# Patient Record
Sex: MALE | Race: BLACK OR AFRICAN AMERICAN | NOT HISPANIC OR LATINO | Employment: OTHER | URBAN - METROPOLITAN AREA
[De-identification: names, ages, dates, MRNs, and addresses within clinical notes are randomized per-mention and may not be internally consistent; named-entity substitution may affect disease eponyms.]

---

## 2024-04-02 ENCOUNTER — OFFICE VISIT (OUTPATIENT)
Dept: SURGERY | Facility: CLINIC | Age: 39
End: 2024-04-02
Payer: MEDICARE

## 2024-04-02 VITALS
BODY MASS INDEX: 40.02 KG/M2 | WEIGHT: 255 LBS | SYSTOLIC BLOOD PRESSURE: 126 MMHG | HEIGHT: 67 IN | DIASTOLIC BLOOD PRESSURE: 78 MMHG

## 2024-04-02 DIAGNOSIS — N18.5 CKD (CHRONIC KIDNEY DISEASE) STAGE 5, GFR LESS THAN 15 ML/MIN: Primary | ICD-10-CM

## 2024-04-02 DIAGNOSIS — E66.01 CLASS 2 SEVERE OBESITY DUE TO EXCESS CALORIES WITH SERIOUS COMORBIDITY AND BODY MASS INDEX (BMI) OF 39.0 TO 39.9 IN ADULT: ICD-10-CM

## 2024-04-02 PROCEDURE — 1159F MED LIST DOCD IN RCRD: CPT | Performed by: SURGERY

## 2024-04-02 PROCEDURE — 1160F RVW MEDS BY RX/DR IN RCRD: CPT | Performed by: SURGERY

## 2024-04-02 PROCEDURE — 99203 OFFICE O/P NEW LOW 30 MIN: CPT | Performed by: SURGERY

## 2024-04-02 RX ORDER — POLYETHYLENE GLYCOL 3350 17 G/17G
17 POWDER, FOR SOLUTION ORAL
COMMUNITY
Start: 2024-03-11

## 2024-04-02 RX ORDER — TICAGRELOR 90 MG/1
1 TABLET ORAL EVERY 12 HOURS SCHEDULED
COMMUNITY
Start: 2024-03-10

## 2024-04-02 RX ORDER — ASPIRIN 81 MG/1
81 TABLET ORAL DAILY
COMMUNITY

## 2024-04-02 RX ORDER — CLONIDINE HYDROCHLORIDE 0.2 MG/1
0.2 TABLET ORAL 3 TIMES DAILY
COMMUNITY
Start: 2023-10-19 | End: 2024-10-18

## 2024-04-02 RX ORDER — FLURBIPROFEN SODIUM 0.3 MG/ML
SOLUTION/ DROPS OPHTHALMIC
COMMUNITY
Start: 2024-01-18

## 2024-04-02 RX ORDER — ATORVASTATIN CALCIUM 40 MG/1
40 TABLET, FILM COATED ORAL NIGHTLY
COMMUNITY
Start: 2024-02-01

## 2024-04-02 RX ORDER — BUMETANIDE 1 MG/1
2 TABLET ORAL
COMMUNITY
Start: 2024-03-22 | End: 2025-03-22

## 2024-04-02 RX ORDER — AMLODIPINE BESYLATE 2.5 MG/1
2.5 TABLET ORAL DAILY
COMMUNITY
Start: 2024-02-19

## 2024-04-02 RX ORDER — EMPAGLIFLOZIN 10 MG/1
10 TABLET, FILM COATED ORAL DAILY
COMMUNITY
Start: 2024-02-27

## 2024-04-02 RX ORDER — DOXAZOSIN 2 MG/1
2 TABLET ORAL DAILY
COMMUNITY
Start: 2023-10-13

## 2024-04-02 RX ORDER — CARVEDILOL 25 MG/1
2 TABLET ORAL 2 TIMES DAILY
COMMUNITY
Start: 2024-01-31

## 2024-04-02 RX ORDER — HYDRALAZINE HYDROCHLORIDE 100 MG/1
TABLET, FILM COATED ORAL
COMMUNITY
Start: 2024-03-29

## 2024-04-02 RX ORDER — PANTOPRAZOLE SODIUM 40 MG/1
40 TABLET, DELAYED RELEASE ORAL DAILY
COMMUNITY
Start: 2024-02-10

## 2024-04-02 RX ORDER — INSULIN ASPART 100 [IU]/ML
INJECTION, SOLUTION INTRAVENOUS; SUBCUTANEOUS
COMMUNITY
Start: 2024-03-02

## 2024-04-02 RX ORDER — SILDENAFIL 25 MG/1
1 TABLET, FILM COATED ORAL DAILY PRN
COMMUNITY
Start: 2024-01-25

## 2024-04-02 RX ORDER — SODIUM BICARBONATE 650 MG/1
650 TABLET ORAL
COMMUNITY
Start: 2024-03-22 | End: 2025-03-22

## 2024-04-02 NOTE — PROGRESS NOTES
Chief Complaint   Patient presents with    PD Cath Consult       Subjective      Wilner Menjivar is a 39 y.o. male who is referred by PAYTON Escobedo to be evaluated for peritoneal dialysis catheter placement. Patient has CKD secondary to diabetic nephropathy and hypertensive nephrosclerosis and  is not on dialysis. Patient does not have a AV access.  Patient has not had a recent intraabdominal infection. The patient reports having regular bowel movements.  Patient did not have a Colonoscopy.   Patient has visual impairment.    Home Evaluation: No    Past Medical History:   Diagnosis Date    CKD stage 4 due to type 2 diabetes mellitus     GERD (gastroesophageal reflux disease)     Heart failure     Hypertension     Type 2 diabetes mellitus     Vision impairment        Past Surgical History:   Procedure Laterality Date    CORONARY STENT PLACEMENT      EYE SURGERY      WISDOM TOOTH EXTRACTION           Current Outpatient Medications:     amLODIPine (NORVASC) 2.5 MG tablet, Take 1 tablet by mouth Daily., Disp: , Rfl:     aspirin 81 MG EC tablet, Take 1 tablet by mouth Daily., Disp: , Rfl:     atorvastatin (LIPITOR) 40 MG tablet, Take 1 tablet by mouth Every Night., Disp: , Rfl:     B-D UF III MINI PEN NEEDLES 31G X 5 MM misc, USE 1 TO CHECK BLOOD SUGAR FIVE TIMES DAILY, Disp: , Rfl:     Brilinta 90 MG tablet tablet, Take 1 tablet by mouth Every 12 (Twelve) Hours., Disp: , Rfl:     bumetanide (BUMEX) 1 MG tablet, Take 2 tablets by mouth., Disp: , Rfl:     carvedilol (COREG) 25 MG tablet, Take 2 tablets by mouth 2 (Two) Times a Day., Disp: , Rfl:     cloNIDine (CATAPRES) 0.2 MG tablet, Take 1 tablet by mouth 3 (Three) Times a Day., Disp: , Rfl:     doxazosin (CARDURA) 2 MG tablet, Take 1 tablet by mouth Daily., Disp: , Rfl:     hydrALAZINE (APRESOLINE) 100 MG tablet, , Disp: , Rfl:     Insulin Degludec (TRESIBA FLEXTOUCH) 200 UNIT/ML solution pen-injector pen injection, Inject 40 Units under the skin into the  appropriate area as directed Daily., Disp: , Rfl:     Jardiance 10 MG tablet tablet, Take 1 tablet by mouth Daily., Disp: , Rfl:     NovoLOG FlexPen 100 UNIT/ML solution pen-injector sc pen, INJECT 5 UNITS SUBCUTANEOUSLY THREE TIMES DAILY WITH MEALS PLUS UP TO 30 UNITS SLIDING SCALE. MAX OF 45 UNITS DAILY, Disp: , Rfl:     pantoprazole (PROTONIX) 40 MG EC tablet, Take 1 tablet by mouth Daily., Disp: , Rfl:     polyethylene glycol (MIRALAX) 17 g packet, Take 17 g by mouth., Disp: , Rfl:     sildenafil (VIAGRA) 25 MG tablet, Take 1 tablet by mouth Daily As Needed., Disp: , Rfl:     sodium bicarbonate 650 MG tablet, Take 1 tablet by mouth., Disp: , Rfl:     Allergies   Allergen Reactions    Azithromycin Nausea And Vomiting     Pt given IV azithro, reported nausea/vomiting and hot flashes within 3-5 minutes of admin. Pt also c/o new abd pain with admin.     Pt given IV azithro, reported nausea/vomiting and hot flashes within 3-5 minutes of admin. Pt also c/o new abd pain with admin.    Penicillins Unknown - Low Severity     reaction as a child. Does not recall reaction.     reaction as a child. Does not recall reaction.  Beta lactam allergy details  Antibiotic reaction: unknown  Age at reaction: infant  Dose to reaction time: unknown  Reason for antibiotic: unknown  Epinephrine required for reaction?: unknown  Tolerated antibiotics: unknown          History reviewed. No pertinent family history.    Social History     Socioeconomic History    Marital status:    Tobacco Use    Smoking status: Never    Smokeless tobacco: Never   Vaping Use    Vaping status: Never Used   Substance and Sexual Activity    Alcohol use: Yes     Comment: occ    Drug use: Yes     Types: Marijuana    Sexual activity: Defer     REVIEW OF SYSTEMS    Review of Systems   Constitutional:  Negative for activity change and appetite change.   HENT:  Negative for congestion and hearing loss.    Eyes:  Positive for visual disturbance. Negative for  "photophobia.   Respiratory:  Negative for apnea and chest tightness.    Cardiovascular:  Negative for chest pain and leg swelling.   Gastrointestinal:  Negative for abdominal distention, abdominal pain, constipation and diarrhea.   Endocrine: Negative for cold intolerance and heat intolerance.   Genitourinary:  Negative for difficulty urinating, dysuria and hematuria.   Musculoskeletal:  Negative for arthralgias and back pain.   Skin:  Negative for color change and pallor.   Allergic/Immunologic: Negative for environmental allergies and food allergies.   Neurological:  Negative for dizziness, seizures and speech difficulty.   Hematological:  Negative for adenopathy. Does not bruise/bleed easily.   Psychiatric/Behavioral:  Negative for agitation. The patient is not nervous/anxious.        Physical Examination  /78 (BP Location: Left arm, Patient Position: Sitting)   Ht 170.2 cm (67\")   Wt 116 kg (255 lb)   BMI 39.94 kg/m²   Body mass index is 39.94 kg/m².  Physical Exam  Constitutional:       Appearance: He is obese.   HENT:      Head: Normocephalic and atraumatic.      Nose: Nose normal.      Mouth/Throat:      Pharynx: Oropharynx is clear.   Eyes:      General: No scleral icterus.     Conjunctiva/sclera: Conjunctivae normal.   Cardiovascular:      Pulses: Normal pulses.   Pulmonary:      Effort: Pulmonary effort is normal.      Breath sounds: Normal breath sounds.   Abdominal:      General: Bowel sounds are normal. There is no distension.      Palpations: Abdomen is soft. There is no mass.      Tenderness: There is no abdominal tenderness.      Hernia: No hernia is present.   Genitourinary:     Penis: Normal.       Testes: Normal.   Musculoskeletal:         General: Normal range of motion.      Cervical back: Normal range of motion and neck supple.   Skin:     General: Skin is warm and dry.      Capillary Refill: Capillary refill takes less than 2 seconds.   Neurological:      General: No focal deficit " present.      Mental Status: He is alert. Mental status is at baseline.   Psychiatric:         Mood and Affect: Mood normal.         Behavior: Behavior normal.       Class 2 Severe Obesity (BMI >=35 and <=39.9). Obesity-related health conditions include the following: hypertension and diabetes mellitus. Obesity is improving with treatment. BMI is is above average; BMI management plan is completed. We discussed low calorie, low carb based diet program, portion control, and increasing exercise.      Labs reviewed  3/22/2024:   Creatinine 5.3  BUN 37  Hemoglobin 8.5, platelets 167  All other results reviewed    Assessment:   Wilner Menjivar is a 39 y.o. male with CKD due to  diabetic nephropathy and hypertensive nephrosclerosis that is not on dialysis.  He is interested in having hemodialysis at home rather than peritoneal dialysis.  He is on full anticoagulation with Brilinta for history of cardiac stents.      The procedure was explained in detail to the patient including risks and benefits.  The benefits including the possibility of having dialysis at home without the side effects of the hemodialysis.  The risks including but not limited to catheter dislodgment, obstruction, malfunction, bleeding, infection and possible injury to surrounding organs during peritoneal access. The patient understands that the catheter will not be used for dialysis for a period of approximately 2 weeks after its placement and that during this time they should not shower until the exit site is completely healed.        Plan:     -Patient to follow-up for peritoneal dialysis catheter placement if he decide to go ahead with it instead of home hemodialysis.   -Follow-up in my office.    Ankit Pacheco MD  General, Minimally Invasive and Endoscopic Surgery  [facility]    [address] [place] [address], [address]  [place], [place], [facility]  P: [phone]  F: [phone]

## 2025-02-11 ENCOUNTER — APPOINTMENT (OUTPATIENT)
Dept: GENERAL RADIOLOGY | Facility: HOSPITAL | Age: 40
DRG: 628 | End: 2025-02-11
Payer: MEDICARE

## 2025-02-11 ENCOUNTER — HOSPITAL ENCOUNTER (INPATIENT)
Facility: HOSPITAL | Age: 40
LOS: 20 days | Discharge: HOME | DRG: 628 | End: 2025-03-03
Attending: EMERGENCY MEDICINE | Admitting: HOSPITALIST
Payer: MEDICARE

## 2025-02-11 DIAGNOSIS — R73.9 HYPERGLYCEMIA: ICD-10-CM

## 2025-02-11 DIAGNOSIS — L97.519 DIABETIC ULCER OF RIGHT GREAT TOE: Primary | ICD-10-CM

## 2025-02-11 DIAGNOSIS — M86.9 OSTEOMYELITIS OF GREAT TOE OF RIGHT FOOT: ICD-10-CM

## 2025-02-11 DIAGNOSIS — L97.514 DIABETIC ULCER OF TOE OF RIGHT FOOT ASSOCIATED WITH TYPE 2 DIABETES MELLITUS, WITH NECROSIS OF BONE: ICD-10-CM

## 2025-02-11 DIAGNOSIS — E11.621 DIABETIC ULCER OF TOE OF RIGHT FOOT ASSOCIATED WITH TYPE 2 DIABETES MELLITUS, WITH NECROSIS OF BONE: ICD-10-CM

## 2025-02-11 DIAGNOSIS — E11.621 DIABETIC ULCER OF RIGHT GREAT TOE: Primary | ICD-10-CM

## 2025-02-11 PROBLEM — L97.509 DIABETIC TOE ULCER: Status: ACTIVE | Noted: 2025-02-11

## 2025-02-11 LAB
ALBUMIN SERPL-MCNC: 3.4 G/DL (ref 3.5–5.2)
ALBUMIN/GLOB SERPL: 1 G/DL
ALP SERPL-CCNC: 154 U/L (ref 39–117)
ALT SERPL W P-5'-P-CCNC: 14 U/L (ref 1–41)
ANION GAP SERPL CALCULATED.3IONS-SCNC: 13.5 MMOL/L (ref 5–15)
AST SERPL-CCNC: 19 U/L (ref 1–40)
BASOPHILS # BLD AUTO: 0.03 10*3/MM3 (ref 0–0.2)
BASOPHILS NFR BLD AUTO: 0.3 % (ref 0–1.5)
BILIRUB SERPL-MCNC: 0.9 MG/DL (ref 0–1.2)
BUN SERPL-MCNC: 42 MG/DL (ref 6–20)
BUN/CREAT SERPL: 7.2 (ref 7–25)
CALCIUM SPEC-SCNC: 9 MG/DL (ref 8.6–10.5)
CHLORIDE SERPL-SCNC: 88 MMOL/L (ref 98–107)
CO2 SERPL-SCNC: 23.5 MMOL/L (ref 22–29)
CREAT SERPL-MCNC: 5.85 MG/DL (ref 0.76–1.27)
D-LACTATE SERPL-SCNC: 1.4 MMOL/L (ref 0.5–2)
DEPRECATED RDW RBC AUTO: 42 FL (ref 37–54)
EGFRCR SERPLBLD CKD-EPI 2021: 11.7 ML/MIN/1.73
EOSINOPHIL # BLD AUTO: 0.03 10*3/MM3 (ref 0–0.4)
EOSINOPHIL NFR BLD AUTO: 0.3 % (ref 0.3–6.2)
ERYTHROCYTE [DISTWIDTH] IN BLOOD BY AUTOMATED COUNT: 12.8 % (ref 12.3–15.4)
GLOBULIN UR ELPH-MCNC: 3.5 GM/DL
GLUCOSE SERPL-MCNC: 442 MG/DL (ref 65–99)
HCT VFR BLD AUTO: 24.1 % (ref 37.5–51)
HGB BLD-MCNC: 7.9 G/DL (ref 13–17.7)
HOLD SPECIMEN: NORMAL
HOLD SPECIMEN: NORMAL
IMM GRANULOCYTES # BLD AUTO: 0.06 10*3/MM3 (ref 0–0.05)
IMM GRANULOCYTES NFR BLD AUTO: 0.5 % (ref 0–0.5)
LYMPHOCYTES # BLD AUTO: 0.57 10*3/MM3 (ref 0.7–3.1)
LYMPHOCYTES NFR BLD AUTO: 5.2 % (ref 19.6–45.3)
MCH RBC QN AUTO: 30.2 PG (ref 26.6–33)
MCHC RBC AUTO-ENTMCNC: 32.8 G/DL (ref 31.5–35.7)
MCV RBC AUTO: 92 FL (ref 79–97)
MONOCYTES # BLD AUTO: 1.27 10*3/MM3 (ref 0.1–0.9)
MONOCYTES NFR BLD AUTO: 11.5 % (ref 5–12)
NEUTROPHILS NFR BLD AUTO: 82.2 % (ref 42.7–76)
NEUTROPHILS NFR BLD AUTO: 9.09 10*3/MM3 (ref 1.7–7)
NRBC BLD AUTO-RTO: 0 /100 WBC (ref 0–0.2)
PLATELET # BLD AUTO: 138 10*3/MM3 (ref 140–450)
PMV BLD AUTO: 8.3 FL (ref 6–12)
POTASSIUM SERPL-SCNC: 4.5 MMOL/L (ref 3.5–5.2)
PROT SERPL-MCNC: 6.9 G/DL (ref 6–8.5)
RBC # BLD AUTO: 2.62 10*6/MM3 (ref 4.14–5.8)
SODIUM SERPL-SCNC: 125 MMOL/L (ref 136–145)
WBC NRBC COR # BLD AUTO: 11.05 10*3/MM3 (ref 3.4–10.8)
WHOLE BLOOD HOLD COAG: NORMAL
WHOLE BLOOD HOLD SPECIMEN: NORMAL

## 2025-02-11 PROCEDURE — 36415 COLL VENOUS BLD VENIPUNCTURE: CPT

## 2025-02-11 PROCEDURE — 25010000002 VANCOMYCIN 10 G RECONSTITUTED SOLUTION: Performed by: EMERGENCY MEDICINE

## 2025-02-11 PROCEDURE — 25810000003 SODIUM CHLORIDE 0.9 % SOLUTION: Performed by: EMERGENCY MEDICINE

## 2025-02-11 PROCEDURE — 83605 ASSAY OF LACTIC ACID: CPT | Performed by: EMERGENCY MEDICINE

## 2025-02-11 PROCEDURE — 25010000002 CEFTRIAXONE PER 250 MG: Performed by: EMERGENCY MEDICINE

## 2025-02-11 PROCEDURE — 25010000002 MORPHINE PER 10 MG: Performed by: EMERGENCY MEDICINE

## 2025-02-11 PROCEDURE — 87340 HEPATITIS B SURFACE AG IA: CPT | Performed by: INTERNAL MEDICINE

## 2025-02-11 PROCEDURE — 25810000003 LACTATED RINGERS SOLUTION: Performed by: EMERGENCY MEDICINE

## 2025-02-11 PROCEDURE — 87040 BLOOD CULTURE FOR BACTERIA: CPT | Performed by: EMERGENCY MEDICINE

## 2025-02-11 PROCEDURE — 73630 X-RAY EXAM OF FOOT: CPT

## 2025-02-11 PROCEDURE — 25010000002 ONDANSETRON PER 1 MG: Performed by: EMERGENCY MEDICINE

## 2025-02-11 PROCEDURE — 85025 COMPLETE CBC W/AUTO DIFF WBC: CPT

## 2025-02-11 PROCEDURE — 80053 COMPREHEN METABOLIC PANEL: CPT | Performed by: EMERGENCY MEDICINE

## 2025-02-11 PROCEDURE — 99285 EMERGENCY DEPT VISIT HI MDM: CPT

## 2025-02-11 PROCEDURE — 63710000001 INSULIN REGULAR HUMAN PER 5 UNITS: Performed by: EMERGENCY MEDICINE

## 2025-02-11 RX ORDER — CARVEDILOL 12.5 MG/1
12.5 TABLET ORAL 2 TIMES DAILY WITH MEALS
Status: DISCONTINUED | OUTPATIENT
Start: 2025-02-11 | End: 2025-02-12

## 2025-02-11 RX ORDER — CLONIDINE HYDROCHLORIDE 0.1 MG/1
0.1 TABLET ORAL 2 TIMES DAILY
COMMUNITY
End: 2025-03-03 | Stop reason: HOSPADM

## 2025-02-11 RX ORDER — ONDANSETRON 2 MG/ML
4 INJECTION INTRAMUSCULAR; INTRAVENOUS EVERY 6 HOURS PRN
Status: DISCONTINUED | OUTPATIENT
Start: 2025-02-11 | End: 2025-03-03

## 2025-02-11 RX ORDER — HYDROCODONE BITARTRATE AND ACETAMINOPHEN 7.5; 325 MG/1; MG/1
1 TABLET ORAL ONCE
Status: COMPLETED | OUTPATIENT
Start: 2025-02-11 | End: 2025-02-11

## 2025-02-11 RX ORDER — HYDROCODONE BITARTRATE AND ACETAMINOPHEN 7.5; 325 MG/1; MG/1
1 TABLET ORAL EVERY 4 HOURS PRN
Status: DISCONTINUED | OUTPATIENT
Start: 2025-02-11 | End: 2025-02-12

## 2025-02-11 RX ORDER — MORPHINE SULFATE 2 MG/ML
4 INJECTION, SOLUTION INTRAMUSCULAR; INTRAVENOUS ONCE
Status: COMPLETED | OUTPATIENT
Start: 2025-02-11 | End: 2025-02-11

## 2025-02-11 RX ORDER — LOSARTAN POTASSIUM 100 MG/1
100 TABLET ORAL DAILY
COMMUNITY
End: 2025-03-03 | Stop reason: HOSPADM

## 2025-02-11 RX ORDER — AMLODIPINE BESYLATE 10 MG/1
10 TABLET ORAL DAILY
Status: DISCONTINUED | OUTPATIENT
Start: 2025-02-12 | End: 2025-02-14

## 2025-02-11 RX ORDER — ONDANSETRON 2 MG/ML
4 INJECTION INTRAMUSCULAR; INTRAVENOUS ONCE
Status: COMPLETED | OUTPATIENT
Start: 2025-02-11 | End: 2025-02-11

## 2025-02-11 RX ORDER — PANTOPRAZOLE SODIUM 40 MG/1
40 TABLET, DELAYED RELEASE ORAL
Status: DISCONTINUED | OUTPATIENT
Start: 2025-02-12 | End: 2025-02-19

## 2025-02-11 RX ORDER — ATORVASTATIN CALCIUM 20 MG/1
40 TABLET, FILM COATED ORAL NIGHTLY
Status: DISCONTINUED | OUTPATIENT
Start: 2025-02-11 | End: 2025-02-18

## 2025-02-11 RX ORDER — ASPIRIN 81 MG/1
81 TABLET, CHEWABLE ORAL DAILY
Status: DISCONTINUED | OUTPATIENT
Start: 2025-02-12 | End: 2025-03-03 | Stop reason: HOSPADM

## 2025-02-11 RX ORDER — MORPHINE SULFATE 2 MG/ML
2 INJECTION, SOLUTION INTRAMUSCULAR; INTRAVENOUS EVERY 4 HOURS PRN
Status: DISCONTINUED | OUTPATIENT
Start: 2025-02-11 | End: 2025-02-12

## 2025-02-11 RX ADMIN — SODIUM CHLORIDE, POTASSIUM CHLORIDE, SODIUM LACTATE AND CALCIUM CHLORIDE 500 ML: 600; 310; 30; 20 INJECTION, SOLUTION INTRAVENOUS at 17:56

## 2025-02-11 RX ADMIN — CARVEDILOL 12.5 MG: 12.5 TABLET, FILM COATED ORAL at 22:52

## 2025-02-11 RX ADMIN — HYDROCODONE BITARTRATE AND ACETAMINOPHEN 1 TABLET: 7.5; 325 TABLET ORAL at 20:07

## 2025-02-11 RX ADMIN — INSULIN HUMAN 5 UNITS: 100 INJECTION, SOLUTION PARENTERAL at 17:56

## 2025-02-11 RX ADMIN — CEFTRIAXONE 2000 MG: 2 INJECTION, POWDER, FOR SOLUTION INTRAMUSCULAR; INTRAVENOUS at 17:56

## 2025-02-11 RX ADMIN — MORPHINE SULFATE 4 MG: 2 INJECTION, SOLUTION INTRAMUSCULAR; INTRAVENOUS at 17:34

## 2025-02-11 RX ADMIN — HYDROCODONE BITARTRATE AND ACETAMINOPHEN 1 TABLET: 7.5; 325 TABLET ORAL at 22:51

## 2025-02-11 RX ADMIN — SODIUM CHLORIDE 2250 MG: 9 INJECTION, SOLUTION INTRAVENOUS at 18:33

## 2025-02-11 RX ADMIN — ONDANSETRON 4 MG: 2 INJECTION, SOLUTION INTRAMUSCULAR; INTRAVENOUS at 17:33

## 2025-02-11 RX ADMIN — ATORVASTATIN CALCIUM 40 MG: 20 TABLET, FILM COATED ORAL at 22:52

## 2025-02-11 NOTE — ED PROVIDER NOTES
EMERGENCY DEPARTMENT ENCOUNTER  Room Number:  14/14  PCP: Roosevelt Thao MD  Independent Historians: Patient and Family  Date of encounter:  2/11/2025  Patient Care Team:  Roosevelt Thao MD as PCP - General (Internal Medicine)     HPI:  Chief Complaint: had concerns including Wound Check.     A complete HPI/ROS/PMH/PSH/SH/FH are unobtainable due to: None    Chronic or social conditions impacting patient care (Social Determinants of Health): None    Context: Wilner Menjivar is a 39 y.o. male with a medical history of ESRD, heart failure, hypertension, diabetes, blindness who presents to the ED c/o acute right great toe infection.  Nearly a month ago, patient developed a callus on his right great toe.  It was removed by podiatrist.  When he was reevaluated today and the callus was removed again, patient had exposed bone.  Patient is also had intermittent bleeding from the wound but no noticed purulent drainage.  No fevers or chills.  States that the foot itself is swollen and more uncomfortable.  Patient has not been on any antibiotics.  Patient received dialysis yesterday.    PAST MEDICAL HISTORY  Active Ambulatory Problems     Diagnosis Date Noted    Morbid (severe) obesity due to excess calories (*specify comorbidity) 04/02/2024     Resolved Ambulatory Problems     Diagnosis Date Noted    No Resolved Ambulatory Problems     Past Medical History:   Diagnosis Date    CKD stage 4 due to type 2 diabetes mellitus     GERD (gastroesophageal reflux disease)     Heart failure     Hypertension     Type 2 diabetes mellitus     Vision impairment        PAST SURGICAL HISTORY  Past Surgical History:   Procedure Laterality Date    CORONARY STENT PLACEMENT      EYE SURGERY      WISDOM TOOTH EXTRACTION         FAMILY HISTORY  History reviewed. No pertinent family history.    SOCIAL HISTORY  Social History     Socioeconomic History    Marital status:    Tobacco Use    Smoking status: Never    Smokeless tobacco: Never    Vaping Use    Vaping status: Never Used   Substance and Sexual Activity    Alcohol use: Yes     Comment: occ    Drug use: Yes     Types: Marijuana    Sexual activity: Defer       ALLERGIES  Azithromycin and Penicillins    REVIEW OF SYSTEMS  Review of Systems  Included in HPI  All systems reviewed and negative except for those discussed in HPI.    PHYSICAL EXAM    I have reviewed the triage vital signs and nursing notes.    ED Triage Vitals   Temp Heart Rate Resp BP SpO2   02/11/25 1549 02/11/25 1549 02/11/25 1549 02/11/25 1551 02/11/25 1549   99 °F (37.2 °C) 95 16 137/75 100 %      Temp src Heart Rate Source Patient Position BP Location FiO2 (%)   02/11/25 1549 02/11/25 1549 02/11/25 1551 -- --   Tympanic Monitor Sitting         Physical Exam  Constitutional:       General: He is not in acute distress.     Appearance: Normal appearance. He is not ill-appearing, toxic-appearing or diaphoretic.   HENT:      Head: Normocephalic and atraumatic.      Nose: Nose normal.      Mouth/Throat:      Mouth: Mucous membranes are moist.      Pharynx: Oropharynx is clear.   Eyes:      Conjunctiva/sclera: Conjunctivae normal.      Pupils: Pupils are equal, round, and reactive to light.   Cardiovascular:      Rate and Rhythm: Normal rate and regular rhythm.      Pulses: Normal pulses.   Pulmonary:      Effort: Pulmonary effort is normal.   Abdominal:      General: Abdomen is flat.      Palpations: Abdomen is soft.   Musculoskeletal:      Comments: Right great toe is black in color with a deep ulceration and dried blood, no active purulent drainage, no palpable crepitus or fluctuance   Skin:     General: Skin is warm and dry.   Neurological:      General: No focal deficit present.      Mental Status: He is alert and oriented to person, place, and time. Mental status is at baseline.   Psychiatric:         Mood and Affect: Mood normal.         Behavior: Behavior normal.         Thought Content: Thought content normal.          Judgment: Judgment normal.         LAB RESULTS  Recent Results (from the past 24 hours)   Comprehensive Metabolic Panel    Collection Time: 02/11/25  3:56 PM    Specimen: Arm, Right; Blood   Result Value Ref Range    Glucose 442 (C) 65 - 99 mg/dL    BUN 42 (H) 6 - 20 mg/dL    Creatinine 5.85 (H) 0.76 - 1.27 mg/dL    Sodium 125 (L) 136 - 145 mmol/L    Potassium 4.5 3.5 - 5.2 mmol/L    Chloride 88 (L) 98 - 107 mmol/L    CO2 23.5 22.0 - 29.0 mmol/L    Calcium 9.0 8.6 - 10.5 mg/dL    Total Protein 6.9 6.0 - 8.5 g/dL    Albumin 3.4 (L) 3.5 - 5.2 g/dL    ALT (SGPT) 14 1 - 41 U/L    AST (SGOT) 19 1 - 40 U/L    Alkaline Phosphatase 154 (H) 39 - 117 U/L    Total Bilirubin 0.9 0.0 - 1.2 mg/dL    Globulin 3.5 gm/dL    A/G Ratio 1.0 g/dL    BUN/Creatinine Ratio 7.2 7.0 - 25.0    Anion Gap 13.5 5.0 - 15.0 mmol/L    eGFR 11.7 (L) >60.0 mL/min/1.73   Green Top (Gel)    Collection Time: 02/11/25  3:56 PM   Result Value Ref Range    Extra Tube Hold for add-ons.    Lavender Top    Collection Time: 02/11/25  3:56 PM   Result Value Ref Range    Extra Tube hold for add-on    Gold Top - SST    Collection Time: 02/11/25  3:56 PM   Result Value Ref Range    Extra Tube Hold for add-ons.    Light Blue Top    Collection Time: 02/11/25  3:56 PM   Result Value Ref Range    Extra Tube Hold for add-ons.    CBC Auto Differential    Collection Time: 02/11/25  3:56 PM    Specimen: Arm, Right; Blood   Result Value Ref Range    WBC 11.05 (H) 3.40 - 10.80 10*3/mm3    RBC 2.62 (L) 4.14 - 5.80 10*6/mm3    Hemoglobin 7.9 (L) 13.0 - 17.7 g/dL    Hematocrit 24.1 (L) 37.5 - 51.0 %    MCV 92.0 79.0 - 97.0 fL    MCH 30.2 26.6 - 33.0 pg    MCHC 32.8 31.5 - 35.7 g/dL    RDW 12.8 12.3 - 15.4 %    RDW-SD 42.0 37.0 - 54.0 fl    MPV 8.3 6.0 - 12.0 fL    Platelets 138 (L) 140 - 450 10*3/mm3    Neutrophil % 82.2 (H) 42.7 - 76.0 %    Lymphocyte % 5.2 (L) 19.6 - 45.3 %    Monocyte % 11.5 5.0 - 12.0 %    Eosinophil % 0.3 0.3 - 6.2 %    Basophil % 0.3 0.0 - 1.5 %     Immature Grans % 0.5 0.0 - 0.5 %    Neutrophils, Absolute 9.09 (H) 1.70 - 7.00 10*3/mm3    Lymphocytes, Absolute 0.57 (L) 0.70 - 3.10 10*3/mm3    Monocytes, Absolute 1.27 (H) 0.10 - 0.90 10*3/mm3    Eosinophils, Absolute 0.03 0.00 - 0.40 10*3/mm3    Basophils, Absolute 0.03 0.00 - 0.20 10*3/mm3    Immature Grans, Absolute 0.06 (H) 0.00 - 0.05 10*3/mm3    nRBC 0.0 0.0 - 0.2 /100 WBC   Lactic Acid, Plasma    Collection Time: 02/11/25  5:42 PM    Specimen: Arm, Right; Blood   Result Value Ref Range    Lactate 1.4 0.5 - 2.0 mmol/L       RADIOLOGY  XR Foot 3+ View Right    Result Date: 2/11/2025  XR FOOT 3+ VW RIGHT-   HISTORY:   Right great toe infection, concern for osteomyelitis  TECHNIQUE: 3 views of the right foot.       There is subcutaneous emphysema and soft tissue loss over the great toe. No radiopaque foreign body. No definite bone erosion.    This report was finalized on 2/11/2025 5:20 PM by Dr. Jason Dan M.D on Workstation: [id number]       MEDICATIONS GIVEN IN ER  Medications   vancomycin 2250 mg/500 mL 0.9% NS IVPB ([facility]) (2,250 mg Intravenous New Bag 2/11/25 1833)   lactated ringers bolus 500 mL (500 mL Intravenous New Bag 2/11/25 1756)   cefTRIAXone (ROCEPHIN) 2,000 mg in sodium chloride 0.9 % 100 mL MBP (2,000 mg Intravenous New Bag 2/11/25 1756)   insulin regular (humuLIN R,novoLIN R) injection 5 Units (5 Units Intravenous Given 2/11/25 1756)   morphine injection 4 mg (4 mg Intravenous Given 2/11/25 1734)   ondansetron (ZOFRAN) injection 4 mg (4 mg Intravenous Given 2/11/25 1733)       ORDERS PLACED DURING THIS VISIT:  Orders Placed This Encounter   Procedures    Blood Culture - Blood,    Blood Culture - Blood,    XR Foot 3+ View Right    [place] Draw    Comprehensive Metabolic Panel    CBC Auto Differential    Lactic Acid, Plasma    LIPPS (on-call MD unless specified)    Inpatient Admission    CBC & Differential    Green Top (Gel)    Lavender Top    Gold Top - SST    Light Blue Top        OUTPATIENT MEDICATION MANAGEMENT:  Current Facility-Administered Medications Ordered in Epic   Medication Dose Route Frequency Provider Last Rate Last Admin    lactated ringers bolus 500 mL  500 mL Intravenous Once Dejon Corbin  mL/hr at 02/11/25 1756 500 mL at 02/11/25 1756    vancomycin 2250 mg/500 mL 0.9% NS IVPB ([facility])  20 mg/kg Intravenous Once Dejon Corbin MD   2,250 mg at 02/11/25 1833     Current Outpatient Medications Ordered in Epic   Medication Sig Dispense Refill    amLODIPine (NORVASC) 2.5 MG tablet Take 1 tablet by mouth Daily.      aspirin 81 MG EC tablet Take 1 tablet by mouth Daily.      atorvastatin (LIPITOR) 40 MG tablet Take 1 tablet by mouth Every Night.      B-D UF III MINI PEN NEEDLES 31G X 5 MM misc USE 1 TO CHECK BLOOD SUGAR FIVE TIMES DAILY      Brilinta 90 MG tablet tablet Take 1 tablet by mouth Every 12 (Twelve) Hours.      bumetanide (BUMEX) 1 MG tablet Take 2 tablets by mouth.      carvedilol (COREG) 25 MG tablet Take 2 tablets by mouth 2 (Two) Times a Day.      cloNIDine (CATAPRES) 0.2 MG tablet Take 1 tablet by mouth 3 (Three) Times a Day.      doxazosin (CARDURA) 2 MG tablet Take 1 tablet by mouth Daily.      hydrALAZINE (APRESOLINE) 100 MG tablet       Insulin Degludec (TRESIBA FLEXTOUCH) 200 UNIT/ML solution pen-injector pen injection Inject 40 Units under the skin into the appropriate area as directed Daily.      Jardiance 10 MG tablet tablet Take 1 tablet by mouth Daily.      NovoLOG FlexPen 100 UNIT/ML solution pen-injector sc pen INJECT 5 UNITS SUBCUTANEOUSLY THREE TIMES DAILY WITH MEALS PLUS UP TO 30 UNITS SLIDING SCALE. MAX OF 45 UNITS DAILY      pantoprazole (PROTONIX) 40 MG EC tablet Take 1 tablet by mouth Daily.      polyethylene glycol (MIRALAX) 17 g packet Take 17 g by mouth.      sildenafil (VIAGRA) 25 MG tablet Take 1 tablet by mouth Daily As Needed.      sodium bicarbonate 650 MG tablet Take 1 tablet by mouth.          PROCEDURES  Procedures    PROGRESS, DATA ANALYSIS, CONSULTS, AND MEDICAL DECISION MAKING  All labs have been independently interpreted by me.  All radiology studies have been reviewed by me. All EKG's have been independently viewed and interpreted by me.  Discussion below represents my analysis of pertinent findings related to patient's condition, differential diagnosis, treatment plan and final disposition.    Differential diagnosis includes but is not limited to diabetic toe wound, anemia, osteomyelitis.    Clinical Scores:                   ED Course as of 02/11/25 1855 Tue Feb 11, 2025   1629 Hemoglobin(!): 7.9  Down 1.8 points from 6 days ago [AB]   1630 WBC(!): 11.05 [AB]   1649 Glucose(!!): 442 [AB]   1649 Creatinine(!): 5.85 [AB]   1649 Anion Gap: 13.5 [AB]   1825 XR Foot 3+ View Right  My independent interpretation of the imaging study is tracking soft tissue gas along the great toe [AB]   1853 Phone call with Dr. Thornton.  Discussed the patient, relevant history, exam, diagnostics, ED findings/progress, and concerns. They agree to admit the patient to telemetry. Care assumed by the admitting physician at this time. [AB]   1854 MDM: Patient presents with chronic right toe.  Elevated blood sugar, given IV fluids and insulin.  Blood cultures collected.  Given vancomycin and Rocephin.  Admit for further workup and management. [AB]      ED Course User Index  [AB] Dejon Corbin MD             AS OF 18:55 EST VITALS:    BP - 137/75  HR - 90  TEMP - 99 °F (37.2 °C) (Tympanic)  O2 SATS - 100%    COMPLEXITY OF CARE  The patient requires admission.      DIAGNOSIS  Final diagnoses:   Diabetic ulcer of right great toe   Hyperglycemia         DISPOSITION  ED Disposition       ED Disposition   Decision to Admit    Condition   --    Comment   Level of Care: Telemetry [5]   Diagnosis: Diabetic toe ulcer [208213]   Admitting Physician: JASKARAN THORNTON [[id number]]   Attending Physician: JASKARAN THORNTNO [[id number]]   Certification:  I Certify That Inpatient Hospital Services Are Medically Necessary For Greater Than 2 Midnights                  Please note that portions of this document were completed with a voice recognition program.    Note Disclaimer: At [facility], we believe that sharing information builds trust and better relationships. You are receiving this note because you recently visited [facility]. It is possible you will see health information before a provider has talked with you about it. This kind of information can be easy to misunderstand. To help you fully understand what it means for your health, we urge you to discuss this note with your provider.         Dejon Corbin MD  02/11/25 [id number]

## 2025-02-11 NOTE — ED NOTES
Pt has a wound to his great toe of his right foot. Went to his podiatrist today and was told to come here for further eval.

## 2025-02-12 ENCOUNTER — APPOINTMENT (OUTPATIENT)
Dept: MRI IMAGING | Facility: HOSPITAL | Age: 40
DRG: 628 | End: 2025-02-12
Payer: MEDICARE

## 2025-02-12 LAB
ANION GAP SERPL CALCULATED.3IONS-SCNC: 12 MMOL/L (ref 5–15)
BASOPHILS # BLD AUTO: 0.03 10*3/MM3 (ref 0–0.2)
BASOPHILS NFR BLD AUTO: 0.3 % (ref 0–1.5)
BUN SERPL-MCNC: 45 MG/DL (ref 6–20)
BUN/CREAT SERPL: 7.5 (ref 7–25)
CALCIUM SPEC-SCNC: 8.9 MG/DL (ref 8.6–10.5)
CHLORIDE SERPL-SCNC: 95 MMOL/L (ref 98–107)
CO2 SERPL-SCNC: 23 MMOL/L (ref 22–29)
CREAT SERPL-MCNC: 6.03 MG/DL (ref 0.76–1.27)
DEPRECATED RDW RBC AUTO: 40 FL (ref 37–54)
EGFRCR SERPLBLD CKD-EPI 2021: 11.3 ML/MIN/1.73
EOSINOPHIL # BLD AUTO: 0.07 10*3/MM3 (ref 0–0.4)
EOSINOPHIL NFR BLD AUTO: 0.7 % (ref 0.3–6.2)
ERYTHROCYTE [DISTWIDTH] IN BLOOD BY AUTOMATED COUNT: 12.8 % (ref 12.3–15.4)
GLUCOSE BLDC GLUCOMTR-MCNC: 127 MG/DL (ref 70–130)
GLUCOSE BLDC GLUCOMTR-MCNC: 179 MG/DL (ref 70–130)
GLUCOSE BLDC GLUCOMTR-MCNC: 181 MG/DL (ref 70–130)
GLUCOSE BLDC GLUCOMTR-MCNC: 182 MG/DL (ref 70–130)
GLUCOSE BLDC GLUCOMTR-MCNC: 280 MG/DL (ref 70–130)
GLUCOSE SERPL-MCNC: 172 MG/DL (ref 65–99)
HBV SURFACE AG SERPL QL IA: NORMAL
HCT VFR BLD AUTO: 21.5 % (ref 37.5–51)
HGB BLD-MCNC: 7.4 G/DL (ref 13–17.7)
IMM GRANULOCYTES # BLD AUTO: 0.09 10*3/MM3 (ref 0–0.05)
IMM GRANULOCYTES NFR BLD AUTO: 0.9 % (ref 0–0.5)
LYMPHOCYTES # BLD AUTO: 0.57 10*3/MM3 (ref 0.7–3.1)
LYMPHOCYTES NFR BLD AUTO: 5.6 % (ref 19.6–45.3)
MCH RBC QN AUTO: 29.8 PG (ref 26.6–33)
MCHC RBC AUTO-ENTMCNC: 34.4 G/DL (ref 31.5–35.7)
MCV RBC AUTO: 86.7 FL (ref 79–97)
MONOCYTES # BLD AUTO: 1.1 10*3/MM3 (ref 0.1–0.9)
MONOCYTES NFR BLD AUTO: 10.7 % (ref 5–12)
NEUTROPHILS NFR BLD AUTO: 8.4 10*3/MM3 (ref 1.7–7)
NEUTROPHILS NFR BLD AUTO: 81.8 % (ref 42.7–76)
NRBC BLD AUTO-RTO: 0 /100 WBC (ref 0–0.2)
PLATELET # BLD AUTO: 155 10*3/MM3 (ref 140–450)
PMV BLD AUTO: 8.7 FL (ref 6–12)
POTASSIUM SERPL-SCNC: 3.9 MMOL/L (ref 3.5–5.2)
RBC # BLD AUTO: 2.48 10*6/MM3 (ref 4.14–5.8)
SODIUM SERPL-SCNC: 130 MMOL/L (ref 136–145)
VANCOMYCIN SERPL-MCNC: 24.8 MCG/ML (ref 5–40)
WBC NRBC COR # BLD AUTO: 10.26 10*3/MM3 (ref 3.4–10.8)

## 2025-02-12 PROCEDURE — 25010000002 CEFTRIAXONE PER 250 MG: Performed by: HOSPITALIST

## 2025-02-12 PROCEDURE — 5A1D70Z PERFORMANCE OF URINARY FILTRATION, INTERMITTENT, LESS THAN 6 HOURS PER DAY: ICD-10-PCS | Performed by: HOSPITALIST

## 2025-02-12 PROCEDURE — 25010000002 VANCOMYCIN 750 MG RECONSTITUTED SOLUTION 1 EACH VIAL: Performed by: HOSPITALIST

## 2025-02-12 PROCEDURE — 25810000003 SODIUM CHLORIDE 0.9 % SOLUTION 250 ML FLEX CONT: Performed by: HOSPITALIST

## 2025-02-12 PROCEDURE — 63710000001 INSULIN GLARGINE PER 5 UNITS: Performed by: HOSPITALIST

## 2025-02-12 PROCEDURE — 82948 REAGENT STRIP/BLOOD GLUCOSE: CPT

## 2025-02-12 PROCEDURE — 25010000002 MORPHINE PER 10 MG: Performed by: HOSPITALIST

## 2025-02-12 PROCEDURE — 73718 MRI LOWER EXTREMITY W/O DYE: CPT

## 2025-02-12 PROCEDURE — 63710000001 INSULIN LISPRO (HUMAN) PER 5 UNITS: Performed by: HOSPITALIST

## 2025-02-12 PROCEDURE — 80048 BASIC METABOLIC PNL TOTAL CA: CPT | Performed by: HOSPITALIST

## 2025-02-12 PROCEDURE — 25010000002 ONDANSETRON PER 1 MG: Performed by: HOSPITALIST

## 2025-02-12 PROCEDURE — 80202 ASSAY OF VANCOMYCIN: CPT | Performed by: HOSPITALIST

## 2025-02-12 PROCEDURE — 85025 COMPLETE CBC W/AUTO DIFF WBC: CPT | Performed by: HOSPITALIST

## 2025-02-12 RX ORDER — NICOTINE POLACRILEX 4 MG
15 LOZENGE BUCCAL
Status: DISCONTINUED | OUTPATIENT
Start: 2025-02-12 | End: 2025-03-03

## 2025-02-12 RX ORDER — HYDROCODONE BITARTRATE AND ACETAMINOPHEN 5; 325 MG/1; MG/1
1 TABLET ORAL EVERY 4 HOURS PRN
Status: DISCONTINUED | OUTPATIENT
Start: 2025-02-12 | End: 2025-02-13

## 2025-02-12 RX ORDER — LOSARTAN POTASSIUM 25 MG/1
25 TABLET ORAL DAILY
Status: DISCONTINUED | OUTPATIENT
Start: 2025-02-12 | End: 2025-02-14

## 2025-02-12 RX ORDER — CARVEDILOL 25 MG/1
25 TABLET ORAL 2 TIMES DAILY
Status: DISCONTINUED | OUTPATIENT
Start: 2025-02-12 | End: 2025-03-03 | Stop reason: HOSPADM

## 2025-02-12 RX ORDER — TERAZOSIN 2 MG/1
2 CAPSULE ORAL NIGHTLY
Status: DISCONTINUED | OUTPATIENT
Start: 2025-02-12 | End: 2025-02-14

## 2025-02-12 RX ORDER — LOSARTAN POTASSIUM 100 MG/1
100 TABLET ORAL DAILY
Status: DISCONTINUED | OUTPATIENT
Start: 2025-02-12 | End: 2025-02-12

## 2025-02-12 RX ORDER — POLYETHYLENE GLYCOL 3350 17 G/17G
17 POWDER, FOR SOLUTION ORAL DAILY
Status: DISCONTINUED | OUTPATIENT
Start: 2025-02-12 | End: 2025-03-03 | Stop reason: HOSPADM

## 2025-02-12 RX ORDER — BUMETANIDE 1 MG/1
2 TABLET ORAL
Status: DISCONTINUED | OUTPATIENT
Start: 2025-02-12 | End: 2025-03-03 | Stop reason: HOSPADM

## 2025-02-12 RX ORDER — IBUPROFEN 600 MG/1
1 TABLET ORAL
Status: DISCONTINUED | OUTPATIENT
Start: 2025-02-12 | End: 2025-03-03

## 2025-02-12 RX ORDER — INSULIN LISPRO 100 [IU]/ML
2-7 INJECTION, SOLUTION INTRAVENOUS; SUBCUTANEOUS
Status: DISCONTINUED | OUTPATIENT
Start: 2025-02-12 | End: 2025-03-03 | Stop reason: HOSPADM

## 2025-02-12 RX ORDER — CARVEDILOL 25 MG/1
50 TABLET ORAL 2 TIMES DAILY
Status: DISCONTINUED | OUTPATIENT
Start: 2025-02-12 | End: 2025-02-12

## 2025-02-12 RX ORDER — SODIUM BICARBONATE 650 MG/1
650 TABLET ORAL 3 TIMES DAILY
Status: DISCONTINUED | OUTPATIENT
Start: 2025-02-12 | End: 2025-02-12

## 2025-02-12 RX ORDER — ZOLPIDEM TARTRATE 5 MG/1
5 TABLET ORAL NIGHTLY PRN
Status: DISCONTINUED | OUTPATIENT
Start: 2025-02-12 | End: 2025-02-16

## 2025-02-12 RX ORDER — DEXTROSE MONOHYDRATE 25 G/50ML
25 INJECTION, SOLUTION INTRAVENOUS
Status: DISCONTINUED | OUTPATIENT
Start: 2025-02-12 | End: 2025-03-03

## 2025-02-12 RX ADMIN — ASPIRIN 81 MG CHEWABLE TABLET 81 MG: 81 TABLET CHEWABLE at 12:42

## 2025-02-12 RX ADMIN — INSULIN GLARGINE 10 UNITS: 100 INJECTION, SOLUTION SUBCUTANEOUS at 20:40

## 2025-02-12 RX ADMIN — TERAZOSIN 2 MG: 2 CAPSULE ORAL at 20:46

## 2025-02-12 RX ADMIN — VANCOMYCIN HYDROCHLORIDE 750 MG: 750 INJECTION, POWDER, LYOPHILIZED, FOR SOLUTION INTRAVENOUS at 20:46

## 2025-02-12 RX ADMIN — LOSARTAN POTASSIUM 25 MG: 25 TABLET, FILM COATED ORAL at 12:42

## 2025-02-12 RX ADMIN — ATORVASTATIN CALCIUM 40 MG: 20 TABLET, FILM COATED ORAL at 20:37

## 2025-02-12 RX ADMIN — TICAGRELOR 90 MG: 90 TABLET ORAL at 20:46

## 2025-02-12 RX ADMIN — AMLODIPINE BESYLATE 10 MG: 10 TABLET ORAL at 12:42

## 2025-02-12 RX ADMIN — TICAGRELOR 60 MG: 60 TABLET ORAL at 00:53

## 2025-02-12 RX ADMIN — MORPHINE SULFATE 2 MG: 2 INJECTION, SOLUTION INTRAMUSCULAR; INTRAVENOUS at 16:54

## 2025-02-12 RX ADMIN — CARVEDILOL 25 MG: 25 TABLET, FILM COATED ORAL at 12:42

## 2025-02-12 RX ADMIN — ONDANSETRON 4 MG: 2 INJECTION INTRAMUSCULAR; INTRAVENOUS at 08:20

## 2025-02-12 RX ADMIN — BUMETANIDE 2 MG: 2 TABLET ORAL at 12:47

## 2025-02-12 RX ADMIN — Medication 5 MG: at 01:20

## 2025-02-12 RX ADMIN — INSULIN LISPRO 4 UNITS: 100 INJECTION, SOLUTION INTRAVENOUS; SUBCUTANEOUS at 12:42

## 2025-02-12 RX ADMIN — BUMETANIDE 2 MG: 2 TABLET ORAL at 20:37

## 2025-02-12 RX ADMIN — CARVEDILOL 25 MG: 25 TABLET, FILM COATED ORAL at 20:37

## 2025-02-12 RX ADMIN — ZOLPIDEM TARTRATE 5 MG: 5 TABLET, FILM COATED ORAL at 22:12

## 2025-02-12 RX ADMIN — MORPHINE SULFATE 2 MG: 2 INJECTION, SOLUTION INTRAMUSCULAR; INTRAVENOUS at 10:12

## 2025-02-12 RX ADMIN — HYDROCODONE BITARTRATE AND ACETAMINOPHEN 1 TABLET: 5; 325 TABLET ORAL at 20:37

## 2025-02-12 RX ADMIN — CEFTRIAXONE 2000 MG: 2 INJECTION, POWDER, FOR SOLUTION INTRAMUSCULAR; INTRAVENOUS at 10:12

## 2025-02-12 RX ADMIN — PANTOPRAZOLE SODIUM 40 MG: 40 TABLET, DELAYED RELEASE ORAL at 06:15

## 2025-02-12 NOTE — NURSING NOTE
Called Attending (DR. Thornton) X3 and sent 3 secure chat requests for sleeping meds for this patient starting at 10:09 pm.   (No response)  Called [facility] for assistance and they referred me back to Dr. Thornton.  Called House Mgr for assistance, still no return call from Dr. Thornton.  Dr. Thornton returned my call at 01:12 a.m. and gave me a verbal order for 5mg melatonin PRN.

## 2025-02-12 NOTE — CONSULTS
[facility] Consult Note      Patient Name: Wilner Menjivar  : 1985  MRN: [id number]  Primary Care Physician:  Roosevelt Thao MD  Referring Physician: Remy Thornton MD  Date of admission: 2025    Subjective     Reason for Consult: End-stage renal disease on hemodialysis    HPI:   Wilner Menjivar is a 39 y.o. male with past medical history of longstanding diabetes mellitus type 2 with complications including diabetic retinopathy and nephropathy with chronic kidney disease that progressed to end-stage renal disease on  status post left upper extremity AV fistula undergoing hemodialysis on a Monday, Wednesday and Friday schedule hypertension, gastroesophageal flux disease, congestive heart failure, vitamin deficiency, hyperphosphatemia, secondary hyperparathyroidism, chronic normocytic anemia    Patient follows closely with podiatry on follow-up by provider noticed that his diabetic ulcer on the right toe require hospital admission and instructed the patient to present to the emergency department where he was admitted for further management    Nephrology consultation has been requested to arrange for hemodialysis during his admission    Patient is due to undergo hemodialysis today    Review of Systems:   14 point review of systems is otherwise negative except for mentioned above on HPI    Personal History     Past Medical History:   Diagnosis Date    CKD stage 4 due to type 2 diabetes mellitus     GERD (gastroesophageal reflux disease)     Heart failure     Hypertension     Type 2 diabetes mellitus     Vision impairment        Past Surgical History:   Procedure Laterality Date    CORONARY STENT PLACEMENT      EYE SURGERY      WISDOM TOOTH EXTRACTION         Family History: family history is not on file.    Social History:  reports that he has never smoked. He has never used smokeless tobacco. He reports current alcohol use. He reports current drug use. Drug:  Marijuana.    Home Medications:  Prior to Admission medications    Medication Sig Start Date End Date Taking? Authorizing Provider   amLODIPine (NORVASC) 2.5 MG tablet Take 1 tablet by mouth Daily. 2/19/24  Yes Lonnie Colin MD   aspirin 81 MG EC tablet Take 1 tablet by mouth Daily.   Yes Lonnie Colin MD   atorvastatin (LIPITOR) 40 MG tablet Take 1 tablet by mouth Every Night. 2/1/24  Yes Lonnie Colin MD   B-D UF III MINI PEN NEEDLES 31G X 5 MM misc USE 1 TO CHECK BLOOD SUGAR FIVE TIMES DAILY 1/18/24  Yes Lonnie Colin MD   Brilinta 90 MG tablet tablet Take 1 tablet by mouth Every 12 (Twelve) Hours. 3/10/24  Yes Lonnie Colin MD   bumetanide (BUMEX) 1 MG tablet Take 2 tablets by mouth. 3/22/24 3/22/25 Yes Lonnie Colin MD   carvedilol (COREG) 25 MG tablet Take 2 tablets by mouth 2 (Two) Times a Day. 1/31/24  Yes Lonnie Colin MD   doxazosin (CARDURA) 2 MG tablet Take 1 tablet by mouth Daily. 10/13/23  Yes Lonnie Colin MD   Insulin Degludec (TRESIBA FLEXTOUCH) 200 UNIT/ML solution pen-injector pen injection Inject 40 Units under the skin into the appropriate area as directed Daily. 2/21/24  Yes Lonnie Colin MD   pantoprazole (PROTONIX) 40 MG EC tablet Take 1 tablet by mouth Daily. 2/10/24  Yes Lonnie Colin MD   sodium bicarbonate 650 MG tablet Take 1 tablet by mouth. 3/22/24 3/22/25 Yes Lonnie Colin MD   cloNIDine (CATAPRES) 0.1 MG tablet Take 1 tablet by mouth 2 (Two) Times a Day.    Lonnie Colin MD   losartan (COZAAR) 100 MG tablet Take 1 tablet by mouth Daily.    Lonnie Colin MD   NovoLOG FlexPen 100 UNIT/ML solution pen-injector sc pen INJECT 5 UNITS SUBCUTANEOUSLY THREE TIMES DAILY WITH MEALS PLUS UP TO 30 UNITS SLIDING SCALE. MAX OF 45 UNITS DAILY 3/2/24   Lonnie Colin MD   polyethylene glycol (MIRALAX) 17 g packet Take 17 g by mouth. 3/11/24   Lonnie Colin MD        Allergies:  Allergies   Allergen Reactions    Azithromycin Nausea And Vomiting     Pt given IV azithro, reported nausea/vomiting and hot flashes within 3-5 minutes of admin. Pt also c/o new abd pain with admin.     Pt given IV azithro, reported nausea/vomiting and hot flashes within 3-5 minutes of admin. Pt also c/o new abd pain with admin.    Penicillins Unknown - Low Severity     reaction as a child. Does not recall reaction.     reaction as a child. Does not recall reaction.  Beta lactam allergy details  Antibiotic reaction: unknown  Age at reaction: infant  Dose to reaction time: unknown  Reason for antibiotic: unknown  Epinephrine required for reaction?: unknown  Tolerated antibiotics: unknown          Objective     Vitals:   Temp:  [99 °F (37.2 °C)-100.6 °F (38.1 °C)] 99.8 °F (37.7 °C)  Heart Rate:  [] 102  Resp:  [16] 16  BP: (137-174)/(75-90) 160/75    Intake/Output Summary (Last 24 hours) at 2/12/2025 0644  Last data filed at 2/12/2025 0030  Gross per 24 hour   Intake 500 ml   Output 200 ml   Net 300 ml       Physical Exam:   Constitutional: Awake, alert, no acute distress.  HEENT: Sclera anicteric, no conjunctival injection  Neck: Supple, no thyromegaly, no lymphadenopathy, trachea at midline, no JVD  Respiratory: Clear to auscultation bilaterally, nonlabored respiration  Cardiovascular: RRR, no murmurs, no rubs or gallops, no carotid bruit  Gastrointestinal: Positive bowel sounds, abdomen is soft, nontender and nondistended  : No palpable bladder  Musculoskeletal: No edema, no clubbing or cyanosis.  Dressing on right foot left upper extremity AV fistula bruit and thrill present.  Psychiatric: Appropriate affect, cooperative  Neurologic: Oriented x3, moving all extremities, normal speech and mental status  Skin: Warm and dry       Scheduled Meds:     amLODIPine, 10 mg, Oral, Daily  aspirin, 81 mg, Oral, Daily  atorvastatin, 40 mg, Oral, Nightly  carvedilol, 12.5 mg, Oral, BID With  Meals  cefTRIAXone, 2,000 mg, Intravenous, Daily  insulin lispro, 2-7 Units, Subcutaneous, 4x Daily AC & at Bedtime  pantoprazole, 40 mg, Oral, Q AM  ticagrelor, 60 mg, Oral, BID      IV Meds:   Pharmacy to dose vancomycin,         Results Reviewed:   I have personally reviewed the results from the time of this admission to 2/12/2025 06:44 EST     Results from last 7 days   Lab Units 02/11/25  1556   WBC 10*3/mm3 11.05*   HEMOGLOBIN g/dL 7.9*   HEMATOCRIT % 24.1*   PLATELETS 10*3/mm3 138*       Lab Results   Component Value Date    GLUCOSE 442 (C) 02/11/2025    CALCIUM 9.0 02/11/2025     (L) 02/11/2025    K 4.5 02/11/2025    CO2 23.5 02/11/2025    CL 88 (L) 02/11/2025    BUN 42 (H) 02/11/2025    CREATININE 5.85 (H) 02/11/2025    EGFRIFAFRI 28 (L) 02/07/2023    BCR 7.2 02/11/2025    ANIONGAP 13.5 02/11/2025      Lab Results   Component Value Date    MG 1.9 06/19/2024    ALBUMIN 3.4 (L) 02/11/2025           Assessment / Plan       Diabetic toe ulcer      ASSESSMENT:    -End Stage Renal Disease ( ESRD )  since 6/2024 followed by Dr. Wasserman on Hemodialysis on a Monday, Wednesday and Friday schedule.s/p LUE  AVF functional .Continue to arrange hemodialysis treatment during patient  admission. Continue renal diet     -Chronic normocytic anemia. On Epogen protocol.  10,000 units subcu  TIW , we will continue to follow H&H closely , order iron panel will follow H&H closely      -Hyperphosphatemia. Continue renal diet  meals w low phosphorus . We will follow phosphorus to make further adjustments to binders if needed      -Hypertension w/ CKD .  Continue   home regimen     . We will continue to follow closely. Heart healthy diet     -Coronary artery disease status postcardiac cath with stent placement to mid/distal OM2 , and proximal Cx .  ( 9/24 ) .  On medical management    -Diabetes Mellitus type 2 w/ complications ( retinopathy , peripheral vascular disease  nephropathy ) .Continue insulin regimen  and .Diabetic  diet, as per primary team    -Right diabetic foot as per primary team.  Foot x-ray showing subcutaneous emphysema and soft tissue loss over the great toe    -Hypervolemic hyponatremia will adjust dialysis prescription and will follow sodium trend      PLAN:  - will arrange for hemodialysis today  -Order Epogen protocol and will obtain iron studies  -For follow phosphorus to adjust binders if needed    Thank you for involving us in the care of Wilner ANGELLA DelarosaMenjivar.  Please feel free to call with any questions.    Houston Ramos MD  02/12/25  06:44 [facility]    [facility]  [phone]      Please note that portions of this note were completed with a voice recognition program.

## 2025-02-12 NOTE — PROGRESS NOTES
"[facility] Clinical Pharmacy Services: Vancomycin Pharmacokinetic Progress Consult Note    Wilner Menjivar is a 39 y.o. male who is on day 2 of pharmacy to dose vancomycin.    Indication: Skin and Soft Tissue  Consulting Provider: Dr. Thornton  Planned Duration of Therapy: 5 days  Loading Dose Ordered or Given: 2250 mg on 2/11 at 1833  Culture/Source:   2/11 blood cultures in process  Target: Dose by Levels  Other Antimicrobials: Ceftriaxone 2 g IV daily    Vitals/Labs  Ht: 170.2 cm (67\"); Wt: 108 kg (238 lb 1.6 oz)  Temp Readings from Last 1 Encounters:   02/12/25 99.5 °F (37.5 °C) (Oral)    Estimated Creatinine Clearance: 19.1 mL/min (A) (by C-G formula based on SCr of 6.03 mg/dL (H)).  Dialysis MWF     Results from last 7 days   Lab Units 02/12/25  0746 02/11/25  1556   CREATININE mg/dL 6.03* 5.85*   WBC 10*3/mm3 10.26 11.05*     Lab Results   Component Value Date    VANCORANDOM 24.80 02/12/2025      Assessment/Plan:    Continue to dose based on levels in conjunction with planned hemodialysis schedule   Based on random level this AM, would expect post dialysis level to be closer to 15 mcg/mL based on estimated ~40% clearance during HD.  Plan to provide supplemental dose of vancomycin 750mg IV x 1 tonight post-HD  Next vancomycin random level planned for 2/14 AM (pre-HD)    Pharmacy will follow patient's kidney function and will adjust doses and obtain levels as necessary. Thank you for involving pharmacy in this patient's care. Please contact pharmacy with any questions or concerns.                           Shelbie Lóen, PharmD, BCPS, [facility]  Clinical Pharmacy Specialist, Emergency Medicine   Phone: [phone]      "

## 2025-02-12 NOTE — NURSING NOTE
Wound/ostomy - consult received regarding wound to the great toe POA. Patient follows with podiatrist Dr. Trevon Garcia, who is a provider that comes to [facility]. Patient was seen at podiatrist office yesterday and was advised to proceed to the ER for concerns regarding the toe. Chart reviewed, Dr. Thornton has assessed patient and did not indicate need for [facility] nurse to see patient, there are greater concerns for infection/osteomyelitis and an MRI has been ordered. Patient's needs are beyond requiring topical management for a wound but site can be covered and protected with betadine moist gauze and wrap with kerlix. Any further wound management needs for the patient should be directed towards patient's podiatrist who does come to this hospital to see patient's.

## 2025-02-12 NOTE — PLAN OF CARE
Goal Outcome Evaluation:    Patient admitted for diabetic ulcer on right big toe, cleaned and dressed.  (Small amount of bleeding)  Patient anxious, awake, with wife at bedside, given sleep meds given.  LIPPS Hold.  Nephrology consult.

## 2025-02-12 NOTE — SIGNIFICANT NOTE
02/12/25 1104   OTHER   Discipline physical therapist   Therapy Assessment/Plan (PT)   Criteria for Skilled Interventions Met (PT) no;no problems identified which require skilled intervention  (Pt denies skilled PT needs, denies DME/walking aide needs (blind). Noted post op shoe bedside, pt reports he has  been wearing this to protect R foot. Pt reports he has assist from spouse at home. will defer formal eval.)

## 2025-02-12 NOTE — H&P
"History and physical    Primary care physician  Dr. NERI    Chief complaint  Right foot wound with infection    History of present illness  39-year-old male with history of end-stage renal disease on hemodialysis chronic anemia diabetes hypertension hyperlipidemia coronary artery disease and gastroesophageal of disease who is also legally blind and has right foot wound which is getting worse mainly right great toe which looks infected and has recently seen by podiatrist and removed the callus from the right great toe.  Patient has intermittent bleeding pain drainage but no fever chills.  Patient also denies any chest pain shortness of breath palpitation abdominal pain nausea vomiting diarrhea.  Patient workup in ER revealed infected right great toe wound admitted for management.    PAST MEDICAL HISTORY   End-stage renal disease      Diabetes mellitus      Hypertension      Hyperlipidemia      Coronary artery disease      Legally blind  Chronic anemia  Gastroesophageal reflux disease        PAST SURGICAL HISTORY              Procedure Laterality Date    CORONARY STENT PLACEMENT        EYE SURGERY        WISDOM TOOTH EXTRACTION             FAMILY HISTORY  History reviewed. No pertinent family history.     SOCIAL HISTORY                 Socioeconomic History    Marital status:    Tobacco Use    Smoking status: Never    Smokeless tobacco: Never   Vaping Use    Vaping status: Never Used   Substance and Sexual Activity    Alcohol use: Yes       Comment: occ    Drug use: Yes       Types: Marijuana    Sexual activity: Defer         ALLERGIES  Azithromycin and Penicillins  Home medications reviewed     REVIEW OF SYSTEMS  All systems reviewed and negative except for those discussed in HPI.     PHYSICAL EXAM   Blood pressure 150/69, pulse 94, temperature 98.5 °F (36.9 °C), temperature source Tympanic, resp. rate 18, height 170.2 cm (67\"), weight 108 kg (238 lb 1.6 oz), SpO2 96%.    Constitutional:       General: He is " not in acute distress.     Appearance: Normal appearance. He is not ill-appearing, toxic-appearing or diaphoretic.   HENT:      Head: Normocephalic and atraumatic.      Nose: Nose normal.      Mouth/Throat:      Mouth: Mucous membranes are moist.      Pharynx: Oropharynx is clear.   Eyes:      Conjunctiva/sclera: Conjunctivae normal.      Pupils: Pupils are equal, round, and reactive to light.   Cardiovascular:      Rate and Rhythm: Normal rate and regular rhythm.      Pulses: Normal pulses.   Pulmonary:      Effort: Pulmonary effort is normal.   Abdominal:      General: Abdomen is flat.      Palpations: Abdomen is soft.   Musculoskeletal:      Comments: Right great toe is black in color with a deep ulceration and dried blood, no active purulent drainage, no palpable crepitus or fluctuance   Skin:     General: Skin is warm and dry.   Neurological:      General: No focal deficit present.      Mental Status: He is alert and oriented to person, place, and time. Mental status is at baseline.   Psychiatric:         Mood and Affect: Mood normal.         Behavior: Behavior normal.         Thought Content: Thought content normal.         Judgment: Judgment normal.      LAB RESULTS  Lab Results (last 24 hours)       Procedure Component Value Units Date/Time    Hepatitis B Surface Antigen [[id number]]  (Normal) Collected: 02/11/25 1556    Specimen: Blood from Arm, Right Updated: 02/12/25 1151     Hepatitis B Surface Ag Non-Reactive    POC Glucose Once [[id number]]  (Abnormal) Collected: 02/12/25 0818    Specimen: Blood Updated: 02/12/25 0832     Glucose 179 mg/dL     Basic Metabolic Panel [[id number]]  (Abnormal) Collected: 02/12/25 0746    Specimen: Blood Updated: 02/12/25 0824     Glucose 172 mg/dL      BUN 45 mg/dL      Creatinine 6.03 mg/dL      Sodium 130 mmol/L      Potassium 3.9 mmol/L      Chloride 95 mmol/L      CO2 23.0 mmol/L      Calcium 8.9 mg/dL      BUN/Creatinine Ratio 7.5     Anion Gap 12.0 mmol/L      eGFR 11.3  mL/min/1.73     Narrative:      GFR Categories in Chronic Kidney Disease (CKD)      GFR Category          GFR (mL/min/1.73)    Interpretation  G1                     90 or greater         Normal or high (1)  G2                      60-89                Mild decrease (1)  G3a                   45-59                Mild to moderate decrease  G3b                   30-44                Moderate to severe decrease  G4                    15-29                Severe decrease  G5                    14 or less           Kidney failure          (1)In the absence of evidence of kidney disease, neither GFR category G1 or G2 fulfill the criteria for CKD.    eGFR calculation 2021 CKD-EPI creatinine equation, which does not include race as a factor    Vancomycin, Random [[id number]]  (Normal) Collected: 02/12/25 0746    Specimen: Blood Updated: 02/12/25 0822     Vancomycin Random 24.80 mcg/mL     Narrative:      Therapeutic Ranges for Vancomycin    Vancomycin Random   5.0-40.0 mcg/mL  Vancomycin Trough   5.0-20.0 mcg/mL  Vancomycin Peak     20.0-40.0 mcg/mL    CBC & Differential [[id number]]  (Abnormal) Collected: 02/12/25 0746    Specimen: Blood Updated: 02/12/25 0802    Narrative:      The following orders were created for panel order CBC & Differential.  Procedure                               Abnormality         Status                     ---------                               -----------         ------                     CBC Auto Differential[[id number]]        Abnormal            Final result                 Please view results for these tests on the individual orders.    CBC Auto Differential [[id number]]  (Abnormal) Collected: 02/12/25 0746    Specimen: Blood Updated: 02/12/25 0802     WBC 10.26 10*3/mm3      RBC 2.48 10*6/mm3      Hemoglobin 7.4 g/dL      Hematocrit 21.5 %      MCV 86.7 fL      MCH 29.8 pg      MCHC 34.4 g/dL      RDW 12.8 %      RDW-SD 40.0 fl      MPV 8.7 fL      Platelets 155 10*3/mm3      Neutrophil %  81.8 %      Lymphocyte % 5.6 %      Monocyte % 10.7 %      Eosinophil % 0.7 %      Basophil % 0.3 %      Immature Grans % 0.9 %      Neutrophils, Absolute 8.40 10*3/mm3      Lymphocytes, Absolute 0.57 10*3/mm3      Monocytes, Absolute 1.10 10*3/mm3      Eosinophils, Absolute 0.07 10*3/mm3      Basophils, Absolute 0.03 10*3/mm3      Immature Grans, Absolute 0.09 10*3/mm3      nRBC 0.0 /100 WBC     POC Glucose Once [[id number]]  (Abnormal) Collected: 02/12/25 0618    Specimen: Blood Updated: 02/12/25 0620     Glucose 182 mg/dL     Lactic Acid, Plasma [[id number]]  (Normal) Collected: 02/11/25 1742    Specimen: Blood from Arm, Right Updated: 02/11/25 1822     Lactate 1.4 mmol/L     Blood Culture - Blood, Arm, Right [[id number]] Collected: 02/11/25 1749    Specimen: Blood from Arm, Right Updated: 02/11/25 1753    Blood Culture - Blood, Arm, Right [[id number]] Collected: 02/11/25 1742    Specimen: Blood from Arm, Right Updated: 02/11/25 1752    Comprehensive Metabolic Panel [[id number]]  (Abnormal) Collected: 02/11/25 1556    Specimen: Blood from Arm, Right Updated: 02/11/25 1647     Glucose 442 mg/dL      BUN 42 mg/dL      Creatinine 5.85 mg/dL      Sodium 125 mmol/L      Potassium 4.5 mmol/L      Chloride 88 mmol/L      CO2 23.5 mmol/L      Calcium 9.0 mg/dL      Total Protein 6.9 g/dL      Albumin 3.4 g/dL      ALT (SGPT) 14 U/L      AST (SGOT) 19 U/L      Alkaline Phosphatase 154 U/L      Total Bilirubin 0.9 mg/dL      Globulin 3.5 gm/dL      A/G Ratio 1.0 g/dL      BUN/Creatinine Ratio 7.2     Anion Gap 13.5 mmol/L      eGFR 11.7 mL/min/1.73     Narrative:      GFR Categories in Chronic Kidney Disease (CKD)      GFR Category          GFR (mL/min/1.73)    Interpretation  G1                     90 or greater         Normal or high (1)  G2                      60-89                Mild decrease (1)  G3a                   45-59                Mild to moderate decrease  G3b                   30-44                Moderate to  severe decrease  G4                    15-29                Severe decrease  G5                    14 or less           Kidney failure          (1)In the absence of evidence of kidney disease, neither GFR category G1 or G2 fulfill the criteria for CKD.    eGFR calculation 2021 CKD-EPI creatinine equation, which does not include race as a factor    [place] Draw [[id number]] Collected: 02/11/25 1556    Specimen: Blood from Arm, Right Updated: 02/11/25 1615    Narrative:      The following orders were created for panel order [place] Draw.  Procedure                               Abnormality         Status                     ---------                               -----------         ------                     Green Top (Gel)[[id number]]                                  Final result               Lavender Top[[id number]]                                     Final result               Gold Top - SST[[id number]]                                   Final result               Light Blue Top[[id number]]                                   Final result                 Please view results for these tests on the individual orders.    Green Top (Gel) [[id number]] Collected: 02/11/25 1556    Specimen: Blood from Arm, Right Updated: 02/11/25 1615     Extra Tube Hold for add-ons.     Comment: Auto resulted.       Gold Top - SST [[id number]] Collected: 02/11/25 1556    Specimen: Blood from Arm, Right Updated: 02/11/25 1615     Extra Tube Hold for add-ons.     Comment: Auto resulted.       Light Blue Top [[id number]] Collected: 02/11/25 1556    Specimen: Blood from Arm, Right Updated: 02/11/25 1615     Extra Tube Hold for add-ons.     Comment: Auto resulted       CBC & Differential [[id number]]  (Abnormal) Collected: 02/11/25 1556    Specimen: Blood from Arm, Right Updated: 02/11/25 1605    Narrative:      The following orders were created for panel order CBC & Differential.  Procedure                               Abnormality         Status                      ---------                               -----------         ------                     CBC Auto Differential[[id number]]        Abnormal            Final result                 Please view results for these tests on the individual orders.    CBC Auto Differential [[id number]]  (Abnormal) Collected: 02/11/25 1556    Specimen: Blood from Arm, Right Updated: 02/11/25 1605     WBC 11.05 10*3/mm3      RBC 2.62 10*6/mm3      Hemoglobin 7.9 g/dL      Hematocrit 24.1 %      MCV 92.0 fL      MCH 30.2 pg      MCHC 32.8 g/dL      RDW 12.8 %      RDW-SD 42.0 fl      MPV 8.3 fL      Platelets 138 10*3/mm3      Neutrophil % 82.2 %      Lymphocyte % 5.2 %      Monocyte % 11.5 %      Eosinophil % 0.3 %      Basophil % 0.3 %      Immature Grans % 0.5 %      Neutrophils, Absolute 9.09 10*3/mm3      Lymphocytes, Absolute 0.57 10*3/mm3      Monocytes, Absolute 1.27 10*3/mm3      Eosinophils, Absolute 0.03 10*3/mm3      Basophils, Absolute 0.03 10*3/mm3      Immature Grans, Absolute 0.06 10*3/mm3      nRBC 0.0 /100 WBC     Lavender Top [[id number]] Collected: 02/11/25 1556    Specimen: Blood from Arm, Right Updated: 02/11/25 1600     Extra Tube hold for add-on     Comment: Auto resulted             Imaging Results (Last 24 Hours)       Procedure Component Value Units Date/Time    XR Foot 3+ View Right [[id number]] Collected: 02/11/25 1716     Updated: 02/11/25 1723    Narrative:      XR FOOT 3+ VW RIGHT-       HISTORY:   Right great toe infection, concern for osteomyelitis      TECHNIQUE: 3 views of the right foot.       Impression:         There is subcutaneous emphysema and soft tissue loss over the great toe.  No radiopaque foreign body. No definite bone erosion.           This report was finalized on 2/11/2025 5:20 PM by Dr. Jason Dan M.D on Workstation: [id number]               Current Facility-Administered Medications:     amLODIPine (NORVASC) tablet 10 mg, 10 mg, Oral, Daily, Remy Thornton MD    aspirin  chewable tablet 81 mg, 81 mg, Oral, Daily, Remy Thornton MD    atorvastatin (LIPITOR) tablet 40 mg, 40 mg, Oral, Nightly, Remy Thornton MD, 40 mg at 02/11/25 2252    bumetanide (BUMEX) tablet 2 mg, 2 mg, Oral, BID Diuretics, Remy Thornton MD    carvedilol (COREG) tablet 25 mg, 25 mg, Oral, BID, Remy Thornton MD    cefTRIAXone (ROCEPHIN) 2,000 mg in sodium chloride 0.9 % 100 mL MBP, 2,000 mg, Intravenous, Daily, Remy Thornton MD, Last Rate: 200 mL/hr at 02/12/25 1012, 2,000 mg at 02/12/25 1012    dextrose (D50W) (25 g/50 mL) IV injection 25 g, 25 g, Intravenous, Q15 Min PRN, Remy Thornton MD    dextrose (GLUTOSE) oral gel 15 g, 15 g, Oral, Q15 Min PRN, Remy Thornton MD    epoetin jamil-epbx (RETACRIT) injection 10,000 Units, 10,000 Units, Subcutaneous, Once per day on Monday Wednesday Friday, Houston Ramos MD    glucagon (GLUCAGEN) injection 1 mg, 1 mg, Intramuscular, Q15 Min PRN, Remy Thornton MD    insulin glargine (LANTUS, SEMGLEE) injection 10 Units, 10 Units, Subcutaneous, Nightly, Remy Thornton MD    insulin lispro (HUMALOG/ADMELOG) injection 2-7 Units, 2-7 Units, Subcutaneous, 4x Daily AC & at Bedtime, Remy Thornton MD    losartan (COZAAR) tablet 25 mg, 25 mg, Oral, Daily, Remy Thornton MD    melatonin tablet 5 mg, 5 mg, Oral, Nightly PRN, Remy Thornton MD, 5 mg at 02/12/25 0120    morphine injection 2 mg, 2 mg, Intravenous, Q4H PRN, Remy Thornton MD, 2 mg at 02/12/25 1012    ondansetron (ZOFRAN) injection 4 mg, 4 mg, Intravenous, Q6H PRN, Remy Thornton MD, 4 mg at 02/12/25 0820    pantoprazole (PROTONIX) EC tablet 40 mg, 40 mg, Oral, Q AM, Remy Thornton MD, 40 mg at 02/12/25 0615    Pharmacy to dose vancomycin, , Not Applicable, Continuous PRN, Remy Thornton MD    polyethylene glycol (MIRALAX) packet 17 g, 17 g, Oral, Daily, Remy Thornton MD    terazosin (HYTRIN) capsule 2 mg, 2 mg, Oral, Nightly, Remy Thornton MD    ticagrelor (BRILINTA) tablet 90 mg, 90 mg, Oral, Q12H, Remy Thornton MD    vancomycin  750 mg in sodium chloride 0.9 % 250 mL IVPB-VTB, 750 mg, Intravenous, Once, Jaskaran Thornton MD     ASSESSMENT  Right great toe wound with infection and surrounding cellulitis rule out osteomyelitis  End-stage renal disease on hemodialysis  Diabetes mellitus  Hypertension  Hyperlipidemia  Coronary artery disease  Legally blind  Chronic anemia  Gastroesophageal reflux disease    PLAN  Admit  IV antibiotics after obtaining blood cultures  Check MRI of the right foot  Infectious disease consult  Nephrology to follow patient for hemodialysis  Continue home medications  Stress ulcer DVT prophylaxis  Supportive care  Patient is full code  Discussed with nursing staff  Follow closely further recommendation current hospital course    JASKARAN THORNTON MD

## 2025-02-12 NOTE — NURSING NOTE
HD WITHOUT INCIDENT OR C/O. TOLERATED. REMOVED 4 L NET LOSS. NO MEDS ADMINISTERED. AVF NEEDLES REMOVED X 2. HEMOSTASIS ACHIEVED. STABLE POST COMPLETION OF HD.

## 2025-02-12 NOTE — PROGRESS NOTES
"[facility] Clinical Pharmacy Services: Vancomycin Pharmacokinetic Initial Consult Note    Wilner Menjivar is a 39 y.o. male who is on day 1 of pharmacy to dose vancomycin.    Indication: Skin and Soft Tissue  Consulting Provider: Dr. Thornton  Planned Duration of Therapy: 5 days  Loading Dose Ordered or Given: 2250 mg on 2/11 at 1833  Culture/Source:   2/11 blood cultures in process  Target: Dose by Levels  Other Antimicrobials: ceftriaxone 2 g iv daily    Vitals/Labs  Ht: 170.2 cm (67\"); Wt: 108 kg (238 lb 1.6 oz)  Temp Readings from Last 1 Encounters:   02/11/25 99.4 °F (37.4 °C) (Oral)    Estimated Creatinine Clearance: 19.7 mL/min (A) (by C-G formula based on SCr of 5.85 mg/dL (H)).  Dialysis MWF     Results from last 7 days   Lab Units 02/11/25  1556   CREATININE mg/dL 5.85*   WBC 10*3/mm3 11.05*     Assessment/Plan:    Vancomycin Dose:   2250 mg IV once  Vanc Random has been ordered for 2/12 at 0600.  Patient last received HD 2/10.  I am assuming next session will be tomorrow.     Pharmacy will follow patient's kidney function and will adjust doses and obtain levels as necessary. Thank you for involving pharmacy in this patient's care. Please contact pharmacy with any questions or concerns.                           Lefty Proctor III, [facility]  Clinical Pharmacist    "

## 2025-02-12 NOTE — SIGNIFICANT NOTE
02/12/25 1220   OTHER   Discipline occupational therapist   Rehab Time/Intention   Session Not Performed other (see comments)  (Per chart, pt denies skilled OT needs during inpatient stay, spouse assist at home as needed. Will sign off, re-order if approp.)   Therapy Assessment/Plan (PT)   Criteria for Skilled Interventions Met (PT) no;no problems identified which require skilled intervention

## 2025-02-13 ENCOUNTER — APPOINTMENT (OUTPATIENT)
Dept: CARDIOLOGY | Facility: HOSPITAL | Age: 40
DRG: 628 | End: 2025-02-13
Payer: MEDICARE

## 2025-02-13 LAB
ALBUMIN SERPL-MCNC: 3.2 G/DL (ref 3.5–5.2)
ALBUMIN/GLOB SERPL: 0.9 G/DL
ALP SERPL-CCNC: 131 U/L (ref 39–117)
ALT SERPL W P-5'-P-CCNC: 19 U/L (ref 1–41)
ANION GAP SERPL CALCULATED.3IONS-SCNC: 12 MMOL/L (ref 5–15)
AST SERPL-CCNC: 25 U/L (ref 1–40)
BASOPHILS # BLD AUTO: 0.05 10*3/MM3 (ref 0–0.2)
BASOPHILS NFR BLD AUTO: 0.5 % (ref 0–1.5)
BH CV LOWER ARTERIAL LEFT ABI RATIO: 0.95
BH CV LOWER ARTERIAL LEFT CALF RATIO: 0.91
BH CV LOWER ARTERIAL LEFT DORSALIS PEDIS SYS MAX: 93
BH CV LOWER ARTERIAL LEFT GREAT TOE SYS MAX: 178
BH CV LOWER ARTERIAL LEFT HIGH THIGH RATIO: 1.47
BH CV LOWER ARTERIAL LEFT HIGH THIGH SYS MAX: 210
BH CV LOWER ARTERIAL LEFT LOW THIGH RATIO: 1.18
BH CV LOWER ARTERIAL LEFT LOW THIGH SYS MAX: 158
BH CV LOWER ARTERIAL LEFT POPLITEAL SYS MAX: 130
BH CV LOWER ARTERIAL LEFT POST TIBIAL SYS MAX: 136
BH CV LOWER ARTERIAL LEFT TBI RATIO: 1.24
BH CV LOWER ARTERIAL RIGHT ABI RATIO: 0.62
BH CV LOWER ARTERIAL RIGHT CALF RATIO: 0.92
BH CV LOWER ARTERIAL RIGHT DORSALIS PEDIS SYS MAX: 74
BH CV LOWER ARTERIAL RIGHT GREAT TOE SYS MAX: 168
BH CV LOWER ARTERIAL RIGHT HIGH THIGH RATIO: 1.45
BH CV LOWER ARTERIAL RIGHT HIGH THIGH SYS MAX: 207
BH CV LOWER ARTERIAL RIGHT LOW THIGH RATIO: 1.28
BH CV LOWER ARTERIAL RIGHT LOW THIGH SYS MAX: 183
BH CV LOWER ARTERIAL RIGHT POPLITEAL SYS MAX: 132
BH CV LOWER ARTERIAL RIGHT POST TIBIAL SYS MAX: 89
BH CV LOWER ARTERIAL RIGHT TBI RATIO: 1.17
BILIRUB SERPL-MCNC: 0.6 MG/DL (ref 0–1.2)
BUN SERPL-MCNC: 26 MG/DL (ref 6–20)
BUN/CREAT SERPL: 5.4 (ref 7–25)
CALCIUM SPEC-SCNC: 9.1 MG/DL (ref 8.6–10.5)
CHLORIDE SERPL-SCNC: 94 MMOL/L (ref 98–107)
CHOLEST SERPL-MCNC: 91 MG/DL (ref 0–200)
CO2 SERPL-SCNC: 24 MMOL/L (ref 22–29)
CREAT SERPL-MCNC: 4.84 MG/DL (ref 0.76–1.27)
DEPRECATED RDW RBC AUTO: 42.4 FL (ref 37–54)
EGFRCR SERPLBLD CKD-EPI 2021: 14.7 ML/MIN/1.73
EOSINOPHIL # BLD AUTO: 0.07 10*3/MM3 (ref 0–0.4)
EOSINOPHIL NFR BLD AUTO: 0.6 % (ref 0.3–6.2)
ERYTHROCYTE [DISTWIDTH] IN BLOOD BY AUTOMATED COUNT: 13.2 % (ref 12.3–15.4)
GLOBULIN UR ELPH-MCNC: 3.6 GM/DL
GLUCOSE BLDC GLUCOMTR-MCNC: 154 MG/DL (ref 70–130)
GLUCOSE BLDC GLUCOMTR-MCNC: 226 MG/DL (ref 70–130)
GLUCOSE SERPL-MCNC: 148 MG/DL (ref 65–99)
HBA1C MFR BLD: 11.1 % (ref 4.8–5.6)
HCT VFR BLD AUTO: 21 % (ref 37.5–51)
HDLC SERPL-MCNC: 25 MG/DL (ref 40–60)
HGB BLD-MCNC: 7.2 G/DL (ref 13–17.7)
IMM GRANULOCYTES # BLD AUTO: 0.13 10*3/MM3 (ref 0–0.05)
IMM GRANULOCYTES NFR BLD AUTO: 1.2 % (ref 0–0.5)
IRON 24H UR-MRATE: 24 MCG/DL (ref 59–158)
IRON SATN MFR SERPL: 14 % (ref 20–50)
LDLC SERPL CALC-MCNC: 43 MG/DL (ref 0–100)
LDLC/HDLC SERPL: 1.6 {RATIO}
LYMPHOCYTES # BLD AUTO: 0.72 10*3/MM3 (ref 0.7–3.1)
LYMPHOCYTES NFR BLD AUTO: 6.5 % (ref 19.6–45.3)
MCH RBC QN AUTO: 30.6 PG (ref 26.6–33)
MCHC RBC AUTO-ENTMCNC: 34.3 G/DL (ref 31.5–35.7)
MCV RBC AUTO: 89.4 FL (ref 79–97)
MONOCYTES # BLD AUTO: 1.18 10*3/MM3 (ref 0.1–0.9)
MONOCYTES NFR BLD AUTO: 10.7 % (ref 5–12)
MRSA DNA SPEC QL NAA+PROBE: NORMAL
NEUTROPHILS NFR BLD AUTO: 8.88 10*3/MM3 (ref 1.7–7)
NEUTROPHILS NFR BLD AUTO: 80.5 % (ref 42.7–76)
NRBC BLD AUTO-RTO: 0 /100 WBC (ref 0–0.2)
PLATELET # BLD AUTO: 175 10*3/MM3 (ref 140–450)
PMV BLD AUTO: 8.5 FL (ref 6–12)
POTASSIUM SERPL-SCNC: 3.5 MMOL/L (ref 3.5–5.2)
PROT SERPL-MCNC: 6.8 G/DL (ref 6–8.5)
RBC # BLD AUTO: 2.35 10*6/MM3 (ref 4.14–5.8)
SODIUM SERPL-SCNC: 130 MMOL/L (ref 136–145)
TIBC SERPL-MCNC: 170 MCG/DL (ref 298–536)
TRANSFERRIN SERPL-MCNC: 114 MG/DL (ref 200–360)
TRIGL SERPL-MCNC: 130 MG/DL (ref 0–150)
TSH SERPL DL<=0.05 MIU/L-ACNC: 0.98 UIU/ML (ref 0.27–4.2)
UPPER ARTERIAL RIGHT ARM BRACHIAL SYS MAX: 143
VLDLC SERPL-MCNC: 23 MG/DL (ref 5–40)
WBC NRBC COR # BLD AUTO: 11.03 10*3/MM3 (ref 3.4–10.8)

## 2025-02-13 PROCEDURE — 63710000001 INSULIN GLARGINE PER 5 UNITS: Performed by: HOSPITALIST

## 2025-02-13 PROCEDURE — 63710000001 INSULIN LISPRO (HUMAN) PER 5 UNITS: Performed by: HOSPITALIST

## 2025-02-13 PROCEDURE — 93923 UPR/LXTR ART STDY 3+ LVLS: CPT

## 2025-02-13 PROCEDURE — 82948 REAGENT STRIP/BLOOD GLUCOSE: CPT

## 2025-02-13 PROCEDURE — 85025 COMPLETE CBC W/AUTO DIFF WBC: CPT | Performed by: HOSPITALIST

## 2025-02-13 PROCEDURE — 83540 ASSAY OF IRON: CPT | Performed by: INTERNAL MEDICINE

## 2025-02-13 PROCEDURE — 80053 COMPREHEN METABOLIC PANEL: CPT | Performed by: HOSPITALIST

## 2025-02-13 PROCEDURE — 25010000002 CEFTRIAXONE PER 250 MG: Performed by: HOSPITALIST

## 2025-02-13 PROCEDURE — 80061 LIPID PANEL: CPT | Performed by: HOSPITALIST

## 2025-02-13 PROCEDURE — 25010000002 ONDANSETRON PER 1 MG: Performed by: HOSPITALIST

## 2025-02-13 PROCEDURE — 84466 ASSAY OF TRANSFERRIN: CPT | Performed by: INTERNAL MEDICINE

## 2025-02-13 PROCEDURE — 83036 HEMOGLOBIN GLYCOSYLATED A1C: CPT | Performed by: HOSPITALIST

## 2025-02-13 PROCEDURE — 99222 1ST HOSP IP/OBS MODERATE 55: CPT | Performed by: STUDENT IN AN ORGANIZED HEALTH CARE EDUCATION/TRAINING PROGRAM

## 2025-02-13 PROCEDURE — 93923 UPR/LXTR ART STDY 3+ LVLS: CPT | Performed by: STUDENT IN AN ORGANIZED HEALTH CARE EDUCATION/TRAINING PROGRAM

## 2025-02-13 PROCEDURE — 84443 ASSAY THYROID STIM HORMONE: CPT | Performed by: HOSPITALIST

## 2025-02-13 PROCEDURE — 87641 MR-STAPH DNA AMP PROBE: CPT | Performed by: INTERNAL MEDICINE

## 2025-02-13 RX ORDER — HYDROCODONE BITARTRATE AND ACETAMINOPHEN 5; 325 MG/1; MG/1
2 TABLET ORAL EVERY 4 HOURS PRN
Status: DISCONTINUED | OUTPATIENT
Start: 2025-02-13 | End: 2025-02-18

## 2025-02-13 RX ORDER — DOCUSATE SODIUM 100 MG/1
100 CAPSULE, LIQUID FILLED ORAL 2 TIMES DAILY
Status: DISCONTINUED | OUTPATIENT
Start: 2025-02-13 | End: 2025-03-03 | Stop reason: HOSPADM

## 2025-02-13 RX ADMIN — INSULIN LISPRO 2 UNITS: 100 INJECTION, SOLUTION INTRAVENOUS; SUBCUTANEOUS at 08:49

## 2025-02-13 RX ADMIN — TICAGRELOR 90 MG: 90 TABLET ORAL at 20:49

## 2025-02-13 RX ADMIN — TERAZOSIN 2 MG: 2 CAPSULE ORAL at 20:49

## 2025-02-13 RX ADMIN — HYDROCODONE BITARTRATE AND ACETAMINOPHEN 2 TABLET: 5; 325 TABLET ORAL at 22:23

## 2025-02-13 RX ADMIN — DOCUSATE SODIUM 100 MG: 100 CAPSULE, LIQUID FILLED ORAL at 20:49

## 2025-02-13 RX ADMIN — AMLODIPINE BESYLATE 10 MG: 10 TABLET ORAL at 08:49

## 2025-02-13 RX ADMIN — DOCUSATE SODIUM 100 MG: 100 CAPSULE, LIQUID FILLED ORAL at 11:01

## 2025-02-13 RX ADMIN — HYDROCODONE BITARTRATE AND ACETAMINOPHEN 2 TABLET: 5; 325 TABLET ORAL at 14:59

## 2025-02-13 RX ADMIN — TICAGRELOR 90 MG: 90 TABLET ORAL at 09:07

## 2025-02-13 RX ADMIN — CARVEDILOL 25 MG: 25 TABLET, FILM COATED ORAL at 08:49

## 2025-02-13 RX ADMIN — LOSARTAN POTASSIUM 25 MG: 25 TABLET, FILM COATED ORAL at 08:49

## 2025-02-13 RX ADMIN — CEFTRIAXONE 2000 MG: 2 INJECTION, POWDER, FOR SOLUTION INTRAMUSCULAR; INTRAVENOUS at 08:49

## 2025-02-13 RX ADMIN — ONDANSETRON 4 MG: 2 INJECTION INTRAMUSCULAR; INTRAVENOUS at 11:01

## 2025-02-13 RX ADMIN — HYDROCODONE BITARTRATE AND ACETAMINOPHEN 2 TABLET: 5; 325 TABLET ORAL at 18:49

## 2025-02-13 RX ADMIN — BUMETANIDE 2 MG: 2 TABLET ORAL at 18:49

## 2025-02-13 RX ADMIN — ATORVASTATIN CALCIUM 40 MG: 20 TABLET, FILM COATED ORAL at 20:49

## 2025-02-13 RX ADMIN — INSULIN GLARGINE 10 UNITS: 100 INJECTION, SOLUTION SUBCUTANEOUS at 20:49

## 2025-02-13 RX ADMIN — PANTOPRAZOLE SODIUM 40 MG: 40 TABLET, DELAYED RELEASE ORAL at 05:41

## 2025-02-13 RX ADMIN — ONDANSETRON 4 MG: 2 INJECTION INTRAMUSCULAR; INTRAVENOUS at 18:49

## 2025-02-13 RX ADMIN — CARVEDILOL 25 MG: 25 TABLET, FILM COATED ORAL at 20:49

## 2025-02-13 RX ADMIN — ASPIRIN 81 MG CHEWABLE TABLET 81 MG: 81 TABLET CHEWABLE at 08:49

## 2025-02-13 RX ADMIN — BUMETANIDE 2 MG: 2 TABLET ORAL at 08:49

## 2025-02-13 RX ADMIN — HYDROCODONE BITARTRATE AND ACETAMINOPHEN 2 TABLET: 5; 325 TABLET ORAL at 11:01

## 2025-02-13 RX ADMIN — INSULIN LISPRO 3 UNITS: 100 INJECTION, SOLUTION INTRAVENOUS; SUBCUTANEOUS at 13:34

## 2025-02-13 RX ADMIN — ZOLPIDEM TARTRATE 5 MG: 5 TABLET, FILM COATED ORAL at 22:15

## 2025-02-13 NOTE — CONSULTS
CONSULT NOTE    Infectious Diseases - Abdiaziz Barnard MD  [facility]       Patient Identification:  Name: Wilner Menjivar  Age: 39 y.o.  Sex: male  :  1985  MRN: [id number]             Date of Consultation: 2025      Primary Care Physician: Roosevelt Thao MD                               Requesting Physician: Dr. Thornton  Reason for Consultation: Right foot infected wound    History of presenting illness: Patient is a 39-year-old male with past medical history remarkable for type 2 diabetes, legally blind, end-stage renal disease on hemodialysis via left arm AV fistula with scheduled dialysis on  has been dealing with progressive worsening of the right great toe wound for which he has been following with the podiatry service.  Patient recalls receiving a week worth course of clindamycin and then subsequent follow-up on 2025 with his podiatrist revealed worsening of the wound resulting in him getting hospitalized.  Patient has been started on vancomycin and ceftriaxone after blood cultures were drawn.  At the time of admission patient had a low-grade temperature of 100.6 which is improved.  Plain x-ray of the foot showed subcutaneous emphysema and soft tissue loss over the great toe with no definite bone destruction.  MRI performed on 2025 reveals osteomyelitis of the distal shaft and head of the first proximal phalanx and the distal phalanx with overlying tissue/wound ulcer and loss.  Gas within the soft tissue and marrow of the great toe associated with infection.  Tenosynovitis of the flexor hallucis tendon sheath noted.  Forefoot cellulitis noted.  No evidence of septic arthritis of the first MTP joint.    Impression: 39-year-old male with  1-right great toe diabetic/ischemic ulcer with dry gangrene with associated abscess and contiguous focus osteomyelitis of the proximal and distal phalanx with associated flexor hallucis tenosynovitis  2-probable  peripheral vascular disease with element of gangrene involving the great toe  3-type 2 diabetes  4-end-stage renal disease  5-legally blind  6-severe anemia multifactorial  7-hypertension  8-multiple antibiotic allergies/intolerances including allergy to penicillin though it could very well be not a true allergy given the description of his reaction.    Recommendations/Discussions:  At this juncture I agree with the care plan consisting of empiric vancomycin and ceftriaxone given his reported penicillin allergy and intolerance.  Check MRSA screen  Adjust and de-escalate antibiotic therapy based on response to treatment culture results and intervention.  Get vascular studies of the right lower extremity  Podiatry consultation given MRI finding and consideration for definitive resective surgery to avoid prolonged antibiotic therapy.  Management of other concomitant medical issues including anemia diabetes hypertension and incisional disease with primary team.  Thank you very much for letting us be the part of your patient care please see above impression and recommendations          Past Medical History:  Past Medical History:   Diagnosis Date    CKD stage 4 due to type 2 diabetes mellitus     GERD (gastroesophageal reflux disease)     Heart failure     Hypertension     Type 2 diabetes mellitus     Vision impairment      Past Surgical History:  Past Surgical History:   Procedure Laterality Date    CORONARY STENT PLACEMENT      EYE SURGERY      WISDOM TOOTH EXTRACTION        Home Meds:  Medications Prior to Admission   Medication Sig Dispense Refill Last Dose/Taking    amLODIPine (NORVASC) 2.5 MG tablet Take 1 tablet by mouth Daily.   2/11/2025 Morning    aspirin 81 MG EC tablet Take 1 tablet by mouth Daily.   2/11/2025 Morning    atorvastatin (LIPITOR) 40 MG tablet Take 1 tablet by mouth Every Night.   2/10/2025 Evening    B-D UF III MINI PEN NEEDLES 31G X 5 MM misc USE 1 TO CHECK BLOOD SUGAR FIVE TIMES DAILY    2/11/2025 Morning    Brilinta 90 MG tablet tablet Take 1 tablet by mouth Every 12 (Twelve) Hours.   2/11/2025 Morning    bumetanide (BUMEX) 1 MG tablet Take 2 tablets by mouth.   2/11/2025 Morning    carvedilol (COREG) 25 MG tablet Take 2 tablets by mouth 2 (Two) Times a Day.   2/11/2025 Morning    doxazosin (CARDURA) 2 MG tablet Take 1 tablet by mouth Daily.   2/11/2025 Morning    Insulin Degludec (TRESIBA FLEXTOUCH) 200 UNIT/ML solution pen-injector pen injection Inject 40 Units under the skin into the appropriate area as directed Daily.   2/11/2025 Morning    pantoprazole (PROTONIX) 40 MG EC tablet Take 1 tablet by mouth Daily.   2/11/2025 Morning    sodium bicarbonate 650 MG tablet Take 1 tablet by mouth.   2/11/2025 Morning    cloNIDine (CATAPRES) 0.1 MG tablet Take 1 tablet by mouth 2 (Two) Times a Day.       losartan (COZAAR) 100 MG tablet Take 1 tablet by mouth Daily.       NovoLOG FlexPen 100 UNIT/ML solution pen-injector sc pen INJECT 5 UNITS SUBCUTANEOUSLY THREE TIMES DAILY WITH MEALS PLUS UP TO 30 UNITS SLIDING SCALE. MAX OF 45 UNITS DAILY   More than a month Morning    polyethylene glycol (MIRALAX) 17 g packet Take 17 g by mouth.   More than a month     Current Meds:     Current Facility-Administered Medications:     amLODIPine (NORVASC) tablet 10 mg, 10 mg, Oral, Daily, Remy Thornton MD, 10 mg at 02/12/25 1242    aspirin chewable tablet 81 mg, 81 mg, Oral, Daily, Remy Thornton MD, 81 mg at 02/12/25 1242    atorvastatin (LIPITOR) tablet 40 mg, 40 mg, Oral, Nightly, Remy Thornton MD, 40 mg at 02/12/25 2037    bumetanide (BUMEX) tablet 2 mg, 2 mg, Oral, BID Diuretics, Remy Thornton MD, 2 mg at 02/12/25 2037    carvedilol (COREG) tablet 25 mg, 25 mg, Oral, BID, Remy Thornton MD, 25 mg at 02/12/25 2037    cefTRIAXone (ROCEPHIN) 2,000 mg in sodium chloride 0.9 % 100 mL MBP, 2,000 mg, Intravenous, Daily, Remy Thornton MD, Last Rate: 200 mL/hr at 02/12/25 1012, 2,000 mg at 02/12/25 1012    dextrose (D50W) (25  g/50 mL) IV injection 25 g, 25 g, Intravenous, Q15 Min PRN, Remy Thornton MD    dextrose (GLUTOSE) oral gel 15 g, 15 g, Oral, Q15 Min PRN, Remy Thornton MD    epoetin jamil-epbx (RETACRIT) injection 10,000 Units, 10,000 Units, Subcutaneous, Once per day on Monday Wednesday Friday, Houston Ramos MD    glucagon (GLUCAGEN) injection 1 mg, 1 mg, Intramuscular, Q15 Min PRN, Remy Thornton MD    HYDROcodone-acetaminophen (NORCO) 5-325 MG per tablet 2 tablet, 2 tablet, Oral, Q4H PRN, Remy Thornton MD    insulin glargine (LANTUS, SEMGLEE) injection 10 Units, 10 Units, Subcutaneous, Nightly, Remy Thornton MD, 10 Units at 02/12/25 2040    insulin lispro (HUMALOG/ADMELOG) injection 2-7 Units, 2-7 Units, Subcutaneous, 4x Daily AC & at Bedtime, Remy Thornton MD, 4 Units at 02/12/25 1242    losartan (COZAAR) tablet 25 mg, 25 mg, Oral, Daily, Remy Thornton MD, 25 mg at 02/12/25 1242    ondansetron (ZOFRAN) injection 4 mg, 4 mg, Intravenous, Q6H PRN, Remy Thornton MD, 4 mg at 02/12/25 0820    pantoprazole (PROTONIX) EC tablet 40 mg, 40 mg, Oral, Q AM, Remy Thornton MD, 40 mg at 02/13/25 0541    Pharmacy to dose vancomycin, , Not Applicable, Continuous PRN, Remy Thornton MD    polyethylene glycol (MIRALAX) packet 17 g, 17 g, Oral, Daily, Remy Thornton MD    terazosin (HYTRIN) capsule 2 mg, 2 mg, Oral, Nightly, Remy Thornton MD, 2 mg at 02/12/25 2046    ticagrelor (BRILINTA) tablet 90 mg, 90 mg, Oral, Q12H, Remy Thornton MD, 90 mg at 02/12/25 2046    zolpidem (AMBIEN) tablet 5 mg, 5 mg, Oral, Nightly PRN, Remy Thornton MD, 5 mg at 02/12/25 2212  Allergies:  Allergies   Allergen Reactions    Azithromycin Nausea And Vomiting     Pt given IV azithro, reported nausea/vomiting and hot flashes within 3-5 minutes of admin. Pt also c/o new abd pain with admin.     Pt given IV azithro, reported nausea/vomiting and hot flashes within 3-5 minutes of admin. Pt also c/o new abd pain with admin.    Penicillins Unknown - Low Severity      "reaction as a child. Does not recall reaction.     reaction as a child. Does not recall reaction.  Beta lactam allergy details  Antibiotic reaction: unknown  Age at reaction: infant  Dose to reaction time: unknown  Reason for antibiotic: unknown  Epinephrine required for reaction?: unknown  Tolerated antibiotics: unknown        Social History:   Social History     Tobacco Use    Smoking status: Never    Smokeless tobacco: Never   Substance Use Topics    Alcohol use: Yes     Comment: occ      Family History:  History reviewed. No pertinent family history.       Review of Systems  See history of present illness and past medical history.  As described in the history of presenting illness      Vitals:   /67 (BP Location: Right arm, Patient Position: Lying)   Pulse 91   Temp 98.8 °F (37.1 °C) (Oral)   Resp 18   Ht 170.2 cm (67\")   Wt 108 kg (238 lb 1.6 oz)   SpO2 95%   BMI 37.29 kg/m²   I/O:   Intake/Output Summary (Last 24 hours) at 2/13/2025 0702  Last data filed at 2/12/2025 1700  Gross per 24 hour   Intake --   Output 4000 ml   Net -4000 ml     Exam:  Patient is examined using the personal protective equipment as per guidelines from infection control for this particular patient as enacted.  Hand washing was performed before and after patient interaction.  General Appearance:    Alert, cooperative, no distress, appears stated age   Head:    Normocephalic, without obvious abnormality, atraumatic   Eyes:  Legally blind with bilateral corneal opacification.   Ears:    Normal external ear canals, both ears   Nose:   Nares normal, septum midline, mucosa normal, no drainage    or sinus tenderness   Throat:   Lips, tongue, gums normal; oral mucosa pink and moist   Neck: Supple   Back:     Symmetric, no curvature, ROM normal, no CVA tenderness   Lungs:     Clear to auscultation bilaterally, respirations unlabored   Chest Wall:    No tenderness or deformity    Heart:  S1-S2 regular   Abdomen:   Soft nontender "   Extremities:      Pulses:   Pulses palpable in all extremities; symmetric all extremities   Skin:   Skin color normal, Skin is warm and dry,  no rashes or palpable lesions   Neurologic:   CNII-XII intact, motor strength grossly intact, sensation grossly intact to light touch, no focal deficits noted       Data Review:    I reviewed the patient's new clinical results.  Results from last 7 days   Lab Units 02/13/25  0507 02/12/25  0746 02/11/25  1556   WBC 10*3/mm3 11.03* 10.26 11.05*   HEMOGLOBIN g/dL 7.2* 7.4* 7.9*   PLATELETS 10*3/mm3 175 155 138*     Results from last 7 days   Lab Units 02/13/25  0507 02/12/25  0746 02/11/25  1556   SODIUM mmol/L 130* 130* 125*   POTASSIUM mmol/L 3.5 3.9 4.5   CHLORIDE mmol/L 94* 95* 88*   CO2 mmol/L 24.0 23.0 23.5   BUN mg/dL 26* 45* 42*   CREATININE mg/dL 4.84* 6.03* 5.85*   CALCIUM mg/dL 9.1 8.9 9.0   GLUCOSE mg/dL 148* 172* 442*     MRI Foot Right Without Contrast    Result Date: 2/13/2025  1. Osteomyelitis of the distal shaft and head of the 1st proximal phalanx and of the distal phalanx with overlying soft tissue wound/ulcer. Gas within the soft tissues and marrow of the great toe associated with infection. Tenosynovitis flexor hallucis longus tendon sheath. Forefoot cellulitis. 2. No evidence for septic arthritis at the 1st MTP joint. 3. Muscular edema and atrophy associated with myositis or chronic neuropathy.      XR Foot 3+ View Right    Result Date: 2/11/2025   There is subcutaneous emphysema and soft tissue loss over the great toe. No radiopaque foreign body. No definite bone erosion.    This report was finalized on 2/11/2025 5:20 PM by Dr. Jason Dan M.D on Workstation: [id number]         Assessment:  Active Hospital Problems    Diagnosis  POA    **Diabetic toe ulcer [E11.621, L97.509]  Yes      Resolved Hospital Problems   No resolved problems to display.         Plan:  See above  Abdiaziz Paz MD   2/13/2025  07:02 EST    Parts of this note may be an  electronic transcription/translation of spoken language to printed text using the Dragon dictation system.

## 2025-02-13 NOTE — PROGRESS NOTES
[facility] Progress Note      Patient Name: Wilner Menjivar  : 1985  MRN: [id number]  Primary Care Physician:  Roosevelt Thao MD  Date of admission: 2025    Subjective     Interval History:   Follow-up on ESRD    Had HD yesterday, removed 4 L without issues  Breathing fine on RA   No chest pain, N/V/D    Review of Systems:   As noted above    Objective     Vitals:   Temp:  [98.4 °F (36.9 °C)-99.7 °F (37.6 °C)] 98.8 °F (37.1 °C)  Heart Rate:  [91-97] 91  Resp:  [16-18] 18  BP: (127-166)/(67-84) 138/67    Intake/Output Summary (Last 24 hours) at 2025 0806  Last data filed at 2025 1700  Gross per 24 hour   Intake --   Output 4000 ml   Net -4000 ml       Physical Exam:    General Appearance: Awake, chronically ill, no acute distress  Skin: warm and dry  HEENT: oral mucosa normal, nonicteric sclera, legally blind  Neck: supple, no JVD  Lungs: CTA, no wheezing, nonlabored on RA  Heart: Tachycardic, RR, no rub  Abdomen: soft, nontender, nondistended, BS +  : no palpable bladder  Extremities: no edema, cyanosis or clubbing; wound to right great toe  Neuro: normal speech and mental status   Vascular: L AVF patent, + thrill and bruit    Scheduled Meds:     amLODIPine, 10 mg, Oral, Daily  aspirin, 81 mg, Oral, Daily  atorvastatin, 40 mg, Oral, Nightly  bumetanide, 2 mg, Oral, BID Diuretics  carvedilol, 25 mg, Oral, BID  cefTRIAXone, 2,000 mg, Intravenous, Daily  epoetin jamil/jamil-epbx, 10,000 Units, Subcutaneous, Once per day on   insulin glargine, 10 Units, Subcutaneous, Nightly  insulin lispro, 2-7 Units, Subcutaneous, 4x Daily AC & at Bedtime  losartan, 25 mg, Oral, Daily  pantoprazole, 40 mg, Oral, Q AM  polyethylene glycol, 17 g, Oral, Daily  terazosin, 2 mg, Oral, Nightly  ticagrelor, 90 mg, Oral, Q12H      IV Meds:   Pharmacy to dose vancomycin,         Results Reviewed:   I have personally reviewed the results from the time of this admission  "to 2/13/2025 08:06 EST     Results from last 7 days   Lab Units 02/13/25  0507 02/12/25  0746 02/11/25  1556   SODIUM mmol/L 130* 130* 125*   POTASSIUM mmol/L 3.5 3.9 4.5   CHLORIDE mmol/L 94* 95* 88*   CO2 mmol/L 24.0 23.0 23.5   BUN mg/dL 26* 45* 42*   CREATININE mg/dL 4.84* 6.03* 5.85*   CALCIUM mg/dL 9.1 8.9 9.0   BILIRUBIN mg/dL 0.6  --  0.9   ALK PHOS U/L 131*  --  154*   ALT (SGPT) U/L 19  --  14   AST (SGOT) U/L 25  --  19   GLUCOSE mg/dL 148* 172* 442*       Estimated Creatinine Clearance: 23.8 mL/min (A) (by C-G formula based on SCr of 4.84 mg/dL (H)).                Results from last 7 days   Lab Units 02/13/25  0507 02/12/25  0746 02/11/25  1556   WBC 10*3/mm3 11.03* 10.26 11.05*   HEMOGLOBIN g/dL 7.2* 7.4* 7.9*   PLATELETS 10*3/mm3 175 155 138*             Assessment / Plan     ASSESSMENT:    -End Stage Renal Disease ( ESRD )  since 6/2024 followed by Dr. Wasserman on Hemodialysis on a Monday, Wednesday and Friday schedule via LUE AVF.  Last HD was 2/12.  Continue renal diet      -Chronic normocytic anemia. On Epogen protocol.  10,000 units subcu  TIW , we will continue to follow H&H closely, low iron stores 2/13     -Hyperphosphatemia. Continue renal diet  meals w low phosphorus . We will follow phosphorus to make further adjustments to binders if needed      -Hypertension w/ CKD.  Continue home regimen. We will continue to follow closely. Heart healthy diet      -Coronary artery disease status postcardiac cath with stent placement to mid/distal OM2 , and proximal Cx. ( 9/24 ).  On medical management     -Diabetes Mellitus type 2 w/ complications ( retinopathy , peripheral vascular disease  nephropathy ) .Continue insulin regimen and Diabetic diet, as per primary team     -Right diabetic foot as per primary team.  Foot x-ray showing subcutaneous emphysema and soft tissue loss over the great toe. MRI showing \" Osteomyelitis of the distal shaft and head of the 1st proximal phalanx and of the distal phalanx " "with overlying soft tissue wound/ulcer. Gas within the soft tissues and marrow of the great toe associated with infection. Tenosynovitis flexor hallucis longus tendon sheath. Forefoot cellulitis\"      -Hypervolemic hyponatremia will adjust dialysis prescription and will follow sodium trend    PLAN:    Continue MWF HD schedule while inpatient,   Despite low iron stores , will hold replacement due to active infection   Surveillance labs    Thank you for involving us in the care of Wilner ANGELLA DelarosaMenjivar.  Please feel free to call with any questions.    PAYTON Colon  02/13/25  08:06 [facility]    [facility]  [phone]    Please note that portions of this note were completed with a voice recognition program.  "

## 2025-02-13 NOTE — PLAN OF CARE
Goal Outcome Evaluation:              Outcome Evaluation: pt is aox4, vss, pt is legally blind, pt still complains of pain , wound care is consulted, dialysis completed today, 4L removed, pt tolerated it well, pt has no further questons at this time, pt is resting at this time

## 2025-02-13 NOTE — PROGRESS NOTES
"Daily progress note    Primary care physician  Dr. NERI    Subjective   Doing same with no new complaints and family at bedside    History of present illness  39-year-old male with history of end-stage renal disease on hemodialysis chronic anemia diabetes hypertension hyperlipidemia coronary artery disease and gastroesophageal of disease who is also legally blind and has right foot wound which is getting worse mainly right great toe which looks infected and has recently seen by podiatrist and removed the callus from the right great toe.  Patient has intermittent bleeding pain drainage but no fever chills.  Patient also denies any chest pain shortness of breath palpitation abdominal pain nausea vomiting diarrhea.  Patient workup in ER revealed infected right great toe wound admitted for management.     REVIEW OF SYSTEMS  All systems reviewed and negative except for those discussed in HPI.     PHYSICAL EXAM   Blood pressure 145/78, pulse 94, temperature 98.2 °F (36.8 °C), temperature source Oral, resp. rate 16, height 170.2 cm (67\"), weight 108 kg (238 lb 1.6 oz), SpO2 99%.    Constitutional:       General: He is not in acute distress.     Appearance: Normal appearance. He is not ill-appearing, toxic-appearing or diaphoretic.   HENT:      Head: Normocephalic and atraumatic.      Nose: Nose normal.      Mouth/Throat:      Mouth: Mucous membranes are moist.      Pharynx: Oropharynx is clear.   Eyes:      Conjunctiva/sclera: Conjunctivae normal.      Pupils: Pupils are equal, round, and reactive to light.   Cardiovascular:      Rate and Rhythm: Normal rate and regular rhythm.      Pulses: Normal pulses.   Pulmonary:      Effort: Pulmonary effort is normal.   Abdominal:      General: Abdomen is flat.      Palpations: Abdomen is soft.   Musculoskeletal:      Comments: Right great toe is black in color with a deep ulceration and dried blood, no active purulent drainage, no palpable crepitus or fluctuance   Skin:     " General: Skin is warm and dry.   Neurological:      General: No focal deficit present.      Mental Status: He is alert and oriented to person, place, and time. Mental status is at baseline.   Psychiatric:         Mood and Affect: Mood normal.         Behavior: Behavior normal.         Thought Content: Thought content normal.         Judgment: Judgment normal.      LAB RESULTS  Lab Results (last 24 hours)       Procedure Component Value Units Date/Time    POC Glucose Once [[id number]]  (Abnormal) Collected: 02/13/25 1323    Specimen: Blood Updated: 02/13/25 1325     Glucose 226 mg/dL     MRSA Screen, PCR (Inpatient) - Swab, Nares [[id number]]  (Normal) Collected: 02/13/25 0906    Specimen: Swab from Nares Updated: 02/13/25 1102     MRSA PCR No MRSA Detected    Narrative:      The negative predictive value of this diagnostic test is high and should only be used to consider de-escalating anti-MRSA therapy. A positive result may indicate colonization with MRSA and must be correlated clinically.    POC Glucose Once [[id number]]  (Abnormal) Collected: 02/13/25 0819    Specimen: Blood Updated: 02/13/25 0819     Glucose 154 mg/dL     TSH [[id number]]  (Normal) Collected: 02/13/25 0507    Specimen: Blood from Arm, Right Updated: 02/13/25 0600     TSH 0.976 uIU/mL     Iron Profile [[id number]]  (Abnormal) Collected: 02/13/25 0507    Specimen: Blood from Arm, Right Updated: 02/13/25 0557     Iron 24 mcg/dL      Iron Saturation (TSAT) 14 %      Transferrin 114 mg/dL      TIBC 170 mcg/dL     Comprehensive Metabolic Panel [[id number]]  (Abnormal) Collected: 02/13/25 0507    Specimen: Blood from Arm, Right Updated: 02/13/25 0557     Glucose 148 mg/dL      BUN 26 mg/dL      Creatinine 4.84 mg/dL      Sodium 130 mmol/L      Potassium 3.5 mmol/L      Chloride 94 mmol/L      CO2 24.0 mmol/L      Calcium 9.1 mg/dL      Total Protein 6.8 g/dL      Albumin 3.2 g/dL      ALT (SGPT) 19 U/L      AST (SGOT) 25 U/L      Alkaline Phosphatase 131 U/L       Total Bilirubin 0.6 mg/dL      Globulin 3.6 gm/dL      A/G Ratio 0.9 g/dL      BUN/Creatinine Ratio 5.4     Anion Gap 12.0 mmol/L      eGFR 14.7 mL/min/1.73     Narrative:      GFR Categories in Chronic Kidney Disease (CKD)      GFR Category          GFR (mL/min/1.73)    Interpretation  G1                     90 or greater         Normal or high (1)  G2                      60-89                Mild decrease (1)  G3a                   45-59                Mild to moderate decrease  G3b                   30-44                Moderate to severe decrease  G4                    15-29                Severe decrease  G5                    14 or less           Kidney failure          (1)In the absence of evidence of kidney disease, neither GFR category G1 or G2 fulfill the criteria for CKD.    eGFR calculation 2021 CKD-EPI creatinine equation, which does not include race as a factor    Lipid Panel [[id number]]  (Abnormal) Collected: 02/13/25 0507    Specimen: Blood from Arm, Right Updated: 02/13/25 0557     Total Cholesterol 91 mg/dL      Triglycerides 130 mg/dL      HDL Cholesterol 25 mg/dL      LDL Cholesterol  43 mg/dL      VLDL Cholesterol 23 mg/dL      LDL/HDL Ratio 1.60    Narrative:      Cholesterol Reference Ranges  (U.S. Department of Health and Human Services ATP III Classifications)    Desirable          <200 mg/dL  Borderline High    200-239 mg/dL  High Risk          >240 mg/dL      Triglyceride Reference Ranges  (U.S. Department of Health and Human Services ATP III Classifications)    Normal           <150 mg/dL  Borderline High  150-199 mg/dL  High             200-499 mg/dL  Very High        >500 mg/dL    HDL Reference Ranges  (U.S. Department of Health and Human Services ATP III Classifications)    Low     <40 mg/dl (major risk factor for CHD)  High    >60 mg/dl ('negative' risk factor for CHD)        LDL Reference Ranges  (U.S. Department of Health and Human Services ATP III Classifications)    Optimal           <100 mg/dL  Near Optimal     100-129 mg/dL  Borderline High  130-159 mg/dL  High             160-189 mg/dL  Very High        >189 mg/dL    LDL is calculated using the NIH LDL-C calculation.      Hemoglobin A1c [[id number]]  (Abnormal) Collected: 02/13/25 0507    Specimen: Blood from Arm, Right Updated: 02/13/25 0544     Hemoglobin A1C 11.10 %     Narrative:      Hemoglobin A1C Ranges:    Increased Risk for Diabetes  5.7% to 6.4%  Diabetes                     >= 6.5%  Diabetic Goal                < 7.0%    CBC & Differential [[id number]]  (Abnormal) Collected: 02/13/25 0507    Specimen: Blood from Arm, Right Updated: 02/13/25 0531    Narrative:      The following orders were created for panel order CBC & Differential.  Procedure                               Abnormality         Status                     ---------                               -----------         ------                     CBC Auto Differential[[id number]]        Abnormal            Final result                 Please view results for these tests on the individual orders.    CBC Auto Differential [[id number]]  (Abnormal) Collected: 02/13/25 0507    Specimen: Blood from Arm, Right Updated: 02/13/25 0531     WBC 11.03 10*3/mm3      RBC 2.35 10*6/mm3      Hemoglobin 7.2 g/dL      Hematocrit 21.0 %      MCV 89.4 fL      MCH 30.6 pg      MCHC 34.3 g/dL      RDW 13.2 %      RDW-SD 42.4 fl      MPV 8.5 fL      Platelets 175 10*3/mm3      Neutrophil % 80.5 %      Lymphocyte % 6.5 %      Monocyte % 10.7 %      Eosinophil % 0.6 %      Basophil % 0.5 %      Immature Grans % 1.2 %      Neutrophils, Absolute 8.88 10*3/mm3      Lymphocytes, Absolute 0.72 10*3/mm3      Monocytes, Absolute 1.18 10*3/mm3      Eosinophils, Absolute 0.07 10*3/mm3      Basophils, Absolute 0.05 10*3/mm3      Immature Grans, Absolute 0.13 10*3/mm3      nRBC 0.0 /100 WBC     POC Glucose Once [[id number]]  (Abnormal) Collected: 02/12/25 2059    Specimen: Blood Updated: 02/12/25 2100      Glucose 181 mg/dL     POC Glucose Once [[id number]]  (Normal) Collected: 02/12/25 1821    Specimen: Blood Updated: 02/12/25 1822     Glucose 127 mg/dL     Blood Culture - Blood, Arm, Right [[id number]]  (Normal) Collected: 02/11/25 1742    Specimen: Blood from Arm, Right Updated: 02/12/25 1800     Blood Culture No growth at 24 hours    Narrative:      Less than seven (7) mL's of blood was collected.  Insufficient quantity may yield false negative results.    Blood Culture - Blood, Arm, Right [[id number]]  (Normal) Collected: 02/11/25 1749    Specimen: Blood from Arm, Right Updated: 02/12/25 1800     Blood Culture No growth at 24 hours          Imaging Results (Last 24 Hours)       Procedure Component Value Units Date/Time    MRI Foot Right Without Contrast [[id number]] Collected: 02/13/25 0654     Updated: 02/13/25 0751    Narrative:      MRI RIGHT FOREFOOT WITHOUT CONTRAST     HISTORY: Right great toe infection. Diabetes. Hypertension.     TECHNIQUE: MRI includes axial T1, STIR as well as coronal T1, T2  fat-sat, and sagittal PD  fat-saturated sequences through the forefoot.     COMPARISON: Right foot x-rays 02/11/2025.     FINDINGS: There is abnormal T1 hypointense and T2 hyperintense signal  within the 1st distal phalanx. There is bone loss involving the tuft and  distal shaft of the 1st distal phalanx that may be in part related to  debridement though is in part related to osteomyelitis. There is also  abnormal T1 hypointense and T2 hyperintense signal within the distal  shaft and head of the 1st proximal phalanx. Small bubbles of air are  present surrounding the 1st proximal and distal phalanges and  potentially within the cancellous bone.     There are mild degenerative changes at the 1st MTP joint without  evidence for septic arthritis at the 1st MTP joint. No evidence for  osteomyelitis of the metatarsals or 2nd through 5th toes. There is  tenosynovitis of the flexor hallucis longus tendon with mild fluid  in  the tendon sheath and surrounding edema extending to the level of the  base of the 1st MTP joint.     Generalized muscular edema and atrophy most likely related to chronic  neuropathy. Chronic arthritic changes are present with degenerative  subcortical cyst formation at the TMT joints and intercuneiform joints.  Generalized edema of the subcutaneous fat about the midfoot and forefoot  consistent with cellulitis.       Impression:      1. Osteomyelitis of the distal shaft and head of the 1st proximal  phalanx and of the distal phalanx with overlying soft tissue  wound/ulcer. Gas within the soft tissues and marrow of the great toe  associated with infection. Tenosynovitis flexor hallucis longus tendon  sheath. Forefoot cellulitis.  2. No evidence for septic arthritis at the 1st MTP joint.  3. Muscular edema and atrophy associated with myositis or chronic  neuropathy.     This report was finalized on 2/13/2025 7:48 AM by Junior Villalobos M.D  on Workstation: [id number]               Current Facility-Administered Medications:     amLODIPine (NORVASC) tablet 10 mg, 10 mg, Oral, Daily, Remy Thornton MD, 10 mg at 02/13/25 0849    aspirin chewable tablet 81 mg, 81 mg, Oral, Daily, Remy Thornton MD, 81 mg at 02/13/25 0849    atorvastatin (LIPITOR) tablet 40 mg, 40 mg, Oral, Nightly, Remy Thornton MD, 40 mg at 02/12/25 2037    bumetanide (BUMEX) tablet 2 mg, 2 mg, Oral, BID Diuretics, Remy Thornton MD, 2 mg at 02/13/25 0849    carvedilol (COREG) tablet 25 mg, 25 mg, Oral, BID, Remy Thornton MD, 25 mg at 02/13/25 0849    cefTRIAXone (ROCEPHIN) 2,000 mg in sodium chloride 0.9 % 100 mL MBP, 2,000 mg, Intravenous, Daily, Remy Thornton MD, Last Rate: 200 mL/hr at 02/13/25 0849, 2,000 mg at 02/13/25 0849    dextrose (D50W) (25 g/50 mL) IV injection 25 g, 25 g, Intravenous, Q15 Min PRN, Remy Thornton MD    dextrose (GLUTOSE) oral gel 15 g, 15 g, Oral, Q15 Min PRN, Remy Thornton MD    docusate sodium (COLACE) capsule 100 mg,  100 mg, Oral, BID, Reym Thornton MD, 100 mg at 02/13/25 1101    epoetin jamil-epbx (RETACRIT) injection 10,000 Units, 10,000 Units, Subcutaneous, Once per day on Monday Wednesday Friday, Houston Ramos MD    glucagon (GLUCAGEN) injection 1 mg, 1 mg, Intramuscular, Q15 Min PRN, Remy Thornton MD    HYDROcodone-acetaminophen (NORCO) 5-325 MG per tablet 2 tablet, 2 tablet, Oral, Q4H PRN, Remy Thornton MD, 2 tablet at 02/13/25 1459    insulin glargine (LANTUS, SEMGLEE) injection 10 Units, 10 Units, Subcutaneous, Nightly, Remy Thornton MD, 10 Units at 02/12/25 2040    insulin lispro (HUMALOG/ADMELOG) injection 2-7 Units, 2-7 Units, Subcutaneous, 4x Daily AC & at Bedtime, Remy Thornton MD, 3 Units at 02/13/25 1334    losartan (COZAAR) tablet 25 mg, 25 mg, Oral, Daily, Remy Thornton MD, 25 mg at 02/13/25 0849    ondansetron (ZOFRAN) injection 4 mg, 4 mg, Intravenous, Q6H PRN, Remy Thornton MD, 4 mg at 02/13/25 1101    pantoprazole (PROTONIX) EC tablet 40 mg, 40 mg, Oral, Q AM, eRmy Thornton MD, 40 mg at 02/13/25 0541    Pharmacy to dose vancomycin, , Not Applicable, Continuous PRN, Remy Thornton MD    polyethylene glycol (MIRALAX) packet 17 g, 17 g, Oral, Daily, Remy Thornton MD    terazosin (HYTRIN) capsule 2 mg, 2 mg, Oral, Nightly, Remy Thornton MD, 2 mg at 02/12/25 2046    ticagrelor (BRILINTA) tablet 90 mg, 90 mg, Oral, Q12H, Remy Thornton MD, 90 mg at 02/13/25 0907    zolpidem (AMBIEN) tablet 5 mg, 5 mg, Oral, Nightly PRN, Remy Thornton MD, 5 mg at 02/12/25 2227     ASSESSMENT  Right great toe wound with infection and surrounding cellulitis and osteomyelitis  End-stage renal disease on hemodialysis  Diabetes mellitus  Hypertension  Hyperlipidemia  Coronary artery disease  Legally blind  Chronic anemia  Gastroesophageal reflux disease    PLAN  CPM  Continue IV antibiotics   Pain management  Infectious disease consult appreciated  Podiatry and nephrology to follow patient   Continue home medications  Stress ulcer  DVT prophylaxis  Supportive care  Discussed with nursing staff and family  Follow closely further recommendation current hospital course    JASKARAN LANGFORD MD    Copied text in this note has been reviewed and is accurate as of 02/13/25

## 2025-02-13 NOTE — NURSING NOTE
Patient requests DILAUDID, said JAMES not helping, Attending refused this request. Patient ask for AMBIEN  to help him sleep, Attending agreed to this.

## 2025-02-13 NOTE — CONSULTS
[facility] Vascular Surgery  Consult Note    Patient Name: Wilner Menjivar  : 1985  MRN: [id number]  Primary Care Physician:  Roosevelt Thao MD  Referring Physician: Remy Thornton MD  Date of admission: 2025    Inpatient Vascular Surgery Consult  Consult performed by: Curt Osorio II, MD  Consult ordered by: Trevon Garcia DPM        Subjective   Subjective     Reason for Consult/ Chief Complaint: Right toe gangrene    History of Present Illness  Wilner Menjivar is a 39 y.o. male with stage IV chronic kidney disease, diabetes with hemoglobin A1c of 11, hypertension, who was admitted with right first toe wound.  Patient complains of pain in the right first toe extending up to the mid forefoot.   He denies any prior history of peripheral arterial disease.  He denies any previous surgery on any of his blood vessels or stents placed in his legs.  He denies any previous history of blood clots.  At the time of evaluation he is resting comfortably in bed.    Review of Systems     Personal History     Past Medical History:   Diagnosis Date    CKD stage 4 due to type 2 diabetes mellitus     GERD (gastroesophageal reflux disease)     Heart failure     Hypertension     Type 2 diabetes mellitus     Vision impairment        Past Surgical History:   Procedure Laterality Date    CORONARY STENT PLACEMENT      EYE SURGERY      WISDOM TOOTH EXTRACTION         Family History: His family history is not on file.     Social History: He  reports that he has never smoked. He has never used smokeless tobacco. He reports current alcohol use. He reports current drug use. Drug: Marijuana.    Home Medications:  Insulin Degludec, Insulin Pen Needle, amLODIPine, aspirin, atorvastatin, bumetanide, carvedilol, cloNIDine, doxazosin, insulin aspart, losartan, pantoprazole, polyethylene glycol, sodium bicarbonate, and ticagrelor    Allergies:  He is allergic to azithromycin and penicillins.    Objective    Objective     Vitals:     Temp:  [98.2 °F (36.8 °C)-99.7 °F (37.6 °C)] 98.2 °F (36.8 °C)  Heart Rate:  [91-97] 94  Resp:  [16-18] 16  BP: (127-159)/(67-78) 145/78    Physical Exam  NAD  Respirations unlabored  RRR  R first toe gangrene noted with malodor, no active purulence.     Result Review    Result Review:  I have personally reviewed the results from the time of this admission to 2/13/2025 17:29 EST and agree with these findings:  [x]  Laboratory list / accordion  [x]  Microbiology  []  Radiology  []  EKG/Telemetry   [x]  Cardiology/Vascular   []  Pathology  []  Old records  []  Other:  Most notable findings include: Hgb A1c is 11, sodium 130, Cr 4.8 eGFR 15, WBC 11k.   Toe Pressures 168 on the right, 178 on the left    CBC    Results from last 7 days   Lab Units 02/13/25  0507 02/12/25  0746 02/11/25  1556   WBC 10*3/mm3 11.03* 10.26 11.05*   HEMOGLOBIN g/dL 7.2* 7.4* 7.9*   PLATELETS 10*3/mm3 175 155 138*     BMP   Results from last 7 days   Lab Units 02/13/25  0507 02/12/25  0746 02/11/25  1556   SODIUM mmol/L 130* 130* 125*   POTASSIUM mmol/L 3.5 3.9 4.5   CHLORIDE mmol/L 94* 95* 88*   CO2 mmol/L 24.0 23.0 23.5   BUN mg/dL 26* 45* 42*   CREATININE mg/dL 4.84* 6.03* 5.85*   GLUCOSE mg/dL 148* 172* 442*     Cr Clearance Estimated Creatinine Clearance: 23.8 mL/min (A) (by C-G formula based on SCr of 4.84 mg/dL (H)).  Coag     HbA1C   Lab Results   Component Value Date    HGBA1C 11.10 (H) 02/13/2025    HGBA1C 8.2 (H) 12/11/2024    HGBA1C 10.4 (H) 09/13/2024     Blood Glucose   Glucose   Date/Time Value Ref Range Status   02/13/2025 1323 226 (H) 70 - 130 mg/dL Final   02/13/2025 0819 154 (H) 70 - 130 mg/dL Final   02/12/2025 2059 181 (H) 70 - 130 mg/dL Final   02/12/2025 1821 127 70 - 130 mg/dL Final   02/12/2025 1232 280 (H) 70 - 130 mg/dL Final   02/12/2025 0818 179 (H) 70 - 130 mg/dL Final   02/12/2025 0618 182 (H) 70 - 130 mg/dL Final     Infection   Results from last 7 days   Lab Units 02/11/25  1749 02/11/25  1742   BLOODCX  No  "growth at 24 hours No growth at 24 hours     CMP   Results from last 7 days   Lab Units 02/13/25  0507 02/12/25  0746 02/11/25  1556   SODIUM mmol/L 130* 130* 125*   POTASSIUM mmol/L 3.5 3.9 4.5   CHLORIDE mmol/L 94* 95* 88*   CO2 mmol/L 24.0 23.0 23.5   BUN mg/dL 26* 45* 42*   CREATININE mg/dL 4.84* 6.03* 5.85*   GLUCOSE mg/dL 148* 172* 442*   ALBUMIN g/dL 3.2*  --  3.4*   BILIRUBIN mg/dL 0.6  --  0.9   ALK PHOS U/L 131*  --  154*   AST (SGOT) U/L 25  --  19   ALT (SGPT) U/L 19  --  14     ABG      UA      TYRESE  No results found for: \"POCMETH\", \"POCAMPHET\", \"POCBARBITUR\", \"POCBENZO\", \"POCCOCAINE\", \"POCOPIATES\", \"POCOXYCODO\", \"POCPHENCYC\", \"POCPROPOXY\", \"POCTHC\", \"POCTRICYC\"  Lysis Labs   Results from last 7 days   Lab Units 02/13/25  0507 02/12/25  0746 02/11/25  1556   HEMOGLOBIN g/dL 7.2* 7.4* 7.9*   PLATELETS 10*3/mm3 175 155 138*   CREATININE mg/dL 4.84* 6.03* 5.85*     Radiology(recent) MRI Foot Right Without Contrast    Result Date: 2/13/2025  1. Osteomyelitis of the distal shaft and head of the 1st proximal phalanx and of the distal phalanx with overlying soft tissue wound/ulcer. Gas within the soft tissues and marrow of the great toe associated with infection. Tenosynovitis flexor hallucis longus tendon sheath. Forefoot cellulitis. 2. No evidence for septic arthritis at the 1st MTP joint. 3. Muscular edema and atrophy associated with myositis or chronic neuropathy.  This report was finalized on 2/13/2025 7:48 AM by Junior Villalobos M.D on Workstation: [id number]       Assessment & Plan   Assessment / Plan     Brief Patient Summary:  Wilner Menjivar is a 39 y.o. male with poorly controlled diabetes, chronic kidney disease, and gangrene of his right first toe.  Patient has likely mild to moderate peripheral arterial disease in the right leg likely secondary to diabetic tibial and microvascular disease.  Toe pressures are 168 however which indicates that he should have adequate perfusion for wound healing.  " Waveforms are quite dampened.  The patient's MRI shows osteomyelitis with gas in the soft tissues and bone marrow.  Hyponatremia noted as well.  The patient likely does have some    Decreased perfusion on the right side I think his perfusion should be adequate for wound healing and with his degree of infection indicated on the MRI and his CKD putting him at risk for contrast exposure, he would  likely benefit from toe amputation before vascular intervention.    Will discuss with Dr. Garcia but hopefully he can get a toe amputation on this admission and follow with us closely as an outpatient and if wound healing is poor then can schedule angiography.     Active Hospital Problems:  Active Hospital Problems    Diagnosis     **Diabetic toe ulcer        Plan:   Will d/w Dr. Garcia but I think ideally would move forward with toe amputation and antibiotics and close follow up with us as outpatient to assess healing. If toe doesn't appear to be healing well then we can proceed with angiography.       VTE Prophylaxis:  Mechanical VTE prophylaxis orders are present.         MD Curt Godinez II, II, MD  02/13/25  17:29 EST  Office Number [phone]    [facility] Vascular Surgery

## 2025-02-13 NOTE — CASE MANAGEMENT/SOCIAL WORK
Discharge Planning Assessment  [facility]     Patient Name: Wilner Menjivar  MRN: [id number]  Today's Date: 2/13/2025    Admit Date: 2/11/2025        Discharge Needs Assessment       Row Name 02/13/25 1517       Living Environment    People in Home spouse    Name(s) of People in Home Mitali Menjivar- spouse    Current Living Arrangements home    Potentially Unsafe Housing Conditions none    In the past 12 months has the electric, gas, oil, or water company threatened to shut off services in your home? No    Primary Care Provided by self;spouse/significant other    Provides Primary Care For no one, unable/limited ability to care for self    Family Caregiver if Needed spouse    Quality of Family Relationships helpful;involved;supportive    Able to Return to Prior Arrangements --  to be determined       Resource/Environmental Concerns    Resource/Environmental Concerns none    Transportation Concerns none       Transportation Needs    In the past 12 months, has lack of transportation kept you from medical appointments or from getting medications? no    In the past 12 months, has lack of transportation kept you from meetings, work, or from getting things needed for daily living? No       Food Insecurity    Within the past 12 months, you worried that your food would run out before you got the money to buy more. Never true    Within the past 12 months, the food you bought just didn't last and you didn't have money to get more. Never true       Transition Planning    Patient/Family Anticipates Transition to --  to be determined    Patient/Family Anticipated Services at Transition   TBD    Transportation Anticipated --  TBD       Discharge Needs Assessment    Current Outpatient/Agency/Support Group outpatient hemodialysis    Equipment Currently Used at Home glucometer    Concerns to be Addressed discharge planning;decision-making    Do you want help finding or keeping work or a job? --  N/A    Anticipated  Changes Related to Illness inability to care for self    Equipment Needed After Discharge --  TBD    Outpatient/Agency/Support Group Needs --  TBD    Discharge Facility/Level of Care Needs --  TBD    Provided Post Acute Provider List? Yes    Delivered To Support Person    Support Person Mitaliher Menjivar-spouse    Method of Delivery In person    Current Discharge Risk chronically ill;physical impairment                   Discharge Plan       Row Name 02/13/25 1501       Plan    Plan Comments Entered room, introduced self and explained role- pt not in room at this time- is in vascular per spouse Mitali at bedside; spouse spoke w/this CCP and advised and verified pts' address listed on facesheet; She noted that patient is disabled, lives in a single level home w/3 YAS w/rails-denies pt difficulty navigating steps; she noted patient is legally blind, is mostly independent w/ADL's and she assist with any needs PRN; Pt uses a glucose meter in the home and receives hemodialysis on M,W,F with [facility]; Verified PCP listed on facesheet; RX are filled at [facility] on [address]- denies difficulty affording or obtaining food, bills, meds; spouse noted patient may need amputation and will find out after vascular consult; CCP to follow for needs;                  Continued Care and Services - Admitted Since 2/11/2025    No active coordination exists for this encounter.          Demographic Summary       Row Name 02/13/25 1510       General Information    Admission Type inpatient    Arrived From home    Required Notices Provided Important Message from Medicare    Reason for Consult decision-making;discharge planning    Preferred Language English       Contact Information    Permission Granted to Share Info With ;family/designee                   Functional Status       Row Name 02/13/25 1511       Assessment of Health Literacy    How often do you have someone help you read hospital materials? Always    How often do you have  problems learning about your medical condition because of difficulty understanding written information? Often    How often do you have a problem understanding what is told to you about your medical condition? Sometimes    How confident are you filling out medical forms by yourself? Not at all    Health Literacy Moderate       Functional Status, IADL    Medications independent    Meal Preparation independent;assistive person    Housekeeping independent;assistive person    Laundry independent;assistive person    Shopping independent;assistive person    If for any reason you need help with day-to-day activities such as bathing, preparing meals, shopping, managing finances, etc., do you get the help you need? I get all the help I need       Mental Status    General Appearance WDL WDL       Mental Status Summary    Recent Changes in Mental Status/Cognitive Functioning no changes       Employment/    Employment Status disabled                   Psychosocial    No documentation.                  Abuse/Neglect    No documentation.                  Legal    No documentation.                  Substance Abuse    No documentation.                  Patient Forms    No documentation.                     Misty Wang RN

## 2025-02-13 NOTE — CONSULTS
Podiatry Consult Note      Patient: Wilner Menjivar Admit Date: 02/11/2025    Age: 39 y.o.   PCP: Roosevelt Thao MD    MRN: [id number]  Room: 121/1        Subjective     Chief Complaint     Chief Complaint   Patient presents with    Wound Check        HPI     This is a 39-year-old male with diabetes mellitus, kidney disease, diabetic retinopathy who has infection and worsening changes to his right great toe.  Patient has been seeing me in my outpatient clinic and developed an ulceration to the plantar medial right hallux.  He was receiving local wound care and offloading.  He came to my office yesterday and was noted to have necrosis and exposure of bone.  I advised him to come to [facility] for further evaluation.  He is currently observation unit infectious disease has been consulted.  He has an MRI performed which the read is back as well as an ABIs performed which are currently pending.    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 Pt also c/o new abd pain with admin.     Pt given IV azithro, reported nausea/vomiting and hot flashes within 3-5 minutes of admin. Pt also c/o new abd pain with admin.    Penicillins Unknown - Low Severity     reaction as a child. Does not recall reaction.     reaction as a child. Does not recall reaction.  Beta lactam allergy details  Antibiotic reaction: unknown  Age at reaction: infant  Dose to reaction time: unknown  Reason for antibiotic: unknown  Epinephrine required for  reaction?: unknown  Tolerated antibiotics: unknown           Social History     Tobacco Use   Smoking Status Never   Smokeless Tobacco Never        Objective   Physical Exam    Vitals:    02/13/25 0814   BP: 150/77   Pulse: 96   Resp: 16   Temp: 98.6 °F (37 °C)   SpO2: 98%        Dermatology:   Right hallux medial aspect eschar with exposed phalanx.  No purulence, mild malodor.  No proximal streaking.  Mild edema present.    Vascular:   DP and PT pulses are  dopplerable    Neurological: light touch and protective sensation are absent to the forefoot    Musckuloskeletal:  hallux IPJ range of motion is crepitus present.    Labs     Lab Results   Component Value Date    HGBA1C 11.10 (H) 02/13/2025    POCGLU 226 (H) 02/13/2025        CBC:      Lab 02/13/25  0507 02/12/25  0746 02/11/25  1556   WBC 11.03* 10.26 11.05*   HEMOGLOBIN 7.2* 7.4* 7.9*   HEMATOCRIT 21.0* 21.5* 24.1*   PLATELETS 175 155 138*   NEUTROS ABS 8.88* 8.40* 9.09*   IMMATURE GRANS (ABS) 0.13* 0.09* 0.06*   LYMPHS ABS 0.72 0.57* 0.57*   MONOS ABS 1.18* 1.10* 1.27*   EOS ABS 0.07 0.07 0.03   MCV 89.4 86.7 92.0          Results for orders placed or performed during the hospital encounter of 02/11/25   Blood Culture - Blood, Arm, Right    Specimen: Arm, Right; Blood   Result Value Ref Range    Blood Culture No growth at 24 hours         MRI Foot Right Without Contrast  Narrative: MRI RIGHT FOREFOOT WITHOUT CONTRAST     HISTORY: Right great toe infection. Diabetes. Hypertension.     TECHNIQUE: MRI includes axial T1, STIR as well as coronal T1, T2  fat-sat, and sagittal PD  fat-saturated sequences through the forefoot.     COMPARISON: Right foot x-rays 02/11/2025.     FINDINGS: There is abnormal T1 hypointense and T2 hyperintense signal  within the 1st distal phalanx. There is bone loss involving the tuft and  distal shaft of the 1st distal phalanx that may be in part related to  debridement though is in part related to osteomyelitis. There is also  abnormal  T1 hypointense and T2 hyperintense signal within the distal  shaft and head of the 1st proximal phalanx. Small bubbles of air are  present surrounding the 1st proximal and distal phalanges and  potentially within the cancellous bone.     There are mild degenerative changes at the 1st MTP joint without  evidence for septic arthritis at the 1st MTP joint. No evidence for  osteomyelitis of the metatarsals or 2nd through 5th toes. There is  tenosynovitis of the flexor hallucis longus tendon with mild fluid in  the tendon sheath and surrounding edema extending to the level of the  base of the 1st MTP joint.     Generalized muscular edema and atrophy most likely related to chronic  neuropathy. Chronic arthritic changes are present with degenerative  subcortical cyst formation at the TMT joints and intercuneiform joints.  Generalized edema of the subcutaneous fat about the midfoot and forefoot  consistent with cellulitis.     Impression: 1. Osteomyelitis of the distal shaft and head of the 1st proximal  phalanx and of the distal phalanx with overlying soft tissue  wound/ulcer. Gas within the soft tissues and marrow of the great toe  associated with infection. Tenosynovitis flexor hallucis longus tendon  sheath. Forefoot cellulitis.  2. No evidence for septic arthritis at the 1st MTP joint.  3. Muscular edema and atrophy associated with myositis or chronic  neuropathy.     This report was finalized on 2/13/2025 7:48 AM by Junior Villalobos M.D  on Workstation: [id number]          Assessment/Plan       39-year-old male with osteomyelitis  right hallux, peripheral vascular disease    -patient examined and evaluated by myself  - Leukocytosis, infectious disease monitoring antibiotics and appreciate their recommendation  - Dialysis performed this morning  - MRI performed last night which shows osteomyelitis of the proximal distal phalanx of the hallux.  There is some soft tissue gas present.  Clinically, there is no purulence  and this is likely due to the tissue degradation and the significant ulceration that is present.  - ABIs performed at [facility] in 2023 showed mild disease on the right, new ABIs were obtained this afternoon  - I have placed an inpatient vascular surgery consult.  Will await further evaluation regarding the vascular disease  extent.  I discussed with the patient and his wife that he will   Likely need to undergo a hallux amputation.    Trevon Garcia DPM  [facility]  Office: [phone]

## 2025-02-14 PROBLEM — M86.9 OSTEOMYELITIS OF GREAT TOE OF RIGHT FOOT: Status: ACTIVE | Noted: 2025-02-11

## 2025-02-14 LAB
ABO GROUP BLD: NORMAL
ALBUMIN SERPL-MCNC: 3.3 G/DL (ref 3.5–5.2)
ANION GAP SERPL CALCULATED.3IONS-SCNC: 11 MMOL/L (ref 5–15)
BASOPHILS # BLD AUTO: 0.04 10*3/MM3 (ref 0–0.2)
BASOPHILS NFR BLD AUTO: 0.3 % (ref 0–1.5)
BLD GP AB SCN SERPL QL: NEGATIVE
BUN SERPL-MCNC: 40 MG/DL (ref 6–20)
BUN/CREAT SERPL: 6.1 (ref 7–25)
CALCIUM SPEC-SCNC: 9 MG/DL (ref 8.6–10.5)
CHLORIDE SERPL-SCNC: 95 MMOL/L (ref 98–107)
CO2 SERPL-SCNC: 23 MMOL/L (ref 22–29)
CREAT SERPL-MCNC: 6.57 MG/DL (ref 0.76–1.27)
DEPRECATED RDW RBC AUTO: 42.1 FL (ref 37–54)
EGFRCR SERPLBLD CKD-EPI 2021: 10.2 ML/MIN/1.73
EOSINOPHIL # BLD AUTO: 0.18 10*3/MM3 (ref 0–0.4)
EOSINOPHIL NFR BLD AUTO: 1.3 % (ref 0.3–6.2)
ERYTHROCYTE [DISTWIDTH] IN BLOOD BY AUTOMATED COUNT: 12.9 % (ref 12.3–15.4)
GLUCOSE BLDC GLUCOMTR-MCNC: 206 MG/DL (ref 70–130)
GLUCOSE BLDC GLUCOMTR-MCNC: 285 MG/DL (ref 70–130)
GLUCOSE SERPL-MCNC: 202 MG/DL (ref 65–99)
HCT VFR BLD AUTO: 20.6 % (ref 37.5–51)
HCT VFR BLD AUTO: 27.3 % (ref 37.5–51)
HGB BLD-MCNC: 6.6 G/DL (ref 13–17.7)
HGB BLD-MCNC: 9.4 G/DL (ref 13–17.7)
IMM GRANULOCYTES # BLD AUTO: 0.18 10*3/MM3 (ref 0–0.05)
IMM GRANULOCYTES NFR BLD AUTO: 1.3 % (ref 0–0.5)
LYMPHOCYTES # BLD AUTO: 0.89 10*3/MM3 (ref 0.7–3.1)
LYMPHOCYTES NFR BLD AUTO: 6.4 % (ref 19.6–45.3)
MCH RBC QN AUTO: 28.9 PG (ref 26.6–33)
MCHC RBC AUTO-ENTMCNC: 32 G/DL (ref 31.5–35.7)
MCV RBC AUTO: 90.4 FL (ref 79–97)
MONOCYTES # BLD AUTO: 1.13 10*3/MM3 (ref 0.1–0.9)
MONOCYTES NFR BLD AUTO: 8.1 % (ref 5–12)
NEUTROPHILS NFR BLD AUTO: 11.54 10*3/MM3 (ref 1.7–7)
NEUTROPHILS NFR BLD AUTO: 82.6 % (ref 42.7–76)
NRBC BLD AUTO-RTO: 0 /100 WBC (ref 0–0.2)
PHOSPHATE SERPL-MCNC: 3.5 MG/DL (ref 2.5–4.5)
PLATELET # BLD AUTO: 226 10*3/MM3 (ref 140–450)
PMV BLD AUTO: 8.6 FL (ref 6–12)
POTASSIUM SERPL-SCNC: 3.6 MMOL/L (ref 3.5–5.2)
RBC # BLD AUTO: 2.28 10*6/MM3 (ref 4.14–5.8)
RH BLD: POSITIVE
SODIUM SERPL-SCNC: 129 MMOL/L (ref 136–145)
T&S EXPIRATION DATE: NORMAL
VANCOMYCIN SERPL-MCNC: 22.3 MCG/ML (ref 5–40)
WBC NRBC COR # BLD AUTO: 13.96 10*3/MM3 (ref 3.4–10.8)

## 2025-02-14 PROCEDURE — 86923 COMPATIBILITY TEST ELECTRIC: CPT

## 2025-02-14 PROCEDURE — 85018 HEMOGLOBIN: CPT | Performed by: HOSPITALIST

## 2025-02-14 PROCEDURE — 80069 RENAL FUNCTION PANEL: CPT

## 2025-02-14 PROCEDURE — 63710000001 INSULIN LISPRO (HUMAN) PER 5 UNITS: Performed by: HOSPITALIST

## 2025-02-14 PROCEDURE — 85014 HEMATOCRIT: CPT | Performed by: HOSPITALIST

## 2025-02-14 PROCEDURE — 86901 BLOOD TYPING SEROLOGIC RH(D): CPT

## 2025-02-14 PROCEDURE — 86901 BLOOD TYPING SEROLOGIC RH(D): CPT | Performed by: HOSPITALIST

## 2025-02-14 PROCEDURE — 80202 ASSAY OF VANCOMYCIN: CPT | Performed by: HOSPITALIST

## 2025-02-14 PROCEDURE — 86900 BLOOD TYPING SEROLOGIC ABO: CPT | Performed by: HOSPITALIST

## 2025-02-14 PROCEDURE — 86900 BLOOD TYPING SEROLOGIC ABO: CPT

## 2025-02-14 PROCEDURE — 25010000002 ONDANSETRON PER 1 MG: Performed by: HOSPITALIST

## 2025-02-14 PROCEDURE — 63710000001 INSULIN GLARGINE PER 5 UNITS: Performed by: HOSPITALIST

## 2025-02-14 PROCEDURE — P9016 RBC LEUKOCYTES REDUCED: HCPCS

## 2025-02-14 PROCEDURE — 82948 REAGENT STRIP/BLOOD GLUCOSE: CPT

## 2025-02-14 PROCEDURE — 85025 COMPLETE CBC W/AUTO DIFF WBC: CPT

## 2025-02-14 PROCEDURE — 86850 RBC ANTIBODY SCREEN: CPT | Performed by: HOSPITALIST

## 2025-02-14 RX ADMIN — ATORVASTATIN CALCIUM 40 MG: 20 TABLET, FILM COATED ORAL at 20:18

## 2025-02-14 RX ADMIN — INSULIN GLARGINE 10 UNITS: 100 INJECTION, SOLUTION SUBCUTANEOUS at 21:20

## 2025-02-14 RX ADMIN — PANTOPRAZOLE SODIUM 40 MG: 40 TABLET, DELAYED RELEASE ORAL at 05:22

## 2025-02-14 RX ADMIN — ZOLPIDEM TARTRATE 5 MG: 5 TABLET, FILM COATED ORAL at 23:56

## 2025-02-14 RX ADMIN — CARVEDILOL 25 MG: 25 TABLET, FILM COATED ORAL at 20:18

## 2025-02-14 RX ADMIN — ONDANSETRON 4 MG: 2 INJECTION INTRAMUSCULAR; INTRAVENOUS at 12:09

## 2025-02-14 RX ADMIN — HYDROCODONE BITARTRATE AND ACETAMINOPHEN 2 TABLET: 5; 325 TABLET ORAL at 20:18

## 2025-02-14 RX ADMIN — HYDROCODONE BITARTRATE AND ACETAMINOPHEN 2 TABLET: 5; 325 TABLET ORAL at 12:09

## 2025-02-14 RX ADMIN — INSULIN LISPRO 3 UNITS: 100 INJECTION, SOLUTION INTRAVENOUS; SUBCUTANEOUS at 07:34

## 2025-02-14 RX ADMIN — HYDROCODONE BITARTRATE AND ACETAMINOPHEN 2 TABLET: 5; 325 TABLET ORAL at 15:53

## 2025-02-14 RX ADMIN — INSULIN LISPRO 4 UNITS: 100 INJECTION, SOLUTION INTRAVENOUS; SUBCUTANEOUS at 18:20

## 2025-02-14 RX ADMIN — DOCUSATE SODIUM 100 MG: 100 CAPSULE, LIQUID FILLED ORAL at 20:18

## 2025-02-14 RX ADMIN — INSULIN LISPRO 3 UNITS: 100 INJECTION, SOLUTION INTRAVENOUS; SUBCUTANEOUS at 21:20

## 2025-02-14 RX ADMIN — HYDROCODONE BITARTRATE AND ACETAMINOPHEN 2 TABLET: 5; 325 TABLET ORAL at 23:56

## 2025-02-14 RX ADMIN — TICAGRELOR 90 MG: 90 TABLET ORAL at 22:04

## 2025-02-14 RX ADMIN — BUMETANIDE 2 MG: 2 TABLET ORAL at 18:20

## 2025-02-14 RX ADMIN — INSULIN LISPRO 3 UNITS: 100 INJECTION, SOLUTION INTRAVENOUS; SUBCUTANEOUS at 05:21

## 2025-02-14 RX ADMIN — ONDANSETRON 4 MG: 2 INJECTION INTRAMUSCULAR; INTRAVENOUS at 21:23

## 2025-02-14 NOTE — PROGRESS NOTES
"  Infectious Diseases Progress Note    Abdiaziz Paz MD     [facility]  Los: 3 days  Patient Identification:  Name: Wilner Menjivar  Age: 39 y.o.  Sex: male  :  1985  MRN: [id number]         Primary Care Physician: Roosevelt Thao MD        Subjective: Denies any complaints.  Going for surgery later today.  Interval History: See consultation note.    Objective:    Scheduled Meds:amLODIPine, 10 mg, Oral, Daily  aspirin, 81 mg, Oral, Daily  atorvastatin, 40 mg, Oral, Nightly  bumetanide, 2 mg, Oral, BID Diuretics  carvedilol, 25 mg, Oral, BID  cefTRIAXone, 2,000 mg, Intravenous, Daily  docusate sodium, 100 mg, Oral, BID  epoetin jamil/jamil-epbx, 10,000 Units, Subcutaneous, Once per day on   insulin glargine, 10 Units, Subcutaneous, Nightly  insulin lispro, 2-7 Units, Subcutaneous, 4x Daily AC & at Bedtime  losartan, 25 mg, Oral, Daily  pantoprazole, 40 mg, Oral, Q AM  polyethylene glycol, 17 g, Oral, Daily  terazosin, 2 mg, Oral, Nightly  ticagrelor, 90 mg, Oral, Q12H      Continuous Infusions:Pharmacy to dose vancomycin,         Vital signs in last 24 hours:  Temp:  [98.1 °F (36.7 °C)-98.6 °F (37 °C)] 98.1 °F (36.7 °C)  Heart Rate:  [] 99  Resp:  [16-18] 18  BP: (115-150)/(63-78) 117/63    Intake/Output:    Intake/Output Summary (Last 24 hours) at 2025 0621  Last data filed at 2025 1100  Gross per 24 hour   Intake --   Output 225 ml   Net -225 ml       Exam:  /63 (BP Location: Right arm, Patient Position: Lying)   Pulse 99   Temp 98.1 °F (36.7 °C) (Tympanic)   Resp 18   Ht 170.2 cm (67\")   Wt 108 kg (238 lb 1.6 oz)   SpO2 98%   BMI 37.29 kg/m²   Patient is examined using the personal protective equipment as per guidelines from infection control for this particular patient as enacted.  Hand washing was performed before and after patient interaction.  General Appearance:    Alert, cooperative, no distress, AAOx3                          Head:    " Normocephalic, without obvious abnormality, atraumatic                           Eyes:  Legally blind                         Throat:   Lips, tongue, gums normal; oral mucosa pink and moist                           Neck:   Supple, symmetrical, trachea midline, no JVD                         Lungs:    Clear to auscultation bilaterally, respirations unlabored                 Chest Wall:    No tenderness or deformity                          Heart:  S1-S2 regular                  Abdomen:   Soft nontender                 Extremities:                          Pulses:   Pulses palpable in all extremities                            Skin:   Skin is warm and dry,  no rashes or palpable lesions                  Neurologic: Legally blind       Data Review:    I reviewed the patient's new clinical results.  Results from last 7 days   Lab Units 02/14/25  0333 02/13/25  0507 02/12/25  0746 02/11/25  1556   WBC 10*3/mm3 13.96* 11.03* 10.26 11.05*   HEMOGLOBIN g/dL 6.6* 7.2* 7.4* 7.9*   PLATELETS 10*3/mm3 226 175 155 138*     Results from last 7 days   Lab Units 02/14/25  0333 02/13/25  0507 02/12/25  0746 02/11/25  1556   SODIUM mmol/L 129* 130* 130* 125*   POTASSIUM mmol/L 3.6 3.5 3.9 4.5   CHLORIDE mmol/L 95* 94* 95* 88*   CO2 mmol/L 23.0 24.0 23.0 23.5   BUN mg/dL 40* 26* 45* 42*   CREATININE mg/dL 6.57* 4.84* 6.03* 5.85*   CALCIUM mg/dL 9.0 9.1 8.9 9.0   GLUCOSE mg/dL 202* 148* 172* 442*   Doppler Arterial Multi Level Lower Extremity - Bilateral CAR    Addendum Date: 2/13/2025      Right Conclusion: The right GIOVANA is moderately reduced. Waveforms are consistent with femoral disease, popliteal disease and tib-peroneal disease. Normal digital pressures.   Left Conclusion: The left GIOVANA is normal. Normal digital pressures.     MRI Foot Right Without Contrast    Result Date: 2/13/2025  1. Osteomyelitis of the distal shaft and head of the 1st proximal phalanx and of the distal phalanx with overlying soft tissue wound/ulcer. Gas  within the soft tissues and marrow of the great toe associated with infection. Tenosynovitis flexor hallucis longus tendon sheath. Forefoot cellulitis. 2. No evidence for septic arthritis at the 1st MTP joint. 3. Muscular edema and atrophy associated with myositis or chronic neuropathy.  This report was finalized on 2/13/2025 7:48 AM by Junior Villalobos M.D on Workstation: [id number]      XR Foot 3+ View Right    Result Date: 2/11/2025   There is subcutaneous emphysema and soft tissue loss over the great toe. No radiopaque foreign body. No definite bone erosion.    This report was finalized on 2/11/2025 5:20 PM by Dr. Jason Dan M.D on Workstation: [id number]     Microbiology Results (last 10 days)       Procedure Component Value - Date/Time    MRSA Screen, PCR (Inpatient) - Swab, Nares [[id number]]  (Normal) Collected: 02/13/25 0906    Lab Status: Final result Specimen: Swab from Nares Updated: 02/13/25 1102     MRSA PCR No MRSA Detected    Narrative:      The negative predictive value of this diagnostic test is high and should only be used to consider de-escalating anti-MRSA therapy. A positive result may indicate colonization with MRSA and must be correlated clinically.    Blood Culture - Blood, Arm, Right [[id number]]  (Normal) Collected: 02/11/25 1749    Lab Status: Preliminary result Specimen: Blood from Arm, Right Updated: 02/13/25 1800     Blood Culture No growth at 2 days    Blood Culture - Blood, Arm, Right [[id number]]  (Normal) Collected: 02/11/25 1742    Lab Status: Preliminary result Specimen: Blood from Arm, Right Updated: 02/13/25 1800     Blood Culture No growth at 2 days    Narrative:      Less than seven (7) mL's of blood was collected.  Insufficient quantity may yield false negative results.          Telemetry Scan   Final Result      Telemetry Scan   Final Result      Telemetry Scan   Final Result              Assessment:    Diabetic toe ulcer  39-year-old male with  1-right great toe  diabetic/ischemic ulcer with dry gangrene with associated abscess and contiguous focus osteomyelitis of the proximal and distal phalanx with associated flexor hallucis tenosynovitis  2-significant peripheral vascular disease with element of gangrene involving the great toe  3-type 2 diabetes  4-end-stage renal disease  5-legally blind  6-severe anemia multifactorial  7-hypertension  8-multiple antibiotic allergies/intolerances including allergy to penicillin though it could very well be not a true allergy given the description of his reaction.     Recommendations/Discussions:  See my discussion and recommendations on 2/13/2025.  Continue present antibiotic therapy  Follow-up on operative culture results and pathology report and based on whether or not there is concern for residual osteomyelitis decide on duration of antibiotic treatment.  Further vascular evaluation and need for revascularization be deferred to our podiatry and vascular surgery colleagues.    Abdiaziz Paz MD  2/14/2025  06:21 EST    Parts of this note may be an electronic transcription/translation of spoken language to printed text using the Dragon dictation system.

## 2025-02-14 NOTE — PROGRESS NOTES
[facility] Progress Note      Patient Name: Wilner Menjivar  : 1985  MRN: [id number]  Primary Care Physician:  Roosevelt Thao MD  Date of admission: 2025    Subjective     Interval History:   Follow-up on ESRD    Patient lying in bed comfortable denies any  Abdominal pain or GI bleed  No chest pain, shortness of breath, palpitations   tolerating oral intake      Review of Systems:   As noted above    Objective     Vitals:   Temp:  [98.1 °F (36.7 °C)-98.6 °F (37 °C)] 98.1 °F (36.7 °C)  Heart Rate:  [] 99  Resp:  [16-18] 18  BP: (115-150)/(63-78) 117/63    Intake/Output Summary (Last 24 hours) at 2025 0719  Last data filed at 2025 1100  Gross per 24 hour   Intake --   Output 225 ml   Net -225 ml       Physical Exam:    General Appearance: Awake, chronically ill, no acute distress  Skin: warm and dry  HEENT: oral mucosa normal, nonicteric sclera, legally blind  Neck: supple, no JVD  Lungs: CTA, no wheezing, nonlabored on RA  Heart: Tachycardic, RR, no rub  Abdomen: soft, nontender, nondistended, BS +  : no palpable bladder  Extremities: no edema, cyanosis or clubbing; wound to right great toe  Neuro: normal speech and mental status   Vascular: L AVF patent, + thrill and bruit    Scheduled Meds:     amLODIPine, 10 mg, Oral, Daily  aspirin, 81 mg, Oral, Daily  atorvastatin, 40 mg, Oral, Nightly  bumetanide, 2 mg, Oral, BID Diuretics  carvedilol, 25 mg, Oral, BID  cefTRIAXone, 2,000 mg, Intravenous, Daily  docusate sodium, 100 mg, Oral, BID  epoetin jamil/jamil-epbx, 10,000 Units, Subcutaneous, Once per day on   insulin glargine, 10 Units, Subcutaneous, Nightly  insulin lispro, 2-7 Units, Subcutaneous, 4x Daily AC & at Bedtime  losartan, 25 mg, Oral, Daily  pantoprazole, 40 mg, Oral, Q AM  polyethylene glycol, 17 g, Oral, Daily  terazosin, 2 mg, Oral, Nightly  ticagrelor, 90 mg, Oral, Q12H      IV Meds:   Pharmacy to dose vancomycin,          Results Reviewed:   I have personally reviewed the results from the time of this admission to 2/14/2025 07:19 EST     Results from last 7 days   Lab Units 02/14/25  0333 02/13/25  0507 02/12/25  0746 02/11/25  1556   SODIUM mmol/L 129* 130* 130* 125*   POTASSIUM mmol/L 3.6 3.5 3.9 4.5   CHLORIDE mmol/L 95* 94* 95* 88*   CO2 mmol/L 23.0 24.0 23.0 23.5   BUN mg/dL 40* 26* 45* 42*   CREATININE mg/dL 6.57* 4.84* 6.03* 5.85*   CALCIUM mg/dL 9.0 9.1 8.9 9.0   BILIRUBIN mg/dL  --  0.6  --  0.9   ALK PHOS U/L  --  131*  --  154*   ALT (SGPT) U/L  --  19  --  14   AST (SGOT) U/L  --  25  --  19   GLUCOSE mg/dL 202* 148* 172* 442*       Estimated Creatinine Clearance: 17.5 mL/min (A) (by C-G formula based on SCr of 6.57 mg/dL (H)).    Results from last 7 days   Lab Units 02/14/25  0333   PHOSPHORUS mg/dL 3.5             Results from last 7 days   Lab Units 02/14/25  0333 02/13/25  0507 02/12/25  0746 02/11/25  1556   WBC 10*3/mm3 13.96* 11.03* 10.26 11.05*   HEMOGLOBIN g/dL 6.6* 7.2* 7.4* 7.9*   PLATELETS 10*3/mm3 226 175 155 138*             Assessment / Plan     ASSESSMENT:    -End Stage Renal Disease ( ESRD )  since 6/2024 followed by Dr. Wasserman on Hemodialysis on a Monday, Wednesday and Friday schedule via LUE AVF.  Last HD was 2/12.  Continue renal diet      -Acute on chronic normocytic anemia. On Epogen protocol.  10,000 units subcu  TIW , we will continue to follow H&H closely, low iron stores 2/13-screen ordered.  Primary team ordered PRBCs x 2     -Hyperphosphatemia. Continue renal diet  meals w low phosphorus . We will follow phosphorus to make further adjustments to binders if needed      -Hypertension w/ CKD.  Continue home regimen. We will continue to follow closely. Heart healthy diet      -Coronary artery disease status postcardiac cath with stent placement to mid/distal OM2 , and proximal Cx. ( 9/24 ).  On medical management     -Diabetes Mellitus type 2 w/ complications ( retinopathy , peripheral  "vascular disease  nephropathy ) .Continue insulin regimen and Diabetic diet, as per primary team     -Right diabetic foot as per primary team.  Foot x-ray showing subcutaneous emphysema and soft tissue loss over the great toe. MRI showing \" Osteomyelitis of the distal shaft and head of the 1st proximal phalanx and of the distal phalanx with overlying soft tissue wound/ulcer. Gas within the soft tissues and marrow of the great toe associated with infection. Tenosynovitis flexor hallucis longus tendon sheath. Forefoot cellulitis\"      -Hypervolemic hyponatremia will adjust dialysis prescription and will follow sodium trend    PLAN:    Sudden drop of hemoglobin type and screen order primary team order a PRBCs  Arrange for hemodialysis today  Will discontinue and hold amlodipine, losartan, terazosin  Awaiting possible surgical plan during his admission  Surveillance labs    Thank you for involving us in the care of Wilner ANGELLA DelarosaMenjivar.  Please feel free to call with any questions.    Houston Ramos MD  02/14/25  07:19 [facility]    [facility]  [phone]    Please note that portions of this note were completed with a voice recognition program.  "

## 2025-02-14 NOTE — PROGRESS NOTES
"Daily progress note    Primary care physician  Dr. NERI    Subjective   Doing same with no new complaints and family at bedside    History of present illness  39-year-old male with history of end-stage renal disease on hemodialysis chronic anemia diabetes hypertension hyperlipidemia coronary artery disease and gastroesophageal of disease who is also legally blind and has right foot wound which is getting worse mainly right great toe which looks infected and has recently seen by podiatrist and removed the callus from the right great toe.  Patient has intermittent bleeding pain drainage but no fever chills.  Patient also denies any chest pain shortness of breath palpitation abdominal pain nausea vomiting diarrhea.  Patient workup in ER revealed infected right great toe wound admitted for management.     REVIEW OF SYSTEMS  Unremarkable except pain     PHYSICAL EXAM   Blood pressure 131/74, pulse 98, temperature 98.1 °F (36.7 °C), temperature source Oral, resp. rate 16, height 170.2 cm (67\"), weight 108 kg (238 lb 1.6 oz), SpO2 98%.    Constitutional:       General: He is not in acute distress.     Appearance: Normal appearance. He is not ill-appearing, toxic-appearing or diaphoretic.   HENT:      Head: Normocephalic and atraumatic.      Nose: Nose normal.      Mouth/Throat:      Mouth: Mucous membranes are moist.      Pharynx: Oropharynx is clear.   Eyes:      Conjunctiva/sclera: Conjunctivae normal.      Pupils: Pupils are equal, round, and reactive to light.   Cardiovascular:      Rate and Rhythm: Normal rate and regular rhythm.      Pulses: Normal pulses.   Pulmonary:      Effort: Pulmonary effort is normal.   Abdominal:      General: Abdomen is flat.      Palpations: Abdomen is soft.   Musculoskeletal:      Comments: Right great toe is black in color with a deep ulceration and dried blood, no active purulent drainage, no palpable crepitus or fluctuance   Skin:     General: Skin is warm and dry.   Neurological:      " General: No focal deficit present.      Mental Status: He is alert and oriented to person, place, and time. Mental status is at baseline.   Psychiatric:         Mood and Affect: Mood normal.         Behavior: Behavior normal.         Thought Content: Thought content normal.         Judgment: Judgment normal.      LAB RESULTS  Lab Results (last 24 hours)       Procedure Component Value Units Date/Time    CBC & Differential [[id number]]  (Abnormal) Collected: 02/14/25 0333    Specimen: Blood from Arm, Right Updated: 02/14/25 0500    Narrative:      The following orders were created for panel order CBC & Differential.  Procedure                               Abnormality         Status                     ---------                               -----------         ------                     CBC Auto Differential[[id number]]        Abnormal            Final result                 Please view results for these tests on the individual orders.    CBC Auto Differential [[id number]]  (Abnormal) Collected: 02/14/25 0333    Specimen: Blood from Arm, Right Updated: 02/14/25 0500     WBC 13.96 10*3/mm3      RBC 2.28 10*6/mm3      Hemoglobin 6.6 g/dL      Hematocrit 20.6 %      MCV 90.4 fL      MCH 28.9 pg      MCHC 32.0 g/dL      RDW 12.9 %      RDW-SD 42.1 fl      MPV 8.6 fL      Platelets 226 10*3/mm3      Neutrophil % 82.6 %      Lymphocyte % 6.4 %      Monocyte % 8.1 %      Eosinophil % 1.3 %      Basophil % 0.3 %      Immature Grans % 1.3 %      Neutrophils, Absolute 11.54 10*3/mm3      Lymphocytes, Absolute 0.89 10*3/mm3      Monocytes, Absolute 1.13 10*3/mm3      Eosinophils, Absolute 0.18 10*3/mm3      Basophils, Absolute 0.04 10*3/mm3      Immature Grans, Absolute 0.18 10*3/mm3      nRBC 0.0 /100 WBC     Renal Function Panel [[id number]]  (Abnormal) Collected: 02/14/25 0333    Specimen: Blood from Arm, Right Updated: 02/14/25 0445     Glucose 202 mg/dL      BUN 40 mg/dL      Creatinine 6.57 mg/dL      Sodium 129 mmol/L       Potassium 3.6 mmol/L      Chloride 95 mmol/L      CO2 23.0 mmol/L      Calcium 9.0 mg/dL      Albumin 3.3 g/dL      Phosphorus 3.5 mg/dL      Anion Gap 11.0 mmol/L      BUN/Creatinine Ratio 6.1     eGFR 10.2 mL/min/1.73     Narrative:      GFR Categories in Chronic Kidney Disease (CKD)      GFR Category          GFR (mL/min/1.73)    Interpretation  G1                     90 or greater         Normal or high (1)  G2                      60-89                Mild decrease (1)  G3a                   45-59                Mild to moderate decrease  G3b                   30-44                Moderate to severe decrease  G4                    15-29                Severe decrease  G5                    14 or less           Kidney failure          (1)In the absence of evidence of kidney disease, neither GFR category G1 or G2 fulfill the criteria for CKD.    eGFR calculation 2021 CKD-EPI creatinine equation, which does not include race as a factor    Vancomycin, Random [[id number]]  (Normal) Collected: 02/14/25 0333    Specimen: Blood from Arm, Right Updated: 02/14/25 0445     Vancomycin Random 22.30 mcg/mL     Narrative:      Therapeutic Ranges for Vancomycin    Vancomycin Random   5.0-40.0 mcg/mL  Vancomycin Trough   5.0-20.0 mcg/mL  Vancomycin Peak     20.0-40.0 mcg/mL    Blood Culture - Blood, Arm, Right [[id number]]  (Normal) Collected: 02/11/25 1742    Specimen: Blood from Arm, Right Updated: 02/13/25 1800     Blood Culture No growth at 2 days    Narrative:      Less than seven (7) mL's of blood was collected.  Insufficient quantity may yield false negative results.    Blood Culture - Blood, Arm, Right [[id number]]  (Normal) Collected: 02/11/25 1749    Specimen: Blood from Arm, Right Updated: 02/13/25 1800     Blood Culture No growth at 2 days          Imaging Results (Last 24 Hours)       ** No results found for the last 24 hours. **            Current Facility-Administered Medications:     aspirin chewable tablet 81 mg,  81 mg, Oral, Daily, Remy Thornton MD, 81 mg at 02/13/25 0849    atorvastatin (LIPITOR) tablet 40 mg, 40 mg, Oral, Nightly, Remy Thornton MD, 40 mg at 02/13/25 2049    bumetanide (BUMEX) tablet 2 mg, 2 mg, Oral, BID Diuretics, Remy Thornton MD, 2 mg at 02/13/25 1849    carvedilol (COREG) tablet 25 mg, 25 mg, Oral, BID, Remy Thornton MD, 25 mg at 02/13/25 2049    cefTRIAXone (ROCEPHIN) 2,000 mg in sodium chloride 0.9 % 100 mL MBP, 2,000 mg, Intravenous, Daily, Abdiaziz Paz MD, Last Rate: 200 mL/hr at 02/13/25 0849, 2,000 mg at 02/13/25 0849    dextrose (D50W) (25 g/50 mL) IV injection 25 g, 25 g, Intravenous, Q15 Min PRN, Remy Thornton MD    dextrose (GLUTOSE) oral gel 15 g, 15 g, Oral, Q15 Min PRN, Remy Thornton MD    docusate sodium (COLACE) capsule 100 mg, 100 mg, Oral, BID, Remy Thornton MD, 100 mg at 02/13/25 2049    epoetin jamil-epbx (RETACRIT) injection 10,000 Units, 10,000 Units, Subcutaneous, Once per day on Monday Wednesday Friday, Houston Ramos MD    glucagon (GLUCAGEN) injection 1 mg, 1 mg, Intramuscular, Q15 Min PRN, Remy Thornton MD    HYDROcodone-acetaminophen (NORCO) 5-325 MG per tablet 2 tablet, 2 tablet, Oral, Q4H PRN, Remy Thornton MD, 2 tablet at 02/14/25 1209    insulin glargine (LANTUS, SEMGLEE) injection 10 Units, 10 Units, Subcutaneous, Nightly, Remy Thornton MD, 10 Units at 02/13/25 2049    insulin lispro (HUMALOG/ADMELOG) injection 2-7 Units, 2-7 Units, Subcutaneous, 4x Daily AC & at Bedtime, Remy Thornton MD, 3 Units at 02/14/25 0734    ondansetron (ZOFRAN) injection 4 mg, 4 mg, Intravenous, Q6H PRN, Remy Thornton MD, 4 mg at 02/14/25 1209    pantoprazole (PROTONIX) EC tablet 40 mg, 40 mg, Oral, Q AM, Remy Thornton MD, 40 mg at 02/14/25 0522    Pharmacy to dose vancomycin, , Not Applicable, Continuous PRN, JacksonAbdiaziz MD    polyethylene glycol (MIRALAX) packet 17 g, 17 g, Oral, Daily, Remy Thornton MD    ticagrelor (BRILINTA) tablet 90 mg, 90 mg, Oral, Q12H, Remy Thornton MD, 90 mg  at 02/13/25 2049    zolpidem (AMBIEN) tablet 5 mg, 5 mg, Oral, Nightly PRN, Jaskaran Thornton MD, 5 mg at 02/13/25 2215     ASSESSMENT  Right great toe wound with infection and surrounding cellulitis and osteomyelitis  End-stage renal disease on hemodialysis  Diabetes mellitus  Hypertension  Hyperlipidemia  Coronary artery disease  Legally blind  Chronic anemia with drop in H&H  Gastroesophageal reflux disease    PLAN  CPM   Transfuse 2 unit packed RBC  Continue IV antibiotics   Pain management  Vascular surgery input appreciated  Infectious disease consult appreciated  Podiatry and nephrology to follow patient   Continue home medications  Stress ulcer DVT prophylaxis  Supportive care  Discussed with nursing staff and family  Follow closely further recommendation current hospital course    JASKARAN THORNTON MD    Copied text in this note has been reviewed and is accurate as of 02/14/25

## 2025-02-14 NOTE — NURSING NOTE
Patient admitted for diabetic foot ulcer.  Wet to dry dressing intact.   Patient given pain meds and Ambien for sleep.  Patient sleeping between care.  Patient has orders for transfer.

## 2025-02-14 NOTE — PLAN OF CARE
Goal Outcome Evaluation:   Patient went to dialysis this morning. When he returned back to the unit, 2 units of blood was started. Patient tolerated it well. Patients vitals stayed within acceptable range during reception.

## 2025-02-14 NOTE — NURSING NOTE
Diabetes Education    Patient Name:  Wilner Menjivar  YOB: 1985  MRN: [id number]  Admit Date:  2/11/2025      Attempted to consult pt, currently BERE at this time. Will attempt again if available.      Electronically signed by:  Chris Bashir RN  02/14/25 11:32 EST

## 2025-02-14 NOTE — NURSING NOTE
HD complete, 2.1 liters removed. Post /86  T 98.9. Treatment ended 1 hour early at patients request. Patient had to go to the restroom but refused the bed pan. Verbal report given.

## 2025-02-14 NOTE — PROGRESS NOTES
[facility]: Vancomycin Level Monitoring Note    Wilner Menjivar is a 39 y.o. male who is on day 3/14 of pharmacy to dose vancomycin for Skin and Soft Tissue.    Estimated Creatinine Clearance: 17.5 mL/min (A) (by C-G formula based on SCr of 6.57 mg/dL (H)).    Current Vanc Dose: intermittent dosing HD MWF next scheduled 2/14   Results from last 7 days   Lab Units 02/14/25  0333 02/12/25  0746   VANCOMYCIN RM mcg/mL 22.30 24.80     No doses scheduled at this time, expect pt level to be >15mcg/mL post HD today.   Next Level Date and Time: Vanc Random on 2/17 @ 0600    No changes at this time. Pharmacy is continuing to monitor and will adjust as needed.    Teresa Bowman, PharmD  Clinical Pharmacist     

## 2025-02-14 NOTE — PLAN OF CARE
Goal Outcome Evaluation:      Patient received care and medications throughout the day. Patient went to vascular and wound care assessed his injury. Patient is resting.                                       [facility]  Anesthesia Pre-Operative Evaluation         Patient Name: Korey Santos  YOB: 1942  MRN: [id number]    SUBJECTIVE:     04/11/2024    Procedure(s) (LRB):  CYSTOSCOPY WITH TRANSURETHRAL DESTRUCTION OF PROSTATE,RFA  rezum generator and at least 2 handpieces Contact Paul grant to schedule (N/A)    Korey Santos is a 81 y.o. male here for Procedure(s) (LRB):  CYSTOSCOPY WITH TRANSURETHRAL DESTRUCTION OF PROSTATE,RFA  rezum generator and at least 2 handpieces Contact Paul grant to schedule (N/A)    Drips:     Patient Active Problem List   Diagnosis    Benign prostatic hyperplasia with urinary frequency    Erectile dysfunction    DJD (degenerative joint disease) of hip    Lumbar spondylosis    Lumbar herniated disc    Hyperlipidemia    Left inguinal hernia    Hypertension, essential    Medial meniscus tear    Elevated PSA    Dysphagia, pharyngoesophageal    Laryngopharyngeal reflux    Chronic rhinitis    Genu varum of both lower extremities    Status post total left knee replacement    Polyp of colon    Lumbar facet arthropathy    Class 1 obesity due to excess calories with serious comorbidity in adult    Constipation    Multiple thyroid nodules: see u/s 7/23    Bradycardia    Left hand pain    Ecchymosis of forearm    Coronary artery disease involving native coronary artery without angina pectoris    Complete heart block    Pacemaker       Review of patient's allergies indicates:   Allergen Reactions    Clindamycin Swelling     Throat swells    Lisinopril Swelling and Other (See Comments)     Throat swelling    Shellfish containing products        No current facility-administered medications on file prior to encounter.     Current Outpatient Medications on File Prior to Encounter   Medication Sig Dispense Refill    azelastine (ASTELIN) 137 mcg (0.1 %) nasal spray 1 spray (137 mcg total) by Nasal route 2 (two) times daily as needed for Rhinitis. 30 mL 0    NIFEdipine  (PROCARDIA-XL) 90 MG (OSM) 24 hr tablet Take 1 tablet (90 mg total) by mouth once daily. 90 tablet 3    pravastatin (PRAVACHOL) 40 MG tablet Take 1 tablet (40 mg total) by mouth once daily. 90 tablet 3    sulfamethoxazole-trimethoprim 800-160mg (BACTRIM DS) 800-160 mg Tab Take 1 tablet by mouth 2 (two) times daily. for 10 days 20 tablet 0    tamsulosin (FLOMAX) 0.4 mg Cap Take 1 capsule (0.4 mg total) by mouth once daily. 90 capsule 3    aspirin (ECOTRIN) 325 MG EC tablet Take 1 tablet (325 mg total) by mouth once daily. (Patient taking differently: Take 325 mg by mouth every other day.) 42 tablet 0    finasteride (PROSCAR) 5 mg tablet Take 1 tablet (5 mg total) by mouth once daily. 90 tablet 3    fluticasone propionate (FLONASE) 50 mcg/actuation nasal spray 1 spray (50 mcg total) by Each Nostril route once daily. 16 g 6    hydrOXYzine HCL (ATARAX) 25 MG tablet Take 1 tablet (25 mg total) by mouth 3 (three) times daily as needed for Anxiety. 30 tablet 0    linaCLOtide (LINZESS) 145 mcg Cap capsule Take 1 capsule (145 mcg total) by mouth daily as needed (constipation). 90 capsule 3    losartan (COZAAR) 100 MG tablet Take 1 tablet (100 mg total) by mouth once daily. 90 tablet 3    methylPREDNISolone (MEDROL DOSEPACK) 4 mg tablet use as directed 21 each 0    multivitamin capsule Take 1 capsule by mouth once daily.         Past Surgical History:   Procedure Laterality Date    A-V CARDIAC PACEMAKER INSERTION N/A 12/19/2023    Procedure: INSERTION, CARDIAC PACEMAKER, DUAL CHAMBER;  Surgeon: Noah Vazquez MD;  Location: [facility] CATH LAB/EP;  Service: Cardiology;  Laterality: N/A;    BACK SURGERY  2014    COLONOSCOPY      COLONOSCOPY N/A 5/17/2023    Procedure: COLONOSCOPY;  Surgeon: Christiano Vazquez MD;  Location: [facility] ENDO;  Service: Endoscopy;  Laterality: N/A;  Constipation 2 day prep.Instructions to portal.EC    CORONARY ANGIOGRAPHY N/A 12/18/2023    Procedure: ANGIOGRAM, CORONARY ARTERY;  Surgeon: Leelee  Flaco Cardenas MD;  Location: [facility] CATH LAB/EP;  Service: Cardiology;  Laterality: N/A;    EPIDURAL STEROID INJECTION INTO LUMBAR SPINE N/A 7/5/2022    Procedure: Injection-steroid-epidural-lumbar L3-4;  Surgeon: Yury Crum Jr., MD;  Location: [facility] PAIN MGT;  Service: Pain Management;  Laterality: N/A;  oral sedation   asa      INSERTION, PACEMAKER, TEMPORARY TRANSVENOUS  12/18/2023    Procedure: Insertion, Pacemaker, Temporary Transvenous;  Surgeon: Flaco Kelly MD;  Location: [facility] CATH LAB/EP;  Service: Cardiology;;    JOINT REPLACEMENT Left 05/04/2018    KNEE SURGERY      left hand      LEG SURGERY      SPINE SURGERY      UPPER GASTROINTESTINAL ENDOSCOPY         Social History     Socioeconomic History    Marital status: Single   Tobacco Use    Smoking status: Never    Smokeless tobacco: Never   Substance and Sexual Activity    Alcohol use: Yes     Alcohol/week: 0.0 standard drinks of alcohol     Comment: approximately 2 beers weekly    Drug use: No    Sexual activity: Yes     Partners: Female     Birth control/protection: None   Social History Narrative    Ret. 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     Social Determinants of Health     Transportation Needs: No Transportation Needs (12/19/2023)    PRAPARE - Transportation     Lack of Transportation (Medical): No     Lack of Transportation (Non-Medical): No   Physical Activity: Sufficiently Active (12/19/2023)    Exercise Vital Sign     Days of Exercise per Week: 3 days     Minutes of Exercise per Session: 50 min   Social Connections: Unknown (12/19/2023)    Social Connection and Isolation Panel [NHANES]     Frequency of Communication with Friends and Family: More than three times a week     Frequency of Social Gatherings with Friends and Family: More than three times a week     Marital Status:    Housing Stability: Low Risk  (12/19/2023)    Housing Stability Vital Sign     Unable to Pay for Housing in the Last Year: No     Number of Places  Lived in the Last Year: 1     Unstable Housing in the Last Year: No         OBJECTIVE:     Vital Signs Range (Last 24H):  Temp:  [37 °C (98.6 °F)] 37 °C (98.6 °F)  Pulse:  [66] 66  Resp:  [18] 18  SpO2:  [97 %] 97 %  BP: (175)/(81) 175/81    Significant Labs:  Lab Results   Component Value Date    WBC 6.66 03/05/2024    HGB 14.4 03/05/2024    HCT 43.8 03/05/2024     03/05/2024    CHOL 190 12/28/2022    TRIG 62 12/28/2022    HDL 45 12/28/2022    ALT 26 12/18/2023    AST 29 12/18/2023     12/22/2023    K 4.3 12/22/2023     12/22/2023    CREATININE 0.75 03/05/2024    BUN 19 12/22/2023    CO2 22 (L) 12/22/2023    TSH 1.780 12/28/2022    PSA 4.7 (H) 07/08/2017    INR 1.0 08/22/2022    GLUF 92 09/16/2017    HGBA1C 5.9 (H) 12/28/2022       Diagnostic Studies:    EKG:   Results for orders placed or performed during the hospital encounter of 12/18/23   EKG 12-lead    Collection Time: 12/18/23  6:07 PM    Narrative    Test Reason : R42,    Vent. Rate : 060 BPM     Atrial Rate : 089 BPM     P-R Int : 000 ms          QRS Dur : 154 ms      QT Int : 480 ms       P-R-T Axes : 078 -80 093 degrees     QTc Int : 480 ms    Sinus rhythm with complete heart block and Ventricular-paced rhythm  Abnormal ECG  When compared with ECG of 18-DEC-2023 10:05,  Electronic ventricular pacemaker has replaced Sinus rhythm  Vent. rate has increased BY  31 BPM  Confirmed by Leelee Cardenas MD, Flaco ([id number]) on 12/21/2023 3:37:03 PM    Referred By: [email or web address]   SELF           Confirmed By:Flaco Cardenas,       2D ECHO:  TTE:  No results found for this or any previous visit.  Results for orders placed or performed during the hospital encounter of 12/18/23   Echo   Result Value Ref Range    RA Width 4.23 cm    LA volume (mod) 107.75 cm3    Left Atrium Major Axis 7.19 cm    Left Atrium Minor Axis 6.47 cm    RA Major Axis 5.44 cm    LV Diastolic Volume 59.31 mL    LV Systolic Volume 25.95 mL    PV Peak D Kalyan 0.35 m/s    PV Peak S Kalyan  0.38 m/s    MV Peak A Kalyan 1.17 m/s    MV stenosis pressure 1/2 time 62.22 ms    MV VTI 45.1 cm    TR Max Kalyan 2.81 m/s    MV Peak E Kalyan 0.78 m/s    MV peak gradient 7 mmHg    Mr max kalyan 4.64 m/s    Ao VTI 37.80 cm    Ao peak kalyan 1.72 m/s    LVOT peak VTI 25.70 cm    LVOT peak kalyan 1.23 m/s    LVOT diameter 2.00 cm    E wave deceleration time 214.54 msec    MV mean gradient 2 mmHg    AV mean gradient 7 mmHg    RV S' 14.75 cm/s    TAPSE 2.72 cm    RVDD 3.41 cm    LA size 4.81 cm    Ascending aorta 3.63 cm    STJ 3.56 cm    Sinus 4.03 cm    LVIDs 2.66 2.1 - 4.0 cm    Posterior Wall 1.46 (A) 0.6 - 1.1 cm    IVS 1.40 (A) 0.6 - 1.1 cm    LVIDd 3.73 3.5 - 6.0 cm    PV PEAK VELOCITY 1.56 m/s    TDI LATERAL 0.08 m/s    Left Ventricular Outflow Tract Mean Gradient 3.70 mmHg    Left Ventricular Outflow Tract Mean Velocity 0.92 cm/s    Ansari's Biplane MOD Ejection Fraction 64 %    TDI SEPTAL 0.06 m/s    LV LATERAL E/E' RATIO 9.75 m/s    LV SEPTAL E/E' RATIO 13.00 m/s    FS 29 28 - 44 %    LV mass 195.53 g    ZLVIDD -7.82     ZLVIDS -4.89     Left Ventricle Relative Wall Thickness 0.78 cm    AV valve area 2.13 cm²    AV Velocity Ratio 0.72     AV index (prosthetic) 0.68     MV valve area p 1/2 method 3.54 cm2    MV valve area by continuity eq 1.79 cm2    PV peak gradient 10 mmHg    E/A ratio 0.67     Mean e' 0.07 m/s    Pulm vein S/D ratio 1.09     LVOT area 3.1 cm2    LVOT stroke volume 80.70 cm3    AV peak gradient 12 mmHg    E/E' ratio 11.14 m/s    LV Systolic Volume Index 11.5 mL/m2    LV Diastolic Volume Index 26.36 mL/m2    LV Mass Index 87 g/m2    Triscuspid Valve Regurgitation Peak Gradient 32 mmHg    LA Volume Index (Mod) 47.9 mL/m2    JANINE by Velocity Ratio 2.25 cm²    BSA 2.3 m2    LA Volume Index 60.6 mL/m2    LA volume 136.45 cm3    LA WIDTH 4.9 cm    TV resting pulmonary artery pressure 35 mmHg    RV TB RVSP 6 mmHg    Est. RA pres 3 mmHg    Narrative      Left Ventricle: The left ventricle is normal in size. There  is   concentric remodeling. Normal wall motion. There is normal systolic   function. Biplane (2D) method of discs ejection fraction is 64%. Grade II   diastolic dysfunction.    Right Ventricle: Normal right ventricular cavity size. Systolic   function is normal. TAPSE is 2.72 cm.    Left Atrium: Left atrium is severely dilated. The left atrium volume   index is 60.6 mL/m2.    Aortic Valve: There is mild aortic valve sclerosis.    Mitral Valve: There is mild regurgitation.    Tricuspid Valve: There is mild regurgitation.    Pulmonic Valve: There is no stenosis. There is mild to moderate   regurgitation.    Pulmonary Artery: The estimated pulmonary artery systolic pressure is   35 mmHg.    IVC/SVC: Normal venous pressure at 3 mmHg.               Pre-op Assessment    I have reviewed the Patient Summary Reports.     I have reviewed the Nursing Notes. I have reviewed the NPO Status.   I have reviewed the Medications.     Review of Systems  Anesthesia Hx:  No problems with previous Anesthesia   History of prior surgery of interest to airway management or planning:            Denies Personal Hx of Anesthesia complications.                    Social:  Non-Smoker, Social Alcohol Use       EENT/Dental:   Nasal drip / possible allergies           Cardiovascular:    Pacemaker Hypertension   CAD (non obstructive)    Dysrhythmias          ECG has been reviewed. Chest pain thought to be from GERD                          Pulmonary:    Denies COPD.  Denies Asthma.     Denies Sleep Apnea.                Renal/:   Denies Chronic Renal Disease.  BPH              Hepatic/GI:     GERD             Musculoskeletal:  Arthritis               Neurological:  Neurology Normal Denies TIA.  Denies CVA.    Denies Seizures.                                Endocrine:  Denies Diabetes. Denies Hypothyroidism.  Denies Hyperthyroidism.       Obesity / BMI > 30      Physical Exam  General: Well nourished, Cooperative, Alert and Oriented  Left chest PM  pocket  Left wrist tender and swelling from prior IV site   Airway:  Mallampati: III / II  Mouth Opening: Normal  TM Distance: Normal    Dental:  Caps / Implants    Chest/Lungs:  Clear to auscultation, Normal Respiratory Rate    Heart:  Rate: Normal  Rhythm: Regular Rhythm        Anesthesia Plan  Type of Anesthesia, risks & benefits discussed:    Anesthesia Type: Gen Natural Airway, Gen ETT, Gen Supraglottic Airway  Intra-op Monitoring Plan: Standard ASA Monitors  Post Op Pain Control Plan: multimodal analgesia and IV/PO Opioids PRN  Induction:  IV  Airway Plan: Video, Post-Induction  Informed Consent: Informed consent signed with the Patient and all parties understand the risks and agree with anesthesia plan.  All questions answered.   ASA Score: 3  Day of Surgery Review of History & Physical: H&P Update referred to the surgeon/provider.  Anesthesia Plan Notes:     EKG today shows that he is not paced. Will have magnet available to place in asynchronous mode.   Patient to follow with cardiologist regarding HTN and concern for bruising over PM pocket.   IV to be placed in right side per patient request.  Patient had IV site on left forearm from previous procedure. Remains with small area of swelling and pain.     Ready For Surgery From Anesthesia Perspective.     .

## 2025-02-15 LAB
ANION GAP SERPL CALCULATED.3IONS-SCNC: 13 MMOL/L (ref 5–15)
BASOPHILS # BLD AUTO: 0.05 10*3/MM3 (ref 0–0.2)
BASOPHILS NFR BLD AUTO: 0.3 % (ref 0–1.5)
BH BB BLOOD EXPIRATION DATE: NORMAL
BH BB BLOOD EXPIRATION DATE: NORMAL
BH BB BLOOD TYPE BARCODE: 5100
BH BB BLOOD TYPE BARCODE: 5100
BH BB DISPENSE STATUS: NORMAL
BH BB DISPENSE STATUS: NORMAL
BH BB PRODUCT CODE: NORMAL
BH BB PRODUCT CODE: NORMAL
BH BB UNIT NUMBER: NORMAL
BH BB UNIT NUMBER: NORMAL
BUN SERPL-MCNC: 29 MG/DL (ref 6–20)
BUN/CREAT SERPL: 5.4 (ref 7–25)
CALCIUM SPEC-SCNC: 8.8 MG/DL (ref 8.6–10.5)
CHLORIDE SERPL-SCNC: 95 MMOL/L (ref 98–107)
CO2 SERPL-SCNC: 21 MMOL/L (ref 22–29)
CREAT SERPL-MCNC: 5.35 MG/DL (ref 0.76–1.27)
CROSSMATCH INTERPRETATION: NORMAL
CROSSMATCH INTERPRETATION: NORMAL
DEPRECATED RDW RBC AUTO: 46 FL (ref 37–54)
EGFRCR SERPLBLD CKD-EPI 2021: 13 ML/MIN/1.73
EOSINOPHIL # BLD AUTO: 0.21 10*3/MM3 (ref 0–0.4)
EOSINOPHIL NFR BLD AUTO: 1.4 % (ref 0.3–6.2)
ERYTHROCYTE [DISTWIDTH] IN BLOOD BY AUTOMATED COUNT: 14.1 % (ref 12.3–15.4)
GLUCOSE BLDC GLUCOMTR-MCNC: 154 MG/DL (ref 70–130)
GLUCOSE BLDC GLUCOMTR-MCNC: 270 MG/DL (ref 70–130)
GLUCOSE BLDC GLUCOMTR-MCNC: 274 MG/DL (ref 70–130)
GLUCOSE BLDC GLUCOMTR-MCNC: 293 MG/DL (ref 70–130)
GLUCOSE SERPL-MCNC: 274 MG/DL (ref 65–99)
HCT VFR BLD AUTO: 28.4 % (ref 37.5–51)
HGB BLD-MCNC: 9.1 G/DL (ref 13–17.7)
IMM GRANULOCYTES # BLD AUTO: 0.28 10*3/MM3 (ref 0–0.05)
IMM GRANULOCYTES NFR BLD AUTO: 1.8 % (ref 0–0.5)
LYMPHOCYTES # BLD AUTO: 0.7 10*3/MM3 (ref 0.7–3.1)
LYMPHOCYTES NFR BLD AUTO: 4.6 % (ref 19.6–45.3)
MCH RBC QN AUTO: 28.7 PG (ref 26.6–33)
MCHC RBC AUTO-ENTMCNC: 32 G/DL (ref 31.5–35.7)
MCV RBC AUTO: 89.6 FL (ref 79–97)
MONOCYTES # BLD AUTO: 1.14 10*3/MM3 (ref 0.1–0.9)
MONOCYTES NFR BLD AUTO: 7.5 % (ref 5–12)
NEUTROPHILS NFR BLD AUTO: 12.92 10*3/MM3 (ref 1.7–7)
NEUTROPHILS NFR BLD AUTO: 84.4 % (ref 42.7–76)
NRBC BLD AUTO-RTO: 0 /100 WBC (ref 0–0.2)
PLATELET # BLD AUTO: 294 10*3/MM3 (ref 140–450)
PMV BLD AUTO: 8.5 FL (ref 6–12)
POTASSIUM SERPL-SCNC: 4 MMOL/L (ref 3.5–5.2)
RBC # BLD AUTO: 3.17 10*6/MM3 (ref 4.14–5.8)
SODIUM SERPL-SCNC: 129 MMOL/L (ref 136–145)
UNIT  ABO: NORMAL
UNIT  ABO: NORMAL
UNIT  RH: NORMAL
UNIT  RH: NORMAL
WBC NRBC COR # BLD AUTO: 15.3 10*3/MM3 (ref 3.4–10.8)

## 2025-02-15 PROCEDURE — 63710000001 INSULIN GLARGINE PER 5 UNITS: Performed by: HOSPITALIST

## 2025-02-15 PROCEDURE — 63710000001 INSULIN LISPRO (HUMAN) PER 5 UNITS: Performed by: HOSPITALIST

## 2025-02-15 PROCEDURE — 25010000002 ONDANSETRON PER 1 MG: Performed by: HOSPITALIST

## 2025-02-15 PROCEDURE — 82948 REAGENT STRIP/BLOOD GLUCOSE: CPT

## 2025-02-15 PROCEDURE — 80048 BASIC METABOLIC PNL TOTAL CA: CPT | Performed by: HOSPITALIST

## 2025-02-15 PROCEDURE — 85025 COMPLETE CBC W/AUTO DIFF WBC: CPT | Performed by: HOSPITALIST

## 2025-02-15 PROCEDURE — 25010000002 CEFTRIAXONE PER 250 MG: Performed by: INTERNAL MEDICINE

## 2025-02-15 RX ORDER — TERAZOSIN 1 MG/1
1 CAPSULE ORAL NIGHTLY
Status: DISCONTINUED | OUTPATIENT
Start: 2025-02-15 | End: 2025-02-23

## 2025-02-15 RX ORDER — AMLODIPINE BESYLATE 5 MG/1
5 TABLET ORAL
Status: DISCONTINUED | OUTPATIENT
Start: 2025-02-15 | End: 2025-02-23

## 2025-02-15 RX ORDER — INSULIN LISPRO 100 [IU]/ML
5 INJECTION, SOLUTION INTRAVENOUS; SUBCUTANEOUS
Status: DISCONTINUED | OUTPATIENT
Start: 2025-02-15 | End: 2025-02-18

## 2025-02-15 RX ORDER — LOSARTAN POTASSIUM 25 MG/1
25 TABLET ORAL
Status: DISCONTINUED | OUTPATIENT
Start: 2025-02-15 | End: 2025-02-17

## 2025-02-15 RX ADMIN — ZOLPIDEM TARTRATE 5 MG: 5 TABLET, FILM COATED ORAL at 22:23

## 2025-02-15 RX ADMIN — INSULIN LISPRO 4 UNITS: 100 INJECTION, SOLUTION INTRAVENOUS; SUBCUTANEOUS at 08:04

## 2025-02-15 RX ADMIN — ONDANSETRON 4 MG: 2 INJECTION INTRAMUSCULAR; INTRAVENOUS at 20:55

## 2025-02-15 RX ADMIN — ATORVASTATIN CALCIUM 40 MG: 20 TABLET, FILM COATED ORAL at 21:28

## 2025-02-15 RX ADMIN — DOCUSATE SODIUM 100 MG: 100 CAPSULE, LIQUID FILLED ORAL at 21:27

## 2025-02-15 RX ADMIN — HYDROCODONE BITARTRATE AND ACETAMINOPHEN 2 TABLET: 5; 325 TABLET ORAL at 13:08

## 2025-02-15 RX ADMIN — BUMETANIDE 2 MG: 2 TABLET ORAL at 16:58

## 2025-02-15 RX ADMIN — PANTOPRAZOLE SODIUM 40 MG: 40 TABLET, DELAYED RELEASE ORAL at 08:03

## 2025-02-15 RX ADMIN — AMLODIPINE BESYLATE 5 MG: 5 TABLET ORAL at 09:54

## 2025-02-15 RX ADMIN — TERAZOSIN HYDROCHLORIDE 1 MG: 1 CAPSULE ORAL at 21:27

## 2025-02-15 RX ADMIN — LOSARTAN POTASSIUM 25 MG: 25 TABLET, FILM COATED ORAL at 09:54

## 2025-02-15 RX ADMIN — ASPIRIN 81 MG CHEWABLE TABLET 81 MG: 81 TABLET CHEWABLE at 08:03

## 2025-02-15 RX ADMIN — HYDROCODONE BITARTRATE AND ACETAMINOPHEN 2 TABLET: 5; 325 TABLET ORAL at 08:03

## 2025-02-15 RX ADMIN — CARVEDILOL 25 MG: 25 TABLET, FILM COATED ORAL at 21:27

## 2025-02-15 RX ADMIN — INSULIN LISPRO 5 UNITS: 100 INJECTION, SOLUTION INTRAVENOUS; SUBCUTANEOUS at 16:58

## 2025-02-15 RX ADMIN — INSULIN LISPRO 4 UNITS: 100 INJECTION, SOLUTION INTRAVENOUS; SUBCUTANEOUS at 16:58

## 2025-02-15 RX ADMIN — INSULIN GLARGINE 15 UNITS: 100 INJECTION, SOLUTION SUBCUTANEOUS at 20:55

## 2025-02-15 RX ADMIN — INSULIN LISPRO 2 UNITS: 100 INJECTION, SOLUTION INTRAVENOUS; SUBCUTANEOUS at 22:23

## 2025-02-15 RX ADMIN — INSULIN LISPRO 4 UNITS: 100 INJECTION, SOLUTION INTRAVENOUS; SUBCUTANEOUS at 11:42

## 2025-02-15 RX ADMIN — CARVEDILOL 25 MG: 25 TABLET, FILM COATED ORAL at 08:03

## 2025-02-15 RX ADMIN — BUMETANIDE 2 MG: 2 TABLET ORAL at 08:03

## 2025-02-15 RX ADMIN — ONDANSETRON 4 MG: 2 INJECTION INTRAMUSCULAR; INTRAVENOUS at 07:32

## 2025-02-15 RX ADMIN — ONDANSETRON 4 MG: 2 INJECTION INTRAMUSCULAR; INTRAVENOUS at 14:28

## 2025-02-15 RX ADMIN — DOCUSATE SODIUM 100 MG: 100 CAPSULE, LIQUID FILLED ORAL at 09:54

## 2025-02-15 RX ADMIN — CEFTRIAXONE 2000 MG: 2 INJECTION, POWDER, FOR SOLUTION INTRAMUSCULAR; INTRAVENOUS at 08:16

## 2025-02-15 NOTE — PROGRESS NOTES
"Daily progress note    Primary care physician  Dr. NERI    Subjective   Doing same with no new complaints and family at bedside    History of present illness  39-year-old male with history of end-stage renal disease on hemodialysis chronic anemia diabetes hypertension hyperlipidemia coronary artery disease and gastroesophageal of disease who is also legally blind and has right foot wound which is getting worse mainly right great toe which looks infected and has recently seen by podiatrist and removed the callus from the right great toe.  Patient has intermittent bleeding pain drainage but no fever chills.  Patient also denies any chest pain shortness of breath palpitation abdominal pain nausea vomiting diarrhea.  Patient workup in ER revealed infected right great toe wound admitted for management.     REVIEW OF SYSTEMS  Unremarkable except pain     PHYSICAL EXAM   Blood pressure 143/76, pulse 92, temperature 99 °F (37.2 °C), temperature source Oral, resp. rate 16, height 170.2 cm (67\"), weight 108 kg (238 lb 1.6 oz), SpO2 97%.    Constitutional:       General: He is not in acute distress.     Appearance: Normal appearance. He is not ill-appearing, toxic-appearing or diaphoretic.   HENT:      Head: Normocephalic and atraumatic.      Nose: Nose normal.      Mouth/Throat:      Mouth: Mucous membranes are moist.      Pharynx: Oropharynx is clear.   Eyes:      Conjunctiva/sclera: Conjunctivae normal.      Pupils: Pupils are equal, round, and reactive to light.   Cardiovascular:      Rate and Rhythm: Normal rate and regular rhythm.      Pulses: Normal pulses.   Pulmonary:      Effort: Pulmonary effort is normal.   Abdominal:      General: Abdomen is flat.      Palpations: Abdomen is soft.   Musculoskeletal:      Comments: Right great toe is black in color with a deep ulceration and dried blood, no active purulent drainage, no palpable crepitus or fluctuance   Skin:     General: Skin is warm and dry.   Neurological:      " General: No focal deficit present.      Mental Status: He is alert and oriented to person, place, and time. Mental status is at baseline.   Psychiatric:         Mood and Affect: Mood normal.         Behavior: Behavior normal.         Thought Content: Thought content normal.         Judgment: Judgment normal.      LAB RESULTS  Lab Results (last 24 hours)       Procedure Component Value Units Date/Time    POC Glucose Once [[id number]]  (Abnormal) Collected: 02/15/25 1128    Specimen: Blood Updated: 02/15/25 1130     Glucose 270 mg/dL     Basic Metabolic Panel [[id number]]  (Abnormal) Collected: 02/15/25 0725    Specimen: Blood from Hand, Right Updated: 02/15/25 0917     Glucose 274 mg/dL      BUN 29 mg/dL      Creatinine 5.35 mg/dL      Sodium 129 mmol/L      Potassium 4.0 mmol/L      Comment: Slight hemolysis detected by analyzer. Result may be falsely elevated.        Chloride 95 mmol/L      CO2 21.0 mmol/L      Calcium 8.8 mg/dL      BUN/Creatinine Ratio 5.4     Anion Gap 13.0 mmol/L      eGFR 13.0 mL/min/1.73     Narrative:      GFR Categories in Chronic Kidney Disease (CKD)      GFR Category          GFR (mL/min/1.73)    Interpretation  G1                     90 or greater         Normal or high (1)  G2                      60-89                Mild decrease (1)  G3a                   45-59                Mild to moderate decrease  G3b                   30-44                Moderate to severe decrease  G4                    15-29                Severe decrease  G5                    14 or less           Kidney failure          (1)In the absence of evidence of kidney disease, neither GFR category G1 or G2 fulfill the criteria for CKD.    eGFR calculation 2021 CKD-EPI creatinine equation, which does not include race as a factor    CBC & Differential [[id number]]  (Abnormal) Collected: 02/15/25 0725    Specimen: Blood from Hand, Right Updated: 02/15/25 0813    Narrative:      The following orders were created for panel  order CBC & Differential.  Procedure                               Abnormality         Status                     ---------                               -----------         ------                     CBC Auto Differential[[id number]]        Abnormal            Final result                 Please view results for these tests on the individual orders.    CBC Auto Differential [[id number]]  (Abnormal) Collected: 02/15/25 0725    Specimen: Blood from Hand, Right Updated: 02/15/25 0813     WBC 15.30 10*3/mm3      RBC 3.17 10*6/mm3      Hemoglobin 9.1 g/dL      Hematocrit 28.4 %      MCV 89.6 fL      MCH 28.7 pg      MCHC 32.0 g/dL      RDW 14.1 %      RDW-SD 46.0 fl      MPV 8.5 fL      Platelets 294 10*3/mm3      Neutrophil % 84.4 %      Lymphocyte % 4.6 %      Monocyte % 7.5 %      Eosinophil % 1.4 %      Basophil % 0.3 %      Immature Grans % 1.8 %      Neutrophils, Absolute 12.92 10*3/mm3      Lymphocytes, Absolute 0.70 10*3/mm3      Monocytes, Absolute 1.14 10*3/mm3      Eosinophils, Absolute 0.21 10*3/mm3      Basophils, Absolute 0.05 10*3/mm3      Immature Grans, Absolute 0.28 10*3/mm3      nRBC 0.0 /100 WBC     POC Glucose Once [[id number]]  (Abnormal) Collected: 02/15/25 0748    Specimen: Blood Updated: 02/15/25 0752     Glucose 293 mg/dL     Hemoglobin & Hematocrit, Blood [[id number]]  (Abnormal) Collected: 02/14/25 2233    Specimen: Blood Updated: 02/14/25 2254     Hemoglobin 9.4 g/dL      Hematocrit 27.3 %     POC Glucose Once [[id number]]  (Abnormal) Collected: 02/14/25 2101    Specimen: Blood Updated: 02/14/25 2102     Glucose 206 mg/dL     POC Glucose Once [[id number]]  (Abnormal) Collected: 02/14/25 1804    Specimen: Blood Updated: 02/14/25 1806     Glucose 285 mg/dL     Blood Culture - Blood, Arm, Right [[id number]]  (Normal) Collected: 02/11/25 1742    Specimen: Blood from Arm, Right Updated: 02/14/25 1800     Blood Culture No growth at 3 days    Narrative:      Less than seven (7) mL's of blood was  collected.  Insufficient quantity may yield false negative results.    Blood Culture - Blood, Arm, Right [[id number]]  (Normal) Collected: 02/11/25 1749    Specimen: Blood from Arm, Right Updated: 02/14/25 1800     Blood Culture No growth at 3 days          Imaging Results (Last 24 Hours)       ** No results found for the last 24 hours. **            Current Facility-Administered Medications:     amLODIPine (NORVASC) tablet 5 mg, 5 mg, Oral, Q24H, Houston Ramos MD, 5 mg at 02/15/25 0954    aspirin chewable tablet 81 mg, 81 mg, Oral, Daily, Remy Thornton MD, 81 mg at 02/15/25 0803    atorvastatin (LIPITOR) tablet 40 mg, 40 mg, Oral, Nightly, Remy Thornton MD, 40 mg at 02/14/25 2018    bumetanide (BUMEX) tablet 2 mg, 2 mg, Oral, BID Diuretics, Remy Thornton MD, 2 mg at 02/15/25 0803    carvedilol (COREG) tablet 25 mg, 25 mg, Oral, BID, Remy Thornton MD, 25 mg at 02/15/25 0803    cefTRIAXone (ROCEPHIN) 2,000 mg in sodium chloride 0.9 % 100 mL MBP, 2,000 mg, Intravenous, Daily, Abdiaziz Paz MD, Last Rate: 200 mL/hr at 02/15/25 0816, 2,000 mg at 02/15/25 0816    dextrose (D50W) (25 g/50 mL) IV injection 25 g, 25 g, Intravenous, Q15 Min PRN, Remy Thornton MD    dextrose (GLUTOSE) oral gel 15 g, 15 g, Oral, Q15 Min PRN, Remy Thornton MD    docusate sodium (COLACE) capsule 100 mg, 100 mg, Oral, BID, Remy Thornton MD, 100 mg at 02/15/25 0954    epoetin jamil-epbx (RETACRIT) injection 10,000 Units, 10,000 Units, Subcutaneous, Once per day on Monday Wednesday Friday, Houston Ramos MD    glucagon (GLUCAGEN) injection 1 mg, 1 mg, Intramuscular, Q15 Min PRN, Remy Thornton MD    HYDROcodone-acetaminophen (NORCO) 5-325 MG per tablet 2 tablet, 2 tablet, Oral, Q4H PRN, Remy Thornton MD, 2 tablet at 02/15/25 1308    insulin glargine (LANTUS, SEMGLEE) injection 10 Units, 10 Units, Subcutaneous, Nightly, Remy Thornton MD, 10 Units at 02/14/25 2120    insulin lispro (HUMALOG/ADMELOG) injection 2-7 Units, 2-7 Units,  Subcutaneous, 4x Daily AC & at Bedtime, Jaskaran Thornton MD, 4 Units at 02/15/25 1142    losartan (COZAAR) tablet 25 mg, 25 mg, Oral, Q24H, Houston Ramos MD, 25 mg at 02/15/25 0954    ondansetron (ZOFRAN) injection 4 mg, 4 mg, Intravenous, Q6H PRN, Jaskaran Thornton MD, 4 mg at 02/15/25 1428    pantoprazole (PROTONIX) EC tablet 40 mg, 40 mg, Oral, Q AM, Jaskaran Thornton MD, 40 mg at 02/15/25 0803    Pharmacy to dose vancomycin, , Not Applicable, Continuous PRN, JacksonAbdiaziz morejon MD    polyethylene glycol (MIRALAX) packet 17 g, 17 g, Oral, Daily, Jaskaran Thornton MD    terazosin (HYTRIN) capsule 1 mg, 1 mg, Oral, Nightly, Houston Ramos MD    ticagrelor (BRILINTA) tablet 90 mg, 90 mg, Oral, Q12H, Jaskaran Thornton MD, 90 mg at 02/14/25 2204    zolpidem (AMBIEN) tablet 5 mg, 5 mg, Oral, Nightly PRN, Jaskaran Thornton MD, 5 mg at 02/14/25 8646     ASSESSMENT  Right great toe wound with infection and surrounding cellulitis and osteomyelitis  End-stage renal disease on hemodialysis  Diabetes mellitus  Hypertension  Hyperlipidemia  Coronary artery disease  Legally blind  Chronic anemia with drop in H&H  Gastroesophageal reflux disease    PLAN  CPM   Surgery on Tuesday  Continue IV antibiotics   Pain management  Infectious disease consult appreciated  Podiatry and nephrology to follow patient   Continue home medications  Stress ulcer DVT prophylaxis  Supportive care  Discussed with nursing staff and family  Follow closely further recommendation current hospital course    JASKARAN THORNTON MD    Copied text in this note has been reviewed and is accurate as of 02/15/25

## 2025-02-15 NOTE — PLAN OF CARE
Goal Outcome Evaluation:  Plan of Care Reviewed With: patient        Progress: no change  Outcome Evaluation: Assumed care of patient. Patient is alert and orientedx4, on RA, assistx1-2, urinal within reach, wife stayed overnight wth patient at bedside, tele shows SR/ST. Await inpatient transfer under [facility]. Received patient with ongoing 2/2 units of PRBCs, completed with repeat H&H done post transfusion, Hemoglobin noted at 9.4. Spoke with Podiatrist (Dr. Finley) via phone call and verified that patient's surgery will be on 2/18/25 in v/o patient's dialysis schedule and low hemoglobin needing BT. Informed patient at bedside that provider will see him today. Pain meds were given q4H/as needed. Encouraged symptoms reporting. Pending AM labs. Plan of care ongoing.

## 2025-02-15 NOTE — PROGRESS NOTES
[facility] Progress Note      Patient Name: Wilner Menjivar  : 1985  MRN: [id number]  Primary Care Physician:  Roosevelt Thao MD  Date of admission: 2025    Subjective     Interval History:   Follow-up on ESRD    Patient lying in bed comfortable denies any  No chest pain, shortness of breath, palpitations   Patient underwent hemodialysis yesterday without any complications with 2.1 L removal    Patient awaiting surgical management of his diabetic foot during this admission    Patient accompanied by wife at bedside    Review of Systems:   As noted above    Objective     Vitals:   Temp:  [97.4 °F (36.3 °C)-99.5 °F (37.5 °C)] 99.1 °F (37.3 °C)  Heart Rate:  [] 92  Resp:  [15-18] 16  BP: (131-159)/(63-86) 159/76    Intake/Output Summary (Last 24 hours) at 2/15/2025 0962  Last data filed at 2/15/2025 0415  Gross per 24 hour   Intake 1100 ml   Output 350 ml   Net 750 ml       Physical Exam:    General Appearance: Awake, chronically ill, no acute distress  Skin: warm and dry  HEENT: oral mucosa normal, nonicteric sclera, legally blind  Neck: supple, no JVD  Lungs: CTA, no wheezing, nonlabored on RA  Heart: Tachycardic, RR, no rub  Abdomen: soft, nontender, nondistended, BS +  : no palpable bladder  Extremities: no edema, cyanosis or clubbing; dressing on right foot  Neuro: normal speech and mental status   Vascular: L AVF patent, + thrill and bruit    Scheduled Meds:     aspirin, 81 mg, Oral, Daily  atorvastatin, 40 mg, Oral, Nightly  bumetanide, 2 mg, Oral, BID Diuretics  carvedilol, 25 mg, Oral, BID  cefTRIAXone, 2,000 mg, Intravenous, Daily  docusate sodium, 100 mg, Oral, BID  epoetin jamil/jamil-epbx, 10,000 Units, Subcutaneous, Once per day on   insulin glargine, 10 Units, Subcutaneous, Nightly  insulin lispro, 2-7 Units, Subcutaneous, 4x Daily AC & at Bedtime  pantoprazole, 40 mg, Oral, Q AM  polyethylene glycol, 17 g, Oral, Daily  ticagrelor, 90  mg, Oral, Q12H      IV Meds:   Pharmacy to dose vancomycin,         Results Reviewed:   I have personally reviewed the results from the time of this admission to 2/15/2025 09:34 EST     Results from last 7 days   Lab Units 02/15/25  0725 02/14/25 0333 02/13/25  0507 02/12/25  0746 02/11/25  1556   SODIUM mmol/L 129* 129* 130*   < > 125*   POTASSIUM mmol/L 4.0 3.6 3.5   < > 4.5   CHLORIDE mmol/L 95* 95* 94*   < > 88*   CO2 mmol/L 21.0* 23.0 24.0   < > 23.5   BUN mg/dL 29* 40* 26*   < > 42*   CREATININE mg/dL 5.35* 6.57* 4.84*   < > 5.85*   CALCIUM mg/dL 8.8 9.0 9.1   < > 9.0   BILIRUBIN mg/dL  --   --  0.6  --  0.9   ALK PHOS U/L  --   --  131*  --  154*   ALT (SGPT) U/L  --   --  19  --  14   AST (SGOT) U/L  --   --  25  --  19   GLUCOSE mg/dL 274* 202* 148*   < > 442*    < > = values in this interval not displayed.       Estimated Creatinine Clearance: 21.5 mL/min (A) (by C-G formula based on SCr of 5.35 mg/dL (H)).    Results from last 7 days   Lab Units 02/14/25 0333   PHOSPHORUS mg/dL 3.5             Results from last 7 days   Lab Units 02/15/25  0725 02/14/25  2233 02/14/25 0333 02/13/25  0507 02/12/25  0746 02/11/25  1556   WBC 10*3/mm3 15.30*  --  13.96* 11.03* 10.26 11.05*   HEMOGLOBIN g/dL 9.1* 9.4* 6.6* 7.2* 7.4* 7.9*   PLATELETS 10*3/mm3 294  --  226 175 155 138*             Assessment / Plan     ASSESSMENT:    -End Stage Renal Disease ( ESRD )  since 6/2024 followed by Dr. Wasserman on Hemodialysis on a Monday, Wednesday and Friday schedule via LUE AVF.  Continue renal diet      -Acute on chronic normocytic anemia. On Epogen protocol.  10,000 units subcu  TIW , we will continue to follow H&H closely, low iron stores 2/13-screen ordered.  Primary team ordered PRBCs x 2 hemoglobin dropped to 6.6 repeat 9.1     -Hyperphosphatemia. Continue renal diet  meals w low phosphorus . We will follow phosphorus to make further adjustments to binders if needed      -Hypertension w/ CKD.  Continue home regimen. We will  "continue to follow closely. Heart healthy diet      -Coronary artery disease status postcardiac cath with stent placement to mid/distal OM2 , and proximal Cx. ( 9/24 ).  On medical management     -Diabetes Mellitus type 2 w/ complications ( retinopathy , peripheral vascular disease  nephropathy ) .Continue insulin regimen and Diabetic diet, as per primary team     -Right diabetic foot as per primary team.  Foot x-ray showing subcutaneous emphysema and soft tissue loss over the great toe. MRI showing \" Osteomyelitis of the distal shaft and head of the 1st proximal phalanx and of the distal phalanx with overlying soft tissue wound/ulcer. Gas within the soft tissues and marrow of the great toe associated with infection. Tenosynovitis flexor hallucis longus tendon sheath. Forefoot cellulitis\"      -Hypervolemic hyponatremia will adjust dialysis prescription and will follow sodium trend    PLAN:     restart home dose of losartan terazosin and amlodipine, now that his blood pressure has improved after his PRBCs   No acute indication for hemodialysis  Awaiting possible surgical plan during his admission  Surveillance labs    Thank you for involving us in the care of Wilner Menjivar.  Please feel free to call with any questions.    Houston Ramos MD  02/15/25  09:34 [facility]    [facility]  [phone]    Please note that portions of this note were completed with a voice recognition program.  "

## 2025-02-15 NOTE — PROGRESS NOTES
Podiatry Progress Note      Patient: Wilner Menjivar Admit Date: 02/11/2025    Age: 39 y.o.   PCP: Roosevelt Thao MD    MRN: [id number]  Room: [facility]/1        Subjective     Chief Complaint     Chief Complaint   Patient presents with    Wound Check        HPI      patient resting in bed, wife is bedside.  No new complaints today.    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 Pt also c/o new abd pain with admin.     Pt given IV azithro, reported nausea/vomiting and hot flashes within 3-5 minutes of admin. Pt also c/o new abd pain with admin.    Penicillins Unknown - Low Severity     reaction as a child. Does not recall reaction.     reaction as a child. Does not recall reaction.  Beta lactam allergy details  Antibiotic reaction: unknown  Age at reaction: infant  Dose to reaction time: unknown  Reason for antibiotic: unknown  Epinephrine required for reaction?: unknown  Tolerated antibiotics: unknown           Social History     Tobacco Use   Smoking Status Never   Smokeless Tobacco Never        Objective   Physical Exam    Vitals:    02/15/25 0750   BP: 159/76   Pulse:    Resp: 16   Temp: 99.1 °F (37.3 °C)   SpO2: 97%          Dressing clean dry intact    Labs     Lab Results   Component Value Date    HGBA1C 11.10 (H) 02/13/2025    POCGLU 293 (H) 02/15/2025        CBC:      Lab 02/15/25  0725 02/14/25  2233 02/14/25  0333 02/13/25  0507 02/12/25  0746 02/11/25  1556   WBC 15.30*  --  13.96* 11.03* 10.26 11.05*   HEMOGLOBIN  9.1*   < > 6.6* 7.2* 7.4* 7.9*   HEMATOCRIT 28.4*   < > 20.6* 21.0* 21.5* 24.1*   PLATELETS 294  --  226 175 155 138*   NEUTROS ABS 12.92*  --  11.54* 8.88* 8.40* 9.09*   IMMATURE GRANS (ABS) 0.28*  --  0.18* 0.13* 0.09* 0.06*   LYMPHS ABS 0.70  --  0.89 0.72 0.57* 0.57*   MONOS ABS 1.14*  --  1.13* 1.18* 1.10* 1.27*   EOS ABS 0.21  --  0.18 0.07 0.07 0.03   MCV 89.6  --  90.4 89.4 86.7 92.0    < > = values in this interval not displayed.          Results for orders placed or performed during the hospital encounter of 02/11/25   Blood Culture - Blood, Arm, Right    Specimen: Arm, Right; Blood   Result Value Ref Range    Blood Culture No growth at 3 days         Doppler Arterial Multi Level Lower Extremity - Bilateral CAR  Addendum:   Right Conclusion: The right GIOVANA is moderately reduced. Waveforms are  consistent with femoral disease, popliteal disease and tib-peroneal  disease. Normal digital pressures.     Left Conclusion: The left GIOVANA is normal. Normal digital pressures.  Narrative:   Right Conclusion: The right GIOVANA is moderately reduced. Normal digital   pressures.    Left Conclusion: The left GIOVANA is normal. Normal digital pressures.  MRI Foot Right Without Contrast  Narrative: MRI RIGHT FOREFOOT WITHOUT CONTRAST     HISTORY: Right great toe infection. Diabetes. Hypertension.     TECHNIQUE: MRI includes axial T1, STIR as well as coronal T1, T2  fat-sat, and sagittal PD  fat-saturated sequences through the forefoot.     COMPARISON: Right foot x-rays 02/11/2025.     FINDINGS: There is abnormal T1 hypointense and T2 hyperintense signal  within the 1st distal phalanx. There is bone loss involving the tuft and  distal shaft of the 1st distal phalanx that may be in part related to  debridement though is in part related to osteomyelitis. There is also  abnormal T1 hypointense and T2 hyperintense signal within the distal  shaft and head of the 1st proximal phalanx. Small bubbles of air are  present surrounding the 1st  proximal and distal phalanges and  potentially within the cancellous bone.     There are mild degenerative changes at the 1st MTP joint without  evidence for septic arthritis at the 1st MTP joint. No evidence for  osteomyelitis of the metatarsals or 2nd through 5th toes. There is  tenosynovitis of the flexor hallucis longus tendon with mild fluid in  the tendon sheath and surrounding edema extending to the level of the  base of the 1st MTP joint.     Generalized muscular edema and atrophy most likely related to chronic  neuropathy. Chronic arthritic changes are present with degenerative  subcortical cyst formation at the TMT joints and intercuneiform joints.  Generalized edema of the subcutaneous fat about the midfoot and forefoot  consistent with cellulitis.     Impression: 1. Osteomyelitis of the distal shaft and head of the 1st proximal  phalanx and of the distal phalanx with overlying soft tissue  wound/ulcer. Gas within the soft tissues and marrow of the great toe  associated with infection. Tenosynovitis flexor hallucis longus tendon  sheath. Forefoot cellulitis.  2. No evidence for septic arthritis at the 1st MTP joint.  3. Muscular edema and atrophy associated with myositis or chronic  neuropathy.     This report was finalized on 2/13/2025 7:48 AM by Junior Villalobos M.D  on Workstation: [id number]          Assessment/Plan      39-year-old male with osteomyelitis to the right hallux with peripheral arterial disease    -patient examined and evaluated by myself  - Antibiotics per infectious disease  - Spoke directly with Dr. Osorio and appreciate his input and recommendations.  Based off patient testing should have adequate flow to heal the incision.  - I placed the patient on the OR schedule for Tuesday morning in hopes that he can be optimized with dialysis on Monday.  This will  likely be under MAC anesthesia.  -patient will have a small incision with wound closure and I will leave anticoagulation therapy up  to other team members.  -will continue to follow, please call with questions      Trevon Garcia DPM  Office: [phone]

## 2025-02-15 NOTE — PROGRESS NOTES
"  Infectious Diseases Progress Note    Abdiaziz Paz MD     [facility]  [place]: 4 days  Patient Identification:  Name: Wilner Menjivar  Age: 39 y.o.  Sex: male  :  1985  MRN: [id number]         Primary Care Physician: Roosevelt Thao MD        Subjective: Had some chills and low-grade temperature and blood transfusion.  Surgery is postponed till Tuesday.  Interval History: See consultation note.    Objective:    Scheduled Meds:aspirin, 81 mg, Oral, Daily  atorvastatin, 40 mg, Oral, Nightly  bumetanide, 2 mg, Oral, BID Diuretics  carvedilol, 25 mg, Oral, BID  cefTRIAXone, 2,000 mg, Intravenous, Daily  docusate sodium, 100 mg, Oral, BID  epoetin jamil/jamil-epbx, 10,000 Units, Subcutaneous, Once per day on   insulin glargine, 10 Units, Subcutaneous, Nightly  insulin lispro, 2-7 Units, Subcutaneous, 4x Daily AC & at Bedtime  pantoprazole, 40 mg, Oral, Q AM  polyethylene glycol, 17 g, Oral, Daily  ticagrelor, 90 mg, Oral, Q12H      Continuous Infusions:Pharmacy to dose vancomycin,         Vital signs in last 24 hours:  Temp:  [97.4 °F (36.3 °C)-99.5 °F (37.5 °C)] 99.1 °F (37.3 °C)  Heart Rate:  [] 92  Resp:  [15-18] 16  BP: (131-159)/(63-86) 159/76    Intake/Output:    Intake/Output Summary (Last 24 hours) at 2/15/2025 0816  Last data filed at 2/15/2025 0415  Gross per 24 hour   Intake 1100 ml   Output 350 ml   Net 750 ml       Exam:  /76 (BP Location: Right arm, Patient Position: Lying)   Pulse 92   Temp 99.1 °F (37.3 °C) (Oral)   Resp 16   Ht 170.2 cm (67\")   Wt 108 kg (238 lb 1.6 oz)   SpO2 97%   BMI 37.29 kg/m²   Patient is examined using the personal protective equipment as per guidelines from infection control for this particular patient as enacted.  Hand washing was performed before and after patient interaction.  General Appearance:    Alert, cooperative, no distress, AAOx3                          Head:    Normocephalic, without obvious abnormality, " atraumatic                           Eyes:  Legally blind                         Throat:   Lips, tongue, gums normal; oral mucosa pink and moist                           Neck:   Supple, symmetrical, trachea midline, no JVD                         Lungs:    Clear to auscultation bilaterally, respirations unlabored                 Chest Wall:    No tenderness or deformity                          Heart:  S1-S2 regular                  Abdomen:   Soft nontender                 Extremities:                          Pulses:   Pulses palpable in all extremities                            Skin:   Skin is warm and dry,  no rashes or palpable lesions                  Neurologic: Legally blind       Data Review:    I reviewed the patient's new clinical results.  Results from last 7 days   Lab Units 02/15/25  0725 02/14/25  2233 02/14/25  0333 02/13/25  0507 02/12/25  0746 02/11/25  1556   WBC 10*3/mm3 15.30*  --  13.96* 11.03* 10.26 11.05*   HEMOGLOBIN g/dL 9.1* 9.4* 6.6* 7.2* 7.4* 7.9*   PLATELETS 10*3/mm3 294  --  226 175 155 138*     Results from last 7 days   Lab Units 02/14/25 0333 02/13/25  0507 02/12/25  0746 02/11/25  1556   SODIUM mmol/L 129* 130* 130* 125*   POTASSIUM mmol/L 3.6 3.5 3.9 4.5   CHLORIDE mmol/L 95* 94* 95* 88*   CO2 mmol/L 23.0 24.0 23.0 23.5   BUN mg/dL 40* 26* 45* 42*   CREATININE mg/dL 6.57* 4.84* 6.03* 5.85*   CALCIUM mg/dL 9.0 9.1 8.9 9.0   GLUCOSE mg/dL 202* 148* 172* 442*   Doppler Arterial Multi Level Lower Extremity - Bilateral CAR    Addendum Date: 2/13/2025      Right Conclusion: The right GIOVANA is moderately reduced. Waveforms are consistent with femoral disease, popliteal disease and tib-peroneal disease. Normal digital pressures.   Left Conclusion: The left GIOVANA is normal. Normal digital pressures.     MRI Foot Right Without Contrast    Result Date: 2/13/2025  1. Osteomyelitis of the distal shaft and head of the 1st proximal phalanx and of the distal phalanx with overlying soft tissue  wound/ulcer. Gas within the soft tissues and marrow of the great toe associated with infection. Tenosynovitis flexor hallucis longus tendon sheath. Forefoot cellulitis. 2. No evidence for septic arthritis at the 1st MTP joint. 3. Muscular edema and atrophy associated with myositis or chronic neuropathy.  This report was finalized on 2/13/2025 7:48 AM by Junior Villalobos M.D on Workstation: [id number]      XR Foot 3+ View Right    Result Date: 2/11/2025   There is subcutaneous emphysema and soft tissue loss over the great toe. No radiopaque foreign body. No definite bone erosion.    This report was finalized on 2/11/2025 5:20 PM by Dr. Jason Dan M.D on Workstation: [id number]     Microbiology Results (last 10 days)       Procedure Component Value - Date/Time    MRSA Screen, PCR (Inpatient) - Swab, Nares [[id number]]  (Normal) Collected: 02/13/25 0906    Lab Status: Final result Specimen: Swab from Nares Updated: 02/13/25 1102     MRSA PCR No MRSA Detected    Narrative:      The negative predictive value of this diagnostic test is high and should only be used to consider de-escalating anti-MRSA therapy. A positive result may indicate colonization with MRSA and must be correlated clinically.    Blood Culture - Blood, Arm, Right [[id number]]  (Normal) Collected: 02/11/25 1749    Lab Status: Preliminary result Specimen: Blood from Arm, Right Updated: 02/14/25 1800     Blood Culture No growth at 3 days    Blood Culture - Blood, Arm, Right [[id number]]  (Normal) Collected: 02/11/25 1742    Lab Status: Preliminary result Specimen: Blood from Arm, Right Updated: 02/14/25 1800     Blood Culture No growth at 3 days    Narrative:      Less than seven (7) mL's of blood was collected.  Insufficient quantity may yield false negative results.          Telemetry Scan   Final Result      Telemetry Scan   Final Result      Telemetry Scan   Final Result      Telemetry Scan   Final Result      Telemetry Scan   Final Result       Telemetry Scan   Final Result      Telemetry Scan   Final Result      Telemetry Scan   Final Result              Assessment:    Diabetic toe ulcer    Osteomyelitis of great toe of right foot  39-year-old male with  1-right great toe diabetic/ischemic ulcer with dry gangrene with associated abscess and contiguous focus osteomyelitis of the proximal and distal phalanx with associated flexor hallucis tenosynovitis  2-significant peripheral vascular disease with element of gangrene involving the great toe  3-type 2 diabetes  4-end-stage renal disease  5-legally blind  6-severe anemia multifactorial  7-hypertension  8-multiple antibiotic allergies/intolerances including allergy to penicillin though it could very well be not a true allergy given the description of his reaction.  9-anemia multifactorial status post transfusion     Recommendations/Discussions:  See my discussion and recommendations on 2/13/2025.  Continue present antibiotic therapy  Follow-up on operative culture results and pathology report and based on whether or not there is concern for residual osteomyelitis decide on duration of antibiotic treatment.  Duration of antibiotic treatment will depend upon vascular status as well as whether or not definitive resective surgery is performed.    Abdiaziz Paz MD  2/15/2025  08:16 EST    Parts of this note may be an electronic transcription/translation of spoken language to printed text using the Dragon dictation system.

## 2025-02-15 NOTE — PLAN OF CARE
Goal Outcome Evaluation:  Plan of Care Reviewed With: patient        Progress: no change    Patient is alert and oriented.  Transferred to us this afternoon.  Nausea and vomiting this afternoon - medicated and patient improved.  VSS.  Will continue to monitor patient.

## 2025-02-16 LAB
ANION GAP SERPL CALCULATED.3IONS-SCNC: 13.4 MMOL/L (ref 5–15)
BACTERIA SPEC AEROBE CULT: NORMAL
BACTERIA SPEC AEROBE CULT: NORMAL
BASOPHILS # BLD AUTO: 0.08 10*3/MM3 (ref 0–0.2)
BASOPHILS NFR BLD AUTO: 0.5 % (ref 0–1.5)
BUN SERPL-MCNC: 35 MG/DL (ref 6–20)
BUN/CREAT SERPL: 5.9 (ref 7–25)
CALCIUM SPEC-SCNC: 9.1 MG/DL (ref 8.6–10.5)
CHLORIDE SERPL-SCNC: 95 MMOL/L (ref 98–107)
CO2 SERPL-SCNC: 21.6 MMOL/L (ref 22–29)
CREAT SERPL-MCNC: 5.97 MG/DL (ref 0.76–1.27)
DEPRECATED RDW RBC AUTO: 43.2 FL (ref 37–54)
EGFRCR SERPLBLD CKD-EPI 2021: 11.4 ML/MIN/1.73
EOSINOPHIL # BLD AUTO: 0.24 10*3/MM3 (ref 0–0.4)
EOSINOPHIL NFR BLD AUTO: 1.6 % (ref 0.3–6.2)
ERYTHROCYTE [DISTWIDTH] IN BLOOD BY AUTOMATED COUNT: 14.2 % (ref 12.3–15.4)
GLUCOSE BLDC GLUCOMTR-MCNC: 132 MG/DL (ref 70–130)
GLUCOSE BLDC GLUCOMTR-MCNC: 166 MG/DL (ref 70–130)
GLUCOSE BLDC GLUCOMTR-MCNC: 179 MG/DL (ref 70–130)
GLUCOSE BLDC GLUCOMTR-MCNC: 195 MG/DL (ref 70–130)
GLUCOSE SERPL-MCNC: 133 MG/DL (ref 65–99)
HCT VFR BLD AUTO: 25.2 % (ref 37.5–51)
HGB BLD-MCNC: 8.9 G/DL (ref 13–17.7)
IMM GRANULOCYTES # BLD AUTO: 0.29 10*3/MM3 (ref 0–0.05)
IMM GRANULOCYTES NFR BLD AUTO: 1.9 % (ref 0–0.5)
LYMPHOCYTES # BLD AUTO: 0.74 10*3/MM3 (ref 0.7–3.1)
LYMPHOCYTES NFR BLD AUTO: 4.9 % (ref 19.6–45.3)
MCH RBC QN AUTO: 29.7 PG (ref 26.6–33)
MCHC RBC AUTO-ENTMCNC: 35.3 G/DL (ref 31.5–35.7)
MCV RBC AUTO: 84 FL (ref 79–97)
MONOCYTES # BLD AUTO: 1 10*3/MM3 (ref 0.1–0.9)
MONOCYTES NFR BLD AUTO: 6.7 % (ref 5–12)
NEUTROPHILS NFR BLD AUTO: 12.62 10*3/MM3 (ref 1.7–7)
NEUTROPHILS NFR BLD AUTO: 84.4 % (ref 42.7–76)
NRBC BLD AUTO-RTO: 0 /100 WBC (ref 0–0.2)
PLATELET # BLD AUTO: 304 10*3/MM3 (ref 140–450)
PMV BLD AUTO: 8.3 FL (ref 6–12)
POTASSIUM SERPL-SCNC: 3.7 MMOL/L (ref 3.5–5.2)
RBC # BLD AUTO: 3 10*6/MM3 (ref 4.14–5.8)
SODIUM SERPL-SCNC: 130 MMOL/L (ref 136–145)
WBC NRBC COR # BLD AUTO: 14.97 10*3/MM3 (ref 3.4–10.8)

## 2025-02-16 PROCEDURE — 80048 BASIC METABOLIC PNL TOTAL CA: CPT | Performed by: HOSPITALIST

## 2025-02-16 PROCEDURE — 63710000001 INSULIN GLARGINE PER 5 UNITS: Performed by: HOSPITALIST

## 2025-02-16 PROCEDURE — 85025 COMPLETE CBC W/AUTO DIFF WBC: CPT | Performed by: HOSPITALIST

## 2025-02-16 PROCEDURE — 25010000002 ONDANSETRON PER 1 MG: Performed by: HOSPITALIST

## 2025-02-16 PROCEDURE — 63710000001 INSULIN LISPRO (HUMAN) PER 5 UNITS: Performed by: HOSPITALIST

## 2025-02-16 PROCEDURE — 25010000002 HYDROMORPHONE PER 4 MG: Performed by: PODIATRIST

## 2025-02-16 PROCEDURE — 25010000002 CEFTRIAXONE PER 250 MG: Performed by: INTERNAL MEDICINE

## 2025-02-16 PROCEDURE — 82948 REAGENT STRIP/BLOOD GLUCOSE: CPT

## 2025-02-16 RX ORDER — HYDROMORPHONE HYDROCHLORIDE 1 MG/ML
0.5 INJECTION, SOLUTION INTRAMUSCULAR; INTRAVENOUS; SUBCUTANEOUS EVERY 4 HOURS PRN
Status: DISCONTINUED | OUTPATIENT
Start: 2025-02-16 | End: 2025-02-19

## 2025-02-16 RX ORDER — ZOLPIDEM TARTRATE 5 MG/1
5 TABLET ORAL NIGHTLY PRN
Status: DISCONTINUED | OUTPATIENT
Start: 2025-02-16 | End: 2025-03-03

## 2025-02-16 RX ADMIN — INSULIN LISPRO 2 UNITS: 100 INJECTION, SOLUTION INTRAVENOUS; SUBCUTANEOUS at 11:45

## 2025-02-16 RX ADMIN — BUMETANIDE 2 MG: 2 TABLET ORAL at 17:02

## 2025-02-16 RX ADMIN — CARVEDILOL 25 MG: 25 TABLET, FILM COATED ORAL at 08:34

## 2025-02-16 RX ADMIN — ONDANSETRON 4 MG: 2 INJECTION INTRAMUSCULAR; INTRAVENOUS at 08:33

## 2025-02-16 RX ADMIN — INSULIN LISPRO 5 UNITS: 100 INJECTION, SOLUTION INTRAVENOUS; SUBCUTANEOUS at 11:45

## 2025-02-16 RX ADMIN — DOCUSATE SODIUM 100 MG: 100 CAPSULE, LIQUID FILLED ORAL at 22:37

## 2025-02-16 RX ADMIN — HYDROMORPHONE HYDROCHLORIDE 0.5 MG: 1 INJECTION, SOLUTION INTRAMUSCULAR; INTRAVENOUS; SUBCUTANEOUS at 09:19

## 2025-02-16 RX ADMIN — ASPIRIN 81 MG CHEWABLE TABLET 81 MG: 81 TABLET CHEWABLE at 08:36

## 2025-02-16 RX ADMIN — INSULIN LISPRO 5 UNITS: 100 INJECTION, SOLUTION INTRAVENOUS; SUBCUTANEOUS at 08:33

## 2025-02-16 RX ADMIN — ATORVASTATIN CALCIUM 40 MG: 20 TABLET, FILM COATED ORAL at 22:36

## 2025-02-16 RX ADMIN — HYDROCODONE BITARTRATE AND ACETAMINOPHEN 2 TABLET: 5; 325 TABLET ORAL at 08:36

## 2025-02-16 RX ADMIN — CEFTRIAXONE 2000 MG: 2 INJECTION, POWDER, FOR SOLUTION INTRAMUSCULAR; INTRAVENOUS at 08:33

## 2025-02-16 RX ADMIN — TICAGRELOR 90 MG: 90 TABLET ORAL at 22:37

## 2025-02-16 RX ADMIN — INSULIN LISPRO 5 UNITS: 100 INJECTION, SOLUTION INTRAVENOUS; SUBCUTANEOUS at 17:02

## 2025-02-16 RX ADMIN — AMLODIPINE BESYLATE 5 MG: 5 TABLET ORAL at 08:34

## 2025-02-16 RX ADMIN — INSULIN GLARGINE 15 UNITS: 100 INJECTION, SOLUTION SUBCUTANEOUS at 22:38

## 2025-02-16 RX ADMIN — HYDROCODONE BITARTRATE AND ACETAMINOPHEN 2 TABLET: 5; 325 TABLET ORAL at 15:01

## 2025-02-16 RX ADMIN — HYDROMORPHONE HYDROCHLORIDE 0.5 MG: 1 INJECTION, SOLUTION INTRAMUSCULAR; INTRAVENOUS; SUBCUTANEOUS at 14:25

## 2025-02-16 RX ADMIN — BUMETANIDE 2 MG: 2 TABLET ORAL at 08:34

## 2025-02-16 RX ADMIN — TERAZOSIN HYDROCHLORIDE 1 MG: 1 CAPSULE ORAL at 22:37

## 2025-02-16 RX ADMIN — HYDROMORPHONE HYDROCHLORIDE 0.5 MG: 1 INJECTION, SOLUTION INTRAMUSCULAR; INTRAVENOUS; SUBCUTANEOUS at 22:37

## 2025-02-16 RX ADMIN — PANTOPRAZOLE SODIUM 40 MG: 40 TABLET, DELAYED RELEASE ORAL at 06:17

## 2025-02-16 RX ADMIN — LOSARTAN POTASSIUM 25 MG: 25 TABLET, FILM COATED ORAL at 08:34

## 2025-02-16 RX ADMIN — INSULIN LISPRO 2 UNITS: 100 INJECTION, SOLUTION INTRAVENOUS; SUBCUTANEOUS at 17:01

## 2025-02-16 RX ADMIN — INSULIN LISPRO 3 UNITS: 100 INJECTION, SOLUTION INTRAVENOUS; SUBCUTANEOUS at 22:38

## 2025-02-16 NOTE — PROGRESS NOTES
"[facility] Clinical Pharmacy Services: Vancomycin Monitoring Note    Wilner Menjivar is a 39 y.o. male who is on day 5/14 of pharmacy to dose vancomycin for Skin and Soft Tissue. Dr. Paz is following    Previous Vancomycin Dose:   intermittent dosing    Results from last 7 days   Lab Units 02/14/25  0333 02/12/25  0746   VANCOMYCIN RM mcg/mL 22.30 24.80     Last HD:  2/14 ending around 1200 (stopped 1 hour early per patient request)  Last admin vanc dose:  750 mg 2/12 at 2046    Vitals/Labs  Ht: 170.2 cm (67\"); Wt: 108 kg (238 lb 1.6 oz)   Temp Readings from Last 1 Encounters:   02/16/25 99.1 °F (37.3 °C) (Oral)     Estimated Creatinine Clearance: 19.3 mL/min (A) (by C-G formula based on SCr of 5.97 mg/dL (H)).   Dialysis MWF    Results from last 7 days   Lab Units 02/16/25  0457 02/15/25  0725 02/14/25  0333   CREATININE mg/dL 5.97* 5.35* 6.57*   WBC 10*3/mm3 14.97* 15.30* 13.96*     Assessment/Plan    Current Vancomycin Dose: intermittent dosing  Last HD:  2/14 ending around 1200 (stopped 1 hour early per patient request)  Last admin vanc dose:  750 mg 2/12 at 2046  Next Level Date and Time: Vanc Random on 2/17/25 at 0600 prior to HD  We will continue to monitor patient changes and renal function     Thank you for involving pharmacy in this patient's care. Please contact pharmacy with any questions or concerns.       Estrellita Mclean, [facility]  Clinical Pharmacist          "

## 2025-02-16 NOTE — PROGRESS NOTES
"Daily progress note    Primary care physician  Dr. NERI    Subjective   Doing same with no new complaints     History of present illness  39-year-old male with history of end-stage renal disease on hemodialysis chronic anemia diabetes hypertension hyperlipidemia coronary artery disease and gastroesophageal of disease who is also legally blind and has right foot wound which is getting worse mainly right great toe which looks infected and has recently seen by podiatrist and removed the callus from the right great toe.  Patient has intermittent bleeding pain drainage but no fever chills.  Patient also denies any chest pain shortness of breath palpitation abdominal pain nausea vomiting diarrhea.  Patient workup in ER revealed infected right great toe wound admitted for management.     REVIEW OF SYSTEMS  Unremarkable except pain     PHYSICAL EXAM   Blood pressure 150/70, pulse 102, temperature 99.1 °F (37.3 °C), temperature source Oral, resp. rate 16, height 170.2 cm (67\"), weight 108 kg (238 lb 1.6 oz), SpO2 99%.    Constitutional:       General: He is not in acute distress.     Appearance: Normal appearance. He is not ill-appearing, toxic-appearing or diaphoretic.   HENT:      Head: Normocephalic and atraumatic.      Nose: Nose normal.      Mouth/Throat:      Mouth: Mucous membranes are moist.      Pharynx: Oropharynx is clear.   Eyes:      Conjunctiva/sclera: Conjunctivae normal.      Pupils: Pupils are equal, round, and reactive to light.   Cardiovascular:      Rate and Rhythm: Normal rate and regular rhythm.      Pulses: Normal pulses.   Pulmonary:      Effort: Pulmonary effort is normal.   Abdominal:      General: Abdomen is flat.      Palpations: Abdomen is soft.   Musculoskeletal:      Comments: Right great toe is black in color with a deep ulceration and dried blood, no active purulent drainage, no palpable crepitus or fluctuance   Skin:     General: Skin is warm and dry.   Neurological:      General: No focal " deficit present.      Mental Status: He is alert and oriented to person, place, and time. Mental status is at baseline.   Psychiatric:         Mood and Affect: Mood normal.         Behavior: Behavior normal.         Thought Content: Thought content normal.         Judgment: Judgment normal.      LAB RESULTS  Lab Results (last 24 hours)       Procedure Component Value Units Date/Time    POC Glucose Once [[id number]]  (Abnormal) Collected: 02/16/25 1109    Specimen: Blood Updated: 02/16/25 1111     Glucose 195 mg/dL     POC Glucose Once [[id number]]  (Abnormal) Collected: 02/16/25 0625    Specimen: Blood Updated: 02/16/25 0626     Glucose 132 mg/dL     Basic Metabolic Panel [[id number]]  (Abnormal) Collected: 02/16/25 0457    Specimen: Blood Updated: 02/16/25 0601     Glucose 133 mg/dL      BUN 35 mg/dL      Creatinine 5.97 mg/dL      Sodium 130 mmol/L      Potassium 3.7 mmol/L      Chloride 95 mmol/L      CO2 21.6 mmol/L      Calcium 9.1 mg/dL      BUN/Creatinine Ratio 5.9     Anion Gap 13.4 mmol/L      eGFR 11.4 mL/min/1.73     Narrative:      GFR Categories in Chronic Kidney Disease (CKD)      GFR Category          GFR (mL/min/1.73)    Interpretation  G1                     90 or greater         Normal or high (1)  G2                      60-89                Mild decrease (1)  G3a                   45-59                Mild to moderate decrease  G3b                   30-44                Moderate to severe decrease  G4                    15-29                Severe decrease  G5                    14 or less           Kidney failure          (1)In the absence of evidence of kidney disease, neither GFR category G1 or G2 fulfill the criteria for CKD.    eGFR calculation 2021 CKD-EPI creatinine equation, which does not include race as a factor    CBC & Differential [[id number]]  (Abnormal) Collected: 02/16/25 0457    Specimen: Blood Updated: 02/16/25 0546    Narrative:      The following orders were created for panel  order CBC & Differential.  Procedure                               Abnormality         Status                     ---------                               -----------         ------                     CBC Auto Differential[[id number]]        Abnormal            Final result                 Please view results for these tests on the individual orders.    CBC Auto Differential [[id number]]  (Abnormal) Collected: 02/16/25 0457    Specimen: Blood Updated: 02/16/25 0546     WBC 14.97 10*3/mm3      RBC 3.00 10*6/mm3      Hemoglobin 8.9 g/dL      Hematocrit 25.2 %      MCV 84.0 fL      MCH 29.7 pg      MCHC 35.3 g/dL      RDW 14.2 %      RDW-SD 43.2 fl      MPV 8.3 fL      Platelets 304 10*3/mm3      Neutrophil % 84.4 %      Lymphocyte % 4.9 %      Monocyte % 6.7 %      Eosinophil % 1.6 %      Basophil % 0.5 %      Immature Grans % 1.9 %      Neutrophils, Absolute 12.62 10*3/mm3      Lymphocytes, Absolute 0.74 10*3/mm3      Monocytes, Absolute 1.00 10*3/mm3      Eosinophils, Absolute 0.24 10*3/mm3      Basophils, Absolute 0.08 10*3/mm3      Immature Grans, Absolute 0.29 10*3/mm3      nRBC 0.0 /100 WBC     POC Glucose Once [[id number]]  (Abnormal) Collected: 02/15/25 2058    Specimen: Blood Updated: 02/15/25 2100     Glucose 154 mg/dL     Blood Culture - Blood, Arm, Right [[id number]]  (Normal) Collected: 02/11/25 1742    Specimen: Blood from Arm, Right Updated: 02/15/25 1800     Blood Culture No growth at 4 days    Narrative:      Less than seven (7) mL's of blood was collected.  Insufficient quantity may yield false negative results.    Blood Culture - Blood, Arm, Right [[id number]]  (Normal) Collected: 02/11/25 1749    Specimen: Blood from Arm, Right Updated: 02/15/25 1800     Blood Culture No growth at 4 days    POC Glucose Once [[id number]]  (Abnormal) Collected: 02/15/25 1642    Specimen: Blood Updated: 02/15/25 1643     Glucose 274 mg/dL           Imaging Results (Last 24 Hours)       ** No results found for the last 24  hours. **            Current Facility-Administered Medications:     amLODIPine (NORVASC) tablet 5 mg, 5 mg, Oral, Q24H, Houston Ramos MD, 5 mg at 02/16/25 0834    aspirin chewable tablet 81 mg, 81 mg, Oral, Daily, Remy Thornton MD, 81 mg at 02/16/25 0836    atorvastatin (LIPITOR) tablet 40 mg, 40 mg, Oral, Nightly, Remy Thornton MD, 40 mg at 02/15/25 2128    bumetanide (BUMEX) tablet 2 mg, 2 mg, Oral, BID Diuretics, Remy Thornton MD, 2 mg at 02/16/25 0834    carvedilol (COREG) tablet 25 mg, 25 mg, Oral, BID, Remy Thornton MD, 25 mg at 02/16/25 0834    cefTRIAXone (ROCEPHIN) 2,000 mg in sodium chloride 0.9 % 100 mL MBP, 2,000 mg, Intravenous, Daily, Abdiaziz Paz MD, Last Rate: 200 mL/hr at 02/16/25 0833, 2,000 mg at 02/16/25 0833    dextrose (D50W) (25 g/50 mL) IV injection 25 g, 25 g, Intravenous, Q15 Min PRN, Remy Thornton MD    dextrose (GLUTOSE) oral gel 15 g, 15 g, Oral, Q15 Min PRN, Remy Thornton MD    docusate sodium (COLACE) capsule 100 mg, 100 mg, Oral, BID, Remy Thornton MD, 100 mg at 02/15/25 2127    epoetin jamil-epbx (RETACRIT) injection 10,000 Units, 10,000 Units, Subcutaneous, Once per day on Monday Wednesday Friday, Houston Ramos MD    glucagon (GLUCAGEN) injection 1 mg, 1 mg, Intramuscular, Q15 Min PRN, Remy Thornton MD    HYDROcodone-acetaminophen (NORCO) 5-325 MG per tablet 2 tablet, 2 tablet, Oral, Q4H PRN, Remy Thornton MD, 2 tablet at 02/16/25 0836    HYDROmorphone (DILAUDID) injection 0.5 mg, 0.5 mg, Intravenous, Q4H PRN, Trevon Garcia, DPM, 0.5 mg at 02/16/25 0919    insulin glargine (LANTUS, SEMGLEE) injection 15 Units, 15 Units, Subcutaneous, Nightly, Remy Thornton MD, 15 Units at 02/15/25 2055    insulin lispro (HUMALOG/ADMELOG) injection 2-7 Units, 2-7 Units, Subcutaneous, 4x Daily AC & at Bedtime, Remy Thornton MD, 2 Units at 02/16/25 1145    insulin lispro (HUMALOG/ADMELOG) injection 5 Units, 5 Units, Subcutaneous, TID With Meals, Remy Thornton MD, 5 Units at 02/16/25  1145    losartan (COZAAR) tablet 25 mg, 25 mg, Oral, Q24H, Houston Ramos MD, 25 mg at 02/16/25 0834    ondansetron (ZOFRAN) injection 4 mg, 4 mg, Intravenous, Q6H PRN, Jaskaran Thornton MD, 4 mg at 02/16/25 0833    pantoprazole (PROTONIX) EC tablet 40 mg, 40 mg, Oral, Q AM, Jaskaran Thornton MD, 40 mg at 02/16/25 0617    Pharmacy to dose vancomycin, , Not Applicable, Continuous PRN, Abdiaziz Paz MD    polyethylene glycol (MIRALAX) packet 17 g, 17 g, Oral, Daily, Jaskaran Thornton MD    terazosin (HYTRIN) capsule 1 mg, 1 mg, Oral, Nightly, Houston Ramos MD, 1 mg at 02/15/25 2127    ticagrelor (BRILINTA) tablet 90 mg, 90 mg, Oral, Q12H, Jaskaran Thornton MD, 90 mg at 02/14/25 2204    zolpidem (AMBIEN) tablet 5 mg, 5 mg, Oral, Nightly PRN, Jaskaran Thornton MD, 5 mg at 02/15/25 2223     ASSESSMENT  Right great toe wound with infection and surrounding cellulitis and osteomyelitis  End-stage renal disease on hemodialysis  Diabetes mellitus  Hypertension  Hyperlipidemia  Coronary artery disease  Legally blind  Chronic anemia with drop in H&H  Gastroesophageal reflux disease    PLAN  CPM   Surgery on Tuesday  Continue IV antibiotics   Pain management  Infectious disease consult appreciated  Podiatry and nephrology to follow patient   Continue home medications  Stress ulcer DVT prophylaxis  Supportive care  Discussed with nursing staff and family  Follow closely further recommendation current hospital course    JASKARAN THORNTON MD    Copied text in this note has been reviewed and is accurate as of 02/16/25

## 2025-02-17 LAB
ANION GAP SERPL CALCULATED.3IONS-SCNC: 18.7 MMOL/L (ref 5–15)
BASOPHILS # BLD AUTO: 0.05 10*3/MM3 (ref 0–0.2)
BASOPHILS NFR BLD AUTO: 0.3 % (ref 0–1.5)
BUN SERPL-MCNC: 46 MG/DL (ref 6–20)
BUN/CREAT SERPL: 7 (ref 7–25)
CALCIUM SPEC-SCNC: 9.3 MG/DL (ref 8.6–10.5)
CHLORIDE SERPL-SCNC: 92 MMOL/L (ref 98–107)
CO2 SERPL-SCNC: 20.3 MMOL/L (ref 22–29)
CREAT SERPL-MCNC: 6.59 MG/DL (ref 0.76–1.27)
DEPRECATED RDW RBC AUTO: 44.1 FL (ref 37–54)
EGFRCR SERPLBLD CKD-EPI 2021: 10.2 ML/MIN/1.73
EOSINOPHIL # BLD AUTO: 0.28 10*3/MM3 (ref 0–0.4)
EOSINOPHIL NFR BLD AUTO: 1.5 % (ref 0.3–6.2)
ERYTHROCYTE [DISTWIDTH] IN BLOOD BY AUTOMATED COUNT: 13.9 % (ref 12.3–15.4)
GLUCOSE BLDC GLUCOMTR-MCNC: 179 MG/DL (ref 70–130)
GLUCOSE BLDC GLUCOMTR-MCNC: 182 MG/DL (ref 70–130)
GLUCOSE BLDC GLUCOMTR-MCNC: 189 MG/DL (ref 70–130)
GLUCOSE BLDC GLUCOMTR-MCNC: 269 MG/DL (ref 70–130)
GLUCOSE SERPL-MCNC: 166 MG/DL (ref 65–99)
HCT VFR BLD AUTO: 25.2 % (ref 37.5–51)
HGB BLD-MCNC: 8.4 G/DL (ref 13–17.7)
IMM GRANULOCYTES # BLD AUTO: 0.29 10*3/MM3 (ref 0–0.05)
IMM GRANULOCYTES NFR BLD AUTO: 1.6 % (ref 0–0.5)
LYMPHOCYTES # BLD AUTO: 0.84 10*3/MM3 (ref 0.7–3.1)
LYMPHOCYTES NFR BLD AUTO: 4.6 % (ref 19.6–45.3)
MCH RBC QN AUTO: 29.2 PG (ref 26.6–33)
MCHC RBC AUTO-ENTMCNC: 33.3 G/DL (ref 31.5–35.7)
MCV RBC AUTO: 87.5 FL (ref 79–97)
MONOCYTES # BLD AUTO: 1.1 10*3/MM3 (ref 0.1–0.9)
MONOCYTES NFR BLD AUTO: 6 % (ref 5–12)
NEUTROPHILS NFR BLD AUTO: 15.64 10*3/MM3 (ref 1.7–7)
NEUTROPHILS NFR BLD AUTO: 86 % (ref 42.7–76)
NRBC BLD AUTO-RTO: 0 /100 WBC (ref 0–0.2)
PLATELET # BLD AUTO: 359 10*3/MM3 (ref 140–450)
PMV BLD AUTO: 8.4 FL (ref 6–12)
POTASSIUM SERPL-SCNC: 3.9 MMOL/L (ref 3.5–5.2)
RBC # BLD AUTO: 2.88 10*6/MM3 (ref 4.14–5.8)
SODIUM SERPL-SCNC: 131 MMOL/L (ref 136–145)
VANCOMYCIN SERPL-MCNC: 6.8 MCG/ML (ref 5–40)
WBC NRBC COR # BLD AUTO: 18.2 10*3/MM3 (ref 3.4–10.8)

## 2025-02-17 PROCEDURE — 25010000002 EPOETIN ALFA-EPBX 10000 UNIT/ML SOLUTION: Performed by: INTERNAL MEDICINE

## 2025-02-17 PROCEDURE — 63710000001 INSULIN LISPRO (HUMAN) PER 5 UNITS: Performed by: HOSPITALIST

## 2025-02-17 PROCEDURE — 25810000003 SODIUM CHLORIDE 0.9 % SOLUTION: Performed by: HOSPITALIST

## 2025-02-17 PROCEDURE — 80048 BASIC METABOLIC PNL TOTAL CA: CPT | Performed by: HOSPITALIST

## 2025-02-17 PROCEDURE — 25010000002 HYDROMORPHONE PER 4 MG: Performed by: PODIATRIST

## 2025-02-17 PROCEDURE — 25010000002 ONDANSETRON PER 1 MG: Performed by: HOSPITALIST

## 2025-02-17 PROCEDURE — 63710000001 INSULIN GLARGINE PER 5 UNITS: Performed by: HOSPITALIST

## 2025-02-17 PROCEDURE — 82948 REAGENT STRIP/BLOOD GLUCOSE: CPT

## 2025-02-17 PROCEDURE — 25010000002 HYDROMORPHONE 1 MG/ML SOLUTION: Performed by: PODIATRIST

## 2025-02-17 PROCEDURE — 85025 COMPLETE CBC W/AUTO DIFF WBC: CPT | Performed by: HOSPITALIST

## 2025-02-17 PROCEDURE — 25010000002 CEFTRIAXONE PER 250 MG: Performed by: INTERNAL MEDICINE

## 2025-02-17 PROCEDURE — 80202 ASSAY OF VANCOMYCIN: CPT | Performed by: INTERNAL MEDICINE

## 2025-02-17 PROCEDURE — 25010000002 VANCOMYCIN 10 G RECONSTITUTED SOLUTION: Performed by: HOSPITALIST

## 2025-02-17 RX ORDER — LOSARTAN POTASSIUM 50 MG/1
50 TABLET ORAL
Status: DISCONTINUED | OUTPATIENT
Start: 2025-02-18 | End: 2025-02-23

## 2025-02-17 RX ORDER — VANCOMYCIN/0.9 % SOD CHLORIDE 1.5G/250ML
15 PLASTIC BAG, INJECTION (ML) INTRAVENOUS ONCE
Status: COMPLETED | OUTPATIENT
Start: 2025-02-17 | End: 2025-02-17

## 2025-02-17 RX ADMIN — HYDROMORPHONE HYDROCHLORIDE 0.5 MG: 1 INJECTION, SOLUTION INTRAMUSCULAR; INTRAVENOUS; SUBCUTANEOUS at 11:56

## 2025-02-17 RX ADMIN — LOSARTAN POTASSIUM 25 MG: 25 TABLET, FILM COATED ORAL at 13:05

## 2025-02-17 RX ADMIN — INSULIN LISPRO 4 UNITS: 100 INJECTION, SOLUTION INTRAVENOUS; SUBCUTANEOUS at 17:04

## 2025-02-17 RX ADMIN — CARVEDILOL 25 MG: 25 TABLET, FILM COATED ORAL at 20:37

## 2025-02-17 RX ADMIN — INSULIN LISPRO 2 UNITS: 100 INJECTION, SOLUTION INTRAVENOUS; SUBCUTANEOUS at 13:55

## 2025-02-17 RX ADMIN — ZOLPIDEM TARTRATE 5 MG: 5 TABLET, FILM COATED ORAL at 00:43

## 2025-02-17 RX ADMIN — HYDROCODONE BITARTRATE AND ACETAMINOPHEN 2 TABLET: 5; 325 TABLET ORAL at 20:37

## 2025-02-17 RX ADMIN — AMLODIPINE BESYLATE 5 MG: 5 TABLET ORAL at 13:02

## 2025-02-17 RX ADMIN — ASPIRIN 81 MG CHEWABLE TABLET 81 MG: 81 TABLET CHEWABLE at 13:02

## 2025-02-17 RX ADMIN — INSULIN LISPRO 2 UNITS: 100 INJECTION, SOLUTION INTRAVENOUS; SUBCUTANEOUS at 20:44

## 2025-02-17 RX ADMIN — TERAZOSIN HYDROCHLORIDE 1 MG: 1 CAPSULE ORAL at 20:38

## 2025-02-17 RX ADMIN — ONDANSETRON 4 MG: 2 INJECTION INTRAMUSCULAR; INTRAVENOUS at 08:22

## 2025-02-17 RX ADMIN — HYDROCODONE BITARTRATE AND ACETAMINOPHEN 2 TABLET: 5; 325 TABLET ORAL at 12:07

## 2025-02-17 RX ADMIN — ATORVASTATIN CALCIUM 40 MG: 20 TABLET, FILM COATED ORAL at 20:38

## 2025-02-17 RX ADMIN — INSULIN LISPRO 5 UNITS: 100 INJECTION, SOLUTION INTRAVENOUS; SUBCUTANEOUS at 17:04

## 2025-02-17 RX ADMIN — INSULIN LISPRO 5 UNITS: 100 INJECTION, SOLUTION INTRAVENOUS; SUBCUTANEOUS at 13:56

## 2025-02-17 RX ADMIN — DOCUSATE SODIUM 100 MG: 100 CAPSULE, LIQUID FILLED ORAL at 13:02

## 2025-02-17 RX ADMIN — HYDROMORPHONE HYDROCHLORIDE 0.5 MG: 1 INJECTION, SOLUTION INTRAMUSCULAR; INTRAVENOUS; SUBCUTANEOUS at 04:10

## 2025-02-17 RX ADMIN — CARVEDILOL 25 MG: 25 TABLET, FILM COATED ORAL at 13:06

## 2025-02-17 RX ADMIN — EPOETIN ALFA-EPBX 10000 UNITS: 10000 INJECTION, SOLUTION INTRAVENOUS; SUBCUTANEOUS at 12:58

## 2025-02-17 RX ADMIN — HYDROCODONE BITARTRATE AND ACETAMINOPHEN 2 TABLET: 5; 325 TABLET ORAL at 00:46

## 2025-02-17 RX ADMIN — DOCUSATE SODIUM 100 MG: 100 CAPSULE, LIQUID FILLED ORAL at 20:37

## 2025-02-17 RX ADMIN — HYDROMORPHONE HYDROCHLORIDE 0.5 MG: 1 INJECTION, SOLUTION INTRAMUSCULAR; INTRAVENOUS; SUBCUTANEOUS at 22:48

## 2025-02-17 RX ADMIN — ONDANSETRON 4 MG: 2 INJECTION INTRAMUSCULAR; INTRAVENOUS at 22:52

## 2025-02-17 RX ADMIN — HYDROMORPHONE HYDROCHLORIDE 0.5 MG: 1 INJECTION, SOLUTION INTRAMUSCULAR; INTRAVENOUS; SUBCUTANEOUS at 17:05

## 2025-02-17 RX ADMIN — ONDANSETRON 4 MG: 2 INJECTION INTRAMUSCULAR; INTRAVENOUS at 14:25

## 2025-02-17 RX ADMIN — INSULIN GLARGINE 15 UNITS: 100 INJECTION, SOLUTION SUBCUTANEOUS at 20:38

## 2025-02-17 RX ADMIN — BUMETANIDE 2 MG: 2 TABLET ORAL at 17:04

## 2025-02-17 RX ADMIN — TICAGRELOR 90 MG: 90 TABLET ORAL at 13:03

## 2025-02-17 RX ADMIN — HYDROMORPHONE HYDROCHLORIDE 0.5 MG: 1 INJECTION, SOLUTION INTRAMUSCULAR; INTRAVENOUS; SUBCUTANEOUS at 08:22

## 2025-02-17 RX ADMIN — SODIUM CHLORIDE 1500 MG: 9 INJECTION, SOLUTION INTRAVENOUS at 14:26

## 2025-02-17 RX ADMIN — BUMETANIDE 2 MG: 2 TABLET ORAL at 13:02

## 2025-02-17 RX ADMIN — ONDANSETRON 4 MG: 2 INJECTION INTRAMUSCULAR; INTRAVENOUS at 00:38

## 2025-02-17 RX ADMIN — PANTOPRAZOLE SODIUM 40 MG: 40 TABLET, DELAYED RELEASE ORAL at 06:48

## 2025-02-17 NOTE — PLAN OF CARE
Goal Outcome Evaluation:         Pt has been alert and oriented x 4 this shift. Pain has been treated with PO Norco and IVP Dilaudid, both with varying effective. Pt's bp's have been elevated during shift, no c/o CP or SOB. Pt refused wound care this morning. Wife is bedside attentive and supportive.

## 2025-02-17 NOTE — PROGRESS NOTES
Podiatry Progress Note      Patient: Wilner Menjivar Admit Date: 02/11/2025    Age: 39 y.o.   PCP: Roosevelt Thao MD    MRN: [id number]  Room: [facility]        Subjective     Chief Complaint     Chief Complaint   Patient presents with    Wound Check        HPI      patient resting in bed, wife at bedside.  Plan for dialysis today.  IV Dilaudid ordered for pain control which is improved.    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 Pt also c/o new abd pain with admin.     Pt given IV azithro, reported nausea/vomiting and hot flashes within 3-5 minutes of admin. Pt also c/o new abd pain with admin.    Penicillins Unknown - Low Severity     reaction as a child. Does not recall reaction.     reaction as a child. Does not recall reaction.  Beta lactam allergy details  Antibiotic reaction: unknown  Age at reaction: infant  Dose to reaction time: unknown  Reason for antibiotic: unknown  Epinephrine required for reaction?: unknown  Tolerated antibiotics: unknown           Social History     Tobacco Use   Smoking Status Never   Smokeless Tobacco Never        Objective   Physical Exam    Vitals:    02/16/25 2307   BP: 173/90   Pulse: 101   Resp: 18   Temp: 98.6 °F (37 °C)   SpO2:           Dressing clean dry and intact    Labs     Lab Results   Component Value Date    HGBA1C 11.10 (H) 02/13/2025    POCGLU 182 (H) 02/17/2025        CBC:      Lab 02/17/25  0526 02/16/25  0457 02/15/25  0725 02/14/25  2233 02/14/25  0333 02/13/25  0507    WBC 18.20* 14.97* 15.30*  --  13.96* 11.03*   HEMOGLOBIN 8.4* 8.9* 9.1*   < > 6.6* 7.2*   HEMATOCRIT 25.2* 25.2* 28.4*   < > 20.6* 21.0*   PLATELETS 359 304 294  --  226 175   NEUTROS ABS 15.64* 12.62* 12.92*  --  11.54* 8.88*   IMMATURE GRANS (ABS) 0.29* 0.29* 0.28*  --  0.18* 0.13*   LYMPHS ABS 0.84 0.74 0.70  --  0.89 0.72   MONOS ABS 1.10* 1.00* 1.14*  --  1.13* 1.18*   EOS ABS 0.28 0.24 0.21  --  0.18 0.07   MCV 87.5 84.0 89.6  --  90.4 89.4    < > = values in this interval not displayed.          Results for orders placed or performed during the hospital encounter of 02/11/25   Blood Culture - Blood, Arm, Right    Specimen: Arm, Right; Blood   Result Value Ref Range    Blood Culture No growth at 5 days         Doppler Arterial Multi Level Lower Extremity - Bilateral CAR  Addendum:   Right Conclusion: The right GIOVANA is moderately reduced. Waveforms are  consistent with femoral disease, popliteal disease and tib-peroneal  disease. Normal digital pressures.     Left Conclusion: The left GIOVANA is normal. Normal digital pressures.  Narrative:   Right Conclusion: The right GIOVANA is moderately reduced. Normal digital   pressures.    Left Conclusion: The left GIOVANA is normal. Normal digital pressures.  MRI Foot Right Without Contrast  Narrative: MRI RIGHT FOREFOOT WITHOUT CONTRAST     HISTORY: Right great toe infection. Diabetes. Hypertension.     TECHNIQUE: MRI includes axial T1, STIR as well as coronal T1, T2  fat-sat, and sagittal PD  fat-saturated sequences through the forefoot.     COMPARISON: Right foot x-rays 02/11/2025.     FINDINGS: There is abnormal T1 hypointense and T2 hyperintense signal  within the 1st distal phalanx. There is bone loss involving the tuft and  distal shaft of the 1st distal phalanx that may be in part related to  debridement though is in part related to osteomyelitis. There is also  abnormal T1 hypointense and T2 hyperintense signal within the distal  shaft and head of the 1st proximal  phalanx. Small bubbles of air are  present surrounding the 1st proximal and distal phalanges and  potentially within the cancellous bone.     There are mild degenerative changes at the 1st MTP joint without  evidence for septic arthritis at the 1st MTP joint. No evidence for  osteomyelitis of the metatarsals or 2nd through 5th toes. There is  tenosynovitis of the flexor hallucis longus tendon with mild fluid in  the tendon sheath and surrounding edema extending to the level of the  base of the 1st MTP joint.     Generalized muscular edema and atrophy most likely related to chronic  neuropathy. Chronic arthritic changes are present with degenerative  subcortical cyst formation at the TMT joints and intercuneiform joints.  Generalized edema of the subcutaneous fat about the midfoot and forefoot  consistent with cellulitis.     Impression: 1. Osteomyelitis of the distal shaft and head of the 1st proximal  phalanx and of the distal phalanx with overlying soft tissue  wound/ulcer. Gas within the soft tissues and marrow of the great toe  associated with infection. Tenosynovitis flexor hallucis longus tendon  sheath. Forefoot cellulitis.  2. No evidence for septic arthritis at the 1st MTP joint.  3. Muscular edema and atrophy associated with myositis or chronic  neuropathy.     This report was finalized on 2/13/2025 7:48 AM by Junior Villalobos M.D  on Workstation: [id number]          Assessment/Plan      39-year-old male with uncontrolled diabetes with osteomyelitis right hallux    -patient examined and evaluated myself  - Plan for dialysis today  - Continue antibiotics  - Continue daily dressing changes  - Okay to hold anticoagulation today and plans for surgery tomorrow  - OR tomorrow morning 0730  for hallux amputation right foot  -n.p.o. at midnight      Trevon Garcia DPM  Office: [phone]

## 2025-02-17 NOTE — PROGRESS NOTES
"[facility] Clinical Pharmacy Services: Vancomycin Monitoring Note    Wilner Menjivar is a 39 y.o. male who is on day 6/14 (stop 2/23) of pharmacy to dose vancomycin for Skin and Soft Tissue. Dr. Paz is following    Previous Vancomycin Dose:   intermittent dosing    Results from last 7 days   Lab Units 02/17/25  0526 02/14/25  0333 02/12/25  0746   VANCOMYCIN RM mcg/mL 6.80 22.30 24.80     Dosing/HD hx (HD MWF)  2/12 2046 vanc 750 mg   2/14 0333 Vanc random 22.3      HD 2/14 ending around 1200 (stopped 1 hour early per patient request)  2/17 0526 vanc random 6.8     HD 2/17 2/17 Vanc 1500 mg     Vitals/Labs  Ht: 170.2 cm (67\"); Wt: 108 kg (238 lb 1.6 oz)   Temp Readings from Last 1 Encounters:   02/16/25 98.6 °F (37 °C) (Oral)     Estimated Creatinine Clearance: 17.5 mL/min (A) (by C-G formula based on SCr of 6.59 mg/dL (H)).   Dialysis MWF    Results from last 7 days   Lab Units 02/17/25  0526 02/16/25  0457 02/15/25  0725   CREATININE mg/dL 6.59* 5.97* 5.35*   WBC 10*3/mm3 18.20* 14.97* 15.30*     Assessment/Plan  Vanc random level is low. I spoke with RN and Pt is going to HD now. Will schedule redose after    Current Vancomycin Dose: intermittent dosing, redose vancomycin 1500 mg once after HD   Next Level Date and Time: Vanc Random on 2/17/25 at 0600 prior to HD  We will continue to monitor patient changes and renal function     Thank you for involving pharmacy in this patient's care. Please contact pharmacy with any questions or concerns.       Brittaney Lawson [facility]  Clinical Pharmacist            "

## 2025-02-17 NOTE — PROGRESS NOTES
"Daily progress note    Primary care physician  Dr. NERI    Subjective   Doing same with no new complaints and getting hemodialysis    History of present illness  39-year-old male with history of end-stage renal disease on hemodialysis chronic anemia diabetes hypertension hyperlipidemia coronary artery disease and gastroesophageal of disease who is also legally blind and has right foot wound which is getting worse mainly right great toe which looks infected and has recently seen by podiatrist and removed the callus from the right great toe.  Patient has intermittent bleeding pain drainage but no fever chills.  Patient also denies any chest pain shortness of breath palpitation abdominal pain nausea vomiting diarrhea.  Patient workup in ER revealed infected right great toe wound admitted for management.     REVIEW OF SYSTEMS  Unremarkable except pain     PHYSICAL EXAM   Blood pressure 165/84, pulse 106, temperature 98 °F (36.7 °C), temperature source Temporal, resp. rate 16, height 170.2 cm (67\"), weight 108 kg (238 lb 1.6 oz), SpO2 100%.    Constitutional:       General: He is not in acute distress.     Appearance: Normal appearance. He is not ill-appearing, toxic-appearing or diaphoretic.   HENT:      Head: Normocephalic and atraumatic.      Nose: Nose normal.      Mouth/Throat:      Mouth: Mucous membranes are moist.      Pharynx: Oropharynx is clear.   Eyes:      Conjunctiva/sclera: Conjunctivae normal.      Pupils: Pupils are equal, round, and reactive to light.   Cardiovascular:      Rate and Rhythm: Normal rate and regular rhythm.      Pulses: Normal pulses.   Pulmonary:      Effort: Pulmonary effort is normal.   Abdominal:      General: Abdomen is flat.      Palpations: Abdomen is soft.   Musculoskeletal:      Comments: Right great toe is black in color with a deep ulceration and dried blood, no active purulent drainage, no palpable crepitus or fluctuance   Skin:     General: Skin is warm and dry. "   Neurological:      General: No focal deficit present.      Mental Status: He is alert and oriented to person, place, and time. Mental status is at baseline.   Psychiatric:         Mood and Affect: Mood normal.         Behavior: Behavior normal.         Thought Content: Thought content normal.         Judgment: Judgment normal.      LAB RESULTS  Lab Results (last 24 hours)       Procedure Component Value Units Date/Time    POC Glucose Once [[id number]]  (Abnormal) Collected: 02/17/25 1249    Specimen: Blood Updated: 02/17/25 1251     Glucose 189 mg/dL     Basic Metabolic Panel [[id number]]  (Abnormal) Collected: 02/17/25 0526    Specimen: Blood Updated: 02/17/25 0713     Glucose 166 mg/dL      BUN 46 mg/dL      Creatinine 6.59 mg/dL      Sodium 131 mmol/L      Potassium 3.9 mmol/L      Chloride 92 mmol/L      CO2 20.3 mmol/L      Calcium 9.3 mg/dL      BUN/Creatinine Ratio 7.0     Anion Gap 18.7 mmol/L      eGFR 10.2 mL/min/1.73     Narrative:      GFR Categories in Chronic Kidney Disease (CKD)      GFR Category          GFR (mL/min/1.73)    Interpretation  G1                     90 or greater         Normal or high (1)  G2                      60-89                Mild decrease (1)  G3a                   45-59                Mild to moderate decrease  G3b                   30-44                Moderate to severe decrease  G4                    15-29                Severe decrease  G5                    14 or less           Kidney failure          (1)In the absence of evidence of kidney disease, neither GFR category G1 or G2 fulfill the criteria for CKD.    eGFR calculation 2021 CKD-EPI creatinine equation, which does not include race as a factor    Vancomycin, Random [[id number]]  (Normal) Collected: 02/17/25 0526    Specimen: Blood Updated: 02/17/25 0642     Vancomycin Random 6.80 mcg/mL     Narrative:      Therapeutic Ranges for Vancomycin    Vancomycin Random   5.0-40.0 mcg/mL  Vancomycin Trough   5.0-20.0  mcg/mL  Vancomycin Peak     20.0-40.0 mcg/mL    CBC & Differential [[id number]]  (Abnormal) Collected: 02/17/25 0526    Specimen: Blood Updated: 02/17/25 0626    Narrative:      The following orders were created for panel order CBC & Differential.  Procedure                               Abnormality         Status                     ---------                               -----------         ------                     CBC Auto Differential[[id number]]        Abnormal            Final result                 Please view results for these tests on the individual orders.    CBC Auto Differential [[id number]]  (Abnormal) Collected: 02/17/25 0526    Specimen: Blood Updated: 02/17/25 0626     WBC 18.20 10*3/mm3      RBC 2.88 10*6/mm3      Hemoglobin 8.4 g/dL      Hematocrit 25.2 %      MCV 87.5 fL      MCH 29.2 pg      MCHC 33.3 g/dL      RDW 13.9 %      RDW-SD 44.1 fl      MPV 8.4 fL      Platelets 359 10*3/mm3      Neutrophil % 86.0 %      Lymphocyte % 4.6 %      Monocyte % 6.0 %      Eosinophil % 1.5 %      Basophil % 0.3 %      Immature Grans % 1.6 %      Neutrophils, Absolute 15.64 10*3/mm3      Lymphocytes, Absolute 0.84 10*3/mm3      Monocytes, Absolute 1.10 10*3/mm3      Eosinophils, Absolute 0.28 10*3/mm3      Basophils, Absolute 0.05 10*3/mm3      Immature Grans, Absolute 0.29 10*3/mm3      nRBC 0.0 /100 WBC     POC Glucose Once [[id number]]  (Abnormal) Collected: 02/17/25 0614    Specimen: Blood Updated: 02/17/25 0616     Glucose 182 mg/dL     POC Glucose Once [[id number]]  (Abnormal) Collected: 02/16/25 2137    Specimen: Blood Updated: 02/16/25 2139     Glucose 179 mg/dL     Blood Culture - Blood, Arm, Right [[id number]]  (Normal) Collected: 02/11/25 1742    Specimen: Blood from Arm, Right Updated: 02/16/25 1800     Blood Culture No growth at 5 days    Narrative:      Less than seven (7) mL's of blood was collected.  Insufficient quantity may yield false negative results.    Blood Culture - Blood, Arm, Right  [[id number]]  (Normal) Collected: 02/11/25 1749    Specimen: Blood from Arm, Right Updated: 02/16/25 1800     Blood Culture No growth at 5 days    POC Glucose Once [[id number]]  (Abnormal) Collected: 02/16/25 1629    Specimen: Blood Updated: 02/16/25 1630     Glucose 166 mg/dL           Imaging Results (Last 24 Hours)       ** No results found for the last 24 hours. **            Current Facility-Administered Medications:     amLODIPine (NORVASC) tablet 5 mg, 5 mg, Oral, Q24H, Houston Ramos MD, 5 mg at 02/17/25 1302    aspirin chewable tablet 81 mg, 81 mg, Oral, Daily, Remy Thornton MD, 81 mg at 02/17/25 1302    atorvastatin (LIPITOR) tablet 40 mg, 40 mg, Oral, Nightly, Remy Thornton MD, 40 mg at 02/16/25 2236    bumetanide (BUMEX) tablet 2 mg, 2 mg, Oral, BID Diuretics, Remy Thornton MD, 2 mg at 02/17/25 1302    carvedilol (COREG) tablet 25 mg, 25 mg, Oral, BID, Remy Thornton MD, 25 mg at 02/17/25 1306    cefTRIAXone (ROCEPHIN) 2,000 mg in sodium chloride 0.9 % 100 mL MBP, 2,000 mg, Intravenous, Daily, Abdiaziz Paz MD, Held at 02/17/25 1308    dextrose (D50W) (25 g/50 mL) IV injection 25 g, 25 g, Intravenous, Q15 Min PRN, Remy Thornton MD    dextrose (GLUTOSE) oral gel 15 g, 15 g, Oral, Q15 Min PRN, Remy Thornton MD    docusate sodium (COLACE) capsule 100 mg, 100 mg, Oral, BID, Remy Thornton MD, 100 mg at 02/17/25 1302    epoetin jamil-epbx (RETACRIT) injection 10,000 Units, 10,000 Units, Subcutaneous, Once per day on Monday Wednesday Friday, Houston Ramos MD, 10,000 Units at 02/17/25 1258    glucagon (GLUCAGEN) injection 1 mg, 1 mg, Intramuscular, Q15 Min PRN, Remy Thornton MD    HYDROcodone-acetaminophen (NORCO) 5-325 MG per tablet 2 tablet, 2 tablet, Oral, Q4H PRN, Remy Thornton MD, 2 tablet at 02/17/25 1207    HYDROmorphone (DILAUDID) injection 0.5 mg, 0.5 mg, Intravenous, Q4H PRN, Trevon Garcia DPM, 0.5 mg at 02/17/25 1156    insulin glargine (LANTUS, SEMGLEE) injection 15 Units, 15 Units,  Subcutaneous, Nightly, Remy Thornton MD, 15 Units at 02/16/25 2238    insulin lispro (HUMALOG/ADMELOG) injection 2-7 Units, 2-7 Units, Subcutaneous, 4x Daily AC & at Bedtime, Remy Thornton MD, 3 Units at 02/16/25 2238    insulin lispro (HUMALOG/ADMELOG) injection 5 Units, 5 Units, Subcutaneous, TID With Meals, Remy Thornton MD, 5 Units at 02/16/25 1702    losartan (COZAAR) tablet 25 mg, 25 mg, Oral, Q24H, Houston Ramos MD, 25 mg at 02/17/25 1305    ondansetron (ZOFRAN) injection 4 mg, 4 mg, Intravenous, Q6H PRN, Remy Thornton MD, 4 mg at 02/17/25 0822    pantoprazole (PROTONIX) EC tablet 40 mg, 40 mg, Oral, Q AM, Remy Thornton MD, 40 mg at 02/17/25 0648    Pharmacy to dose vancomycin, , Not Applicable, Continuous PRN, Abdiaziz Paz MD    polyethylene glycol (MIRALAX) packet 17 g, 17 g, Oral, Daily, Remy Thornton MD    terazosin (HYTRIN) capsule 1 mg, 1 mg, Oral, Nightly, Houston Ramos MD, 1 mg at 02/16/25 2237    ticagrelor (BRILINTA) tablet 90 mg, 90 mg, Oral, Q12H, Remy Thornton MD, 90 mg at 02/17/25 1303    vancomycin IVPB 1500 mg in 0.9% NaCl (Premix) 500 mL, 15 mg/kg, Intravenous, Once, Remy Thornton MD    Vancomycin Pharmacy Intermittent/Pulse Dosing, , Not Applicable, Daily, Remy Thornton MD    zolpidem (AMBIEN) tablet 5 mg, 5 mg, Oral, Nightly PRN, Remy Thornton MD, 5 mg at 02/17/25 0043     ASSESSMENT  Right great toe wound with infection and surrounding cellulitis and osteomyelitis  End-stage renal disease on hemodialysis  Diabetes mellitus  Hypertension  Hyperlipidemia  Coronary artery disease  Legally blind  Chronic anemia with drop in H&H  Gastroesophageal reflux disease    PLAN  CPM   Surgery in a.m.  Continue IV antibiotics   Pain management  Infectious disease consult appreciated  Podiatry and nephrology to follow patient   Continue home medications  Stress ulcer DVT prophylaxis  Supportive care  Discussed with nursing staff and family  Follow closely further recommendation current  hospital course    JASKARAN LANGFORD MD    Copied text in this note has been reviewed and is accurate as of 02/17/25

## 2025-02-17 NOTE — PROGRESS NOTES
"  Infectious Diseases Progress Note    Abdiaziz Paz MD     [facility]  [place]: 6 days  Patient Identification:  Name: Wilner Menjivar  Age: 39 y.o.  Sex: male  :  1985  MRN: [id number]         Primary Care Physician: Roosevelt Thao MD        Subjective: Pain in the left foot is better after pain is being at the dialysis.  Denies any fever and chills.  Interval History: See consultation note.    Objective:    Scheduled Meds:amLODIPine, 5 mg, Oral, Q24H  aspirin, 81 mg, Oral, Daily  atorvastatin, 40 mg, Oral, Nightly  bumetanide, 2 mg, Oral, BID Diuretics  carvedilol, 25 mg, Oral, BID  cefTRIAXone, 2,000 mg, Intravenous, Daily  docusate sodium, 100 mg, Oral, BID  epoetin jamil/jamil-epbx, 10,000 Units, Subcutaneous, Once per day on   insulin glargine, 15 Units, Subcutaneous, Nightly  insulin lispro, 2-7 Units, Subcutaneous, 4x Daily AC & at Bedtime  insulin lispro, 5 Units, Subcutaneous, TID With Meals  losartan, 25 mg, Oral, Q24H  pantoprazole, 40 mg, Oral, Q AM  polyethylene glycol, 17 g, Oral, Daily  terazosin, 1 mg, Oral, Nightly  ticagrelor, 90 mg, Oral, Q12H      Continuous Infusions:Pharmacy to dose vancomycin,         Vital signs in last 24 hours:  Temp:  [97.9 °F (36.6 °C)-98.6 °F (37 °C)] 98.6 °F (37 °C)  Heart Rate:  [] 101  Resp:  [18] 18  BP: (143-173)/(67-90) 173/90    Intake/Output:  No intake or output data in the 24 hours ending 25 0757      Exam:  /90 (BP Location: Right arm, Patient Position: Lying)   Pulse 101   Temp 98.6 °F (37 °C) (Oral)   Resp 18   Ht 170.2 cm (67\")   Wt 108 kg (238 lb 1.6 oz)   SpO2 100%   BMI 37.29 kg/m²   Patient is examined using the personal protective equipment as per guidelines from infection control for this particular patient as enacted.  Hand washing was performed before and after patient interaction.  General Appearance:    Alert, cooperative, no distress, AAOx3                          Head:    " Normocephalic, without obvious abnormality, atraumatic                           Eyes:  Legally blind                         Throat:   Lips, tongue, gums normal; oral mucosa pink and moist                           Neck:   Supple, symmetrical, trachea midline, no JVD                         Lungs:    Clear to auscultation bilaterally, respirations unlabored                 Chest Wall:    No tenderness or deformity                          Heart:  S1-S2 regular                  Abdomen:   Soft nontender                 Extremities:                          Pulses:   Pulses palpable in all extremities                            Skin:   Skin is warm and dry,  no rashes or palpable lesions                  Neurologic: Legally blind       Data Review:    I reviewed the patient's new clinical results.  Results from last 7 days   Lab Units 02/17/25  0526 02/16/25  0457 02/15/25  0725 02/14/25  2233 02/14/25  0333 02/13/25  0507 02/12/25  0746 02/11/25  1556   WBC 10*3/mm3 18.20* 14.97* 15.30*  --  13.96* 11.03* 10.26 11.05*   HEMOGLOBIN g/dL 8.4* 8.9* 9.1* 9.4* 6.6* 7.2* 7.4* 7.9*   PLATELETS 10*3/mm3 359 304 294  --  226 175 155 138*     Results from last 7 days   Lab Units 02/17/25  0526 02/16/25 0457 02/15/25  0725 02/14/25  0333 02/13/25  0507 02/12/25  0746 02/11/25  1556   SODIUM mmol/L 131* 130* 129* 129* 130* 130* 125*   POTASSIUM mmol/L 3.9 3.7 4.0 3.6 3.5 3.9 4.5   CHLORIDE mmol/L 92* 95* 95* 95* 94* 95* 88*   CO2 mmol/L 20.3* 21.6* 21.0* 23.0 24.0 23.0 23.5   BUN mg/dL 46* 35* 29* 40* 26* 45* 42*   CREATININE mg/dL 6.59* 5.97* 5.35* 6.57* 4.84* 6.03* 5.85*   CALCIUM mg/dL 9.3 9.1 8.8 9.0 9.1 8.9 9.0   GLUCOSE mg/dL 166* 133* 274* 202* 148* 172* 442*   Doppler Arterial Multi Level Lower Extremity - Bilateral CAR    Addendum Date: 2/13/2025      Right Conclusion: The right GIOVANA is moderately reduced. Waveforms are consistent with femoral disease, popliteal disease and tib-peroneal disease. Normal digital  pressures.   Left Conclusion: The left GIOVANA is normal. Normal digital pressures.     MRI Foot Right Without Contrast    Result Date: 2/13/2025  1. Osteomyelitis of the distal shaft and head of the 1st proximal phalanx and of the distal phalanx with overlying soft tissue wound/ulcer. Gas within the soft tissues and marrow of the great toe associated with infection. Tenosynovitis flexor hallucis longus tendon sheath. Forefoot cellulitis. 2. No evidence for septic arthritis at the 1st MTP joint. 3. Muscular edema and atrophy associated with myositis or chronic neuropathy.  This report was finalized on 2/13/2025 7:48 AM by Junior Villalobos M.D on Workstation: [id number]      XR Foot 3+ View Right    Result Date: 2/11/2025   There is subcutaneous emphysema and soft tissue loss over the great toe. No radiopaque foreign body. No definite bone erosion.    This report was finalized on 2/11/2025 5:20 PM by Dr. Jason Dan M.D on Workstation: [id number]     Microbiology Results (last 10 days)       Procedure Component Value - Date/Time    MRSA Screen, PCR (Inpatient) - Swab, Nares [[id number]]  (Normal) Collected: 02/13/25 0906    Lab Status: Final result Specimen: Swab from Nares Updated: 02/13/25 1102     MRSA PCR No MRSA Detected    Narrative:      The negative predictive value of this diagnostic test is high and should only be used to consider de-escalating anti-MRSA therapy. A positive result may indicate colonization with MRSA and must be correlated clinically.    Blood Culture - Blood, Arm, Right [[id number]]  (Normal) Collected: 02/11/25 1749    Lab Status: Final result Specimen: Blood from Arm, Right Updated: 02/16/25 1800     Blood Culture No growth at 5 days    Blood Culture - Blood, Arm, Right [[id number]]  (Normal) Collected: 02/11/25 1742    Lab Status: Final result Specimen: Blood from Arm, Right Updated: 02/16/25 1800     Blood Culture No growth at 5 days    Narrative:      Less than seven (7) mL's of blood  was collected.  Insufficient quantity may yield false negative results.          Telemetry Scan   Final Result      Telemetry Scan   Final Result      Telemetry Scan   Final Result      Telemetry Scan   Final Result      Telemetry Scan   Final Result      Telemetry Scan   Final Result      Telemetry Scan   Final Result      Telemetry Scan   Final Result      Telemetry Scan   Final Result      Telemetry Scan   Final Result      Telemetry Scan   Final Result              Assessment:    Diabetic toe ulcer    Osteomyelitis of great toe of right foot  39-year-old male with  1-right great toe diabetic/ischemic ulcer with dry gangrene with associated abscess and contiguous focus osteomyelitis of the proximal and distal phalanx with associated flexor hallucis tenosynovitis  2-significant peripheral vascular disease with element of gangrene involving the great toe  3-type 2 diabetes  4-end-stage renal disease  5-legally blind  6-severe anemia multifactorial  7-hypertension  8-multiple antibiotic allergies/intolerances including allergy to penicillin though it could very well be not a true allergy given the description of his reaction.  9-anemia multifactorial status post transfusion     Recommendations/Discussions:  See my discussion and recommendations on 2/13/2025.  Continue present antibiotic therapy  Follow-up on operative culture results and pathology report and based on whether or not there is concern for residual osteomyelitis decide on duration of antibiotic treatment.  Duration of antibiotic treatment will depend upon vascular status as well as whether or not definitive resective surgery is performed.    Abdiaziz Paz MD  2/17/2025  07:57 EST    Parts of this note may be an electronic transcription/translation of spoken language to printed text using the Dragon dictation system.

## 2025-02-17 NOTE — PROGRESS NOTES
[facility] Progress Note      Patient Name: Wilner Menjivar  : 1985  MRN: [id number]  Primary Care Physician:  Roosevelt Thao MD  Date of admission: 2025    Subjective     Interval History:   Follow-up on ESRD  Laying comfortably in bed on oxygen.  Denies any nausea or vomiting.  No chest pain or shortness of breath      Review of Systems:   As noted above    Objective     Vitals:   Temp:  [97.9 °F (36.6 °C)-98.6 °F (37 °C)] 98.6 °F (37 °C)  Heart Rate:  [] 97  Resp:  [18] 18  BP: (143-173)/(66-90) 158/66  No intake or output data in the 24 hours ending 25 0854      Physical Exam:    General Appearance: Awake, chronically ill, no acute distress  Skin: warm and dry  HEENT: oral mucosa normal, nonicteric sclera, legally blind  Neck: supple, no JVD  Lungs: CTA, no wheezing, nonlabored on RA  Heart: Tachycardic, RR, no rub  Abdomen: soft, nontender, nondistended, BS +  : no palpable bladder  Extremities: no edema, cyanosis or clubbing; dressing on right foot  Neuro: normal speech and mental status   Vascular: L AVF patent, + thrill and bruit    Scheduled Meds:     amLODIPine, 5 mg, Oral, Q24H  aspirin, 81 mg, Oral, Daily  atorvastatin, 40 mg, Oral, Nightly  bumetanide, 2 mg, Oral, BID Diuretics  carvedilol, 25 mg, Oral, BID  cefTRIAXone, 2,000 mg, Intravenous, Daily  docusate sodium, 100 mg, Oral, BID  epoetin jamil/jamil-epbx, 10,000 Units, Subcutaneous, Once per day on   insulin glargine, 15 Units, Subcutaneous, Nightly  insulin lispro, 2-7 Units, Subcutaneous, 4x Daily AC & at Bedtime  insulin lispro, 5 Units, Subcutaneous, TID With Meals  losartan, 25 mg, Oral, Q24H  pantoprazole, 40 mg, Oral, Q AM  polyethylene glycol, 17 g, Oral, Daily  terazosin, 1 mg, Oral, Nightly  ticagrelor, 90 mg, Oral, Q12H  vancomycin, 15 mg/kg, Intravenous, Once  Vancomycin Pharmacy Intermittent/Pulse Dosing, , Not Applicable, Daily      IV Meds:   Pharmacy to  dose vancomycin,         Results Reviewed:   I have personally reviewed the results from the time of this admission to 2/17/2025 08:54 EST     Results from last 7 days   Lab Units 02/17/25  0526 02/16/25  0457 02/15/25  0725 02/14/25  0333 02/13/25  0507 02/12/25  0746 02/11/25  1556   SODIUM mmol/L 131* 130* 129*   < > 130*   < > 125*   POTASSIUM mmol/L 3.9 3.7 4.0   < > 3.5   < > 4.5   CHLORIDE mmol/L 92* 95* 95*   < > 94*   < > 88*   CO2 mmol/L 20.3* 21.6* 21.0*   < > 24.0   < > 23.5   BUN mg/dL 46* 35* 29*   < > 26*   < > 42*   CREATININE mg/dL 6.59* 5.97* 5.35*   < > 4.84*   < > 5.85*   CALCIUM mg/dL 9.3 9.1 8.8   < > 9.1   < > 9.0   BILIRUBIN mg/dL  --   --   --   --  0.6  --  0.9   ALK PHOS U/L  --   --   --   --  131*  --  154*   ALT (SGPT) U/L  --   --   --   --  19  --  14   AST (SGOT) U/L  --   --   --   --  25  --  19   GLUCOSE mg/dL 166* 133* 274*   < > 148*   < > 442*    < > = values in this interval not displayed.       Estimated Creatinine Clearance: 17.5 mL/min (A) (by C-G formula based on SCr of 6.59 mg/dL (H)).    Results from last 7 days   Lab Units 02/14/25  0333   PHOSPHORUS mg/dL 3.5             Results from last 7 days   Lab Units 02/17/25  0526 02/16/25  0457 02/15/25  0725 02/14/25  2233 02/14/25  0333 02/13/25  0507   WBC 10*3/mm3 18.20* 14.97* 15.30*  --  13.96* 11.03*   HEMOGLOBIN g/dL 8.4* 8.9* 9.1* 9.4* 6.6* 7.2*   PLATELETS 10*3/mm3 359 304 294  --  226 175             Assessment / Plan     ASSESSMENT:     End  Stage Renal Disease ( ESRD )  since 6/2024 followed by Dr. Wasserman on Hemodialysis on a Monday, Wednesday and Friday schedule via LUE AVF.    Anemia of CKD on Epogen protocol.  10,000 units subcu. iron studies are pending   History of hyperphosphatemia will recheck  Hypertension w/ CKD.  Continue home regimen. We will continue to follow closely  Coronary artery disease status postcardiac cath with stent placement to mid/distal OM2 , and proximal Cx. ( 9/24 ).  On medical  management  Diabetes Mellitus type 2 w/ complications ( retinopathy , peripheral vascular disease  nephropathy ) .Continue insulin regimen and Diabetic diet, as per primary team   Right diabetic foot/osteomyelitis follow-up podiatry and vascular surgery with plan for surgical intervention tomorrow     PLAN:    Will continue dialysis on Monday Wednesday and Friday with plan for dialysis today.  Surgery plan noted for tomorrow  Surveillance labs    Thank you for involving us in the care of Wilner Menjivar.  Please feel free to call with any questions.    Brenna Denis MD  02/17/25  08:54 [facility]    [facility]  [phone]    Please note that portions of this note were completed with a voice recognition program.

## 2025-02-17 NOTE — NURSING NOTE
HD WITHOUT INCIDENT OR C/O. TOLERATED. REMOVED 4 L. NORCO ADMINISTERED FOR PAIN. AVF NEEDLES REMOVED X 2. HEMOSTASIS ACHIEVED. STABLE, NO C/O POST COMPLETION OF HD.

## 2025-02-17 NOTE — NURSING NOTE
Diabetes Education    Patient Name:  Wilner Menjivar  YOB: 1985  MRN: [id number]  Admit Date:  2/11/2025      Consulted for diabetes education. Attempted to see pt, currently in dialysis at this time. Will check back later if available.      Electronically signed by:  Chris Bashir RN  02/17/25 11:43 EST

## 2025-02-18 ENCOUNTER — APPOINTMENT (OUTPATIENT)
Dept: GENERAL RADIOLOGY | Facility: HOSPITAL | Age: 40
DRG: 628 | End: 2025-02-18
Payer: MEDICARE

## 2025-02-18 ENCOUNTER — ANESTHESIA (OUTPATIENT)
Dept: PERIOP | Facility: HOSPITAL | Age: 40
End: 2025-02-18
Payer: MEDICARE

## 2025-02-18 ENCOUNTER — ANESTHESIA EVENT (OUTPATIENT)
Dept: PERIOP | Facility: HOSPITAL | Age: 40
End: 2025-02-18
Payer: MEDICARE

## 2025-02-18 LAB
ALBUMIN SERPL-MCNC: 3.1 G/DL (ref 3.5–5.2)
ANION GAP SERPL CALCULATED.3IONS-SCNC: 11 MMOL/L (ref 5–15)
BASOPHILS # BLD AUTO: 0.05 10*3/MM3 (ref 0–0.2)
BASOPHILS NFR BLD AUTO: 0.3 % (ref 0–1.5)
BUN SERPL-MCNC: 31 MG/DL (ref 6–20)
BUN/CREAT SERPL: 5.5 (ref 7–25)
CALCIUM SPEC-SCNC: 9.3 MG/DL (ref 8.6–10.5)
CHLORIDE SERPL-SCNC: 93 MMOL/L (ref 98–107)
CO2 SERPL-SCNC: 23 MMOL/L (ref 22–29)
CREAT SERPL-MCNC: 5.65 MG/DL (ref 0.76–1.27)
DEPRECATED RDW RBC AUTO: 44.4 FL (ref 37–54)
EGFRCR SERPLBLD CKD-EPI 2021: 12.2 ML/MIN/1.73
EOSINOPHIL # BLD AUTO: 0.26 10*3/MM3 (ref 0–0.4)
EOSINOPHIL NFR BLD AUTO: 1.6 % (ref 0.3–6.2)
ERYTHROCYTE [DISTWIDTH] IN BLOOD BY AUTOMATED COUNT: 13.7 % (ref 12.3–15.4)
FERRITIN SERPL-MCNC: 2698 NG/ML (ref 30–400)
GLUCOSE BLDC GLUCOMTR-MCNC: 121 MG/DL (ref 70–130)
GLUCOSE BLDC GLUCOMTR-MCNC: 210 MG/DL (ref 70–130)
GLUCOSE BLDC GLUCOMTR-MCNC: 244 MG/DL (ref 70–130)
GLUCOSE BLDC GLUCOMTR-MCNC: 305 MG/DL (ref 70–130)
GLUCOSE BLDC GLUCOMTR-MCNC: 346 MG/DL (ref 70–130)
GLUCOSE BLDC GLUCOMTR-MCNC: 348 MG/DL (ref 70–130)
GLUCOSE SERPL-MCNC: 326 MG/DL (ref 65–99)
HCT VFR BLD AUTO: 25.5 % (ref 37.5–51)
HGB BLD-MCNC: 8.5 G/DL (ref 13–17.7)
IMM GRANULOCYTES # BLD AUTO: 0.29 10*3/MM3 (ref 0–0.05)
IMM GRANULOCYTES NFR BLD AUTO: 1.8 % (ref 0–0.5)
LYMPHOCYTES # BLD AUTO: 0.81 10*3/MM3 (ref 0.7–3.1)
LYMPHOCYTES NFR BLD AUTO: 5.1 % (ref 19.6–45.3)
MCH RBC QN AUTO: 29.7 PG (ref 26.6–33)
MCHC RBC AUTO-ENTMCNC: 33.3 G/DL (ref 31.5–35.7)
MCV RBC AUTO: 89.2 FL (ref 79–97)
MONOCYTES # BLD AUTO: 0.98 10*3/MM3 (ref 0.1–0.9)
MONOCYTES NFR BLD AUTO: 6.2 % (ref 5–12)
NEUTROPHILS NFR BLD AUTO: 13.52 10*3/MM3 (ref 1.7–7)
NEUTROPHILS NFR BLD AUTO: 85 % (ref 42.7–76)
NRBC BLD AUTO-RTO: 0 /100 WBC (ref 0–0.2)
PHOSPHATE SERPL-MCNC: 3.4 MG/DL (ref 2.5–4.5)
PLATELET # BLD AUTO: 337 10*3/MM3 (ref 140–450)
PMV BLD AUTO: 8.2 FL (ref 6–12)
POTASSIUM SERPL-SCNC: 4.3 MMOL/L (ref 3.5–5.2)
RBC # BLD AUTO: 2.86 10*6/MM3 (ref 4.14–5.8)
SODIUM SERPL-SCNC: 127 MMOL/L (ref 136–145)
WBC NRBC COR # BLD AUTO: 15.91 10*3/MM3 (ref 3.4–10.8)

## 2025-02-18 PROCEDURE — 63710000001 INSULIN LISPRO (HUMAN) PER 5 UNITS: Performed by: HOSPITALIST

## 2025-02-18 PROCEDURE — 25010000002 ONDANSETRON PER 1 MG: Performed by: PODIATRIST

## 2025-02-18 PROCEDURE — 87102 FUNGUS ISOLATION CULTURE: CPT | Performed by: PODIATRIST

## 2025-02-18 PROCEDURE — 25010000002 ONDANSETRON PER 1 MG: Performed by: NURSE ANESTHETIST, CERTIFIED REGISTERED

## 2025-02-18 PROCEDURE — 88311 DECALCIFY TISSUE: CPT | Performed by: PODIATRIST

## 2025-02-18 PROCEDURE — 87077 CULTURE AEROBIC IDENTIFY: CPT | Performed by: PODIATRIST

## 2025-02-18 PROCEDURE — 0QBN0Z2 EXCISION OF RIGHT METATARSAL, SESAMOID BONE(S) 1ST TOE, OPEN APPROACH: ICD-10-PCS | Performed by: PODIATRIST

## 2025-02-18 PROCEDURE — 73630 X-RAY EXAM OF FOOT: CPT

## 2025-02-18 PROCEDURE — 25010000002 FENTANYL CITRATE (PF) 50 MCG/ML SOLUTION: Performed by: NURSE ANESTHETIST, CERTIFIED REGISTERED

## 2025-02-18 PROCEDURE — 25010000002 HYDROMORPHONE PER 4 MG: Performed by: PODIATRIST

## 2025-02-18 PROCEDURE — 87070 CULTURE OTHR SPECIMN AEROBIC: CPT | Performed by: PODIATRIST

## 2025-02-18 PROCEDURE — 63710000001 INSULIN LISPRO (HUMAN) PER 5 UNITS: Performed by: ANESTHESIOLOGY

## 2025-02-18 PROCEDURE — 82728 ASSAY OF FERRITIN: CPT | Performed by: HOSPITALIST

## 2025-02-18 PROCEDURE — 87186 SC STD MICRODIL/AGAR DIL: CPT | Performed by: PODIATRIST

## 2025-02-18 PROCEDURE — 87176 TISSUE HOMOGENIZATION CULTR: CPT | Performed by: PODIATRIST

## 2025-02-18 PROCEDURE — 25010000002 PROPOFOL 200 MG/20ML EMULSION: Performed by: NURSE ANESTHETIST, CERTIFIED REGISTERED

## 2025-02-18 PROCEDURE — 63710000001 INSULIN GLARGINE PER 5 UNITS: Performed by: HOSPITALIST

## 2025-02-18 PROCEDURE — 87075 CULTR BACTERIA EXCEPT BLOOD: CPT | Performed by: PODIATRIST

## 2025-02-18 PROCEDURE — 87185 SC STD ENZYME DETCJ PER NZM: CPT | Performed by: PODIATRIST

## 2025-02-18 PROCEDURE — 88305 TISSUE EXAM BY PATHOLOGIST: CPT | Performed by: PODIATRIST

## 2025-02-18 PROCEDURE — 87076 CULTURE ANAEROBE IDENT EACH: CPT | Performed by: PODIATRIST

## 2025-02-18 PROCEDURE — 82948 REAGENT STRIP/BLOOD GLUCOSE: CPT

## 2025-02-18 PROCEDURE — 63710000001 INSULIN LISPRO (HUMAN) PER 5 UNITS: Performed by: PODIATRIST

## 2025-02-18 PROCEDURE — 25810000003 SODIUM CHLORIDE 0.9 % SOLUTION: Performed by: ANESTHESIOLOGY

## 2025-02-18 PROCEDURE — 87205 SMEAR GRAM STAIN: CPT | Performed by: PODIATRIST

## 2025-02-18 PROCEDURE — 80069 RENAL FUNCTION PANEL: CPT | Performed by: HOSPITALIST

## 2025-02-18 PROCEDURE — 85025 COMPLETE CBC W/AUTO DIFF WBC: CPT | Performed by: HOSPITALIST

## 2025-02-18 PROCEDURE — 25010000002 HYDROMORPHONE PER 4 MG: Performed by: NURSE ANESTHETIST, CERTIFIED REGISTERED

## 2025-02-18 PROCEDURE — 25010000002 PROPOFOL 10 MG/ML EMULSION: Performed by: NURSE ANESTHETIST, CERTIFIED REGISTERED

## 2025-02-18 PROCEDURE — 25010000002 LIDOCAINE 1 % SOLUTION: Performed by: PODIATRIST

## 2025-02-18 PROCEDURE — 25010000002 FENTANYL CITRATE (PF) 50 MCG/ML SOLUTION: Performed by: ANESTHESIOLOGY

## 2025-02-18 PROCEDURE — 25010000002 CEFTRIAXONE PER 250 MG: Performed by: INTERNAL MEDICINE

## 2025-02-18 RX ORDER — HYDRALAZINE HYDROCHLORIDE 20 MG/ML
5 INJECTION INTRAMUSCULAR; INTRAVENOUS
Status: DISCONTINUED | OUTPATIENT
Start: 2025-02-18 | End: 2025-02-18 | Stop reason: HOSPADM

## 2025-02-18 RX ORDER — PROPOFOL 10 MG/ML
INJECTION, EMULSION INTRAVENOUS AS NEEDED
Status: DISCONTINUED | OUTPATIENT
Start: 2025-02-18 | End: 2025-02-18 | Stop reason: SURG

## 2025-02-18 RX ORDER — ACETAMINOPHEN 650 MG
TABLET, EXTENDED RELEASE ORAL AS NEEDED
Status: DISCONTINUED | OUTPATIENT
Start: 2025-02-18 | End: 2025-02-18 | Stop reason: HOSPADM

## 2025-02-18 RX ORDER — SODIUM CHLORIDE 0.9 % (FLUSH) 0.9 %
3-10 SYRINGE (ML) INJECTION AS NEEDED
Status: DISCONTINUED | OUTPATIENT
Start: 2025-02-18 | End: 2025-02-18 | Stop reason: HOSPADM

## 2025-02-18 RX ORDER — FENTANYL CITRATE 50 UG/ML
50 INJECTION, SOLUTION INTRAMUSCULAR; INTRAVENOUS ONCE AS NEEDED
Status: COMPLETED | OUTPATIENT
Start: 2025-02-18 | End: 2025-02-18

## 2025-02-18 RX ORDER — FAMOTIDINE 10 MG/ML
20 INJECTION, SOLUTION INTRAVENOUS ONCE
Status: COMPLETED | OUTPATIENT
Start: 2025-02-18 | End: 2025-02-18

## 2025-02-18 RX ORDER — ATORVASTATIN CALCIUM 10 MG/1
10 TABLET, FILM COATED ORAL NIGHTLY
Status: DISCONTINUED | OUTPATIENT
Start: 2025-02-18 | End: 2025-03-03 | Stop reason: HOSPADM

## 2025-02-18 RX ORDER — EPHEDRINE SULFATE 50 MG/ML
5 INJECTION, SOLUTION INTRAVENOUS ONCE AS NEEDED
Status: DISCONTINUED | OUTPATIENT
Start: 2025-02-18 | End: 2025-02-18 | Stop reason: HOSPADM

## 2025-02-18 RX ORDER — FENTANYL CITRATE 50 UG/ML
INJECTION, SOLUTION INTRAMUSCULAR; INTRAVENOUS AS NEEDED
Status: DISCONTINUED | OUTPATIENT
Start: 2025-02-18 | End: 2025-02-18 | Stop reason: SURG

## 2025-02-18 RX ORDER — HYDROCODONE BITARTRATE AND ACETAMINOPHEN 5; 325 MG/1; MG/1
2 TABLET ORAL EVERY 4 HOURS PRN
Status: DISCONTINUED | OUTPATIENT
Start: 2025-02-18 | End: 2025-03-03 | Stop reason: HOSPADM

## 2025-02-18 RX ORDER — PROMETHAZINE HYDROCHLORIDE 25 MG/1
25 TABLET ORAL ONCE AS NEEDED
Status: DISCONTINUED | OUTPATIENT
Start: 2025-02-18 | End: 2025-02-18 | Stop reason: HOSPADM

## 2025-02-18 RX ORDER — LIDOCAINE HYDROCHLORIDE 10 MG/ML
0.5 INJECTION, SOLUTION INFILTRATION; PERINEURAL ONCE AS NEEDED
Status: DISCONTINUED | OUTPATIENT
Start: 2025-02-18 | End: 2025-02-18 | Stop reason: HOSPADM

## 2025-02-18 RX ORDER — LIDOCAINE HYDROCHLORIDE 10 MG/ML
INJECTION, SOLUTION INFILTRATION; PERINEURAL AS NEEDED
Status: DISCONTINUED | OUTPATIENT
Start: 2025-02-18 | End: 2025-02-18 | Stop reason: HOSPADM

## 2025-02-18 RX ORDER — FLUMAZENIL 0.1 MG/ML
0.2 INJECTION INTRAVENOUS AS NEEDED
Status: DISCONTINUED | OUTPATIENT
Start: 2025-02-18 | End: 2025-02-18 | Stop reason: HOSPADM

## 2025-02-18 RX ORDER — SODIUM CHLORIDE 0.9 % (FLUSH) 0.9 %
3 SYRINGE (ML) INJECTION EVERY 12 HOURS SCHEDULED
Status: DISCONTINUED | OUTPATIENT
Start: 2025-02-18 | End: 2025-02-18 | Stop reason: HOSPADM

## 2025-02-18 RX ORDER — DIPHENHYDRAMINE HYDROCHLORIDE 50 MG/ML
12.5 INJECTION INTRAMUSCULAR; INTRAVENOUS
Status: DISCONTINUED | OUTPATIENT
Start: 2025-02-18 | End: 2025-02-18 | Stop reason: HOSPADM

## 2025-02-18 RX ORDER — HYDROCODONE BITARTRATE AND ACETAMINOPHEN 5; 325 MG/1; MG/1
1 TABLET ORAL ONCE AS NEEDED
Status: DISCONTINUED | OUTPATIENT
Start: 2025-02-18 | End: 2025-02-18 | Stop reason: HOSPADM

## 2025-02-18 RX ORDER — ONDANSETRON 2 MG/ML
INJECTION INTRAMUSCULAR; INTRAVENOUS AS NEEDED
Status: DISCONTINUED | OUTPATIENT
Start: 2025-02-18 | End: 2025-02-18 | Stop reason: SURG

## 2025-02-18 RX ORDER — MIDAZOLAM HYDROCHLORIDE 1 MG/ML
1 INJECTION, SOLUTION INTRAMUSCULAR; INTRAVENOUS
Status: DISCONTINUED | OUTPATIENT
Start: 2025-02-18 | End: 2025-02-18 | Stop reason: HOSPADM

## 2025-02-18 RX ORDER — FENTANYL CITRATE 50 UG/ML
50 INJECTION, SOLUTION INTRAMUSCULAR; INTRAVENOUS
Status: DISCONTINUED | OUTPATIENT
Start: 2025-02-18 | End: 2025-02-18 | Stop reason: HOSPADM

## 2025-02-18 RX ORDER — ONDANSETRON 2 MG/ML
4 INJECTION INTRAMUSCULAR; INTRAVENOUS ONCE AS NEEDED
Status: COMPLETED | OUTPATIENT
Start: 2025-02-18 | End: 2025-02-18

## 2025-02-18 RX ORDER — HYDROMORPHONE HYDROCHLORIDE 1 MG/ML
0.5 INJECTION, SOLUTION INTRAMUSCULAR; INTRAVENOUS; SUBCUTANEOUS
Status: DISCONTINUED | OUTPATIENT
Start: 2025-02-18 | End: 2025-02-18 | Stop reason: HOSPADM

## 2025-02-18 RX ORDER — INSULIN LISPRO 100 [IU]/ML
4 INJECTION, SOLUTION INTRAVENOUS; SUBCUTANEOUS ONCE
Status: COMPLETED | OUTPATIENT
Start: 2025-02-18 | End: 2025-02-18

## 2025-02-18 RX ORDER — IPRATROPIUM BROMIDE AND ALBUTEROL SULFATE 2.5; .5 MG/3ML; MG/3ML
3 SOLUTION RESPIRATORY (INHALATION) ONCE AS NEEDED
Status: DISCONTINUED | OUTPATIENT
Start: 2025-02-18 | End: 2025-02-18 | Stop reason: HOSPADM

## 2025-02-18 RX ORDER — LABETALOL HYDROCHLORIDE 5 MG/ML
5 INJECTION, SOLUTION INTRAVENOUS
Status: DISCONTINUED | OUTPATIENT
Start: 2025-02-18 | End: 2025-02-18 | Stop reason: HOSPADM

## 2025-02-18 RX ORDER — SODIUM CHLORIDE 9 MG/ML
9 INJECTION, SOLUTION INTRAVENOUS CONTINUOUS PRN
Status: DISCONTINUED | OUTPATIENT
Start: 2025-02-18 | End: 2025-02-18 | Stop reason: HOSPADM

## 2025-02-18 RX ORDER — OXYCODONE AND ACETAMINOPHEN 7.5; 325 MG/1; MG/1
1 TABLET ORAL EVERY 4 HOURS PRN
Status: DISCONTINUED | OUTPATIENT
Start: 2025-02-18 | End: 2025-02-18 | Stop reason: HOSPADM

## 2025-02-18 RX ORDER — PROMETHAZINE HYDROCHLORIDE 25 MG/1
25 SUPPOSITORY RECTAL ONCE AS NEEDED
Status: DISCONTINUED | OUTPATIENT
Start: 2025-02-18 | End: 2025-02-18 | Stop reason: HOSPADM

## 2025-02-18 RX ORDER — NALOXONE HCL 0.4 MG/ML
0.2 VIAL (ML) INJECTION AS NEEDED
Status: DISCONTINUED | OUTPATIENT
Start: 2025-02-18 | End: 2025-02-18 | Stop reason: HOSPADM

## 2025-02-18 RX ORDER — INSULIN LISPRO 100 [IU]/ML
7 INJECTION, SOLUTION INTRAVENOUS; SUBCUTANEOUS
Status: DISCONTINUED | OUTPATIENT
Start: 2025-02-18 | End: 2025-02-19

## 2025-02-18 RX ORDER — ATROPINE SULFATE 0.4 MG/ML
0.4 INJECTION, SOLUTION INTRAMUSCULAR; INTRAVENOUS; SUBCUTANEOUS ONCE AS NEEDED
Status: DISCONTINUED | OUTPATIENT
Start: 2025-02-18 | End: 2025-02-18 | Stop reason: HOSPADM

## 2025-02-18 RX ORDER — DEXMEDETOMIDINE HYDROCHLORIDE 100 UG/ML
INJECTION, SOLUTION INTRAVENOUS AS NEEDED
Status: DISCONTINUED | OUTPATIENT
Start: 2025-02-18 | End: 2025-02-18 | Stop reason: SURG

## 2025-02-18 RX ADMIN — CARVEDILOL 25 MG: 25 TABLET, FILM COATED ORAL at 21:10

## 2025-02-18 RX ADMIN — INSULIN LISPRO 5 UNITS: 100 INJECTION, SOLUTION INTRAVENOUS; SUBCUTANEOUS at 08:48

## 2025-02-18 RX ADMIN — HYDROMORPHONE HYDROCHLORIDE 0.5 MG: 1 INJECTION, SOLUTION INTRAMUSCULAR; INTRAVENOUS; SUBCUTANEOUS at 08:52

## 2025-02-18 RX ADMIN — FENTANYL CITRATE 50 MCG: 50 INJECTION, SOLUTION INTRAMUSCULAR; INTRAVENOUS at 07:51

## 2025-02-18 RX ADMIN — HYDROMORPHONE HYDROCHLORIDE 0.5 MG: 1 INJECTION, SOLUTION INTRAMUSCULAR; INTRAVENOUS; SUBCUTANEOUS at 21:09

## 2025-02-18 RX ADMIN — HYDROMORPHONE HYDROCHLORIDE 0.5 MG: 1 INJECTION, SOLUTION INTRAMUSCULAR; INTRAVENOUS; SUBCUTANEOUS at 08:31

## 2025-02-18 RX ADMIN — TERAZOSIN HYDROCHLORIDE 1 MG: 1 CAPSULE ORAL at 21:10

## 2025-02-18 RX ADMIN — HYDROCODONE BITARTRATE AND ACETAMINOPHEN 2 TABLET: 5; 325 TABLET ORAL at 14:04

## 2025-02-18 RX ADMIN — ONDANSETRON 4 MG: 2 INJECTION INTRAMUSCULAR; INTRAVENOUS at 17:47

## 2025-02-18 RX ADMIN — PROPOFOL 50 MG: 10 INJECTION, EMULSION INTRAVENOUS at 07:40

## 2025-02-18 RX ADMIN — PROPOFOL 120 MCG/KG/MIN: 10 INJECTION, EMULSION INTRAVENOUS at 07:41

## 2025-02-18 RX ADMIN — ONDANSETRON 4 MG: 2 INJECTION, SOLUTION INTRAMUSCULAR; INTRAVENOUS at 08:32

## 2025-02-18 RX ADMIN — INSULIN GLARGINE 20 UNITS: 100 INJECTION, SOLUTION SUBCUTANEOUS at 21:09

## 2025-02-18 RX ADMIN — HYDROMORPHONE HYDROCHLORIDE 0.5 MG: 1 INJECTION, SOLUTION INTRAMUSCULAR; INTRAVENOUS; SUBCUTANEOUS at 16:01

## 2025-02-18 RX ADMIN — CEFTRIAXONE 2000 MG: 2 INJECTION, POWDER, FOR SOLUTION INTRAMUSCULAR; INTRAVENOUS at 07:25

## 2025-02-18 RX ADMIN — INSULIN LISPRO 5 UNITS: 100 INJECTION, SOLUTION INTRAVENOUS; SUBCUTANEOUS at 11:56

## 2025-02-18 RX ADMIN — AMLODIPINE BESYLATE 5 MG: 5 TABLET ORAL at 09:57

## 2025-02-18 RX ADMIN — ATORVASTATIN CALCIUM 10 MG: 10 TABLET, FILM COATED ORAL at 21:09

## 2025-02-18 RX ADMIN — INSULIN LISPRO 3 UNITS: 100 INJECTION, SOLUTION INTRAVENOUS; SUBCUTANEOUS at 16:56

## 2025-02-18 RX ADMIN — ONDANSETRON 4 MG: 2 INJECTION INTRAMUSCULAR; INTRAVENOUS at 11:56

## 2025-02-18 RX ADMIN — LOSARTAN POTASSIUM 50 MG: 50 TABLET, FILM COATED ORAL at 09:58

## 2025-02-18 RX ADMIN — HYDROMORPHONE HYDROCHLORIDE 0.5 MG: 1 INJECTION, SOLUTION INTRAMUSCULAR; INTRAVENOUS; SUBCUTANEOUS at 11:56

## 2025-02-18 RX ADMIN — INSULIN LISPRO 4 UNITS: 100 INJECTION, SOLUTION INTRAVENOUS; SUBCUTANEOUS at 07:00

## 2025-02-18 RX ADMIN — DEXMEDETOMIDINE HYDROCHLORIDE 2 MCG: 100 INJECTION, SOLUTION INTRAVENOUS at 08:01

## 2025-02-18 RX ADMIN — BUMETANIDE 2 MG: 2 TABLET ORAL at 16:56

## 2025-02-18 RX ADMIN — Medication: at 10:04

## 2025-02-18 RX ADMIN — PANTOPRAZOLE SODIUM 40 MG: 40 TABLET, DELAYED RELEASE ORAL at 10:03

## 2025-02-18 RX ADMIN — ONDANSETRON 4 MG: 2 INJECTION, SOLUTION INTRAMUSCULAR; INTRAVENOUS at 08:03

## 2025-02-18 RX ADMIN — SODIUM CHLORIDE 9 ML/HR: 9 INJECTION, SOLUTION INTRAVENOUS at 06:59

## 2025-02-18 RX ADMIN — FAMOTIDINE 20 MG: 10 INJECTION INTRAVENOUS at 06:59

## 2025-02-18 RX ADMIN — DOCUSATE SODIUM 100 MG: 100 CAPSULE, LIQUID FILLED ORAL at 10:03

## 2025-02-18 RX ADMIN — INSULIN LISPRO 7 UNITS: 100 INJECTION, SOLUTION INTRAVENOUS; SUBCUTANEOUS at 16:57

## 2025-02-18 RX ADMIN — HYDROCODONE BITARTRATE AND ACETAMINOPHEN 2 TABLET: 5; 325 TABLET ORAL at 17:47

## 2025-02-18 RX ADMIN — DOCUSATE SODIUM 100 MG: 100 CAPSULE, LIQUID FILLED ORAL at 21:10

## 2025-02-18 RX ADMIN — Medication 3 ML: at 10:04

## 2025-02-18 RX ADMIN — ZOLPIDEM TARTRATE 5 MG: 5 TABLET, FILM COATED ORAL at 00:23

## 2025-02-18 RX ADMIN — FENTANYL CITRATE 50 MCG: 50 INJECTION, SOLUTION INTRAMUSCULAR; INTRAVENOUS at 06:59

## 2025-02-18 RX ADMIN — INSULIN LISPRO 3 UNITS: 100 INJECTION, SOLUTION INTRAVENOUS; SUBCUTANEOUS at 11:57

## 2025-02-18 NOTE — NURSING NOTE
Pt arrived back to floor from surgery.  Right foot wound actively bleeding through dressing.  Reinforced and notified surgeon.  He said to continue to reinforce and keep him posted.  Added more reinforced ment and will monitor.

## 2025-02-18 NOTE — OP NOTE
Operative Report    Patient: Wilner Menjivar     Patient YOB: 1985     Date of Surgery: 02/18/25     MRN: [id number]       SURGEON: Trevon Garcia III, DPM     ASSISTANT: None    PROCEDURE: Hallux amputation, right foot  Incision and drainage, multiple compartments, right foot  Sesamoidectomy, tibial and fibular, right foot    PRE-OPERATIVE DIAGNOSIS: Osteomyelitis, right hallux  Peripheral arterial disease, right foot    POST-OPERATIVE DIAGNOSIS: Osteomyelitis, right hallux  Abscess, multiple compartments, right foot  Gas gangrene, right foot    ANAESTHESIA: MAC    HEMOSTASIS: None    ESTIMATED BLOOD LOSS: 20 cc    SPECIMEN: Right hallux sent for pathology, culture swab sent for microbiology    IMPLANTS: None    COMPLICATIONS: None    INDICATION FOR PROCEDURE: This is a 39-year-old male with uncontrolled diabetes mellitus, end-stage renal disease and retinopathy who has worsening right hallux infection.  An MRI showed osteomyelitis of the proximal distal phalanx.  At that time, there was no proximal tracking or abscess seen.  Patient was attempted for the operating room on Friday but his hemoglobin dropped to 6 and he was receiving dialysis as well.  We agreed to perform surgery today.    Consent was obtained pre-operatively. All questions were answered, risks versus benefits were discussed at length. No guarantees or assurances were given or implied.    PROCEDURE: Patient was brought to the operating room and placed on the operative table in supine position. A surgical timeout was performed and then patients name, date of birth, procedure and surgical site were all verified. A local block of 10 cc 1% lidocaine plain was injected to the surgical site.  No tourniquet was used.  The right lower extremity was then scrubbed, prepped and draped in the usual sterile manner.     Attention was directed to the right hallux were #15 blade was taken straight down through the first MTPJ and the hallux was  disarticulated at the MTPJ.  Upon incision there was noted to be purulent drainage expressed from the surgical site.  The hallux was removed and sent for pathology evaluation.  The purulent drainage was swabbed for culture.  The incision was then extended proximally along the first metatarsal as well as dorsal laterally first interspace.  Blunt dissection was used there is noted to be tracking up the FHL tendon sheath.  #15 blade was used to remove the FHL tendon distally and the sesamoids were then deemed nonviable and resected as well.  Soft tissue was continued to be removed approximately to what appeared adequate soft tissue.  The first metatarsal head was noted to have intact cartilage and no bony erosion did not appear to have osteomyelitis.  Surgical site was then flushed with copious amounts of sterile saline.  The surgical site was then packed with Betadine, 4 x 4's, Kerlix, Coban and ABD pads to the right lower extremity.  Patient will need to return to the operating room for debridement, possible first ray amputation.  Patient tolerated procedure and anesthesia well.  Patient was transferred from the operating room to the recovery room vital signs stable and neurovascular status intact.    Trevon Garcia III, JESSICA

## 2025-02-18 NOTE — CASE MANAGEMENT/SOCIAL WORK
Continued Stay Note  [facility]     Patient Name: Wilner Menjivar  MRN: [id number]  Today's Date: 2/18/2025    Admit Date: 2/11/2025        Discharge Plan       Row Name 02/18/25 1257       Plan    Plan Comments Clinicals reviewed. To OR today for amputation. CCP continues to follow for medical readiness for DC planning. Saad RN/CCP                   Discharge Codes    No documentation.                 Expected Discharge Date and Time       Expected Discharge Date Expected Discharge Time    Feb 20, 2025               Una Garcia RN

## 2025-02-18 NOTE — PLAN OF CARE
Goal Outcome Evaluation:      Pt alert and oriented x 4 during shift. Bp controlled during shift on RA. Pt's pain controlled with IVP Dilaudid and Norco, both one dose each. Pt had episode of nausea which was alleviated by IVP zofran. Spouse at bedside, attentive, supportive of patient. Pt currently giving himself chlorhexidine bath and getting ready for surgery.

## 2025-02-18 NOTE — ANESTHESIA POSTPROCEDURE EVALUATION
"Patient: Wilner Menjivar    Procedure Summary       Date: 02/18/25 Room / Location: [facility] OR [address] [place] [address] MAIN OR    Anesthesia Start: 0729 Anesthesia Stop: 0824    Procedure: Hallux amputation, right foot SESAMOIDECTOMY, INCISION AN DRAINAGE (Right) Diagnosis:       Osteomyelitis of great toe of right foot      (Osteomyelitis of great toe of right foot [M86.9])    Surgeons: Trevon Garcia DPM Provider: Yarelis Patel MD    Anesthesia Type: MAC ASA Status: 4            Anesthesia Type: MAC    Vitals  Vitals Value Taken Time   /80 02/18/25 0900   Temp 37.3 °C (99.1 °F) 02/18/25 0820   Pulse 93 02/18/25 0907   Resp 18 02/18/25 0900   SpO2 100 % 02/18/25 0907   Vitals shown include unfiled device data.        Post Anesthesia Care and Evaluation    Patient location during evaluation: PACU  Patient participation: complete - patient participated  Level of consciousness: awake  Pain management: adequate    Airway patency: patent  Anesthetic complications: No anesthetic complications    Cardiovascular status: acceptable  Respiratory status: acceptable  Hydration status: acceptable    Comments: /80   Pulse 94   Temp 37.3 °C (99.1 °F) (Oral)   Resp 18   Ht 170.2 cm (67\")   Wt 108 kg (238 lb 1.6 oz)   SpO2 100%   BMI 37.29 kg/m²         "

## 2025-02-18 NOTE — PROGRESS NOTES
"  Infectious Diseases Progress Note    Abdiaziz Paz MD     [facility]  Los: 7 days  Patient Identification:  Name: Wilner Menjivar  Age: 39 y.o.  Sex: male  :  1985  MRN: [id number]         Primary Care Physician: Roosevelt Thao MD        Subjective: Just came back after his right first toe amputation and debridement of his right foot.  Denies any complaints.  Denies any fever and chills.  Interval History: See consultation note.    Objective:    Scheduled Meds:amLODIPine, 5 mg, Oral, Q24H  aspirin, 81 mg, Oral, Daily  atorvastatin, 40 mg, Oral, Nightly  bumetanide, 2 mg, Oral, BID Diuretics  carvedilol, 25 mg, Oral, BID  cefTRIAXone, 2,000 mg, Intravenous, Daily  docusate sodium, 100 mg, Oral, BID  epoetin jamil/jamil-epbx, 10,000 Units, Subcutaneous, Once per day on   insulin glargine, 15 Units, Subcutaneous, Nightly  insulin lispro, 2-7 Units, Subcutaneous, 4x Daily AC & at Bedtime  insulin lispro, 5 Units, Subcutaneous, TID With Meals  losartan, 50 mg, Oral, Q24H  pantoprazole, 40 mg, Oral, Q AM  polyethylene glycol, 17 g, Oral, Daily  terazosin, 1 mg, Oral, Nightly  ticagrelor, 90 mg, Oral, Q12H  Vancomycin Pharmacy Intermittent/Pulse Dosing, , Not Applicable, Daily      Continuous Infusions:Pharmacy to dose vancomycin,         Vital signs in last 24 hours:  Temp:  [98 °F (36.7 °C)-99.1 °F (37.3 °C)] 99.1 °F (37.3 °C)  Heart Rate:  [] 94  Resp:  [16-18] 18  BP: (119-165)/(68-84) 144/80    Intake/Output:    Intake/Output Summary (Last 24 hours) at 2025 1031  Last data filed at 2025 0804  Gross per 24 hour   Intake 750 ml   Output 4020 ml   Net -3270 ml         Exam:  /80   Pulse 94   Temp 99.1 °F (37.3 °C) (Oral)   Resp 18   Ht 170.2 cm (67\")   Wt 108 kg (238 lb 1.6 oz)   SpO2 100%   BMI 37.29 kg/m²   Patient is examined using the personal protective equipment as per guidelines from infection control for this particular patient as enacted.  " Hand washing was performed before and after patient interaction.  General Appearance:    Alert, cooperative, no distress, AAOx3                          Head:    Normocephalic, without obvious abnormality, atraumatic                           Eyes:  Legally blind                         Throat:   Lips, tongue, gums normal; oral mucosa pink and moist                           Neck:   Supple, symmetrical, trachea midline, no JVD                         Lungs:    Clear to auscultation bilaterally, respirations unlabored                 Chest Wall:    No tenderness or deformity                          Heart:  S1-S2 regular                  Abdomen:   Soft nontender                 Extremities: Right foot after debridement and amputation of the great toe dressed.                  Neurologic: Legally blind       Data Review:    I reviewed the patient's new clinical results.  Results from last 7 days   Lab Units 02/18/25 0348 02/17/25  0526 02/16/25  0457 02/15/25  0725 02/14/25  2233 02/14/25  0333 02/13/25  0507 02/12/25  0746   WBC 10*3/mm3 15.91* 18.20* 14.97* 15.30*  --  13.96* 11.03* 10.26   HEMOGLOBIN g/dL 8.5* 8.4* 8.9* 9.1* 9.4* 6.6* 7.2* 7.4*   PLATELETS 10*3/mm3 337 359 304 294  --  226 175 155     Results from last 7 days   Lab Units 02/18/25 0348 02/17/25  0526 02/16/25  0457 02/15/25  0725 02/14/25  0333 02/13/25  0507 02/12/25  0746   SODIUM mmol/L 127* 131* 130* 129* 129* 130* 130*   POTASSIUM mmol/L 4.3 3.9 3.7 4.0 3.6 3.5 3.9   CHLORIDE mmol/L 93* 92* 95* 95* 95* 94* 95*   CO2 mmol/L 23.0 20.3* 21.6* 21.0* 23.0 24.0 23.0   BUN mg/dL 31* 46* 35* 29* 40* 26* 45*   CREATININE mg/dL 5.65* 6.59* 5.97* 5.35* 6.57* 4.84* 6.03*   CALCIUM mg/dL 9.3 9.3 9.1 8.8 9.0 9.1 8.9   GLUCOSE mg/dL 326* 166* 133* 274* 202* 148* 172*   XR Foot 3+ View Right    Result Date: 2/18/2025  Postoperative changes from amputation of the great toe at the level of the MTP joint.  This report was finalized on 2/18/2025 9:04 AM by Tom  MD Semaj on Workstation: [id number]      Doppler Arterial Multi Level Lower Extremity - Bilateral CAR    Addendum Date: 2/13/2025      Right Conclusion: The right GIOVANA is moderately reduced. Waveforms are consistent with femoral disease, popliteal disease and tib-peroneal disease. Normal digital pressures.   Left Conclusion: The left GIOVANA is normal. Normal digital pressures.     MRI Foot Right Without Contrast    Result Date: 2/13/2025  1. Osteomyelitis of the distal shaft and head of the 1st proximal phalanx and of the distal phalanx with overlying soft tissue wound/ulcer. Gas within the soft tissues and marrow of the great toe associated with infection. Tenosynovitis flexor hallucis longus tendon sheath. Forefoot cellulitis. 2. No evidence for septic arthritis at the 1st MTP joint. 3. Muscular edema and atrophy associated with myositis or chronic neuropathy.  This report was finalized on 2/13/2025 7:48 AM by Junior Villalobos M.D on Workstation: [id number]      XR Foot 3+ View Right    Result Date: 2/11/2025   There is subcutaneous emphysema and soft tissue loss over the great toe. No radiopaque foreign body. No definite bone erosion.    This report was finalized on 2/11/2025 5:20 PM by Dr. Jason Dan M.D on Workstation: [id number]     Microbiology Results (last 10 days)       Procedure Component Value - Date/Time    MRSA Screen, PCR (Inpatient) - Swab, Nares [[id number]]  (Normal) Collected: 02/13/25 0906    Lab Status: Final result Specimen: Swab from Nares Updated: 02/13/25 1102     MRSA PCR No MRSA Detected    Narrative:      The negative predictive value of this diagnostic test is high and should only be used to consider de-escalating anti-MRSA therapy. A positive result may indicate colonization with MRSA and must be correlated clinically.    Blood Culture - Blood, Arm, Right [[id number]]  (Normal) Collected: 02/11/25 1749    Lab Status: Final result Specimen: Blood from Arm, Right Updated: 02/16/25  1800     Blood Culture No growth at 5 days    Blood Culture - Blood, Arm, Right [[id number]]  (Normal) Collected: 02/11/25 1742    Lab Status: Final result Specimen: Blood from Arm, Right Updated: 02/16/25 1800     Blood Culture No growth at 5 days    Narrative:      Less than seven (7) mL's of blood was collected.  Insufficient quantity may yield false negative results.          Telemetry Scan   Final Result      Telemetry Scan   Final Result      Telemetry Scan   Final Result      Telemetry Scan   Final Result      Telemetry Scan   Final Result      Telemetry Scan   Final Result      Telemetry Scan   Final Result      Telemetry Scan   Final Result      Telemetry Scan   Final Result      Telemetry Scan   Final Result      Telemetry Scan   Final Result              Assessment:    Diabetic toe ulcer    Osteomyelitis of great toe of right foot  39-year-old male with  1-right great toe diabetic/ischemic ulcer with dry gangrene with associated abscess and contiguous focus osteomyelitis of the proximal and distal phalanx with associated flexor hallucis tenosynovitis  2-significant peripheral vascular disease with element of gangrene involving the great toe  3-type 2 diabetes  4-end-stage renal disease  5-legally blind  6-severe anemia multifactorial  7-hypertension  8-multiple antibiotic allergies/intolerances including allergy to penicillin though it could very well be not a true allergy given the description of his reaction.  9-anemia multifactorial status post transfusion     Recommendations/Discussions:  See my discussion and recommendations on 2/13/2025.  Continue present antibiotic therapy  Follow-up on operative culture results and pathology report and based on whether or not there is concern for residual osteomyelitis decide on duration of antibiotic treatment.  Duration of antibiotic treatment will depend upon vascular status as well as whether or not definitive resective surgery is performed.    Abdiaziz Paz,  MD  2/18/2025  10:31 EST    Parts of this note may be an electronic transcription/translation of spoken language to printed text using the Dragon dictation system.

## 2025-02-18 NOTE — PROGRESS NOTES
"Daily progress note    Primary care physician  Dr. NERI    Subjective   Status post hallux amputation right foot I&D and doing same with no new complaints and family at bedside    History of present illness  39-year-old male with history of end-stage renal disease on hemodialysis chronic anemia diabetes hypertension hyperlipidemia coronary artery disease and gastroesophageal of disease who is also legally blind and has right foot wound which is getting worse mainly right great toe which looks infected and has recently seen by podiatrist and removed the callus from the right great toe.  Patient has intermittent bleeding pain drainage but no fever chills.  Patient also denies any chest pain shortness of breath palpitation abdominal pain nausea vomiting diarrhea.  Patient workup in ER revealed infected right great toe wound admitted for management.     REVIEW OF SYSTEMS  Unremarkable except pain     PHYSICAL EXAM   Blood pressure 108/67, pulse 90, temperature 97.5 °F (36.4 °C), temperature source Oral, resp. rate 18, height 170.2 cm (67\"), weight 108 kg (238 lb 1.6 oz), SpO2 100%.    Constitutional:       General: He is not in acute distress.     Appearance: Normal appearance. He is not ill-appearing, toxic-appearing or diaphoretic.   HENT:      Head: Normocephalic and atraumatic.      Nose: Nose normal.      Mouth/Throat:      Mouth: Mucous membranes are moist.      Pharynx: Oropharynx is clear.   Eyes:      Conjunctiva/sclera: Conjunctivae normal.      Pupils: Pupils are equal, round, and reactive to light.   Cardiovascular:      Rate and Rhythm: Normal rate and regular rhythm.      Pulses: Normal pulses.   Pulmonary:      Effort: Pulmonary effort is normal.   Abdominal:      General: Abdomen is flat.      Palpations: Abdomen is soft.   Musculoskeletal:      Comments: Right great toe is black in color with a deep ulceration and dried blood, no active purulent drainage, no palpable crepitus or fluctuance   Skin:     " General: Skin is warm and dry.   Neurological:      General: No focal deficit present.      Mental Status: He is alert and oriented to person, place, and time. Mental status is at baseline.   Psychiatric:         Mood and Affect: Mood normal.         Behavior: Behavior normal.         Thought Content: Thought content normal.         Judgment: Judgment normal.      LAB RESULTS  Lab Results (last 24 hours)       Procedure Component Value Units Date/Time    POC Glucose Once [[id number]]  (Abnormal) Collected: 02/18/25 1138    Specimen: Blood Updated: 02/18/25 1144     Glucose 244 mg/dL     Tissue Pathology Exam [[id number]] Collected: 02/18/25 0756    Specimen: Tissue from Toe, Right Updated: 02/18/25 0937    POC Glucose Once [[id number]]  (Abnormal) Collected: 02/18/25 0820    Specimen: Blood Updated: 02/18/25 0822     Glucose 305 mg/dL     Fungus Culture - Surgical Site, Toe, Right [[id number]] Collected: 02/18/25 0754    Specimen: Surgical Site from Toe, Right Updated: 02/18/25 0818    Tissue / Bone Culture - Surgical Site, Toe, Right [[id number]] Collected: 02/18/25 0754    Specimen: Surgical Site from Toe, Right Updated: 02/18/25 0818    Anaerobic Culture 10 Day Incubation - Surgical Site, Toe, Right [[id number]] Collected: 02/18/25 0754    Specimen: Surgical Site from Toe, Right Updated: 02/18/25 0818    POC Glucose Once [[id number]]  (Abnormal) Collected: 02/18/25 0722    Specimen: Blood Updated: 02/18/25 0725     Glucose 346 mg/dL     POC Glucose Once [[id number]]  (Abnormal) Collected: 02/18/25 0644    Specimen: Blood Updated: 02/18/25 0647     Glucose 348 mg/dL     Ferritin [[id number]]  (Abnormal) Collected: 02/18/25 0348    Specimen: Blood Updated: 02/18/25 0457     Ferritin 2,698.00 ng/mL     Narrative:      Results may be falsely decreased if patient taking Biotin.      Renal Function Panel [[id number]]  (Abnormal) Collected: 02/18/25 0348    Specimen: Blood Updated: 02/18/25 0432     Glucose 326 mg/dL      BUN 31  mg/dL      Creatinine 5.65 mg/dL      Sodium 127 mmol/L      Potassium 4.3 mmol/L      Chloride 93 mmol/L      CO2 23.0 mmol/L      Calcium 9.3 mg/dL      Albumin 3.1 g/dL      Phosphorus 3.4 mg/dL      Anion Gap 11.0 mmol/L      BUN/Creatinine Ratio 5.5     eGFR 12.2 mL/min/1.73     Narrative:      GFR Categories in Chronic Kidney Disease (CKD)      GFR Category          GFR (mL/min/1.73)    Interpretation  G1                     90 or greater         Normal or high (1)  G2                      60-89                Mild decrease (1)  G3a                   45-59                Mild to moderate decrease  G3b                   30-44                Moderate to severe decrease  G4                    15-29                Severe decrease  G5                    14 or less           Kidney failure          (1)In the absence of evidence of kidney disease, neither GFR category G1 or G2 fulfill the criteria for CKD.    eGFR calculation 2021 CKD-EPI creatinine equation, which does not include race as a factor    CBC & Differential [[id number]]  (Abnormal) Collected: 02/18/25 0348    Specimen: Blood Updated: 02/18/25 0406    Narrative:      The following orders were created for panel order CBC & Differential.  Procedure                               Abnormality         Status                     ---------                               -----------         ------                     CBC Auto Differential[[id number]]        Abnormal            Final result                 Please view results for these tests on the individual orders.    CBC Auto Differential [[id number]]  (Abnormal) Collected: 02/18/25 0348    Specimen: Blood Updated: 02/18/25 0406     WBC 15.91 10*3/mm3      RBC 2.86 10*6/mm3      Hemoglobin 8.5 g/dL      Hematocrit 25.5 %      MCV 89.2 fL      MCH 29.7 pg      MCHC 33.3 g/dL      RDW 13.7 %      RDW-SD 44.4 fl      MPV 8.2 fL      Platelets 337 10*3/mm3      Neutrophil % 85.0 %      Lymphocyte % 5.1 %      Monocyte %  6.2 %      Eosinophil % 1.6 %      Basophil % 0.3 %      Immature Grans % 1.8 %      Neutrophils, Absolute 13.52 10*3/mm3      Lymphocytes, Absolute 0.81 10*3/mm3      Monocytes, Absolute 0.98 10*3/mm3      Eosinophils, Absolute 0.26 10*3/mm3      Basophils, Absolute 0.05 10*3/mm3      Immature Grans, Absolute 0.29 10*3/mm3      nRBC 0.0 /100 WBC     POC Glucose Once [[id number]]  (Abnormal) Collected: 02/17/25 2036    Specimen: Blood Updated: 02/17/25 2038     Glucose 179 mg/dL     POC Glucose Once [[id number]]  (Abnormal) Collected: 02/17/25 1639    Specimen: Blood Updated: 02/17/25 1641     Glucose 269 mg/dL           Imaging Results (Last 24 Hours)       Procedure Component Value Units Date/Time    XR Foot 3+ View Right [[id number]] Collected: 02/18/25 0902     Updated: 02/18/25 0907    Narrative:      XR FOOT 3+ VW RIGHT-     DATE OF EXAM: 2/18/2025 8:42 AM     INDICATION: Postop great toe amputation.     COMPARISON: Radiographs 2/11/2025. MRI 2/12/2025.     TECHNIQUE: 4 views of the foot were obtained.     FINDINGS:  Overlying bandage artifact limits evaluation. Postoperative changes from  amputation of the great toe at the level of the MTP joint with resection  of the great toe sesamoids. Normal bone mineralization. No evidence of  acute fracture or dislocation. No lytic or destructive osseous changes  to suggest osteomyelitis mild multifocal DJD at the midfoot. The midfoot  is well aligned. Postoperative soft tissue swelling at the forefoot.       Impression:      Postoperative changes from amputation of the great toe at the level of  the MTP joint.     This report was finalized on 2/18/2025 9:04 AM by Tom Cha MD on  Workstation: [id number]               Current Facility-Administered Medications:     amLODIPine (NORVASC) tablet 5 mg, 5 mg, Oral, Q24H, Trevon Garcia DPM, 5 mg at 02/18/25 0957    aspirin chewable tablet 81 mg, 81 mg, Oral, Daily, Trevon Garcia DPM, 81 mg at 02/17/25 1302     atorvastatin (LIPITOR) tablet 40 mg, 40 mg, Oral, Nightly, Trevon Garcia DPM, 40 mg at 02/17/25 2038    bumetanide (BUMEX) tablet 2 mg, 2 mg, Oral, BID Diuretics, Trevon Garcia, DPM, 2 mg at 02/17/25 1704    carvedilol (COREG) tablet 25 mg, 25 mg, Oral, BID, Trevon Garcia, DPM, 25 mg at 02/17/25 2037    cefTRIAXone (ROCEPHIN) 2,000 mg in sodium chloride 0.9 % 100 mL MBP, 2,000 mg, Intravenous, Daily, Trevon Garcia DPM, Last Rate: 200 mL/hr at 02/18/25 0725, 2,000 mg at 02/18/25 0725    dextrose (D50W) (25 g/50 mL) IV injection 25 g, 25 g, Intravenous, Q15 Min PRN, Trevon Garcia DPM    dextrose (GLUTOSE) oral gel 15 g, 15 g, Oral, Q15 Min PRN, Trevon Garcia DPM    docusate sodium (COLACE) capsule 100 mg, 100 mg, Oral, BID, Trevon Garcia, DPM, 100 mg at 02/18/25 1003    epoetin jamil-epbx (RETACRIT) injection 10,000 Units, 10,000 Units, Subcutaneous, Once per day on Monday Wednesday Friday, Trevon Garcia DPM, 10,000 Units at 02/17/25 1258    glucagon (GLUCAGEN) injection 1 mg, 1 mg, Intramuscular, Q15 Min PRN, Trevon Garcia DPM    HYDROcodone-acetaminophen (NORCO) 5-325 MG per tablet 2 tablet, 2 tablet, Oral, Q4H PRN, Trevon Garcia DPM    HYDROmorphone (DILAUDID) injection 0.5 mg, 0.5 mg, Intravenous, Q4H PRN, Trevon Garcia DPM, 0.5 mg at 02/18/25 1156    insulin glargine (LANTUS, SEMGLEE) injection 15 Units, 15 Units, Subcutaneous, Nightly, Trevon Garcia DPM, 15 Units at 02/17/25 2038    insulin lispro (HUMALOG/ADMELOG) injection 2-7 Units, 2-7 Units, Subcutaneous, 4x Daily AC & at Bedtime, Trevon Garcia DPM, 3 Units at 02/18/25 1157    insulin lispro (HUMALOG/ADMELOG) injection 5 Units, 5 Units, Subcutaneous, TID With Meals, Trevon Garcia DPM, 5 Units at 02/18/25 1156    losartan (COZAAR) tablet 50 mg, 50 mg, Oral, Q24H, Trevon Garcia DPM, 50 mg at 02/18/25 0958    ondansetron (ZOFRAN) injection 4 mg, 4 mg, Intravenous, Q6H PRN, Trevon Garcia DPM, 4 mg at  02/18/25 1156    pantoprazole (PROTONIX) EC tablet 40 mg, 40 mg, Oral, Q AM, Trevon Garcia DPM, 40 mg at 02/18/25 1003    Pharmacy to dose vancomycin, , Not Applicable, Continuous PRN, Trevon Garcia DPM    polyethylene glycol (MIRALAX) packet 17 g, 17 g, Oral, Daily, Trevon Garcia, DPM    terazosin (HYTRIN) capsule 1 mg, 1 mg, Oral, Nightly, Trevon Garcia DPM, 1 mg at 02/17/25 2038    ticagrelor (BRILINTA) tablet 90 mg, 90 mg, Oral, Q12H, Trevon Garcia DPM, 90 mg at 02/17/25 1303    Vancomycin Pharmacy Intermittent/Pulse Dosing, , Not Applicable, Daily, Trevon Garcia DPM, Given at 02/18/25 1004    zolpidem (AMBIEN) tablet 5 mg, 5 mg, Oral, Nightly PRN, rTevon Garcia DPM, 5 mg at 02/18/25 0023     ASSESSMENT  Right great toe wound with infection and surrounding cellulitis and osteomyelitis status post I&D and hallux amputation  End-stage renal disease on hemodialysis  Diabetes mellitus  Hypertension  Hyperlipidemia  Coronary artery disease  Legally blind  Chronic anemia with drop in H&H  Gastroesophageal reflux disease    PLAN  CPM   Postop care  Continue IV antibiotics   Pain management  Infectious disease consult appreciated  Podiatry and nephrology to follow patient   Continue home medications  Stress ulcer DVT prophylaxis  Supportive care  Discussed with nursing staff and family  Follow closely further recommendation current hospital course    JASKARAN LANGFORD MD    Copied text in this note has been reviewed and is accurate as of 02/18/25

## 2025-02-18 NOTE — ANESTHESIA PREPROCEDURE EVALUATION
Anesthesia Evaluation     Patient summary reviewed                Airway   Mallampati: I  No difficulty expected  Dental - normal exam     Pulmonary - negative pulmonary ROS and normal exam   (-) not a smoker  Cardiovascular - normal exam    ECG reviewed  PT is on anticoagulation therapy  Patient on routine beta blocker and Beta blocker given within 24 hours of surgery    (+) hypertension, cardiac stents Drug eluting stent within the past 12 months     ROS comment: Reviewed last echo and cath reports:                               The estimated ejection fraction is 55-60% with no clear regional wall motion abnormalities.                            There is mild, concentric, left ventricular hypertrophy.                            There is grade I left ventricular diastolic dysfunction (impaired relaxation).                            The right ventricular chamber size and systolic function are within normal limits.                            No significant valvular abnormalities are visualized.                            There is no evidence of a pericardial effusion.       Neuro/Psych- negative ROS  GI/Hepatic/Renal/Endo    (+) obesity, GERD, renal disease- dialysis and ESRD, diabetes mellitus type 2    Musculoskeletal (-) negative ROS    Abdominal    Substance History      OB/GYN          Other                    Anesthesia Plan    ASA 4     MAC       Anesthetic plan, risks, benefits, and alternatives have been provided, discussed and informed consent has been obtained with: patient.    CODE STATUS:    Level Of Support Discussed With: Patient  Code Status (Patient has no pulse and is not breathing): CPR (Attempt to Resuscitate)  Medical Interventions (Patient has pulse or is breathing): Full Support

## 2025-02-18 NOTE — PROGRESS NOTES
[facility] Progress Note      Patient Name: Wilner Menjivar  : 1985  MRN: [id number]  Primary Care Physician:  Roosevelt Thao MD  Date of admission: 2025    Subjective     Interval History:   Follow-up on ESRD   No new issues noted today s/p right foot hallux amputation   No acute issues noted today.  Afebrile    Review of Systems:   As noted above    Objective     Vitals:   Temp:  [97.5 °F (36.4 °C)-99.1 °F (37.3 °C)] 98.4 °F (36.9 °C)  Heart Rate:  [] 91  Resp:  [18] 18  BP: (108-145)/(67-80) 116/73  Flow (L/min) (Oxygen Therapy):  [2] 2    Intake/Output Summary (Last 24 hours) at 2025 1624  Last data filed at 2025 0804  Gross per 24 hour   Intake 150 ml   Output 20 ml   Net 130 ml         Physical Exam:    General Appearance: Awake, chronically ill, no acute distress  Skin: warm and dry  HEENT: oral mucosa normal, nonicteric sclera, legally blind  Neck: supple, no JVD  Lungs: CTA, no wheezing, nonlabored on RA  Heart: Tachycardic, RR, no rub  Abdomen: soft, nontender, nondistended, BS +  : no palpable bladder  Extremities: no edema, cyanosis or clubbing; dressing on right foot  Neuro: normal speech and mental status   Vascular: L AVF patent, + thrill and bruit    Scheduled Meds:     amLODIPine, 5 mg, Oral, Q24H  aspirin, 81 mg, Oral, Daily  atorvastatin, 10 mg, Oral, Nightly  bumetanide, 2 mg, Oral, BID Diuretics  carvedilol, 25 mg, Oral, BID  cefTRIAXone, 2,000 mg, Intravenous, Daily  docusate sodium, 100 mg, Oral, BID  epoetin jamil/jamil-epbx, 10,000 Units, Subcutaneous, Once per day on   insulin glargine, 20 Units, Subcutaneous, Nightly  insulin lispro, 2-7 Units, Subcutaneous, 4x Daily AC & at Bedtime  insulin lispro, 7 Units, Subcutaneous, TID With Meals  losartan, 50 mg, Oral, Q24H  pantoprazole, 40 mg, Oral, Q AM  polyethylene glycol, 17 g, Oral, Daily  terazosin, 1 mg, Oral, Nightly  ticagrelor, 90 mg, Oral,  Q12H  Vancomycin Pharmacy Intermittent/Pulse Dosing, , Not Applicable, Daily      IV Meds:   Pharmacy to dose vancomycin,         Results Reviewed:   I have personally reviewed the results from the time of this admission to 2/18/2025 16:24 EST     Results from last 7 days   Lab Units 02/18/25  0348 02/17/25  0526 02/16/25  0457 02/14/25  0333 02/13/25  0507   SODIUM mmol/L 127* 131* 130*   < > 130*   POTASSIUM mmol/L 4.3 3.9 3.7   < > 3.5   CHLORIDE mmol/L 93* 92* 95*   < > 94*   CO2 mmol/L 23.0 20.3* 21.6*   < > 24.0   BUN mg/dL 31* 46* 35*   < > 26*   CREATININE mg/dL 5.65* 6.59* 5.97*   < > 4.84*   CALCIUM mg/dL 9.3 9.3 9.1   < > 9.1   BILIRUBIN mg/dL  --   --   --   --  0.6   ALK PHOS U/L  --   --   --   --  131*   ALT (SGPT) U/L  --   --   --   --  19   AST (SGOT) U/L  --   --   --   --  25   GLUCOSE mg/dL 326* 166* 133*   < > 148*    < > = values in this interval not displayed.       Estimated Creatinine Clearance: 20.4 mL/min (A) (by C-G formula based on SCr of 5.65 mg/dL (H)).    Results from last 7 days   Lab Units 02/18/25 0348 02/14/25  0333   PHOSPHORUS mg/dL 3.4 3.5             Results from last 7 days   Lab Units 02/18/25  0348 02/17/25  0526 02/16/25  0457 02/15/25  0725 02/14/25  2233 02/14/25  0333   WBC 10*3/mm3 15.91* 18.20* 14.97* 15.30*  --  13.96*   HEMOGLOBIN g/dL 8.5* 8.4* 8.9* 9.1* 9.4* 6.6*   PLATELETS 10*3/mm3 337 359 304 294  --  226             Assessment / Plan     ASSESSMENT:     End  Stage Renal Disease ( ESRD )  since 6/2024 followed by Dr. Wasserman on Hemodialysis on a Monday, Wednesday and Friday schedule via LUE AVF.    Anemia of CKD on Epogen protocol.  10,000 units subcu. iron studies are pending   History of hyperphosphatemia will recheck  Hypertension w/ CKD.  Continue home regimen. We will continue to follow closely  Coronary artery disease status postcardiac cath with stent placement to mid/distal OM2 , and proximal Cx. ( 9/24 ).  On medical management  Diabetes Mellitus type  2 w/ complications ( retinopathy , peripheral vascular disease  nephropathy ) .Continue insulin regimen and Diabetic diet, as per primary team   Right diabetic foot/osteomyelitis follow-up podiatry and vascular surgery status post right fifth toe potation today    PLAN:    Will continue dialysis on Monday Wednesday and Friday with plan for dialysis tomorrow.  Surveillance labs    Thank you for involving us in the care of Wilner Menjivar.  Please feel free to call with any questions.    Brenna Denis MD  02/18/25  16:24 [facility]    [facility]  [phone]    Please note that portions of this note were completed with a voice recognition program.

## 2025-02-18 NOTE — PLAN OF CARE
Goal Outcome Evaluation:  Plan of Care Reviewed With: patient        Progress: improving    Pt is alert and oriented.  Surgery this am.  Returned to floor with copious amount of blood draining from surgical wounds.  Reinforced per MD request and monitoring.  Bleeding has stopped.  VSS.  Will continue to monitor.

## 2025-02-18 NOTE — PROGRESS NOTES
"[facility] Clinical Pharmacy Services: Vancomycin Monitoring Note    Wilner Menjivar is a 39 y.o. male who is on day 7/14 (stop 2/23) of pharmacy to dose vancomycin for Skin and Soft Tissue. Dr. Paz is following    Previous Vancomycin Dose:   intermittent dosing    Results from last 7 days   Lab Units 02/17/25  0526 02/14/25  0333 02/12/25  0746   VANCOMYCIN RM mcg/mL 6.80 22.30 24.80     Dosing/HD hx (HD MWF)  2/12 2046 vanc 750 mg   2/14 0333 Vanc random 22.3      HD 2/14 ending around 1200 (stopped 1 hour early per patient request)  2/17 0526 vanc random 6.8     HD 2/17 2/17 Vanc 1500 mg     Vitals/Labs  Ht: 170.2 cm (67\"); Wt: 108 kg (238 lb 1.6 oz)   Temp Readings from Last 1 Encounters:   02/18/25 97.5 °F (36.4 °C) (Oral)     Estimated Creatinine Clearance: 20.4 mL/min (A) (by C-G formula based on SCr of 5.65 mg/dL (H)).   Dialysis MWF    Results from last 7 days   Lab Units 02/18/25  0348 02/17/25  0526 02/16/25  0457   CREATININE mg/dL 5.65* 6.59* 5.97*   WBC 10*3/mm3 15.91* 18.20* 14.97*     Assessment/Plan    Current Vancomycin Dose: intermittent dosing, no dose today   Next Level Date and Time: Vanc Random on 2/19/25 at 0600 prior to HD  We will continue to monitor patient changes and renal function     Thank you for involving pharmacy in this patient's care. Please contact pharmacy with any questions or concerns.       Brittaney Lawson [facility]  Clinical Pharmacist              "

## 2025-02-19 PROBLEM — Z95.5 STATUS POST INSERTION OF DRUG ELUTING CORONARY ARTERY STENT: Status: ACTIVE | Noted: 2024-09-24

## 2025-02-19 LAB
ABO GROUP BLD: NORMAL
ALBUMIN SERPL-MCNC: 2.9 G/DL (ref 3.5–5.2)
ANION GAP SERPL CALCULATED.3IONS-SCNC: 15 MMOL/L (ref 5–15)
BASOPHILS # BLD AUTO: 0.06 10*3/MM3 (ref 0–0.2)
BASOPHILS NFR BLD AUTO: 0.4 % (ref 0–1.5)
BLD GP AB SCN SERPL QL: NEGATIVE
BUN SERPL-MCNC: 39 MG/DL (ref 6–20)
BUN/CREAT SERPL: 5.4 (ref 7–25)
CALCIUM SPEC-SCNC: 8.9 MG/DL (ref 8.6–10.5)
CHLORIDE SERPL-SCNC: 94 MMOL/L (ref 98–107)
CO2 SERPL-SCNC: 21 MMOL/L (ref 22–29)
CREAT SERPL-MCNC: 7.27 MG/DL (ref 0.76–1.27)
DEPRECATED RDW RBC AUTO: 42.6 FL (ref 37–54)
EGFRCR SERPLBLD CKD-EPI 2021: 9 ML/MIN/1.73
EOSINOPHIL # BLD AUTO: 0.25 10*3/MM3 (ref 0–0.4)
EOSINOPHIL NFR BLD AUTO: 1.6 % (ref 0.3–6.2)
ERYTHROCYTE [DISTWIDTH] IN BLOOD BY AUTOMATED COUNT: 13.4 % (ref 12.3–15.4)
GLUCOSE BLDC GLUCOMTR-MCNC: 118 MG/DL (ref 70–130)
GLUCOSE BLDC GLUCOMTR-MCNC: 244 MG/DL (ref 70–130)
GLUCOSE BLDC GLUCOMTR-MCNC: 269 MG/DL (ref 70–130)
GLUCOSE BLDC GLUCOMTR-MCNC: 338 MG/DL (ref 70–130)
GLUCOSE SERPL-MCNC: 303 MG/DL (ref 65–99)
HCT VFR BLD AUTO: 20.3 % (ref 37.5–51)
HGB BLD-MCNC: 6.7 G/DL (ref 13–17.7)
IMM GRANULOCYTES # BLD AUTO: 0.24 10*3/MM3 (ref 0–0.05)
IMM GRANULOCYTES NFR BLD AUTO: 1.6 % (ref 0–0.5)
LYMPHOCYTES # BLD AUTO: 0.93 10*3/MM3 (ref 0.7–3.1)
LYMPHOCYTES NFR BLD AUTO: 6 % (ref 19.6–45.3)
MCH RBC QN AUTO: 29 PG (ref 26.6–33)
MCHC RBC AUTO-ENTMCNC: 33 G/DL (ref 31.5–35.7)
MCV RBC AUTO: 87.9 FL (ref 79–97)
MONOCYTES # BLD AUTO: 0.86 10*3/MM3 (ref 0.1–0.9)
MONOCYTES NFR BLD AUTO: 5.6 % (ref 5–12)
NEUTROPHILS NFR BLD AUTO: 13.07 10*3/MM3 (ref 1.7–7)
NEUTROPHILS NFR BLD AUTO: 84.8 % (ref 42.7–76)
NRBC BLD AUTO-RTO: 0 /100 WBC (ref 0–0.2)
PHOSPHATE SERPL-MCNC: 3.9 MG/DL (ref 2.5–4.5)
PLATELET # BLD AUTO: 331 10*3/MM3 (ref 140–450)
PMV BLD AUTO: 8.4 FL (ref 6–12)
POTASSIUM SERPL-SCNC: 4.2 MMOL/L (ref 3.5–5.2)
RBC # BLD AUTO: 2.31 10*6/MM3 (ref 4.14–5.8)
RH BLD: POSITIVE
SODIUM SERPL-SCNC: 130 MMOL/L (ref 136–145)
T&S EXPIRATION DATE: NORMAL
VANCOMYCIN SERPL-MCNC: 18.9 MCG/ML (ref 5–40)
WBC NRBC COR # BLD AUTO: 15.41 10*3/MM3 (ref 3.4–10.8)

## 2025-02-19 PROCEDURE — 85025 COMPLETE CBC W/AUTO DIFF WBC: CPT | Performed by: HOSPITALIST

## 2025-02-19 PROCEDURE — P9016 RBC LEUKOCYTES REDUCED: HCPCS

## 2025-02-19 PROCEDURE — 25010000002 HYDROMORPHONE PER 4 MG: Performed by: HOSPITALIST

## 2025-02-19 PROCEDURE — 25810000003 SODIUM CHLORIDE 0.9 % SOLUTION 250 ML FLEX CONT: Performed by: HOSPITALIST

## 2025-02-19 PROCEDURE — 86850 RBC ANTIBODY SCREEN: CPT | Performed by: HOSPITALIST

## 2025-02-19 PROCEDURE — 63710000001 INSULIN GLARGINE PER 5 UNITS: Performed by: HOSPITALIST

## 2025-02-19 PROCEDURE — 25010000002 VANCOMYCIN HCL 1.25 G RECONSTITUTED SOLUTION 1 EACH VIAL: Performed by: HOSPITALIST

## 2025-02-19 PROCEDURE — 63710000001 INSULIN LISPRO (HUMAN) PER 5 UNITS: Performed by: HOSPITALIST

## 2025-02-19 PROCEDURE — P9040 RBC LEUKOREDUCED IRRADIATED: HCPCS

## 2025-02-19 PROCEDURE — 82948 REAGENT STRIP/BLOOD GLUCOSE: CPT

## 2025-02-19 PROCEDURE — 25010000002 CEFTRIAXONE PER 250 MG: Performed by: PODIATRIST

## 2025-02-19 PROCEDURE — 80069 RENAL FUNCTION PANEL: CPT | Performed by: HOSPITALIST

## 2025-02-19 PROCEDURE — 86900 BLOOD TYPING SEROLOGIC ABO: CPT

## 2025-02-19 PROCEDURE — 25010000002 HYDROMORPHONE PER 4 MG: Performed by: PODIATRIST

## 2025-02-19 PROCEDURE — 86923 COMPATIBILITY TEST ELECTRIC: CPT

## 2025-02-19 PROCEDURE — 63710000001 INSULIN LISPRO (HUMAN) PER 5 UNITS: Performed by: PODIATRIST

## 2025-02-19 PROCEDURE — 80202 ASSAY OF VANCOMYCIN: CPT | Performed by: PODIATRIST

## 2025-02-19 PROCEDURE — 25010000002 EPOETIN ALFA-EPBX 10000 UNIT/ML SOLUTION: Performed by: PODIATRIST

## 2025-02-19 PROCEDURE — 86901 BLOOD TYPING SEROLOGIC RH(D): CPT | Performed by: HOSPITALIST

## 2025-02-19 PROCEDURE — 86900 BLOOD TYPING SEROLOGIC ABO: CPT | Performed by: HOSPITALIST

## 2025-02-19 PROCEDURE — 25010000002 ONDANSETRON PER 1 MG: Performed by: PODIATRIST

## 2025-02-19 RX ORDER — CALCIUM CARBONATE 500 MG/1
2 TABLET, CHEWABLE ORAL 3 TIMES DAILY PRN
Status: DISCONTINUED | OUTPATIENT
Start: 2025-02-19 | End: 2025-03-03

## 2025-02-19 RX ORDER — INSULIN LISPRO 100 [IU]/ML
8 INJECTION, SOLUTION INTRAVENOUS; SUBCUTANEOUS
Status: DISCONTINUED | OUTPATIENT
Start: 2025-02-19 | End: 2025-02-20

## 2025-02-19 RX ORDER — PANTOPRAZOLE SODIUM 40 MG/1
40 TABLET, DELAYED RELEASE ORAL
Status: DISCONTINUED | OUTPATIENT
Start: 2025-02-19 | End: 2025-03-03 | Stop reason: HOSPADM

## 2025-02-19 RX ORDER — HYDROMORPHONE HYDROCHLORIDE 1 MG/ML
0.5 INJECTION, SOLUTION INTRAMUSCULAR; INTRAVENOUS; SUBCUTANEOUS
Status: DISCONTINUED | OUTPATIENT
Start: 2025-02-19 | End: 2025-02-22

## 2025-02-19 RX ADMIN — PANTOPRAZOLE SODIUM 40 MG: 40 TABLET, DELAYED RELEASE ORAL at 18:56

## 2025-02-19 RX ADMIN — HYDROMORPHONE HYDROCHLORIDE 0.5 MG: 1 INJECTION, SOLUTION INTRAMUSCULAR; INTRAVENOUS; SUBCUTANEOUS at 00:32

## 2025-02-19 RX ADMIN — DOCUSATE SODIUM 100 MG: 100 CAPSULE, LIQUID FILLED ORAL at 10:03

## 2025-02-19 RX ADMIN — PANTOPRAZOLE SODIUM 40 MG: 40 TABLET, DELAYED RELEASE ORAL at 06:52

## 2025-02-19 RX ADMIN — INSULIN LISPRO 7 UNITS: 100 INJECTION, SOLUTION INTRAVENOUS; SUBCUTANEOUS at 09:48

## 2025-02-19 RX ADMIN — TICAGRELOR 90 MG: 90 TABLET ORAL at 09:48

## 2025-02-19 RX ADMIN — CARVEDILOL 25 MG: 25 TABLET, FILM COATED ORAL at 20:57

## 2025-02-19 RX ADMIN — ONDANSETRON 4 MG: 2 INJECTION INTRAMUSCULAR; INTRAVENOUS at 00:22

## 2025-02-19 RX ADMIN — INSULIN LISPRO 5 UNITS: 100 INJECTION, SOLUTION INTRAVENOUS; SUBCUTANEOUS at 09:48

## 2025-02-19 RX ADMIN — ZOLPIDEM TARTRATE 5 MG: 5 TABLET, FILM COATED ORAL at 00:22

## 2025-02-19 RX ADMIN — EPOETIN ALFA-EPBX 10000 UNITS: 10000 INJECTION, SOLUTION INTRAVENOUS; SUBCUTANEOUS at 18:55

## 2025-02-19 RX ADMIN — TICAGRELOR 90 MG: 90 TABLET ORAL at 20:58

## 2025-02-19 RX ADMIN — INSULIN LISPRO 4 UNITS: 100 INJECTION, SOLUTION INTRAVENOUS; SUBCUTANEOUS at 12:33

## 2025-02-19 RX ADMIN — VANCOMYCIN HYDROCHLORIDE 1250 MG: 1.25 INJECTION, POWDER, LYOPHILIZED, FOR SOLUTION INTRAVENOUS at 21:07

## 2025-02-19 RX ADMIN — BUMETANIDE 2 MG: 2 TABLET ORAL at 09:48

## 2025-02-19 RX ADMIN — AMLODIPINE BESYLATE 5 MG: 5 TABLET ORAL at 18:56

## 2025-02-19 RX ADMIN — HYDROMORPHONE HYDROCHLORIDE 0.5 MG: 1 INJECTION, SOLUTION INTRAMUSCULAR; INTRAVENOUS; SUBCUTANEOUS at 18:55

## 2025-02-19 RX ADMIN — ZOLPIDEM TARTRATE 5 MG: 5 TABLET, FILM COATED ORAL at 22:34

## 2025-02-19 RX ADMIN — HYDROMORPHONE HYDROCHLORIDE 0.5 MG: 1 INJECTION, SOLUTION INTRAMUSCULAR; INTRAVENOUS; SUBCUTANEOUS at 10:03

## 2025-02-19 RX ADMIN — HYDROCODONE BITARTRATE AND ACETAMINOPHEN 2 TABLET: 5; 325 TABLET ORAL at 20:57

## 2025-02-19 RX ADMIN — HYDROMORPHONE HYDROCHLORIDE 0.5 MG: 1 INJECTION, SOLUTION INTRAMUSCULAR; INTRAVENOUS; SUBCUTANEOUS at 14:40

## 2025-02-19 RX ADMIN — ASPIRIN 81 MG CHEWABLE TABLET 81 MG: 81 TABLET CHEWABLE at 18:55

## 2025-02-19 RX ADMIN — INSULIN LISPRO 3 UNITS: 100 INJECTION, SOLUTION INTRAVENOUS; SUBCUTANEOUS at 21:24

## 2025-02-19 RX ADMIN — ANTACID TABLETS 2 TABLET: 500 TABLET, CHEWABLE ORAL at 00:22

## 2025-02-19 RX ADMIN — TERAZOSIN HYDROCHLORIDE 1 MG: 1 CAPSULE ORAL at 20:58

## 2025-02-19 RX ADMIN — LOSARTAN POTASSIUM 50 MG: 50 TABLET, FILM COATED ORAL at 18:56

## 2025-02-19 RX ADMIN — ATORVASTATIN CALCIUM 10 MG: 10 TABLET, FILM COATED ORAL at 20:57

## 2025-02-19 RX ADMIN — DOCUSATE SODIUM 100 MG: 100 CAPSULE, LIQUID FILLED ORAL at 20:58

## 2025-02-19 RX ADMIN — ONDANSETRON 4 MG: 2 INJECTION INTRAMUSCULAR; INTRAVENOUS at 09:47

## 2025-02-19 RX ADMIN — HYDROMORPHONE HYDROCHLORIDE 0.5 MG: 1 INJECTION, SOLUTION INTRAMUSCULAR; INTRAVENOUS; SUBCUTANEOUS at 22:34

## 2025-02-19 RX ADMIN — HYDROCODONE BITARTRATE AND ACETAMINOPHEN 2 TABLET: 5; 325 TABLET ORAL at 11:32

## 2025-02-19 RX ADMIN — BUMETANIDE 2 MG: 2 TABLET ORAL at 23:04

## 2025-02-19 RX ADMIN — INSULIN LISPRO 7 UNITS: 100 INJECTION, SOLUTION INTRAVENOUS; SUBCUTANEOUS at 12:34

## 2025-02-19 RX ADMIN — INSULIN GLARGINE 25 UNITS: 100 INJECTION, SOLUTION SUBCUTANEOUS at 21:24

## 2025-02-19 RX ADMIN — CEFTRIAXONE 2000 MG: 2 INJECTION, POWDER, FOR SOLUTION INTRAMUSCULAR; INTRAVENOUS at 23:04

## 2025-02-19 NOTE — PROGRESS NOTES
"Daily progress note    Primary care physician  Dr. NERI    Subjective   Awake and alert and complaining of pain but does not look in any distress    History of present illness  39-year-old male with history of end-stage renal disease on hemodialysis chronic anemia diabetes hypertension hyperlipidemia coronary artery disease and gastroesophageal of disease who is also legally blind and has right foot wound which is getting worse mainly right great toe which looks infected and has recently seen by podiatrist and removed the callus from the right great toe.  Patient has intermittent bleeding pain drainage but no fever chills.  Patient also denies any chest pain shortness of breath palpitation abdominal pain nausea vomiting diarrhea.  Patient workup in ER revealed infected right great toe wound admitted for management.     REVIEW OF SYSTEMS  Unremarkable except pain     PHYSICAL EXAM   Blood pressure 132/68, pulse 99, temperature 98.2 °F (36.8 °C), temperature source Oral, resp. rate 18, height 170.2 cm (67\"), weight 108 kg (238 lb 1.6 oz), SpO2 100%.    Constitutional:       General: He is not in acute distress.     Appearance: Normal appearance. He is not ill-appearing, toxic-appearing or diaphoretic.   HENT:      Head: Normocephalic and atraumatic.      Nose: Nose normal.      Mouth/Throat:      Mouth: Mucous membranes are moist.      Pharynx: Oropharynx is clear.   Eyes:      Conjunctiva/sclera: Conjunctivae normal.      Pupils: Pupils are equal, round, and reactive to light.   Cardiovascular:      Rate and Rhythm: Normal rate and regular rhythm.      Pulses: Normal pulses.   Pulmonary:      Effort: Pulmonary effort is normal.   Abdominal:      General: Abdomen is flat.      Palpations: Abdomen is soft.   Musculoskeletal:      Comments: Right great toe is black in color with a deep ulceration and dried blood, no active purulent drainage, no palpable crepitus or fluctuance   Skin:     General: Skin is warm and dry. "   Neurological:      General: No focal deficit present.      Mental Status: He is alert and oriented to person, place, and time. Mental status is at baseline.   Psychiatric:         Mood and Affect: Mood normal.         Behavior: Behavior normal.         Thought Content: Thought content normal.         Judgment: Judgment normal.      LAB RESULTS  Lab Results (last 24 hours)       Procedure Component Value Units Date/Time    POC Glucose Once [[id number]]  (Abnormal) Collected: 02/19/25 1117    Specimen: Blood Updated: 02/19/25 1119     Glucose 269 mg/dL     Tissue / Bone Culture - Surgical Site, Toe, Right [[id number]]  (Abnormal) Collected: 02/18/25 0754    Specimen: Surgical Site from Toe, Right Updated: 02/19/25 0751     Tissue Culture Scant growth (1+) Gram Negative Bacilli     Gram Stain Few (2+) Gram positive cocci      Rare (1+) WBCs seen    Vancomycin, Random [[id number]]  (Normal) Collected: 02/19/25 0616    Specimen: Blood Updated: 02/19/25 0720     Vancomycin Random 18.90 mcg/mL     Narrative:      Therapeutic Ranges for Vancomycin    Vancomycin Random   5.0-40.0 mcg/mL  Vancomycin Trough   5.0-20.0 mcg/mL  Vancomycin Peak     20.0-40.0 mcg/mL    Renal Function Panel [[id number]]  (Abnormal) Collected: 02/19/25 0616    Specimen: Blood Updated: 02/19/25 0720     Glucose 303 mg/dL      BUN 39 mg/dL      Creatinine 7.27 mg/dL      Sodium 130 mmol/L      Potassium 4.2 mmol/L      Chloride 94 mmol/L      CO2 21.0 mmol/L      Calcium 8.9 mg/dL      Albumin 2.9 g/dL      Phosphorus 3.9 mg/dL      Anion Gap 15.0 mmol/L      BUN/Creatinine Ratio 5.4     eGFR 9.0 mL/min/1.73     Narrative:      GFR Categories in Chronic Kidney Disease (CKD)      GFR Category          GFR (mL/min/1.73)    Interpretation  G1                     90 or greater         Normal or high (1)  G2                      60-89                Mild decrease (1)  G3a                   45-59                Mild to moderate decrease  G3b                    30-44                Moderate to severe decrease  G4                    15-29                Severe decrease  G5                    14 or less           Kidney failure          (1)In the absence of evidence of kidney disease, neither GFR category G1 or G2 fulfill the criteria for CKD.    eGFR calculation 2021 CKD-EPI creatinine equation, which does not include race as a factor    CBC & Differential [[id number]]  (Abnormal) Collected: 02/19/25 0616    Specimen: Blood Updated: 02/19/25 0710    Narrative:      The following orders were created for panel order CBC & Differential.  Procedure                               Abnormality         Status                     ---------                               -----------         ------                     CBC Auto Differential[[id number]]        Abnormal            Final result                 Please view results for these tests on the individual orders.    CBC Auto Differential [[id number]]  (Abnormal) Collected: 02/19/25 0616    Specimen: Blood Updated: 02/19/25 0710     WBC 15.41 10*3/mm3      RBC 2.31 10*6/mm3      Hemoglobin 6.7 g/dL      Hematocrit 20.3 %      MCV 87.9 fL      MCH 29.0 pg      MCHC 33.0 g/dL      RDW 13.4 %      RDW-SD 42.6 fl      MPV 8.4 fL      Platelets 331 10*3/mm3      Neutrophil % 84.8 %      Lymphocyte % 6.0 %      Monocyte % 5.6 %      Eosinophil % 1.6 %      Basophil % 0.4 %      Immature Grans % 1.6 %      Neutrophils, Absolute 13.07 10*3/mm3      Lymphocytes, Absolute 0.93 10*3/mm3      Monocytes, Absolute 0.86 10*3/mm3      Eosinophils, Absolute 0.25 10*3/mm3      Basophils, Absolute 0.06 10*3/mm3      Immature Grans, Absolute 0.24 10*3/mm3      nRBC 0.0 /100 WBC     POC Glucose Once [[id number]]  (Abnormal) Collected: 02/19/25 0615    Specimen: Blood Updated: 02/19/25 0620     Glucose 338 mg/dL     POC Glucose Once [[id number]]  (Normal) Collected: 02/18/25 2047    Specimen: Blood Updated: 02/18/25 2049     Glucose 121 mg/dL     POC Glucose  Once [[id number]]  (Abnormal) Collected: 02/18/25 1603    Specimen: Blood Updated: 02/18/25 1604     Glucose 210 mg/dL           Imaging Results (Last 24 Hours)       ** No results found for the last 24 hours. **            Current Facility-Administered Medications:     amLODIPine (NORVASC) tablet 5 mg, 5 mg, Oral, Q24H, Trevon Garcia DPM, 5 mg at 02/18/25 0957    aspirin chewable tablet 81 mg, 81 mg, Oral, Daily, Trevon Garcia DPM, 81 mg at 02/17/25 1302    atorvastatin (LIPITOR) tablet 10 mg, 10 mg, Oral, Nightly, Remy Thornton MD, 10 mg at 02/18/25 2109    bumetanide (BUMEX) tablet 2 mg, 2 mg, Oral, BID Diuretics, Trevon Garcia DPM, 2 mg at 02/19/25 0948    calcium carbonate (TUMS) chewable tablet 500 mg (200 mg elemental), 2 tablet, Oral, TID PRN, Remy Thornton MD, 2 tablet at 02/19/25 0022    carvedilol (COREG) tablet 25 mg, 25 mg, Oral, BID, Trevon Garcia DPM, 25 mg at 02/18/25 2110    cefTRIAXone (ROCEPHIN) 2,000 mg in sodium chloride 0.9 % 100 mL MBP, 2,000 mg, Intravenous, Daily, Trevon Garcia DPM, Last Rate: 200 mL/hr at 02/18/25 0725, 2,000 mg at 02/18/25 0725    dextrose (D50W) (25 g/50 mL) IV injection 25 g, 25 g, Intravenous, Q15 Min PRN, Trevon Garcia DPM    dextrose (GLUTOSE) oral gel 15 g, 15 g, Oral, Q15 Min PRN, Trevon Garcia DPM    docusate sodium (COLACE) capsule 100 mg, 100 mg, Oral, BID, Trevon Garcia DPM, 100 mg at 02/19/25 1003    epoetin jamil-epbx (RETACRIT) injection 10,000 Units, 10,000 Units, Subcutaneous, Once per day on Monday Wednesday Friday, Trevon Garcia DPM, 10,000 Units at 02/17/25 1258    glucagon (GLUCAGEN) injection 1 mg, 1 mg, Intramuscular, Q15 Min PRN, Trevon Garcia DPM    HYDROcodone-acetaminophen (NORCO) 5-325 MG per tablet 2 tablet, 2 tablet, Oral, Q4H PRN, Trevon Garcia DPM, 2 tablet at 02/19/25 1132    HYDROmorphone (DILAUDID) injection 0.5 mg, 0.5 mg, Intravenous, Q4H PRN, Trevon Garcia DPM, 0.5 mg at 02/19/25 1003     insulin glargine (LANTUS, SEMGLEE) injection 20 Units, 20 Units, Subcutaneous, Nightly, Remy Thornton MD, 20 Units at 02/18/25 2109    insulin lispro (HUMALOG/ADMELOG) injection 2-7 Units, 2-7 Units, Subcutaneous, 4x Daily AC & at Bedtime, Trevon Garcia DPM, 4 Units at 02/19/25 1233    insulin lispro (HUMALOG/ADMELOG) injection 7 Units, 7 Units, Subcutaneous, TID With Meals, Remy Thornton MD, 7 Units at 02/19/25 1234    losartan (COZAAR) tablet 50 mg, 50 mg, Oral, Q24H, Trevon Garcia DPM, 50 mg at 02/18/25 0958    ondansetron (ZOFRAN) injection 4 mg, 4 mg, Intravenous, Q6H PRN, Trevon Garcia DPM, 4 mg at 02/19/25 0947    pantoprazole (PROTONIX) EC tablet 40 mg, 40 mg, Oral, BID AC, Remy Thornton MD    Pharmacy to dose vancomycin, , Not Applicable, Continuous PRN, Trevon Garcia DPM    polyethylene glycol (MIRALAX) packet 17 g, 17 g, Oral, Daily, Trevon Garcia DPM    terazosin (HYTRIN) capsule 1 mg, 1 mg, Oral, Nightly, Trevon Garcia DPM, 1 mg at 02/18/25 2110    ticagrelor (BRILINTA) tablet 90 mg, 90 mg, Oral, Q12H, Trevon Garcia DPM, 90 mg at 02/19/25 0948    Vancomycin HCl 1,250 mg in sodium chloride 0.9 % 250 mL VTB, 1,250 mg, Intravenous, Once, Remy Thornton MD    Vancomycin Pharmacy Intermittent/Pulse Dosing, , Not Applicable, Daily, Trevon Garcia DPM, Given at 02/18/25 1004    zolpidem (AMBIEN) tablet 5 mg, 5 mg, Oral, Nightly PRN, Trevon Garcia DPM, 5 mg at 02/19/25 0022     ASSESSMENT  Right great toe wound with infection and surrounding cellulitis and osteomyelitis status post I&D and hallux amputation  End-stage renal disease on hemodialysis  Diabetes mellitus  Hypertension  Hyperlipidemia  Coronary artery disease  Legally blind  Chronic anemia with drop in H&H  Gastroesophageal reflux disease    PLAN  CPM   Postop care  Continue IV antibiotics   Pain management   Transfuse 1 unit packed RBC  Infectious disease consult appreciated  Podiatry and nephrology to follow  patient   Continue home medications  Stress ulcer DVT prophylaxis  Supportive care  Discussed with nursing staff and family  Follow closely further recommendation current hospital course    JASKARAN LANGFORD MD    Copied text in this note has been reviewed and is accurate as of 02/19/25

## 2025-02-19 NOTE — NURSING NOTE
HD complete 3.9 liters removed. 1 unit of PRBC's given. Post /83  T 98.6. Patient tolerated treatment well.

## 2025-02-19 NOTE — PLAN OF CARE
Goal Outcome Evaluation:           Progress: improving  Outcome Evaluation: VSS. Dilaudid increased to 0.5mg Q 3 prn. Norco given once. Educated pt on alternating norco and dilaudid for pain control. 2 units of PRBC ordered. Dialysis today. Will continue to monitor.

## 2025-02-19 NOTE — NURSING NOTE
Noted DM ed order per pharmacist. Second attempt this stay to see pt and assess needs. Pt is on the telephone.

## 2025-02-19 NOTE — PROGRESS NOTES
"[facility] Clinical Pharmacy Services: Vancomycin Monitoring Note    Wilner Menjivar is a 39 y.o. male who is on day 7/14 (stop 2/23) of pharmacy to dose vancomycin for Skin and Soft Tissue. Dr. Paz is following    Previous Vancomycin Dose:   intermittent dosing    Results from last 7 days   Lab Units 02/19/25  0616 02/17/25  0526 02/14/25  0333   VANCOMYCIN RM mcg/mL 18.90 6.80 22.30     Dosing/HD hx (HD MWF), note pt does have some residual urine output   2/11 1833 vanc 2250 mg (loading dose)  2/12 0746 vanc random 24.8     HD 2/12 2/12 2046 vanc 750 mg   2/14 0333 Vanc random 22.3      HD 2/14 ending around 1200 (stopped 1 hour early per patient request), no dose given  2/17 0526 vanc random 6.8     HD 2/17 2/17 14:26 Vanc 1500 mg      HD 2/19 2/19 vanc 1250 mg (Friday)     Vitals/Labs  Ht: 170.2 cm (67\"); Wt: 108 kg (238 lb 1.6 oz)   Temp Readings from Last 1 Encounters:   02/19/25 98.2 °F (36.8 °C) (Oral)     Estimated Creatinine Clearance: 15.8 mL/min (A) (by C-G formula based on SCr of 7.27 mg/dL (H)).   Dialysis MWF    Results from last 7 days   Lab Units 02/19/25  0616 02/18/25  0348 02/17/25  0526   CREATININE mg/dL 7.27* 5.65* 6.59*   WBC 10*3/mm3 15.41* 15.91* 18.20*     Assessment/Plan    Current Vancomycin Dose: intermittent dosing, vancomycin 1250 mg after HD   Next Level Date and Time: 2/21/25 with am labs   We will continue to monitor patient changes and renal function     Thank you for involving pharmacy in this patient's care. Please contact pharmacy with any questions or concerns.       Brittaney Lawson [facility]  Clinical Pharmacist                  "

## 2025-02-19 NOTE — SIGNIFICANT NOTE
02/19/25 1140   OTHER   Discipline physical therapist   Rehab Time/Intention   Session Not Performed other (see comments)  (Patient with Hgb of 6.7. Plans to get a unit of blood and then going to dialysis. Acute PT will f/u next service date.)   Recommendation   PT - Next Appointment 02/20/25

## 2025-02-19 NOTE — PROGRESS NOTES
"  Infectious Diseases Progress Note    Abdiaziz Paz MD     [facility]  [place]: 8 days  Patient Identification:  Name: Wilner Menjivar  Age: 39 y.o.  Sex: male  :  1985  MRN: [id number]         Primary Care Physician: Roosevelt Thao MD        Subjective: Denies any complaints.  Decreased discomfort in his right foot.  Interval History: See consultation note.  2025: UnderwentHallux amputation, right foot SESAMOIDECTOMY, INCISION AN DRAINAGE   Objective:    Scheduled Meds:amLODIPine, 5 mg, Oral, Q24H  aspirin, 81 mg, Oral, Daily  atorvastatin, 10 mg, Oral, Nightly  bumetanide, 2 mg, Oral, BID Diuretics  carvedilol, 25 mg, Oral, BID  cefTRIAXone, 2,000 mg, Intravenous, Daily  docusate sodium, 100 mg, Oral, BID  epoetin jamil/jamil-epbx, 10,000 Units, Subcutaneous, Once per day on   insulin glargine, 20 Units, Subcutaneous, Nightly  insulin lispro, 2-7 Units, Subcutaneous, 4x Daily AC & at Bedtime  insulin lispro, 7 Units, Subcutaneous, TID With Meals  losartan, 50 mg, Oral, Q24H  pantoprazole, 40 mg, Oral, Q AM  polyethylene glycol, 17 g, Oral, Daily  terazosin, 1 mg, Oral, Nightly  ticagrelor, 90 mg, Oral, Q12H  vancomycin, 1,250 mg, Intravenous, Once  Vancomycin Pharmacy Intermittent/Pulse Dosing, , Not Applicable, Daily      Continuous Infusions:Pharmacy to dose vancomycin,         Vital signs in last 24 hours:  Temp:  [97.5 °F (36.4 °C)-98.8 °F (37.1 °C)] 98.8 °F (37.1 °C)  Heart Rate:  [] 100  Resp:  [18] 18  BP: (108-144)/(67-84) 137/84    Intake/Output:  No intake or output data in the 24 hours ending 25 0830        Exam:  /84 (BP Location: Right arm, Patient Position: Lying)   Pulse 100   Temp 98.8 °F (37.1 °C) (Oral)   Resp 18   Ht 170.2 cm (67\")   Wt 108 kg (238 lb 1.6 oz)   SpO2 100%   BMI 37.29 kg/m²   Patient is examined using the personal protective equipment as per guidelines from infection control for this particular patient as " enacted.  Hand washing was performed before and after patient interaction.  General Appearance:    Alert, cooperative, no distress, AAOx3                          Head:    Normocephalic, without obvious abnormality, atraumatic                           Eyes:  Legally blind                         Throat:   Lips, tongue, gums normal; oral mucosa pink and moist                           Neck:   Supple, symmetrical, trachea midline, no JVD                         Lungs:    Clear to auscultation bilaterally, respirations unlabored                 Chest Wall:    No tenderness or deformity                          Heart:  S1-S2 regular                  Abdomen:   Soft nontender                 Extremities: Right foot after debridement and amputation of the great toe is dressed.                  Neurologic: Legally blind       Data Review:    I reviewed the patient's new clinical results.  Results from last 7 days   Lab Units 02/19/25  0616 02/18/25  0348 02/17/25  0526 02/16/25  0457 02/15/25  0725 02/14/25  2233 02/14/25  0333 02/13/25  0507   WBC 10*3/mm3 15.41* 15.91* 18.20* 14.97* 15.30*  --  13.96* 11.03*   HEMOGLOBIN g/dL 6.7* 8.5* 8.4* 8.9* 9.1* 9.4* 6.6* 7.2*   PLATELETS 10*3/mm3 331 337 359 304 294  --  226 175     Results from last 7 days   Lab Units 02/19/25  0616 02/18/25  0348 02/17/25  0526 02/16/25  0457 02/15/25  0725 02/14/25  0333 02/13/25  0507   SODIUM mmol/L 130* 127* 131* 130* 129* 129* 130*   POTASSIUM mmol/L 4.2 4.3 3.9 3.7 4.0 3.6 3.5   CHLORIDE mmol/L 94* 93* 92* 95* 95* 95* 94*   CO2 mmol/L 21.0* 23.0 20.3* 21.6* 21.0* 23.0 24.0   BUN mg/dL 39* 31* 46* 35* 29* 40* 26*   CREATININE mg/dL 7.27* 5.65* 6.59* 5.97* 5.35* 6.57* 4.84*   CALCIUM mg/dL 8.9 9.3 9.3 9.1 8.8 9.0 9.1   GLUCOSE mg/dL 303* 326* 166* 133* 274* 202* 148*   XR Foot 3+ View Right    Result Date: 2/18/2025  Postoperative changes from amputation of the great toe at the level of the MTP joint.  This report was finalized on 2/18/2025  9:04 AM by Tom Cha MD on Workstation: [id number]      Doppler Arterial Multi Level Lower Extremity - Bilateral CAR    Addendum Date: 2/13/2025      Right Conclusion: The right GIOVANA is moderately reduced. Waveforms are consistent with femoral disease, popliteal disease and tib-peroneal disease. Normal digital pressures.   Left Conclusion: The left GIOVANA is normal. Normal digital pressures.     MRI Foot Right Without Contrast    Result Date: 2/13/2025  1. Osteomyelitis of the distal shaft and head of the 1st proximal phalanx and of the distal phalanx with overlying soft tissue wound/ulcer. Gas within the soft tissues and marrow of the great toe associated with infection. Tenosynovitis flexor hallucis longus tendon sheath. Forefoot cellulitis. 2. No evidence for septic arthritis at the 1st MTP joint. 3. Muscular edema and atrophy associated with myositis or chronic neuropathy.  This report was finalized on 2/13/2025 7:48 AM by Junior Villalobos M.D on Workstation: [id number]      XR Foot 3+ View Right    Result Date: 2/11/2025   There is subcutaneous emphysema and soft tissue loss over the great toe. No radiopaque foreign body. No definite bone erosion.    This report was finalized on 2/11/2025 5:20 PM by Dr. Jason Dan M.D on Workstation: [id number]     Microbiology Results (last 10 days)       Procedure Component Value - Date/Time    Tissue / Bone Culture - Surgical Site, Toe, Right [[id number]]  (Abnormal) Collected: 02/18/25 0754    Lab Status: Preliminary result Specimen: Surgical Site from Toe, Right Updated: 02/19/25 0751     Tissue Culture Scant growth (1+) Gram Negative Bacilli     Gram Stain Few (2+) Gram positive cocci      Rare (1+) WBCs seen    MRSA Screen, PCR (Inpatient) - Swab, Nares [[id number]]  (Normal) Collected: 02/13/25 0906    Lab Status: Final result Specimen: Swab from Nares Updated: 02/13/25 1102     MRSA PCR No MRSA Detected    Narrative:      The negative predictive value of this  diagnostic test is high and should only be used to consider de-escalating anti-MRSA therapy. A positive result may indicate colonization with MRSA and must be correlated clinically.    Blood Culture - Blood, Arm, Right [[id number]]  (Normal) Collected: 02/11/25 1749    Lab Status: Final result Specimen: Blood from Arm, Right Updated: 02/16/25 1800     Blood Culture No growth at 5 days    Blood Culture - Blood, Arm, Right [[id number]]  (Normal) Collected: 02/11/25 1742    Lab Status: Final result Specimen: Blood from Arm, Right Updated: 02/16/25 1800     Blood Culture No growth at 5 days    Narrative:      Less than seven (7) mL's of blood was collected.  Insufficient quantity may yield false negative results.          Telemetry Scan   Final Result      Telemetry Scan   Final Result      Telemetry Scan   Final Result      Telemetry Scan   Final Result      Telemetry Scan   Final Result      Telemetry Scan   Final Result      Telemetry Scan   Final Result      Telemetry Scan   Final Result      Telemetry Scan   Final Result      Telemetry Scan   Final Result      Telemetry Scan   Final Result      Telemetry Scan   Final Result      Telemetry Scan   Final Result              Assessment:    Diabetic toe ulcer    Osteomyelitis of great toe of right foot  39-year-old male with  1-right great toe diabetic/ischemic ulcer with dry gangrene with associated abscess and contiguous focus osteomyelitis of the proximal and distal phalanx with associated flexor hallucis tenosynovitis-status post amputation of right great toe, I&D of multiple compartments of the right foot and sesamoidectomy involving tibia-fibula and right foot on 2/18/2025-operative cultures showing gram-negative rods and Gram stain shows gram-positive cocci.  2-significant peripheral vascular disease with element of gangrene involving the great toe  3-type 2 diabetes  4-end-stage renal disease  5-legally blind  6-severe anemia  multifactorial  7-hypertension  8-multiple antibiotic allergies/intolerances including allergy to penicillin though it could very well be not a true allergy given the description of his reaction.  9-anemia multifactorial status post transfusion     Recommendations/Discussions:  See my discussion and recommendations on 2/13/2025.  Continue present antibiotic therapy  Follow-up on operative culture results and pathology report and based on whether or not there is concern for residual osteomyelitis decide on duration of antibiotic treatment.  Duration of antibiotic treatment will depend upon vascular status as well as whether or not definitive resective surgery is performed.    Abdiaziz Paz MD  2/19/2025  08:30 EST    Parts of this note may be an electronic transcription/translation of spoken language to printed text using the Dragon dictation system.

## 2025-02-20 LAB
ANION GAP SERPL CALCULATED.3IONS-SCNC: 11.5 MMOL/L (ref 5–15)
BASOPHILS # BLD AUTO: 0.07 10*3/MM3 (ref 0–0.2)
BASOPHILS NFR BLD AUTO: 0.5 % (ref 0–1.5)
BH BB BLOOD EXPIRATION DATE: NORMAL
BH BB BLOOD EXPIRATION DATE: NORMAL
BH BB BLOOD TYPE BARCODE: 5100
BH BB BLOOD TYPE BARCODE: 5100
BH BB DISPENSE STATUS: NORMAL
BH BB DISPENSE STATUS: NORMAL
BH BB PRODUCT CODE: NORMAL
BH BB PRODUCT CODE: NORMAL
BH BB UNIT NUMBER: NORMAL
BH BB UNIT NUMBER: NORMAL
BUN SERPL-MCNC: 25 MG/DL (ref 6–20)
BUN/CREAT SERPL: 4.6 (ref 7–25)
CALCIUM SPEC-SCNC: 9.2 MG/DL (ref 8.6–10.5)
CHLORIDE SERPL-SCNC: 98 MMOL/L (ref 98–107)
CO2 SERPL-SCNC: 24.5 MMOL/L (ref 22–29)
CREAT SERPL-MCNC: 5.48 MG/DL (ref 0.76–1.27)
CROSSMATCH INTERPRETATION: NORMAL
CROSSMATCH INTERPRETATION: NORMAL
DEPRECATED RDW RBC AUTO: 43 FL (ref 37–54)
EGFRCR SERPLBLD CKD-EPI 2021: 12.7 ML/MIN/1.73
EOSINOPHIL # BLD AUTO: 0.27 10*3/MM3 (ref 0–0.4)
EOSINOPHIL NFR BLD AUTO: 1.9 % (ref 0.3–6.2)
ERYTHROCYTE [DISTWIDTH] IN BLOOD BY AUTOMATED COUNT: 13.7 % (ref 12.3–15.4)
GLUCOSE BLDC GLUCOMTR-MCNC: 121 MG/DL (ref 70–130)
GLUCOSE BLDC GLUCOMTR-MCNC: 144 MG/DL (ref 70–130)
GLUCOSE BLDC GLUCOMTR-MCNC: 190 MG/DL (ref 70–130)
GLUCOSE BLDC GLUCOMTR-MCNC: 210 MG/DL (ref 70–130)
GLUCOSE SERPL-MCNC: 190 MG/DL (ref 65–99)
HCT VFR BLD AUTO: 26 % (ref 37.5–51)
HGB BLD-MCNC: 9.2 G/DL (ref 13–17.7)
IMM GRANULOCYTES # BLD AUTO: 0.32 10*3/MM3 (ref 0–0.05)
IMM GRANULOCYTES NFR BLD AUTO: 2.2 % (ref 0–0.5)
LYMPHOCYTES # BLD AUTO: 1.03 10*3/MM3 (ref 0.7–3.1)
LYMPHOCYTES NFR BLD AUTO: 7.1 % (ref 19.6–45.3)
MCH RBC QN AUTO: 30.5 PG (ref 26.6–33)
MCHC RBC AUTO-ENTMCNC: 35.4 G/DL (ref 31.5–35.7)
MCV RBC AUTO: 86.1 FL (ref 79–97)
MONOCYTES # BLD AUTO: 1.07 10*3/MM3 (ref 0.1–0.9)
MONOCYTES NFR BLD AUTO: 7.4 % (ref 5–12)
NEUTROPHILS NFR BLD AUTO: 11.65 10*3/MM3 (ref 1.7–7)
NEUTROPHILS NFR BLD AUTO: 80.9 % (ref 42.7–76)
NRBC BLD AUTO-RTO: 0 /100 WBC (ref 0–0.2)
PLATELET # BLD AUTO: 339 10*3/MM3 (ref 140–450)
PMV BLD AUTO: 8.2 FL (ref 6–12)
POTASSIUM SERPL-SCNC: 4.5 MMOL/L (ref 3.5–5.2)
RBC # BLD AUTO: 3.02 10*6/MM3 (ref 4.14–5.8)
SODIUM SERPL-SCNC: 134 MMOL/L (ref 136–145)
UNIT  ABO: NORMAL
UNIT  ABO: NORMAL
UNIT  RH: NORMAL
UNIT  RH: NORMAL
WBC NRBC COR # BLD AUTO: 14.41 10*3/MM3 (ref 3.4–10.8)

## 2025-02-20 PROCEDURE — 63710000001 INSULIN LISPRO (HUMAN) PER 5 UNITS: Performed by: PODIATRIST

## 2025-02-20 PROCEDURE — 80048 BASIC METABOLIC PNL TOTAL CA: CPT | Performed by: HOSPITALIST

## 2025-02-20 PROCEDURE — 82948 REAGENT STRIP/BLOOD GLUCOSE: CPT

## 2025-02-20 PROCEDURE — 25010000002 CEFTRIAXONE PER 250 MG: Performed by: PODIATRIST

## 2025-02-20 PROCEDURE — 25010000002 ONDANSETRON PER 1 MG: Performed by: PODIATRIST

## 2025-02-20 PROCEDURE — 85025 COMPLETE CBC W/AUTO DIFF WBC: CPT | Performed by: HOSPITALIST

## 2025-02-20 PROCEDURE — 25010000002 HYDROMORPHONE PER 4 MG: Performed by: HOSPITALIST

## 2025-02-20 PROCEDURE — 63710000001 INSULIN LISPRO (HUMAN) PER 5 UNITS: Performed by: HOSPITALIST

## 2025-02-20 PROCEDURE — 63710000001 INSULIN GLARGINE PER 5 UNITS: Performed by: HOSPITALIST

## 2025-02-20 RX ORDER — INSULIN LISPRO 100 [IU]/ML
10 INJECTION, SOLUTION INTRAVENOUS; SUBCUTANEOUS
Status: DISCONTINUED | OUTPATIENT
Start: 2025-02-20 | End: 2025-02-22

## 2025-02-20 RX ADMIN — BUMETANIDE 2 MG: 2 TABLET ORAL at 08:45

## 2025-02-20 RX ADMIN — AMLODIPINE BESYLATE 5 MG: 5 TABLET ORAL at 08:45

## 2025-02-20 RX ADMIN — HYDROMORPHONE HYDROCHLORIDE 0.5 MG: 1 INJECTION, SOLUTION INTRAMUSCULAR; INTRAVENOUS; SUBCUTANEOUS at 08:44

## 2025-02-20 RX ADMIN — HYDROMORPHONE HYDROCHLORIDE 0.5 MG: 1 INJECTION, SOLUTION INTRAMUSCULAR; INTRAVENOUS; SUBCUTANEOUS at 23:53

## 2025-02-20 RX ADMIN — ZOLPIDEM TARTRATE 5 MG: 5 TABLET, FILM COATED ORAL at 23:53

## 2025-02-20 RX ADMIN — CEFTRIAXONE 2000 MG: 2 INJECTION, POWDER, FOR SOLUTION INTRAMUSCULAR; INTRAVENOUS at 15:54

## 2025-02-20 RX ADMIN — TERAZOSIN HYDROCHLORIDE 1 MG: 1 CAPSULE ORAL at 21:07

## 2025-02-20 RX ADMIN — BUMETANIDE 2 MG: 2 TABLET ORAL at 17:00

## 2025-02-20 RX ADMIN — INSULIN LISPRO 2 UNITS: 100 INJECTION, SOLUTION INTRAVENOUS; SUBCUTANEOUS at 08:44

## 2025-02-20 RX ADMIN — Medication: at 09:05

## 2025-02-20 RX ADMIN — PANTOPRAZOLE SODIUM 40 MG: 40 TABLET, DELAYED RELEASE ORAL at 08:45

## 2025-02-20 RX ADMIN — ASPIRIN 81 MG CHEWABLE TABLET 81 MG: 81 TABLET CHEWABLE at 08:45

## 2025-02-20 RX ADMIN — ATORVASTATIN CALCIUM 10 MG: 10 TABLET, FILM COATED ORAL at 21:07

## 2025-02-20 RX ADMIN — INSULIN LISPRO 8 UNITS: 100 INJECTION, SOLUTION INTRAVENOUS; SUBCUTANEOUS at 08:44

## 2025-02-20 RX ADMIN — HYDROCODONE BITARTRATE AND ACETAMINOPHEN 2 TABLET: 5; 325 TABLET ORAL at 17:00

## 2025-02-20 RX ADMIN — LOSARTAN POTASSIUM 50 MG: 50 TABLET, FILM COATED ORAL at 08:45

## 2025-02-20 RX ADMIN — HYDROCODONE BITARTRATE AND ACETAMINOPHEN 2 TABLET: 5; 325 TABLET ORAL at 21:12

## 2025-02-20 RX ADMIN — INSULIN GLARGINE 30 UNITS: 100 INJECTION, SOLUTION SUBCUTANEOUS at 21:16

## 2025-02-20 RX ADMIN — TICAGRELOR 90 MG: 90 TABLET ORAL at 08:44

## 2025-02-20 RX ADMIN — INSULIN LISPRO 10 UNITS: 100 INJECTION, SOLUTION INTRAVENOUS; SUBCUTANEOUS at 16:59

## 2025-02-20 RX ADMIN — PANTOPRAZOLE SODIUM 40 MG: 40 TABLET, DELAYED RELEASE ORAL at 16:59

## 2025-02-20 RX ADMIN — INSULIN LISPRO 8 UNITS: 100 INJECTION, SOLUTION INTRAVENOUS; SUBCUTANEOUS at 11:19

## 2025-02-20 RX ADMIN — HYDROCODONE BITARTRATE AND ACETAMINOPHEN 2 TABLET: 5; 325 TABLET ORAL at 02:05

## 2025-02-20 RX ADMIN — ONDANSETRON 4 MG: 2 INJECTION INTRAMUSCULAR; INTRAVENOUS at 08:45

## 2025-02-20 RX ADMIN — INSULIN LISPRO 3 UNITS: 100 INJECTION, SOLUTION INTRAVENOUS; SUBCUTANEOUS at 21:16

## 2025-02-20 RX ADMIN — HYDROMORPHONE HYDROCHLORIDE 0.5 MG: 1 INJECTION, SOLUTION INTRAMUSCULAR; INTRAVENOUS; SUBCUTANEOUS at 14:19

## 2025-02-20 RX ADMIN — DOCUSATE SODIUM 100 MG: 100 CAPSULE, LIQUID FILLED ORAL at 21:07

## 2025-02-20 RX ADMIN — HYDROMORPHONE HYDROCHLORIDE 0.5 MG: 1 INJECTION, SOLUTION INTRAMUSCULAR; INTRAVENOUS; SUBCUTANEOUS at 18:54

## 2025-02-20 RX ADMIN — HYDROCODONE BITARTRATE AND ACETAMINOPHEN 2 TABLET: 5; 325 TABLET ORAL at 11:19

## 2025-02-20 RX ADMIN — CARVEDILOL 25 MG: 25 TABLET, FILM COATED ORAL at 21:07

## 2025-02-20 RX ADMIN — CARVEDILOL 25 MG: 25 TABLET, FILM COATED ORAL at 08:45

## 2025-02-20 RX ADMIN — ONDANSETRON 4 MG: 2 INJECTION INTRAMUSCULAR; INTRAVENOUS at 14:19

## 2025-02-20 RX ADMIN — DOCUSATE SODIUM 100 MG: 100 CAPSULE, LIQUID FILLED ORAL at 08:45

## 2025-02-20 NOTE — PROGRESS NOTES
Podiatry Progress Note      Patient: Wilner Menjivar Admit Date: 02/11/2025    Age: 39 y.o.   PCP: Roosevelt Thao MD    MRN: [id number]  Room: [facility]        Subjective     Chief Complaint     Chief Complaint   Patient presents with    Wound Check        HPI     Patient is resting in bed, wrapped to his right lower extremities intact.  Pain controlled with medication.    Past Medical History     Past Medical History:   Diagnosis Date    CKD stage 4 due to type 2 diabetes mellitus     GERD (gastroesophageal reflux disease)     Heart failure     Hypertension     Type 2 diabetes mellitus     Vision impairment         Past Surgical History:   Procedure Laterality Date    AMPUTATION DIGIT Right 2/18/2025    Procedure: Hallux amputation, right foot SESAMOIDECTOMY, INCISION AN DRAINAGE;  Surgeon: Trevon Garcia DPM;  Location: [facility];  Service: Podiatry;  Laterality: Right;    CORONARY STENT PLACEMENT      EYE SURGERY      WISDOM TOOTH EXTRACTION          Allergies   Allergen Reactions    Azithromycin Nausea And Vomiting     Pt given IV azithro, reported nausea/vomiting and hot flashes within 3-5 minutes of admin. Pt also c/o new abd pain with admin.     Pt given IV azithro, reported nausea/vomiting and hot flashes within 3-5 minutes of admin. Pt also c/o new abd pain with admin.    Penicillins Unknown - Low Severity     reaction as a child. Does not recall reaction.     reaction as a child. Does not recall reaction.  Beta lactam allergy details  Antibiotic reaction: unknown  Age at reaction: infant  Dose to reaction time: unknown  Reason for antibiotic: unknown  Epinephrine required for reaction?: unknown  Tolerated antibiotics: unknown           Social History     Tobacco Use   Smoking Status Never   Smokeless Tobacco Never        Objective   Physical Exam    Vitals:    02/20/25 0733   BP:    Pulse: 89   Resp: 18   Temp:    SpO2:           Surgical site has fibrosis and necrotic tissue with very little  granulation.  There is exposed first metatarsal head and neck with necrosis to the distal aspect of the cartilage.  No purulence expressed.    Labs     Lab Results   Component Value Date    HGBA1C 11.10 (H) 02/13/2025    POCGLU 190 (H) 02/20/2025        CBC:      Lab 02/20/25  0352 02/19/25  0616 02/18/25  0348 02/17/25  0526 02/16/25  0457   WBC 14.41* 15.41* 15.91* 18.20* 14.97*   HEMOGLOBIN 9.2* 6.7* 8.5* 8.4* 8.9*   HEMATOCRIT 26.0* 20.3* 25.5* 25.2* 25.2*   PLATELETS 339 331 337 359 304   NEUTROS ABS 11.65* 13.07* 13.52* 15.64* 12.62*   IMMATURE GRANS (ABS) 0.32* 0.24* 0.29* 0.29* 0.29*   LYMPHS ABS 1.03 0.93 0.81 0.84 0.74   MONOS ABS 1.07* 0.86 0.98* 1.10* 1.00*   EOS ABS 0.27 0.25 0.26 0.28 0.24   MCV 86.1 87.9 89.2 87.5 84.0          Results for orders placed or performed during the hospital encounter of 02/11/25   Blood Culture - Blood, Arm, Right    Specimen: Arm, Right; Blood   Result Value Ref Range    Blood Culture No growth at 5 days         XR Foot 3+ View Right  Narrative: XR FOOT 3+ VW RIGHT-     DATE OF EXAM: 2/18/2025 8:42 AM     INDICATION: Postop great toe amputation.     COMPARISON: Radiographs 2/11/2025. MRI 2/12/2025.     TECHNIQUE: 4 views of the foot were obtained.     FINDINGS:  Overlying bandage artifact limits evaluation. Postoperative changes from  amputation of the great toe at the level of the MTP joint with resection  of the great toe sesamoids. Normal bone mineralization. No evidence of  acute fracture or dislocation. No lytic or destructive osseous changes  to suggest osteomyelitis mild multifocal DJD at the midfoot. The midfoot  is well aligned. Postoperative soft tissue swelling at the forefoot.     Impression: Postoperative changes from amputation of the great toe at the level of  the MTP joint.     This report was finalized on 2/18/2025 9:04 AM by Tom Cha MD on  Workstation: [id number]          Assessment/Plan       39-year-old male postop day #2 from right hallux  amputation, incision and drainage    -patient examined and evaluated by myself  - New Betadine packing dressing applied to the right lower extremity with nursing at bedside  - Antibiotics per infectious disease  - I discussed with the patient that he continues to have soft tissue loss that is not viable at this point.  The amount of exposed first metatarsal is not something that can be healed.  I have him on the operating room schedule for tomorrow at  1330 for debridement and first metatarsal excision.  -I did discuss with the patient concerning his significant comorbidities and the appearance of his foot, I am concerned for more soft tissue loss.  I discussed the case with Dr. Osorio with vascular surgery and may need a second look at the foot pending the operation tomorrow.      Trevon Garcia DPM  Office: [phone]

## 2025-02-20 NOTE — PLAN OF CARE
Goal Outcome Evaluation:  Plan of Care Reviewed With: patient        Progress: improving    Pt alert and oriented.  VSS.  Dsg changed by surgeon this am.  Some bloody drainage noted this afternoon - monitoring.  Pain and nausea medication given as needed.  Surgery and dialysis tomorrow.  Consent signed by wife.  Will continue to monitor.

## 2025-02-20 NOTE — PROGRESS NOTES
[facility] Progress Note      Patient Name: Wilner Menjivar  : 1985  MRN: [id number]  Primary Care Physician:  Roosevelt Thao MD  Date of admission: 2025    Subjective     Interval History:   Follow-up on ESRD  Underwent dialysis yesterday with 3.9 L of fluid removed.  Plan noted for debridement and first metatarsal excision of his right wound  Denies any chest pain or shortness of breath.    Review of Systems:   As noted above    Objective     Vitals:   Temp:  [98.1 °F (36.7 °C)-99.1 °F (37.3 °C)] 98.1 °F (36.7 °C)  Heart Rate:  [] 89  Resp:  [16-18] 18  BP: (114-167)/(57-83) 123/66    Intake/Output Summary (Last 24 hours) at 2025 1012  Last data filed at 2025 1752  Gross per 24 hour   Intake 532.92 ml   Output 3900 ml   Net -3367.08 ml           Physical Exam:    General Appearance: Awake, chronically ill, no acute distress  Skin: warm and dry  HEENT: oral mucosa normal, nonicteric sclera, legally blind  Neck: supple, no JVD  Lungs: CTA, no wheezing, nonlabored on RA  Heart: Regular rate and rhythm  Abdomen: soft, nontender, nondistended, BS +  : no palpable bladder  Extremities: no edema, cyanosis or clubbing; dressing on right foot  Neuro: normal speech and mental status   Vascular: L AVF patent, + thrill and bruit    Scheduled Meds:     amLODIPine, 5 mg, Oral, Q24H  aspirin, 81 mg, Oral, Daily  atorvastatin, 10 mg, Oral, Nightly  bumetanide, 2 mg, Oral, BID Diuretics  carvedilol, 25 mg, Oral, BID  cefTRIAXone, 2,000 mg, Intravenous, Daily  docusate sodium, 100 mg, Oral, BID  epoetin jamil/jamil-epbx, 10,000 Units, Subcutaneous, Once per day on   insulin glargine, 25 Units, Subcutaneous, Nightly  insulin lispro, 2-7 Units, Subcutaneous, 4x Daily AC & at Bedtime  insulin lispro, 8 Units, Subcutaneous, TID With Meals  losartan, 50 mg, Oral, Q24H  pantoprazole, 40 mg, Oral, BID AC  polyethylene glycol, 17 g, Oral, Daily  terazosin, 1  mg, Oral, Nightly  ticagrelor, 90 mg, Oral, Q12H  Vancomycin Pharmacy Intermittent/Pulse Dosing, , Not Applicable, Daily      IV Meds:   Pharmacy to dose vancomycin,         Results Reviewed:   I have personally reviewed the results from the time of this admission to 2/20/2025 10:12 EST     Results from last 7 days   Lab Units 02/20/25  0352 02/19/25  0616 02/18/25  0348   SODIUM mmol/L 134* 130* 127*   POTASSIUM mmol/L 4.5 4.2 4.3   CHLORIDE mmol/L 98 94* 93*   CO2 mmol/L 24.5 21.0* 23.0   BUN mg/dL 25* 39* 31*   CREATININE mg/dL 5.48* 7.27* 5.65*   CALCIUM mg/dL 9.2 8.9 9.3   GLUCOSE mg/dL 190* 303* 326*       Estimated Creatinine Clearance: 20.8 mL/min (A) (by C-G formula based on SCr of 5.48 mg/dL (H)).    Results from last 7 days   Lab Units 02/19/25  0616 02/18/25  0348 02/14/25  0333   PHOSPHORUS mg/dL 3.9 3.4 3.5             Results from last 7 days   Lab Units 02/20/25  0352 02/19/25  0616 02/18/25  0348 02/17/25  0526 02/16/25  0457   WBC 10*3/mm3 14.41* 15.41* 15.91* 18.20* 14.97*   HEMOGLOBIN g/dL 9.2* 6.7* 8.5* 8.4* 8.9*   PLATELETS 10*3/mm3 339 331 337 359 304             Assessment / Plan     ASSESSMENT:     End  Stage Renal Disease ( ESRD )  since 6/2024 followed by Dr. Wasserman on Hemodialysis on a Monday, Wednesday and Friday schedule via LUE AVF.    Anemia of CKD on Epogen protocol.  10,000 units subcu. iron studies are pending   History of hyperphosphatemia  Hypertension w/ CKD.  Continue home regimen. We will continue to follow closely  Coronary artery disease status postcardiac cath with stent placement to mid/distal OM2 , and proximal Cx. ( 9/24 ).  On medical management  Diabetes Mellitus type 2 w/ complications ( retinopathy , peripheral vascular disease  nephropathy ) .Continue insulin regimen and Diabetic diet, as per primary team   Right diabetic foot/osteomyelitis follow-up podiatry and vascular surgery status post right fifth toe amputation    Anemia postoperative and anemia of CKD being  transfused today    PLAN:    Continue dialysis on Monday Wednesday and Friday with plan for dialysis tomorrow.  Continue Epogen on dialysis  Plan noted for right foot debridement and possible excision tomorrow a.m.  Will plan for early dialysis before his surgery to keep on his regular schedule  Labs in a.m.    Thank you for involving us in the care of Wilner Menjivar.  Please feel free to call with any questions.    Brenna Denis MD  02/20/25  10:12 [facility]    [facility]  [phone]    Please note that portions of this note were completed with a voice recognition program.

## 2025-02-20 NOTE — SIGNIFICANT NOTE
02/20/25 1001   OTHER   Discipline physical therapist   Rehab Time/Intention   Session Not Performed unable to evaluate, medical status change  (Per chart, pt to go back to OR for more debridement.  RN reports pt does not have a post-op shoe that would work with the current dressing.  Will check status tomorrow)   Recommendation   PT - Next Appointment 02/21/25

## 2025-02-20 NOTE — PROGRESS NOTES
"Daily progress note    Primary care physician  Dr. NERI    Subjective   Doing better today with no new complaint but still with a lot of pain    History of present illness  39-year-old male with history of end-stage renal disease on hemodialysis chronic anemia diabetes hypertension hyperlipidemia coronary artery disease and gastroesophageal of disease who is also legally blind and has right foot wound which is getting worse mainly right great toe which looks infected and has recently seen by podiatrist and removed the callus from the right great toe.  Patient has intermittent bleeding pain drainage but no fever chills.  Patient also denies any chest pain shortness of breath palpitation abdominal pain nausea vomiting diarrhea.  Patient workup in ER revealed infected right great toe wound admitted for management.     REVIEW OF SYSTEMS  Unremarkable except pain     PHYSICAL EXAM   Blood pressure 123/66, pulse 89, temperature 98.1 °F (36.7 °C), temperature source Oral, resp. rate 18, height 170.2 cm (67\"), weight 106 kg (234 lb 5.6 oz), SpO2 100%.    Constitutional:       General: He is not in acute distress.     Appearance: Normal appearance. He is not ill-appearing, toxic-appearing or diaphoretic.   HENT:      Head: Normocephalic and atraumatic.      Nose: Nose normal.      Mouth/Throat:      Mouth: Mucous membranes are moist.      Pharynx: Oropharynx is clear.   Eyes:      Conjunctiva/sclera: Conjunctivae normal.      Pupils: Pupils are equal, round, and reactive to light.   Cardiovascular:      Rate and Rhythm: Normal rate and regular rhythm.      Pulses: Normal pulses.   Pulmonary:      Effort: Pulmonary effort is normal.   Abdominal:      General: Abdomen is flat.      Palpations: Abdomen is soft.   Musculoskeletal:      Comments: Right great toe is black in color with a deep ulceration and dried blood, no active purulent drainage, no palpable crepitus or fluctuance   Skin:     General: Skin is warm and dry. "   Neurological:      General: No focal deficit present.      Mental Status: He is alert and oriented to person, place, and time. Mental status is at baseline.   Psychiatric:         Mood and Affect: Mood normal.         Behavior: Behavior normal.         Thought Content: Thought content normal.         Judgment: Judgment normal.      LAB RESULTS  Lab Results (last 24 hours)       Procedure Component Value Units Date/Time    POC Glucose Once [[id number]]  (Abnormal) Collected: 02/20/25 1107    Specimen: Blood Updated: 02/20/25 1109     Glucose 144 mg/dL     Tissue / Bone Culture - Surgical Site, Toe, Right [[id number]]  (Abnormal)  (Susceptibility) Collected: 02/18/25 0754    Specimen: Surgical Site from Toe, Right Updated: 02/20/25 0754     Tissue Culture Scant growth (1+) Citrobacter freundii     Comment:   This organism may develop resistance during prolonged therapy with 3rd generation cephalosporins (e.g. ceftriaxone) as a result of de-repression of AmpC B-lactamase.  Ceftriaxone may be a reasonable treatment option for uncomplicated cystitis or other lower severity infections when susceptibility is demonstrated.         Scant growth (1+) Gram Positive Cocci     Gram Stain Few (2+) Gram positive cocci      Rare (1+) WBCs seen    Susceptibility        Citrobacter freundii      BHAVIN      Cefepime Susceptible      Ceftazidime Susceptible      Ceftriaxone Susceptible      Gentamicin Susceptible      Levofloxacin Susceptible      Trimethoprim + Sulfamethoxazole Susceptible                       Susceptibility Comments       Citrobacter freundii    With the exception of urinary-sourced infections, aminoglycosides should not be used as monotherapy.               POC Glucose Once [[id number]]  (Abnormal) Collected: 02/20/25 0605    Specimen: Blood Updated: 02/20/25 0607     Glucose 190 mg/dL     Basic Metabolic Panel [[id number]]  (Abnormal) Collected: 02/20/25 0352    Specimen: Blood Updated: 02/20/25 0444     Glucose 190  mg/dL      BUN 25 mg/dL      Creatinine 5.48 mg/dL      Sodium 134 mmol/L      Potassium 4.5 mmol/L      Chloride 98 mmol/L      CO2 24.5 mmol/L      Calcium 9.2 mg/dL      BUN/Creatinine Ratio 4.6     Anion Gap 11.5 mmol/L      eGFR 12.7 mL/min/1.73     Narrative:      GFR Categories in Chronic Kidney Disease (CKD)      GFR Category          GFR (mL/min/1.73)    Interpretation  G1                     90 or greater         Normal or high (1)  G2                      60-89                Mild decrease (1)  G3a                   45-59                Mild to moderate decrease  G3b                   30-44                Moderate to severe decrease  G4                    15-29                Severe decrease  G5                    14 or less           Kidney failure          (1)In the absence of evidence of kidney disease, neither GFR category G1 or G2 fulfill the criteria for CKD.    eGFR calculation 2021 CKD-EPI creatinine equation, which does not include race as a factor    CBC & Differential [[id number]]  (Abnormal) Collected: 02/20/25 0352    Specimen: Blood Updated: 02/20/25 0438    Narrative:      The following orders were created for panel order CBC & Differential.  Procedure                               Abnormality         Status                     ---------                               -----------         ------                     CBC Auto Differential[[id number]]        Abnormal            Final result                 Please view results for these tests on the individual orders.    CBC Auto Differential [[id number]]  (Abnormal) Collected: 02/20/25 0352    Specimen: Blood Updated: 02/20/25 0438     WBC 14.41 10*3/mm3      RBC 3.02 10*6/mm3      Hemoglobin 9.2 g/dL      Hematocrit 26.0 %      MCV 86.1 fL      MCH 30.5 pg      MCHC 35.4 g/dL      RDW 13.7 %      RDW-SD 43.0 fl      MPV 8.2 fL      Platelets 339 10*3/mm3      Neutrophil % 80.9 %      Lymphocyte % 7.1 %      Monocyte % 7.4 %      Eosinophil % 1.9 %       Basophil % 0.5 %      Immature Grans % 2.2 %      Neutrophils, Absolute 11.65 10*3/mm3      Lymphocytes, Absolute 1.03 10*3/mm3      Monocytes, Absolute 1.07 10*3/mm3      Eosinophils, Absolute 0.27 10*3/mm3      Basophils, Absolute 0.07 10*3/mm3      Immature Grans, Absolute 0.32 10*3/mm3      nRBC 0.0 /100 WBC     POC Glucose Once [[id number]]  (Abnormal) Collected: 02/19/25 2103    Specimen: Blood Updated: 02/19/25 2104     Glucose 244 mg/dL     POC Glucose Once [[id number]]  (Normal) Collected: 02/19/25 1839    Specimen: Blood Updated: 02/19/25 1841     Glucose 118 mg/dL           Imaging Results (Last 24 Hours)       ** No results found for the last 24 hours. **            Current Facility-Administered Medications:     amLODIPine (NORVASC) tablet 5 mg, 5 mg, Oral, Q24H, Trevon Garcia DPM, 5 mg at 02/20/25 0845    aspirin chewable tablet 81 mg, 81 mg, Oral, Daily, Trevon Garcia DPM, 81 mg at 02/20/25 0845    atorvastatin (LIPITOR) tablet 10 mg, 10 mg, Oral, Nightly, Remy Thornton MD, 10 mg at 02/19/25 2057    bumetanide (BUMEX) tablet 2 mg, 2 mg, Oral, BID Diuretics, Trevon Garcia DPM, 2 mg at 02/20/25 0845    calcium carbonate (TUMS) chewable tablet 500 mg (200 mg elemental), 2 tablet, Oral, TID PRN, Remy Thornton MD, 2 tablet at 02/19/25 0022    carvedilol (COREG) tablet 25 mg, 25 mg, Oral, BID, Trevon Garcia DPM, 25 mg at 02/20/25 0845    cefTRIAXone (ROCEPHIN) 2,000 mg in sodium chloride 0.9 % 100 mL MBP, 2,000 mg, Intravenous, Daily, Trevon Garcia DPM, Last Rate: 200 mL/hr at 02/19/25 2304, 2,000 mg at 02/19/25 2304    dextrose (D50W) (25 g/50 mL) IV injection 25 g, 25 g, Intravenous, Q15 Min PRN, Jose, Trevon, DPM    dextrose (GLUTOSE) oral gel 15 g, 15 g, Oral, Q15 Min PRN, Trevon Garcia, DPM    docusate sodium (COLACE) capsule 100 mg, 100 mg, Oral, BID, Jose, Trevon, DPM, 100 mg at 02/20/25 0845    epoetin jamil-epbx (RETACRIT) injection 10,000 Units, 10,000 Units,  Subcutaneous, Once per day on Monday Wednesday Friday, Trevon Garcia DPM, 10,000 Units at 02/19/25 1855    glucagon (GLUCAGEN) injection 1 mg, 1 mg, Intramuscular, Q15 Min PRN, Trevon Garcia DPM    HYDROcodone-acetaminophen (NORCO) 5-325 MG per tablet 2 tablet, 2 tablet, Oral, Q4H PRN, Trevon Garcia DPM, 2 tablet at 02/20/25 1119    HYDROmorphone (DILAUDID) injection 0.5 mg, 0.5 mg, Intravenous, Q3H PRN, Remy Thornton MD, 0.5 mg at 02/20/25 0844    insulin glargine (LANTUS, SEMGLEE) injection 25 Units, 25 Units, Subcutaneous, Nightly, Remy Thornton MD, 25 Units at 02/19/25 2124    insulin lispro (HUMALOG/ADMELOG) injection 2-7 Units, 2-7 Units, Subcutaneous, 4x Daily AC & at Bedtime, Trevon Garcia DPM, 2 Units at 02/20/25 0844    insulin lispro (HUMALOG/ADMELOG) injection 8 Units, 8 Units, Subcutaneous, TID With Meals, Remy Thornton MD, 8 Units at 02/20/25 1119    losartan (COZAAR) tablet 50 mg, 50 mg, Oral, Q24H, Trevon Garcia DPM, 50 mg at 02/20/25 0845    ondansetron (ZOFRAN) injection 4 mg, 4 mg, Intravenous, Q6H PRN, Trevon Garcia DPM, 4 mg at 02/20/25 0845    pantoprazole (PROTONIX) EC tablet 40 mg, 40 mg, Oral, BID AC, Remy Thornton MD, 40 mg at 02/20/25 0845    Pharmacy to dose vancomycin, , Not Applicable, Continuous PRN, Trevon Garcia DPM    polyethylene glycol (MIRALAX) packet 17 g, 17 g, Oral, Daily, Trevon Garcia DPM    terazosin (HYTRIN) capsule 1 mg, 1 mg, Oral, Nightly, Trevon Garcia DPM, 1 mg at 02/19/25 2058    ticagrelor (BRILINTA) tablet 90 mg, 90 mg, Oral, Q12H, Trevon Garcia DPM, 90 mg at 02/20/25 0844    Vancomycin Pharmacy Intermittent/Pulse Dosing, , Not Applicable, Daily, Trevon Garcia DPM, Given at 02/20/25 0905    zolpidem (AMBIEN) tablet 5 mg, 5 mg, Oral, Nightly PRN, Trevon Garcia DPM, 5 mg at 02/19/25 2237     ASSESSMENT  Right great toe wound with infection and surrounding cellulitis and osteomyelitis status post I&D and hallux  amputation  End-stage renal disease   Diabetes mellitus  Hypertension  Hyperlipidemia  Coronary artery disease  Legally blind  Chronic anemia with drop in H&H  Gastroesophageal reflux disease    PLAN  CPM   Postop care  Continue IV antibiotics   Pain management   Debridement and first metatarsal excision in a.m.  Infectious disease consult appreciated  Podiatry and nephrology to follow patient   Continue home medications  Stress ulcer DVT prophylaxis  Supportive care  Discussed with nursing staff and family  Follow closely further recommendation current hospital course    JASKARAN LANGFORD MD    Copied text in this note has been reviewed and is accurate as of 02/20/25

## 2025-02-20 NOTE — PROGRESS NOTES
"[facility]: Vancomycin Monitoring Note    Wilner Menjivar is a 39 y.o. male who is on day 10/14 (stop 2/23) of pharmacy to dose vancomycin for Skin and Soft Tissue. Dr. Paz is following    Previous Vancomycin Dose:   intermittent dosing    Results from last 7 days   Lab Units 02/19/25  0616 02/17/25  0526 02/14/25  0333   VANCOMYCIN RM mcg/mL 18.90 6.80 22.30     Dosing/HD hx (HD MWF), note pt does have some residual urine output   2/11 1833 vanc 2250 mg (loading dose)  2/12 0746 vanc random 24.8     HD 2/12 2/12 2046 vanc 750 mg   2/14 0333 Vanc random 22.3      HD 2/14 ending around 1200 (stopped 1 hour early per patient request), no dose given  2/17 0526 vanc random 6.8     HD 2/17 2/17 14:26 Vanc 1500 mg      HD 2/19 2/19 vanc 1250 mg at 2107    Vitals/Labs  Ht: 170.2 cm (67\"); Wt: 106 kg (234 lb 5.6 oz)   Temp Readings from Last 1 Encounters:   02/20/25 98.1 °F (36.7 °C) (Oral)     Estimated Creatinine Clearance: 20.8 mL/min (A) (by C-G formula based on SCr of 5.48 mg/dL (H)).   Dialysis MWF    Results from last 7 days   Lab Units 02/20/25  0352 02/19/25  0616 02/18/25  0348   CREATININE mg/dL 5.48* 7.27* 5.65*   WBC 10*3/mm3 14.41* 15.41* 15.91*     Assessment/Plan    Patient scheduled for debridement in OR on 2/21, HD being scheduled early to happen prior to surgery.  I will place a vancomycin 1250mg iv once order \"signed and held\" to be given in preop (post HD and pre surgery)    Current Vancomycin Dose: intermittent dosing, vancomycin 1250 mg on HD days  Next Level Date and Time: 2/21/25 with am labs   We will continue to monitor patient changes and renal function     Thank you for involving pharmacy in this patient's care. Please contact pharmacy with any questions or concerns.       Rosa Sheets, Pharm.D., [facility]  Clinical Pharmacist                    "

## 2025-02-20 NOTE — PROGRESS NOTES
"  Infectious Diseases Progress Note    Abdiaziz Paz MD     [facility]  [place]: 9 days  Patient Identification:  Name: Wilner Menjivar  Age: 39 y.o.  Sex: male  :  1985  MRN: [id number]         Primary Care Physician: Roosevelt Thao MD        Subjective: Denies any complaints.  Decreased discomfort in his right foot.  Interval History: See consultation note.  2025: UnderwentHallux amputation, right foot SESAMOIDECTOMY, INCISION AN DRAINAGE   Objective:    Scheduled Meds:amLODIPine, 5 mg, Oral, Q24H  aspirin, 81 mg, Oral, Daily  atorvastatin, 10 mg, Oral, Nightly  bumetanide, 2 mg, Oral, BID Diuretics  carvedilol, 25 mg, Oral, BID  cefTRIAXone, 2,000 mg, Intravenous, Daily  docusate sodium, 100 mg, Oral, BID  epoetin jamil/jamil-epbx, 10,000 Units, Subcutaneous, Once per day on   insulin glargine, 25 Units, Subcutaneous, Nightly  insulin lispro, 2-7 Units, Subcutaneous, 4x Daily AC & at Bedtime  insulin lispro, 8 Units, Subcutaneous, TID With Meals  losartan, 50 mg, Oral, Q24H  pantoprazole, 40 mg, Oral, BID AC  polyethylene glycol, 17 g, Oral, Daily  terazosin, 1 mg, Oral, Nightly  ticagrelor, 90 mg, Oral, Q12H  Vancomycin Pharmacy Intermittent/Pulse Dosing, , Not Applicable, Daily      Continuous Infusions:Pharmacy to dose vancomycin,         Vital signs in last 24 hours:  Temp:  [98.1 °F (36.7 °C)-99.1 °F (37.3 °C)] 98.2 °F (36.8 °C)  Heart Rate:  [] 84  Resp:  [16-18] 16  BP: (114-167)/(57-83) 114/57    Intake/Output:    Intake/Output Summary (Last 24 hours) at 2025 0715  Last data filed at 2025 1752  Gross per 24 hour   Intake 532.92 ml   Output 3900 ml   Net -3367.08 ml           Exam:  /57 (BP Location: Right arm, Patient Position: Lying)   Pulse 84   Temp 98.2 °F (36.8 °C) (Oral)   Resp 16   Ht 170.2 cm (67\")   Wt 106 kg (234 lb 5.6 oz)   SpO2 100%   BMI 36.70 kg/m²   Patient is examined using the personal protective equipment as per " guidelines from infection control for this particular patient as enacted.  Hand washing was performed before and after patient interaction.  General Appearance:    Alert, cooperative, no distress, AAOx3                          Head:    Normocephalic, without obvious abnormality, atraumatic                           Eyes:  Legally blind                         Throat:   Lips, tongue, gums normal; oral mucosa pink and moist                           Neck:   Supple, symmetrical, trachea midline, no JVD                         Lungs:    Clear to auscultation bilaterally, respirations unlabored                 Chest Wall:    No tenderness or deformity                          Heart:  S1-S2 regular                  Abdomen:   Soft nontender                 Extremities: Right foot after debridement and amputation of the great toe is dressed.                  Neurologic: Legally blind       Data Review:    I reviewed the patient's new clinical results.  Results from last 7 days   Lab Units 02/20/25  0352 02/19/25  0616 02/18/25  0348 02/17/25  0526 02/16/25  0457 02/15/25  0725 02/14/25  2233 02/14/25  0333   WBC 10*3/mm3 14.41* 15.41* 15.91* 18.20* 14.97* 15.30*  --  13.96*   HEMOGLOBIN g/dL 9.2* 6.7* 8.5* 8.4* 8.9* 9.1* 9.4* 6.6*   PLATELETS 10*3/mm3 339 331 337 359 304 294  --  226     Results from last 7 days   Lab Units 02/20/25  0352 02/19/25  0616 02/18/25  0348 02/17/25  0526 02/16/25  0457 02/15/25  0725 02/14/25  0333   SODIUM mmol/L 134* 130* 127* 131* 130* 129* 129*   POTASSIUM mmol/L 4.5 4.2 4.3 3.9 3.7 4.0 3.6   CHLORIDE mmol/L 98 94* 93* 92* 95* 95* 95*   CO2 mmol/L 24.5 21.0* 23.0 20.3* 21.6* 21.0* 23.0   BUN mg/dL 25* 39* 31* 46* 35* 29* 40*   CREATININE mg/dL 5.48* 7.27* 5.65* 6.59* 5.97* 5.35* 6.57*   CALCIUM mg/dL 9.2 8.9 9.3 9.3 9.1 8.8 9.0   GLUCOSE mg/dL 190* 303* 326* 166* 133* 274* 202*   XR Foot 3+ View Right    Result Date: 2/18/2025  Postoperative changes from amputation of the great toe at the  level of the MTP joint.  This report was finalized on 2/18/2025 9:04 AM by Tom Cha MD on Workstation: [id number]      Doppler Arterial Multi Level Lower Extremity - Bilateral CAR    Addendum Date: 2/13/2025      Right Conclusion: The right GIOVANA is moderately reduced. Waveforms are consistent with femoral disease, popliteal disease and tib-peroneal disease. Normal digital pressures.   Left Conclusion: The left GIOVANA is normal. Normal digital pressures.     MRI Foot Right Without Contrast    Result Date: 2/13/2025  1. Osteomyelitis of the distal shaft and head of the 1st proximal phalanx and of the distal phalanx with overlying soft tissue wound/ulcer. Gas within the soft tissues and marrow of the great toe associated with infection. Tenosynovitis flexor hallucis longus tendon sheath. Forefoot cellulitis. 2. No evidence for septic arthritis at the 1st MTP joint. 3. Muscular edema and atrophy associated with myositis or chronic neuropathy.  This report was finalized on 2/13/2025 7:48 AM by Junior Villalobos M.D on Workstation: [id number]      XR Foot 3+ View Right    Result Date: 2/11/2025   There is subcutaneous emphysema and soft tissue loss over the great toe. No radiopaque foreign body. No definite bone erosion.    This report was finalized on 2/11/2025 5:20 PM by Dr. Jason Dan M.D on Workstation: [id number]     Microbiology Results (last 10 days)       Procedure Component Value - Date/Time    Tissue / Bone Culture - Surgical Site, Toe, Right [[id number]]  (Abnormal) Collected: 02/18/25 0754    Lab Status: Preliminary result Specimen: Surgical Site from Toe, Right Updated: 02/19/25 0751     Tissue Culture Scant growth (1+) Gram Negative Bacilli     Gram Stain Few (2+) Gram positive cocci      Rare (1+) WBCs seen    MRSA Screen, PCR (Inpatient) - Swab, Nares [[id number]]  (Normal) Collected: 02/13/25 0906    Lab Status: Final result Specimen: Swab from Nares Updated: 02/13/25 1102     MRSA PCR No MRSA  Detected    Narrative:      The negative predictive value of this diagnostic test is high and should only be used to consider de-escalating anti-MRSA therapy. A positive result may indicate colonization with MRSA and must be correlated clinically.    Blood Culture - Blood, Arm, Right [[id number]]  (Normal) Collected: 02/11/25 1749    Lab Status: Final result Specimen: Blood from Arm, Right Updated: 02/16/25 1800     Blood Culture No growth at 5 days    Blood Culture - Blood, Arm, Right [[id number]]  (Normal) Collected: 02/11/25 1742    Lab Status: Final result Specimen: Blood from Arm, Right Updated: 02/16/25 1800     Blood Culture No growth at 5 days    Narrative:      Less than seven (7) mL's of blood was collected.  Insufficient quantity may yield false negative results.          Telemetry Scan   Final Result      Telemetry Scan   Final Result      Telemetry Scan   Final Result      Telemetry Scan   Final Result      Telemetry Scan   Final Result      Telemetry Scan   Final Result      Telemetry Scan   Final Result      Telemetry Scan   Final Result      Telemetry Scan   Final Result      Telemetry Scan   Final Result      Telemetry Scan   Final Result      Telemetry Scan   Final Result      Telemetry Scan   Final Result      Telemetry Scan   Final Result      Telemetry Scan   Final Result              Assessment:    Diabetic toe ulcer    Osteomyelitis of great toe of right foot  39-year-old male with  1-right great toe diabetic/ischemic ulcer with dry gangrene with associated abscess and contiguous focus osteomyelitis of the proximal and distal phalanx with associated flexor hallucis tenosynovitis-status post amputation of right great toe, I&D of multiple compartments of the right foot and sesamoidectomy involving tibia-fibula and right foot on 2/18/2025-operative cultures showing gram-negative rods and Gram stain shows gram-positive cocci.  2-significant peripheral vascular disease with element of gangrene  involving the great toe  3-type 2 diabetes  4-end-stage renal disease  5-legally blind  6-severe anemia multifactorial  7-hypertension  8-multiple antibiotic allergies/intolerances including allergy to penicillin though it could very well be not a true allergy given the description of his reaction.  9-anemia multifactorial status post transfusion     Recommendations/Discussions:  See my discussion and recommendations on 2/13/2025.  Continue present antibiotic therapy  Close follow-up with podiatry service for need for further debridement.  Continue present antibiotic therapy until the ID and sensitivity of the gram-negative rods and gram-positive cocci in the operative cultures available.  Duration of antibiotic treatment is dependent upon whether or not there is concern for residual osteomyelitis or patient has had definitive surgery.  Antibiotic therapy could range from 2 weeks for residual soft tissue infection versus 6 weeks of antibiotic therapy from last intervention.      Abdiaziz Paz MD  2/20/2025  07:15 EST    Parts of this note may be an electronic transcription/translation of spoken language to printed text using the Dragon dictation system.

## 2025-02-21 ENCOUNTER — ANESTHESIA (OUTPATIENT)
Dept: PERIOP | Facility: HOSPITAL | Age: 40
End: 2025-02-21
Payer: MEDICARE

## 2025-02-21 ENCOUNTER — ANESTHESIA EVENT (OUTPATIENT)
Dept: PERIOP | Facility: HOSPITAL | Age: 40
End: 2025-02-21
Payer: MEDICARE

## 2025-02-21 ENCOUNTER — APPOINTMENT (OUTPATIENT)
Dept: GENERAL RADIOLOGY | Facility: HOSPITAL | Age: 40
DRG: 628 | End: 2025-02-21
Payer: MEDICARE

## 2025-02-21 LAB
ALBUMIN SERPL-MCNC: 2.8 G/DL (ref 3.5–5.2)
ANION GAP SERPL CALCULATED.3IONS-SCNC: 14 MMOL/L (ref 5–15)
BASOPHILS # BLD AUTO: 0.08 10*3/MM3 (ref 0–0.2)
BASOPHILS NFR BLD AUTO: 0.6 % (ref 0–1.5)
BUN SERPL-MCNC: 34 MG/DL (ref 6–20)
BUN/CREAT SERPL: 5.4 (ref 7–25)
CALCIUM SPEC-SCNC: 9.2 MG/DL (ref 8.6–10.5)
CHLORIDE SERPL-SCNC: 97 MMOL/L (ref 98–107)
CO2 SERPL-SCNC: 21 MMOL/L (ref 22–29)
CREAT SERPL-MCNC: 6.28 MG/DL (ref 0.76–1.27)
CYTO UR: NORMAL
DEPRECATED RDW RBC AUTO: 44.1 FL (ref 37–54)
EGFRCR SERPLBLD CKD-EPI 2021: 10.8 ML/MIN/1.73
EOSINOPHIL # BLD AUTO: 0.3 10*3/MM3 (ref 0–0.4)
EOSINOPHIL NFR BLD AUTO: 2.3 % (ref 0.3–6.2)
ERYTHROCYTE [DISTWIDTH] IN BLOOD BY AUTOMATED COUNT: 13.9 % (ref 12.3–15.4)
GLUCOSE BLDC GLUCOMTR-MCNC: 184 MG/DL (ref 70–130)
GLUCOSE BLDC GLUCOMTR-MCNC: 187 MG/DL (ref 70–130)
GLUCOSE BLDC GLUCOMTR-MCNC: 201 MG/DL (ref 70–130)
GLUCOSE BLDC GLUCOMTR-MCNC: 218 MG/DL (ref 70–130)
GLUCOSE BLDC GLUCOMTR-MCNC: 239 MG/DL (ref 70–130)
GLUCOSE SERPL-MCNC: 211 MG/DL (ref 65–99)
HCT VFR BLD AUTO: 26.3 % (ref 37.5–51)
HGB BLD-MCNC: 8.7 G/DL (ref 13–17.7)
IMM GRANULOCYTES # BLD AUTO: 0.28 10*3/MM3 (ref 0–0.05)
IMM GRANULOCYTES NFR BLD AUTO: 2.1 % (ref 0–0.5)
LAB AP CASE REPORT: NORMAL
LYMPHOCYTES # BLD AUTO: 1.04 10*3/MM3 (ref 0.7–3.1)
LYMPHOCYTES NFR BLD AUTO: 7.8 % (ref 19.6–45.3)
MCH RBC QN AUTO: 29 PG (ref 26.6–33)
MCHC RBC AUTO-ENTMCNC: 33.1 G/DL (ref 31.5–35.7)
MCV RBC AUTO: 87.7 FL (ref 79–97)
MONOCYTES # BLD AUTO: 0.91 10*3/MM3 (ref 0.1–0.9)
MONOCYTES NFR BLD AUTO: 6.9 % (ref 5–12)
NEUTROPHILS NFR BLD AUTO: 10.66 10*3/MM3 (ref 1.7–7)
NEUTROPHILS NFR BLD AUTO: 80.3 % (ref 42.7–76)
NRBC BLD AUTO-RTO: 0 /100 WBC (ref 0–0.2)
PATH REPORT.FINAL DX SPEC: NORMAL
PATH REPORT.GROSS SPEC: NORMAL
PHOSPHATE SERPL-MCNC: 4.3 MG/DL (ref 2.5–4.5)
PLATELET # BLD AUTO: 328 10*3/MM3 (ref 140–450)
PMV BLD AUTO: 8.2 FL (ref 6–12)
POTASSIUM SERPL-SCNC: 4.5 MMOL/L (ref 3.5–5.2)
RBC # BLD AUTO: 3 10*6/MM3 (ref 4.14–5.8)
SODIUM SERPL-SCNC: 132 MMOL/L (ref 136–145)
VANCOMYCIN SERPL-MCNC: 23.6 MCG/ML (ref 5–40)
WBC NRBC COR # BLD AUTO: 13.27 10*3/MM3 (ref 3.4–10.8)

## 2025-02-21 PROCEDURE — 88304 TISSUE EXAM BY PATHOLOGIST: CPT | Performed by: PODIATRIST

## 2025-02-21 PROCEDURE — 25010000002 EPOETIN ALFA-EPBX 10000 UNIT/ML SOLUTION: Performed by: PODIATRIST

## 2025-02-21 PROCEDURE — 25010000002 VANCOMYCIN PER 500 MG: Performed by: PODIATRIST

## 2025-02-21 PROCEDURE — 80069 RENAL FUNCTION PANEL: CPT | Performed by: HOSPITALIST

## 2025-02-21 PROCEDURE — 25010000002 MIDAZOLAM PER 1 MG: Performed by: ANESTHESIOLOGY

## 2025-02-21 PROCEDURE — 25010000002 LIDOCAINE 1 % SOLUTION: Performed by: PODIATRIST

## 2025-02-21 PROCEDURE — 80202 ASSAY OF VANCOMYCIN: CPT | Performed by: HOSPITALIST

## 2025-02-21 PROCEDURE — 25810000003 LACTATED RINGERS PER 1000 ML: Performed by: NURSE ANESTHETIST, CERTIFIED REGISTERED

## 2025-02-21 PROCEDURE — 25010000002 PROPOFOL 500 MG/50ML EMULSION: Performed by: NURSE ANESTHETIST, CERTIFIED REGISTERED

## 2025-02-21 PROCEDURE — 25010000002 HYDROMORPHONE PER 4 MG: Performed by: HOSPITALIST

## 2025-02-21 PROCEDURE — 25010000002 CEFEPIME PER 500 MG: Performed by: INTERNAL MEDICINE

## 2025-02-21 PROCEDURE — 25010000002 FENTANYL CITRATE (PF) 50 MCG/ML SOLUTION: Performed by: NURSE ANESTHETIST, CERTIFIED REGISTERED

## 2025-02-21 PROCEDURE — 63710000001 INSULIN GLARGINE PER 5 UNITS: Performed by: PODIATRIST

## 2025-02-21 PROCEDURE — 73630 X-RAY EXAM OF FOOT: CPT

## 2025-02-21 PROCEDURE — 25010000002 ONDANSETRON PER 1 MG: Performed by: PODIATRIST

## 2025-02-21 PROCEDURE — 25010000002 FENTANYL CITRATE (PF) 50 MCG/ML SOLUTION: Performed by: ANESTHESIOLOGY

## 2025-02-21 PROCEDURE — 25010000002 HYDROMORPHONE PER 4 MG: Performed by: PODIATRIST

## 2025-02-21 PROCEDURE — 63710000001 INSULIN LISPRO (HUMAN) PER 5 UNITS: Performed by: PODIATRIST

## 2025-02-21 PROCEDURE — 25010000002 BUPIVACAINE 0.5 % SOLUTION: Performed by: PODIATRIST

## 2025-02-21 PROCEDURE — 0QBN0Z2 EXCISION OF RIGHT METATARSAL, SESAMOID BONE(S) 1ST TOE, OPEN APPROACH: ICD-10-PCS | Performed by: PODIATRIST

## 2025-02-21 PROCEDURE — 82948 REAGENT STRIP/BLOOD GLUCOSE: CPT

## 2025-02-21 PROCEDURE — 85025 COMPLETE CBC W/AUTO DIFF WBC: CPT | Performed by: HOSPITALIST

## 2025-02-21 RX ORDER — SODIUM CHLORIDE 0.9 % (FLUSH) 0.9 %
3-10 SYRINGE (ML) INJECTION AS NEEDED
Status: DISCONTINUED | OUTPATIENT
Start: 2025-02-21 | End: 2025-02-21 | Stop reason: HOSPADM

## 2025-02-21 RX ORDER — LIDOCAINE HYDROCHLORIDE 10 MG/ML
INJECTION, SOLUTION INFILTRATION; PERINEURAL AS NEEDED
Status: DISCONTINUED | OUTPATIENT
Start: 2025-02-21 | End: 2025-02-21 | Stop reason: HOSPADM

## 2025-02-21 RX ORDER — DIPHENHYDRAMINE HYDROCHLORIDE 50 MG/ML
12.5 INJECTION INTRAMUSCULAR; INTRAVENOUS
Status: DISCONTINUED | OUTPATIENT
Start: 2025-02-21 | End: 2025-02-21 | Stop reason: HOSPADM

## 2025-02-21 RX ORDER — LIDOCAINE HYDROCHLORIDE 10 MG/ML
0.5 INJECTION, SOLUTION INFILTRATION; PERINEURAL ONCE AS NEEDED
Status: DISCONTINUED | OUTPATIENT
Start: 2025-02-21 | End: 2025-02-21

## 2025-02-21 RX ORDER — SODIUM CHLORIDE 0.9 % (FLUSH) 0.9 %
3 SYRINGE (ML) INJECTION EVERY 12 HOURS SCHEDULED
Status: DISCONTINUED | OUTPATIENT
Start: 2025-02-21 | End: 2025-02-21

## 2025-02-21 RX ORDER — HYDROCODONE BITARTRATE AND ACETAMINOPHEN 5; 325 MG/1; MG/1
1 TABLET ORAL ONCE AS NEEDED
Status: DISCONTINUED | OUTPATIENT
Start: 2025-02-21 | End: 2025-02-21 | Stop reason: HOSPADM

## 2025-02-21 RX ORDER — PROCHLORPERAZINE EDISYLATE 5 MG/ML
10 INJECTION INTRAMUSCULAR; INTRAVENOUS EVERY 6 HOURS PRN
Status: DISCONTINUED | OUTPATIENT
Start: 2025-02-21 | End: 2025-02-21 | Stop reason: HOSPADM

## 2025-02-21 RX ORDER — SODIUM CHLORIDE 0.9 % (FLUSH) 0.9 %
3-10 SYRINGE (ML) INJECTION AS NEEDED
Status: DISCONTINUED | OUTPATIENT
Start: 2025-02-21 | End: 2025-02-21

## 2025-02-21 RX ORDER — HYDROMORPHONE HYDROCHLORIDE 1 MG/ML
0.5 INJECTION, SOLUTION INTRAMUSCULAR; INTRAVENOUS; SUBCUTANEOUS
Status: DISCONTINUED | OUTPATIENT
Start: 2025-02-21 | End: 2025-02-21 | Stop reason: HOSPADM

## 2025-02-21 RX ORDER — FENTANYL CITRATE 50 UG/ML
INJECTION, SOLUTION INTRAMUSCULAR; INTRAVENOUS AS NEEDED
Status: DISCONTINUED | OUTPATIENT
Start: 2025-02-21 | End: 2025-02-21 | Stop reason: SURG

## 2025-02-21 RX ORDER — SODIUM CHLORIDE, SODIUM LACTATE, POTASSIUM CHLORIDE, CALCIUM CHLORIDE 600; 310; 30; 20 MG/100ML; MG/100ML; MG/100ML; MG/100ML
INJECTION, SOLUTION INTRAVENOUS CONTINUOUS PRN
Status: DISCONTINUED | OUTPATIENT
Start: 2025-02-21 | End: 2025-02-21 | Stop reason: SURG

## 2025-02-21 RX ORDER — PROMETHAZINE HYDROCHLORIDE 25 MG/1
25 SUPPOSITORY RECTAL ONCE AS NEEDED
Status: DISCONTINUED | OUTPATIENT
Start: 2025-02-21 | End: 2025-02-21 | Stop reason: HOSPADM

## 2025-02-21 RX ORDER — NALOXONE HCL 0.4 MG/ML
0.2 VIAL (ML) INJECTION AS NEEDED
Status: DISCONTINUED | OUTPATIENT
Start: 2025-02-21 | End: 2025-02-21 | Stop reason: HOSPADM

## 2025-02-21 RX ORDER — PROPOFOL 10 MG/ML
INJECTION, EMULSION INTRAVENOUS AS NEEDED
Status: DISCONTINUED | OUTPATIENT
Start: 2025-02-21 | End: 2025-02-21 | Stop reason: SURG

## 2025-02-21 RX ORDER — LABETALOL HYDROCHLORIDE 5 MG/ML
5 INJECTION, SOLUTION INTRAVENOUS
Status: DISCONTINUED | OUTPATIENT
Start: 2025-02-21 | End: 2025-02-21 | Stop reason: HOSPADM

## 2025-02-21 RX ORDER — SODIUM CHLORIDE 0.9 % (FLUSH) 0.9 %
3 SYRINGE (ML) INJECTION EVERY 12 HOURS SCHEDULED
Status: DISCONTINUED | OUTPATIENT
Start: 2025-02-21 | End: 2025-02-21 | Stop reason: HOSPADM

## 2025-02-21 RX ORDER — OXYCODONE AND ACETAMINOPHEN 7.5; 325 MG/1; MG/1
1 TABLET ORAL EVERY 4 HOURS PRN
Status: DISCONTINUED | OUTPATIENT
Start: 2025-02-21 | End: 2025-02-21 | Stop reason: HOSPADM

## 2025-02-21 RX ORDER — HYDRALAZINE HYDROCHLORIDE 20 MG/ML
5 INJECTION INTRAMUSCULAR; INTRAVENOUS
Status: DISCONTINUED | OUTPATIENT
Start: 2025-02-21 | End: 2025-02-21 | Stop reason: HOSPADM

## 2025-02-21 RX ORDER — SODIUM CHLORIDE, SODIUM LACTATE, POTASSIUM CHLORIDE, CALCIUM CHLORIDE 600; 310; 30; 20 MG/100ML; MG/100ML; MG/100ML; MG/100ML
9 INJECTION, SOLUTION INTRAVENOUS CONTINUOUS
Status: DISCONTINUED | OUTPATIENT
Start: 2025-02-21 | End: 2025-02-21

## 2025-02-21 RX ORDER — FENTANYL CITRATE 50 UG/ML
50 INJECTION, SOLUTION INTRAMUSCULAR; INTRAVENOUS ONCE AS NEEDED
Status: COMPLETED | OUTPATIENT
Start: 2025-02-21 | End: 2025-02-21

## 2025-02-21 RX ORDER — MIDAZOLAM HYDROCHLORIDE 1 MG/ML
1 INJECTION, SOLUTION INTRAMUSCULAR; INTRAVENOUS
Status: DISCONTINUED | OUTPATIENT
Start: 2025-02-21 | End: 2025-02-21 | Stop reason: HOSPADM

## 2025-02-21 RX ORDER — ATROPINE SULFATE 0.4 MG/ML
0.4 INJECTION, SOLUTION INTRAMUSCULAR; INTRAVENOUS; SUBCUTANEOUS ONCE AS NEEDED
Status: DISCONTINUED | OUTPATIENT
Start: 2025-02-21 | End: 2025-02-21 | Stop reason: HOSPADM

## 2025-02-21 RX ORDER — ONDANSETRON 2 MG/ML
4 INJECTION INTRAMUSCULAR; INTRAVENOUS ONCE AS NEEDED
Status: DISCONTINUED | OUTPATIENT
Start: 2025-02-21 | End: 2025-02-21 | Stop reason: HOSPADM

## 2025-02-21 RX ORDER — SODIUM CHLORIDE 9 MG/ML
9 INJECTION, SOLUTION INTRAVENOUS ONCE
Status: COMPLETED | OUTPATIENT
Start: 2025-02-21 | End: 2025-02-21

## 2025-02-21 RX ORDER — PROMETHAZINE HYDROCHLORIDE 25 MG/1
25 TABLET ORAL ONCE AS NEEDED
Status: DISCONTINUED | OUTPATIENT
Start: 2025-02-21 | End: 2025-02-21 | Stop reason: HOSPADM

## 2025-02-21 RX ORDER — FENTANYL CITRATE 50 UG/ML
50 INJECTION, SOLUTION INTRAMUSCULAR; INTRAVENOUS
Status: DISCONTINUED | OUTPATIENT
Start: 2025-02-21 | End: 2025-02-21 | Stop reason: HOSPADM

## 2025-02-21 RX ORDER — IPRATROPIUM BROMIDE AND ALBUTEROL SULFATE 2.5; .5 MG/3ML; MG/3ML
3 SOLUTION RESPIRATORY (INHALATION) ONCE AS NEEDED
Status: DISCONTINUED | OUTPATIENT
Start: 2025-02-21 | End: 2025-02-21 | Stop reason: HOSPADM

## 2025-02-21 RX ORDER — FENTANYL CITRATE 50 UG/ML
50 INJECTION, SOLUTION INTRAMUSCULAR; INTRAVENOUS ONCE AS NEEDED
Status: DISCONTINUED | OUTPATIENT
Start: 2025-02-21 | End: 2025-02-21

## 2025-02-21 RX ORDER — LIDOCAINE HYDROCHLORIDE 10 MG/ML
0.5 INJECTION, SOLUTION INFILTRATION; PERINEURAL ONCE AS NEEDED
Status: DISCONTINUED | OUTPATIENT
Start: 2025-02-21 | End: 2025-02-21 | Stop reason: HOSPADM

## 2025-02-21 RX ORDER — BUPIVACAINE HYDROCHLORIDE 5 MG/ML
INJECTION, SOLUTION PERINEURAL AS NEEDED
Status: DISCONTINUED | OUTPATIENT
Start: 2025-02-21 | End: 2025-02-21 | Stop reason: HOSPADM

## 2025-02-21 RX ORDER — FAMOTIDINE 10 MG/ML
20 INJECTION, SOLUTION INTRAVENOUS ONCE
Status: COMPLETED | OUTPATIENT
Start: 2025-02-21 | End: 2025-02-21

## 2025-02-21 RX ORDER — MIDAZOLAM HYDROCHLORIDE 1 MG/ML
1 INJECTION, SOLUTION INTRAMUSCULAR; INTRAVENOUS
Status: DISCONTINUED | OUTPATIENT
Start: 2025-02-21 | End: 2025-02-21

## 2025-02-21 RX ORDER — FLUMAZENIL 0.1 MG/ML
0.2 INJECTION INTRAVENOUS AS NEEDED
Status: DISCONTINUED | OUTPATIENT
Start: 2025-02-21 | End: 2025-02-21 | Stop reason: HOSPADM

## 2025-02-21 RX ORDER — EPHEDRINE SULFATE 50 MG/ML
5 INJECTION, SOLUTION INTRAVENOUS ONCE AS NEEDED
Status: DISCONTINUED | OUTPATIENT
Start: 2025-02-21 | End: 2025-02-21 | Stop reason: HOSPADM

## 2025-02-21 RX ADMIN — TERAZOSIN HYDROCHLORIDE 1 MG: 1 CAPSULE ORAL at 21:21

## 2025-02-21 RX ADMIN — HYDROCODONE BITARTRATE AND ACETAMINOPHEN 2 TABLET: 5; 325 TABLET ORAL at 21:21

## 2025-02-21 RX ADMIN — FENTANYL CITRATE 50 MCG: 50 INJECTION, SOLUTION INTRAMUSCULAR; INTRAVENOUS at 14:16

## 2025-02-21 RX ADMIN — INSULIN LISPRO 2 UNITS: 100 INJECTION, SOLUTION INTRAVENOUS; SUBCUTANEOUS at 21:21

## 2025-02-21 RX ADMIN — PROPOFOL 70 MG: 10 INJECTION, EMULSION INTRAVENOUS at 14:29

## 2025-02-21 RX ADMIN — PANTOPRAZOLE SODIUM 40 MG: 40 TABLET, DELAYED RELEASE ORAL at 17:03

## 2025-02-21 RX ADMIN — BUMETANIDE 2 MG: 2 TABLET ORAL at 17:03

## 2025-02-21 RX ADMIN — MIDAZOLAM 1 MG: 1 INJECTION INTRAMUSCULAR; INTRAVENOUS at 13:43

## 2025-02-21 RX ADMIN — FENTANYL CITRATE 50 MCG: 50 INJECTION, SOLUTION INTRAMUSCULAR; INTRAVENOUS at 13:36

## 2025-02-21 RX ADMIN — CEFEPIME 1000 MG: 1 INJECTION, POWDER, FOR SOLUTION INTRAMUSCULAR; INTRAVENOUS at 11:59

## 2025-02-21 RX ADMIN — HYDROMORPHONE HYDROCHLORIDE 0.5 MG: 1 INJECTION, SOLUTION INTRAMUSCULAR; INTRAVENOUS; SUBCUTANEOUS at 22:53

## 2025-02-21 RX ADMIN — SODIUM CHLORIDE, POTASSIUM CHLORIDE, SODIUM LACTATE AND CALCIUM CHLORIDE: 600; 310; 30; 20 INJECTION, SOLUTION INTRAVENOUS at 14:05

## 2025-02-21 RX ADMIN — HYDROMORPHONE HYDROCHLORIDE 0.5 MG: 1 INJECTION, SOLUTION INTRAMUSCULAR; INTRAVENOUS; SUBCUTANEOUS at 19:01

## 2025-02-21 RX ADMIN — FAMOTIDINE 20 MG: 10 INJECTION INTRAVENOUS at 13:43

## 2025-02-21 RX ADMIN — HYDROMORPHONE HYDROCHLORIDE 0.5 MG: 1 INJECTION, SOLUTION INTRAMUSCULAR; INTRAVENOUS; SUBCUTANEOUS at 06:23

## 2025-02-21 RX ADMIN — ZOLPIDEM TARTRATE 5 MG: 5 TABLET, FILM COATED ORAL at 22:53

## 2025-02-21 RX ADMIN — CARVEDILOL 25 MG: 25 TABLET, FILM COATED ORAL at 21:21

## 2025-02-21 RX ADMIN — VANCOMYCIN HYDROCHLORIDE 500 MG: 500 INJECTION, POWDER, LYOPHILIZED, FOR SOLUTION INTRAVENOUS at 13:54

## 2025-02-21 RX ADMIN — DOCUSATE SODIUM 100 MG: 100 CAPSULE, LIQUID FILLED ORAL at 21:21

## 2025-02-21 RX ADMIN — PROPOFOL 120 MCG/KG/MIN: 10 INJECTION, EMULSION INTRAVENOUS at 14:08

## 2025-02-21 RX ADMIN — Medication: at 08:02

## 2025-02-21 RX ADMIN — PROPOFOL 50 MG: 10 INJECTION, EMULSION INTRAVENOUS at 14:07

## 2025-02-21 RX ADMIN — ONDANSETRON 4 MG: 2 INJECTION INTRAMUSCULAR; INTRAVENOUS at 09:29

## 2025-02-21 RX ADMIN — INSULIN GLARGINE 30 UNITS: 100 INJECTION, SOLUTION SUBCUTANEOUS at 21:21

## 2025-02-21 RX ADMIN — ATORVASTATIN CALCIUM 10 MG: 10 TABLET, FILM COATED ORAL at 21:24

## 2025-02-21 RX ADMIN — INSULIN LISPRO 10 UNITS: 100 INJECTION, SOLUTION INTRAVENOUS; SUBCUTANEOUS at 17:04

## 2025-02-21 RX ADMIN — EPOETIN ALFA-EPBX 10000 UNITS: 10000 INJECTION, SOLUTION INTRAVENOUS; SUBCUTANEOUS at 17:03

## 2025-02-21 RX ADMIN — SODIUM CHLORIDE, POTASSIUM CHLORIDE, SODIUM LACTATE AND CALCIUM CHLORIDE: 600; 310; 30; 20 INJECTION, SOLUTION INTRAVENOUS at 14:15

## 2025-02-21 RX ADMIN — HYDROCODONE BITARTRATE AND ACETAMINOPHEN 2 TABLET: 5; 325 TABLET ORAL at 17:03

## 2025-02-21 RX ADMIN — HYDROMORPHONE HYDROCHLORIDE 0.5 MG: 1 INJECTION, SOLUTION INTRAMUSCULAR; INTRAVENOUS; SUBCUTANEOUS at 12:00

## 2025-02-21 RX ADMIN — TICAGRELOR 90 MG: 90 TABLET ORAL at 21:21

## 2025-02-21 RX ADMIN — INSULIN LISPRO 3 UNITS: 100 INJECTION, SOLUTION INTRAVENOUS; SUBCUTANEOUS at 17:04

## 2025-02-21 RX ADMIN — HYDROCODONE BITARTRATE AND ACETAMINOPHEN 2 TABLET: 5; 325 TABLET ORAL at 01:13

## 2025-02-21 RX ADMIN — SODIUM CHLORIDE 9 ML/HR: 9 INJECTION, SOLUTION INTRAVENOUS at 13:30

## 2025-02-21 NOTE — PLAN OF CARE
Goal Outcome Evaluation:  Plan of Care Reviewed With: patient        Progress: no change    Pt is alert and oriented.  VSS.  Dialysis today and went to surgery for second debridement of R foot.  Pain medication given as needed.  Will continue to monitor patient.

## 2025-02-21 NOTE — ANESTHESIA POSTPROCEDURE EVALUATION
"Patient: Wilner Menjivar    Procedure Summary       Date: 02/21/25 Room / Location: [facility] OR  / [facility] MAIN OR    Anesthesia Start: 1359 Anesthesia Stop: 1456    Procedures:       DEBRIDEMENT FOOT, RIGHT (Right)      Partial first metatarsal excision, right foot (Right) Diagnosis:       Osteomyelitis of great toe of right foot      (Osteomyelitis of great toe of right foot [M86.9])    Surgeons: Trevon Garcia DPM Provider: Florentino Hardin MD    Anesthesia Type: MAC ASA Status: 4            Anesthesia Type: MAC    Vitals  Vitals Value Taken Time   /71 02/21/25 1515   Temp     Pulse 90 02/21/25 1516   Resp 16 02/21/25 1500   SpO2 100 % 02/21/25 1516   Vitals shown include unfiled device data.        Post Anesthesia Care and Evaluation    Patient location during evaluation: PACU  Patient participation: complete - patient participated  Level of consciousness: awake and alert  Pain management: adequate    Airway patency: patent  Anesthetic complications: No anesthetic complications  PONV Status: controlled  Cardiovascular status: acceptable and hemodynamically stable  Respiratory status: acceptable  Hydration status: acceptable    Comments: /74   Pulse 90   Temp 36.6 °C (97.8 °F) (Temporal)   Resp 16   Ht 170.2 cm (67\")   Wt 106 kg (234 lb 5.6 oz)   SpO2 100%   BMI 36.70 kg/m²     "

## 2025-02-21 NOTE — PROGRESS NOTES
"[facility] Clinical Pharmacy Services: Vancomycin Monitoring Note    Wilner Menjivar is a 39 y.o. male who is on day 11/14 of pharmacy to dose vancomycin for Skin and Soft Tissue.    Previous Vancomycin Dose:   intermittent dosing    Updated Cultures and Sensitivities:   2/11/25: Blood Cx 2/2 NGTD (5d+)  2/13/25: MRSA nares neg  2/18/25: surgical site tissue (toe)- Citrobacter freundii + E. Faecalis    Results from last 7 days   Lab Units 02/21/25  0544 02/19/25  0616 02/17/25  0526   VANCOMYCIN RM mcg/mL 23.60 18.90 6.80     Vitals/Labs  Ht: 170.2 cm (67\"); Wt: 106 kg (234 lb 5.6 oz)   Temp Readings from Last 1 Encounters:   02/21/25 97.8 °F (36.6 °C) (Temporal)     Estimated Creatinine Clearance: 18.2 mL/min (A) (by C-G formula based on SCr of 6.28 mg/dL (H)).   Dialysis MWF    Results from last 7 days   Lab Units 02/21/25  0544 02/20/25  0352 02/19/25  0616   CREATININE mg/dL 6.28* 5.48* 7.27*   WBC 10*3/mm3 13.27* 14.41* 15.41*     Assessment/Plan    Patient had HD this morning with plans for debridement in OR this afternoon. Level this AM resulted at 23.6. Adjusted vancomycin dose from 1250 mg to 500 mg post-HD/pre-op scheduled for 1330 today. Given that patient is noted to have residual urine output, will schedule a level on 2/23 with AM labs.   Total duration of therapy is still to be determined based on \"whether or not there is concern for residual osteomyelitis and when final I&D is performed\" (per ID). Current vanc consult is ordered through Monday.  Next Level Date and Time: Vanc Random on 2/23 at 0600  We will continue to monitor patient changes and renal function     Thank you for involving pharmacy in this patient's care. Please contact pharmacy with any questions or concerns.       You Al [facility]  Clinical Pharmacist         "

## 2025-02-21 NOTE — CASE MANAGEMENT/SOCIAL WORK
Continued Stay Note  [facility]     Patient Name: Wilner Menjivar  MRN: [id number]  Today's Date: 2/21/2025    Admit Date: 2/11/2025    Plan: Home, spouse to transport.   Discharge Plan       Row Name 02/21/25 0807       Plan    Plan Comments Clinicals reviewed. Pt to OR today for debridement. CCP continues to follow for medical readiness for DC planning. Saad RN/CCP                   Discharge Codes    No documentation.                 Expected Discharge Date and Time       Expected Discharge Date Expected Discharge Time    Feb 23, 2025               Una Garcia RN

## 2025-02-21 NOTE — PROGRESS NOTES
"Daily progress note    Primary care physician  Dr. NERI    Subjective   Doing same with no new complaint and getting hemodialysis and going for surgery after hemodialysis    History of present illness  39-year-old male with history of end-stage renal disease on hemodialysis chronic anemia diabetes hypertension hyperlipidemia coronary artery disease and gastroesophageal of disease who is also legally blind and has right foot wound which is getting worse mainly right great toe which looks infected and has recently seen by podiatrist and removed the callus from the right great toe.  Patient has intermittent bleeding pain drainage but no fever chills.  Patient also denies any chest pain shortness of breath palpitation abdominal pain nausea vomiting diarrhea.  Patient workup in ER revealed infected right great toe wound admitted for management.     REVIEW OF SYSTEMS  Unremarkable except pain     PHYSICAL EXAM   Blood pressure 136/72, pulse 93, temperature 97.8 °F (36.6 °C), temperature source Temporal, resp. rate 16, height 170.2 cm (67\"), weight 106 kg (234 lb 5.6 oz), SpO2 99%.    Constitutional:       General: He is not in acute distress.     Appearance: Normal appearance. He is not ill-appearing, toxic-appearing or diaphoretic.   HENT:      Head: Normocephalic and atraumatic.      Nose: Nose normal.      Mouth/Throat:      Mouth: Mucous membranes are moist.      Pharynx: Oropharynx is clear.   Eyes:      Conjunctiva/sclera: Conjunctivae normal.      Pupils: Pupils are equal, round, and reactive to light.   Cardiovascular:      Rate and Rhythm: Normal rate and regular rhythm.      Pulses: Normal pulses.   Pulmonary:      Effort: Pulmonary effort is normal.   Abdominal:      General: Abdomen is flat.      Palpations: Abdomen is soft.   Musculoskeletal:      Comments: Right great toe is black in color with a deep ulceration and dried blood, no active purulent drainage, no palpable crepitus or fluctuance   Skin:     " General: Skin is warm and dry.   Neurological:      General: No focal deficit present.      Mental Status: He is alert and oriented to person, place, and time. Mental status is at baseline.   Psychiatric:         Mood and Affect: Mood normal.         Behavior: Behavior normal.         Thought Content: Thought content normal.         Judgment: Judgment normal.      LAB RESULTS  Lab Results (last 24 hours)       Procedure Component Value Units Date/Time    Anaerobic Culture 10 Day Incubation - Surgical Site, Toe, Right [[id number]]  (Normal) Collected: 02/18/25 0754    Specimen: Surgical Site from Toe, Right Updated: 02/21/25 1052     Anaerobic Culture Screening for Anaerobes    Tissue / Bone Culture - Surgical Site, Toe, Right [[id number]]  (Abnormal)  (Susceptibility) Collected: 02/18/25 0754    Specimen: Surgical Site from Toe, Right Updated: 02/21/25 0759     Tissue Culture Scant growth (1+) Citrobacter freundii     Comment:   This organism may develop resistance during prolonged therapy with 3rd generation cephalosporins (e.g. ceftriaxone) as a result of de-repression of AmpC B-lactamase.  Ceftriaxone may be a reasonable treatment option for uncomplicated cystitis or other lower severity infections when susceptibility is demonstrated.         Scant growth (1+) Enterococcus faecalis     Gram Stain Few (2+) Gram positive cocci      Rare (1+) WBCs seen    Susceptibility        Citrobacter freundii      BHAVIN      Cefepime Susceptible      Ceftazidime Susceptible      Ceftriaxone Susceptible      Gentamicin Susceptible      Levofloxacin Susceptible      Trimethoprim + Sulfamethoxazole Susceptible                       Susceptibility        Enterococcus faecalis      BHAVIN      Ampicillin Susceptible      Vancomycin Susceptible                       Susceptibility Comments       Citrobacter freundii    With the exception of urinary-sourced infections, aminoglycosides should not be used as monotherapy.                Vancomycin, Random [[id number]]  (Normal) Collected: 02/21/25 0544    Specimen: Blood Updated: 02/21/25 0720     Vancomycin Random 23.60 mcg/mL     Narrative:      Therapeutic Ranges for Vancomycin    Vancomycin Random   5.0-40.0 mcg/mL  Vancomycin Trough   5.0-20.0 mcg/mL  Vancomycin Peak     20.0-40.0 mcg/mL    Renal Function Panel [[id number]]  (Abnormal) Collected: 02/21/25 0544    Specimen: Blood Updated: 02/21/25 0720     Glucose 211 mg/dL      BUN 34 mg/dL      Creatinine 6.28 mg/dL      Sodium 132 mmol/L      Potassium 4.5 mmol/L      Chloride 97 mmol/L      CO2 21.0 mmol/L      Calcium 9.2 mg/dL      Albumin 2.8 g/dL      Phosphorus 4.3 mg/dL      Anion Gap 14.0 mmol/L      BUN/Creatinine Ratio 5.4     eGFR 10.8 mL/min/1.73     Narrative:      GFR Categories in Chronic Kidney Disease (CKD)      GFR Category          GFR (mL/min/1.73)    Interpretation  G1                     90 or greater         Normal or high (1)  G2                      60-89                Mild decrease (1)  G3a                   45-59                Mild to moderate decrease  G3b                   30-44                Moderate to severe decrease  G4                    15-29                Severe decrease  G5                    14 or less           Kidney failure          (1)In the absence of evidence of kidney disease, neither GFR category G1 or G2 fulfill the criteria for CKD.    eGFR calculation 2021 CKD-EPI creatinine equation, which does not include race as a factor    CBC & Differential [[id number]]  (Abnormal) Collected: 02/21/25 0544    Specimen: Blood Updated: 02/21/25 0659    Narrative:      The following orders were created for panel order CBC & Differential.  Procedure                               Abnormality         Status                     ---------                               -----------         ------                     CBC Auto Differential[[id number]]        Abnormal            Final result                 Please  view results for these tests on the individual orders.    CBC Auto Differential [[id number]]  (Abnormal) Collected: 02/21/25 0544    Specimen: Blood Updated: 02/21/25 0659     WBC 13.27 10*3/mm3      RBC 3.00 10*6/mm3      Hemoglobin 8.7 g/dL      Hematocrit 26.3 %      MCV 87.7 fL      MCH 29.0 pg      MCHC 33.1 g/dL      RDW 13.9 %      RDW-SD 44.1 fl      MPV 8.2 fL      Platelets 328 10*3/mm3      Neutrophil % 80.3 %      Lymphocyte % 7.8 %      Monocyte % 6.9 %      Eosinophil % 2.3 %      Basophil % 0.6 %      Immature Grans % 2.1 %      Neutrophils, Absolute 10.66 10*3/mm3      Lymphocytes, Absolute 1.04 10*3/mm3      Monocytes, Absolute 0.91 10*3/mm3      Eosinophils, Absolute 0.30 10*3/mm3      Basophils, Absolute 0.08 10*3/mm3      Immature Grans, Absolute 0.28 10*3/mm3      nRBC 0.0 /100 WBC     POC Glucose Once [[id number]]  (Abnormal) Collected: 02/21/25 0607    Specimen: Blood Updated: 02/21/25 0608     Glucose 239 mg/dL     POC Glucose Once [[id number]]  (Abnormal) Collected: 02/20/25 2104    Specimen: Blood Updated: 02/20/25 2106     Glucose 210 mg/dL     POC Glucose Once [[id number]]  (Normal) Collected: 02/20/25 1608    Specimen: Blood Updated: 02/20/25 1609     Glucose 121 mg/dL           Imaging Results (Last 24 Hours)       ** No results found for the last 24 hours. **            Current Facility-Administered Medications:     amLODIPine (NORVASC) tablet 5 mg, 5 mg, Oral, Q24H, Trevon Garcia DPM, 5 mg at 02/20/25 0845    aspirin chewable tablet 81 mg, 81 mg, Oral, Daily, Trevon Garcia DPM, 81 mg at 02/20/25 0845    atorvastatin (LIPITOR) tablet 10 mg, 10 mg, Oral, Nightly, Remy Thornton MD, 10 mg at 02/20/25 2107    bumetanide (BUMEX) tablet 2 mg, 2 mg, Oral, BID Diuretics, Trevon Garcia DPM, 2 mg at 02/20/25 1700    calcium carbonate (TUMS) chewable tablet 500 mg (200 mg elemental), 2 tablet, Oral, TID PRN, Remy Thornton MD, 2 tablet at 02/19/25 0022    carvedilol (COREG) tablet 25 mg,  25 mg, Oral, BID, Trevon Garcia DPM, 25 mg at 02/20/25 2107    cefepime 1000 mg IVPB in 100 mL NS (MBP), 1,000 mg, Intravenous, Q24H, Abdiaziz Paz MD, 1,000 mg at 02/21/25 1159    dextrose (D50W) (25 g/50 mL) IV injection 25 g, 25 g, Intravenous, Q15 Min PRN, Trevon Garcia DPM    dextrose (GLUTOSE) oral gel 15 g, 15 g, Oral, Q15 Min PRN, Trevon Garcia DPM    docusate sodium (COLACE) capsule 100 mg, 100 mg, Oral, BID, Trevon Garcia DPM, 100 mg at 02/20/25 2107    epoetin jamil-epbx (RETACRIT) injection 10,000 Units, 10,000 Units, Subcutaneous, Once per day on Monday Wednesday Friday, Trevon Garcia DPM, 10,000 Units at 02/19/25 1855    glucagon (GLUCAGEN) injection 1 mg, 1 mg, Intramuscular, Q15 Min PRN, Trevon Garcia DPM    HYDROcodone-acetaminophen (NORCO) 5-325 MG per tablet 2 tablet, 2 tablet, Oral, Q4H PRN, Trevon Garcia DPM, 2 tablet at 02/21/25 0113    HYDROmorphone (DILAUDID) injection 0.5 mg, 0.5 mg, Intravenous, Q3H PRN, Remy Thornton MD, 0.5 mg at 02/21/25 1200    insulin glargine (LANTUS, SEMGLEE) injection 30 Units, 30 Units, Subcutaneous, Nightly, Remy Thornton MD, 30 Units at 02/20/25 2116    insulin lispro (HUMALOG/ADMELOG) injection 10 Units, 10 Units, Subcutaneous, TID With Meals, Remy Thornton MD, 10 Units at 02/20/25 1659    insulin lispro (HUMALOG/ADMELOG) injection 2-7 Units, 2-7 Units, Subcutaneous, 4x Daily AC & at Bedtime, Trevon Garcia DPM, 3 Units at 02/20/25 2116    losartan (COZAAR) tablet 50 mg, 50 mg, Oral, Q24H, Trevon Garcia DPM, 50 mg at 02/20/25 0845    ondansetron (ZOFRAN) injection 4 mg, 4 mg, Intravenous, Q6H PRN, Trevon Garcia DPM, 4 mg at 02/21/25 0929    pantoprazole (PROTONIX) EC tablet 40 mg, 40 mg, Oral, BID AC, Remy Thornton MD, 40 mg at 02/20/25 1659    Pharmacy to dose vancomycin, , Not Applicable, Continuous PRN, Trevon Garcia DPM    polyethylene glycol (MIRALAX) packet 17 g, 17 g, Oral, Daily, Trevon Garcia DPM     terazosin (HYTRIN) capsule 1 mg, 1 mg, Oral, Nightly, Trevon Garcia DPM, 1 mg at 02/20/25 2107    ticagrelor (BRILINTA) tablet 90 mg, 90 mg, Oral, Q12H, Trevon Garcia DPM, 90 mg at 02/20/25 0844    Vancomycin Pharmacy Intermittent/Pulse Dosing, , Not Applicable, Daily, Trevon Garcia DPM, Given at 02/21/25 0802    zolpidem (AMBIEN) tablet 5 mg, 5 mg, Oral, Nightly PRN, Trevon Garcia DPANNE MARIE, 5 mg at 02/20/25 4149     ASSESSMENT  Right great toe wound with infection and surrounding cellulitis and osteomyelitis status post I&D and hallux amputation  End-stage renal disease   Diabetes mellitus  Hypertension  Hyperlipidemia  Coronary artery disease  Legally blind  Chronic anemia   Gastroesophageal reflux disease    PLAN  CPM   Postop care  Continue IV antibiotics   Pain management   Hemodialysis today  Debridement and first metatarsal excision after hemodialysis  Infectious disease consult appreciated  Podiatry and nephrology to follow patient   Continue home medications  Stress ulcer DVT prophylaxis  Supportive care  Discussed with nursing staff and family  Follow closely further recommendation current hospital course    JASKARAN LANGFORD MD    Copied text in this note has been reviewed and is accurate as of 02/21/25

## 2025-02-21 NOTE — ANESTHESIA PREPROCEDURE EVALUATION
Anesthesia Evaluation     Patient summary reviewed and Nursing notes reviewed   NPO Solid Status: > 8 hours  NPO Liquid Status: > 2 hours           Airway   Mallampati: III  TM distance: >3 FB  Possible difficult intubation  Comment: Full beard  Dental - normal exam     Pulmonary - normal exam    breath sounds clear to auscultation  Cardiovascular - normal exam    ECG reviewed  Rhythm: regular  Rate: normal    (+) hypertension 2 medications or greater, cardiac stents Drug eluting stent within the past 12 months     ROS comment: CAD with hx stents X2 in 9/24/EF 60% by ECHO 2/25    Neuro/Psych  GI/Hepatic/Renal/Endo    (+) obesity, morbid obesity, GERD, renal disease- ESRD and dialysis, diabetes mellitus type 2 poorly controlled using insulin    Musculoskeletal         ROS comment: Hx diabetic right great toe ulcer with osteomyelitis, s/p right great toe amputation on 2/18/25  Abdominal   (+) obese   Substance History   (+) drug use      Comment: Marijuana   OB/GYN          Other   blood dyscrasia anemia,       Other Comment: Hgb 8.7      Phys Exam Other: Left arm AV graft              Anesthesia Plan    ASA 4     MAC     intravenous induction     Anesthetic plan, risks, benefits, and alternatives have been provided, discussed and informed consent has been obtained with: patient.      CODE STATUS:    Level Of Support Discussed With: Patient  Code Status (Patient has no pulse and is not breathing): CPR (Attempt to Resuscitate)  Medical Interventions (Patient has pulse or is breathing): Full Support

## 2025-02-21 NOTE — PROGRESS NOTES
[facility] Progress Note      Patient Name: Wilner Menjivar  : 1985  MRN: [id number]  Primary Care Physician:  Roosevelt Thao MD  Date of admission: 2025    Subjective     Interval History:   Follow-up on ESRD  Patient was seen today on dialysis.  Denies any nausea or vomiting.  Denies any chest pain or shortness of breath.  Review of Systems:   As noted above    Objective     Vitals:   Temp:  [97.8 °F (36.6 °C)-98.2 °F (36.8 °C)] 97.8 °F (36.6 °C)  Heart Rate:  [80-97] 90  Resp:  [16-18] 16  BP: ()/(69-75) 135/70  Flow (L/min) (Oxygen Therapy):  [2-3] 2    Intake/Output Summary (Last 24 hours) at 2025 1634  Last data filed at 2025 1126  Gross per 24 hour   Intake --   Output 1275 ml   Net -1275 ml           Physical Exam:    General Appearance: Awake, chronically ill, no acute distress  Skin: warm and dry  HEENT: oral mucosa normal, nonicteric sclera, legally blind  Neck: supple, no JVD  Lungs: CTA, no wheezing, nonlabored on RA  Heart: Regular rate and rhythm  Abdomen: soft, nontender, nondistended, BS +  : no palpable bladder  Extremities: no edema, cyanosis or clubbing; dressing on right foot  Neuro: normal speech and mental status   Vascular: L AVF patent, + thrill and bruit    Scheduled Meds:     amLODIPine, 5 mg, Oral, Q24H  aspirin, 81 mg, Oral, Daily  atorvastatin, 10 mg, Oral, Nightly  bumetanide, 2 mg, Oral, BID Diuretics  carvedilol, 25 mg, Oral, BID  cefepime, 1,000 mg, Intravenous, Q24H  docusate sodium, 100 mg, Oral, BID  epoetin jamil/jamil-epbx, 10,000 Units, Subcutaneous, Once per day on   insulin glargine, 30 Units, Subcutaneous, Nightly  insulin lispro, 10 Units, Subcutaneous, TID With Meals  insulin lispro, 2-7 Units, Subcutaneous, 4x Daily AC & at Bedtime  losartan, 50 mg, Oral, Q24H  pantoprazole, 40 mg, Oral, BID AC  polyethylene glycol, 17 g, Oral, Daily  terazosin, 1 mg, Oral, Nightly  ticagrelor, 90 mg, Oral,  Q12H  Vancomycin Pharmacy Intermittent/Pulse Dosing, , Not Applicable, Daily      IV Meds:   Pharmacy to dose vancomycin,         Results Reviewed:   I have personally reviewed the results from the time of this admission to 2/21/2025 16:34 EST     Results from last 7 days   Lab Units 02/21/25  0544 02/20/25  0352 02/19/25  0616   SODIUM mmol/L 132* 134* 130*   POTASSIUM mmol/L 4.5 4.5 4.2   CHLORIDE mmol/L 97* 98 94*   CO2 mmol/L 21.0* 24.5 21.0*   BUN mg/dL 34* 25* 39*   CREATININE mg/dL 6.28* 5.48* 7.27*   CALCIUM mg/dL 9.2 9.2 8.9   GLUCOSE mg/dL 211* 190* 303*       Estimated Creatinine Clearance: 18.2 mL/min (A) (by C-G formula based on SCr of 6.28 mg/dL (H)).    Results from last 7 days   Lab Units 02/21/25  0544 02/19/25  0616 02/18/25  0348   PHOSPHORUS mg/dL 4.3 3.9 3.4             Results from last 7 days   Lab Units 02/21/25  0544 02/20/25  0352 02/19/25  0616 02/18/25  0348 02/17/25  0526   WBC 10*3/mm3 13.27* 14.41* 15.41* 15.91* 18.20*   HEMOGLOBIN g/dL 8.7* 9.2* 6.7* 8.5* 8.4*   PLATELETS 10*3/mm3 328 339 331 337 359             Assessment / Plan     ASSESSMENT:     End  Stage Renal Disease ( ESRD )  since 6/2024 followed by Dr. Wasserman on Hemodialysis on a Monday, Wednesday and Friday schedule via E AVF.    Anemia of CKD on Epogen protocol.  10,000 units subcu. iron studies are pending   History of hyperphosphatemia  Hypertension w/ CKD.  Continue home regimen. We will continue to follow closely  Coronary artery disease status postcardiac cath with stent placement to mid/distal OM2 , and proximal Cx. ( 9/24 ).  On medical management  Diabetes Mellitus type 2 w/ complications ( retinopathy , peripheral vascular disease  nephropathy ) .Continue insulin regimen and Diabetic diet, as per primary team   Right diabetic foot/osteomyelitis follow-up podiatry and vascular surgery status post right fifth toe amputation   Anemia postoperative and anemia of CKD being transfused today    PLAN:    Continue  dialysis on Monday Wednesday and Friday with plan for dialysis Monday  Continue Epogen on dialysis  Plan noted for right foot debridement and possible excision today a.m.   Labs in a.m.    Thank you for involving us in the care of Wilner Menjivar.  Please feel free to call with any questions.    Brenna Denis MD  02/21/25  16:34 [facility]    [facility]  [phone]    Please note that portions of this note were completed with a voice recognition program.

## 2025-02-21 NOTE — NURSING NOTE
Pt back from dialysis. Became very angry when bed alarm went off and staff entered room to help him.  He is addiment about not having his bed alarm on and getting up to BR by himself.  He was yelling and threatening my charge nurse.  I was able to calm him down.  He will not have his bed alarm on from this point forward.  Wife arrived at bedside.  I explained to her what happened, she was very understanding.  Patient will be going to pre- op shortly.  Will continue to monitor.

## 2025-02-21 NOTE — SIGNIFICANT NOTE
02/21/25 1146   OTHER   Discipline physical therapist   Rehab Time/Intention   Session Not Performed other (see comments)  (Patient BERE at dialysis in AM. Going to OR after for foot debriedment. Acute PT will f/u.)   Recommendation   PT - Next Appointment 02/22/25

## 2025-02-21 NOTE — PROGRESS NOTES
"  Infectious Diseases Progress Note    Abdiaziz Paz MD     [facility]  [place]: 10 days  Patient Identification:  Name: Wilner Menjivar  Age: 39 y.o.  Sex: male  :  1985  MRN: [id number]         Primary Care Physician: Roosevelt Thao MD        Subjective: Denies any complaints.  Seen at the dialysis.  Scheduled to have debridement later today.  Interval History: See consultation note.  2025: UnderwentHallux amputation, right foot SESAMOIDECTOMY, INCISION AN DRAINAGE   Objective:    Scheduled Meds:amLODIPine, 5 mg, Oral, Q24H  aspirin, 81 mg, Oral, Daily  atorvastatin, 10 mg, Oral, Nightly  bumetanide, 2 mg, Oral, BID Diuretics  carvedilol, 25 mg, Oral, BID  cefTRIAXone, 2,000 mg, Intravenous, Daily  docusate sodium, 100 mg, Oral, BID  epoetin jamil/jamil-epbx, 10,000 Units, Subcutaneous, Once per day on   insulin glargine, 30 Units, Subcutaneous, Nightly  insulin lispro, 10 Units, Subcutaneous, TID With Meals  insulin lispro, 2-7 Units, Subcutaneous, 4x Daily AC & at Bedtime  losartan, 50 mg, Oral, Q24H  pantoprazole, 40 mg, Oral, BID AC  polyethylene glycol, 17 g, Oral, Daily  terazosin, 1 mg, Oral, Nightly  ticagrelor, 90 mg, Oral, Q12H  Vancomycin Pharmacy Intermittent/Pulse Dosing, , Not Applicable, Daily      Continuous Infusions:Pharmacy to dose vancomycin,         Vital signs in last 24 hours:  Temp:  [97.9 °F (36.6 °C)-98.2 °F (36.8 °C)] 98.2 °F (36.8 °C)  Heart Rate:  [87-97] 97  Resp:  [18] 18  BP: (133-141)/(69-83) 133/69    Intake/Output:    Intake/Output Summary (Last 24 hours) at 2025 09  Last data filed at 2025  Gross per 24 hour   Intake --   Output 275 ml   Net -275 ml           Exam:  /69 (BP Location: Right arm, Patient Position: Sitting)   Pulse 97   Temp 98.2 °F (36.8 °C) (Oral)   Resp 18   Ht 170.2 cm (67\")   Wt 106 kg (234 lb 5.6 oz)   SpO2 99%   BMI 36.70 kg/m²   Patient is examined using the personal protective " equipment as per guidelines from infection control for this particular patient as enacted.  Hand washing was performed before and after patient interaction.  General Appearance:    Alert, cooperative, no distress, AAOx3                          Head:    Normocephalic, without obvious abnormality, atraumatic                           Eyes:  Legally blind                         Throat:   Lips, tongue, gums normal; oral mucosa pink and moist                           Neck:   Supple, symmetrical, trachea midline, no JVD                         Lungs:    Clear to auscultation bilaterally, respirations unlabored                 Chest Wall:    No tenderness or deformity                          Heart:  S1-S2 regular                  Abdomen:   Soft nontender                 Extremities: Right foot after debridement and amputation of the great toe is dressed.                  Neurologic: Legally blind       Data Review:    I reviewed the patient's new clinical results.  Results from last 7 days   Lab Units 02/21/25  0544 02/20/25  0352 02/19/25  0616 02/18/25  0348 02/17/25  0526 02/16/25  0457 02/15/25  0725   WBC 10*3/mm3 13.27* 14.41* 15.41* 15.91* 18.20* 14.97* 15.30*   HEMOGLOBIN g/dL 8.7* 9.2* 6.7* 8.5* 8.4* 8.9* 9.1*   PLATELETS 10*3/mm3 328 339 331 337 359 304 294     Results from last 7 days   Lab Units 02/21/25  0544 02/20/25  0352 02/19/25  0616 02/18/25 0348 02/17/25  0526 02/16/25  0457 02/15/25  0725   SODIUM mmol/L 132* 134* 130* 127* 131* 130* 129*   POTASSIUM mmol/L 4.5 4.5 4.2 4.3 3.9 3.7 4.0   CHLORIDE mmol/L 97* 98 94* 93* 92* 95* 95*   CO2 mmol/L 21.0* 24.5 21.0* 23.0 20.3* 21.6* 21.0*   BUN mg/dL 34* 25* 39* 31* 46* 35* 29*   CREATININE mg/dL 6.28* 5.48* 7.27* 5.65* 6.59* 5.97* 5.35*   CALCIUM mg/dL 9.2 9.2 8.9 9.3 9.3 9.1 8.8   GLUCOSE mg/dL 211* 190* 303* 326* 166* 133* 274*   XR Foot 3+ View Right    Result Date: 2/18/2025  Postoperative changes from amputation of the great toe at the level of the  MTP joint.  This report was finalized on 2/18/2025 9:04 AM by Tom Cha MD on Workstation: [id number]      Doppler Arterial Multi Level Lower Extremity - Bilateral CAR    Addendum Date: 2/13/2025      Right Conclusion: The right GIOVANA is moderately reduced. Waveforms are consistent with femoral disease, popliteal disease and tib-peroneal disease. Normal digital pressures.   Left Conclusion: The left GIOVANA is normal. Normal digital pressures.     MRI Foot Right Without Contrast    Result Date: 2/13/2025  1. Osteomyelitis of the distal shaft and head of the 1st proximal phalanx and of the distal phalanx with overlying soft tissue wound/ulcer. Gas within the soft tissues and marrow of the great toe associated with infection. Tenosynovitis flexor hallucis longus tendon sheath. Forefoot cellulitis. 2. No evidence for septic arthritis at the 1st MTP joint. 3. Muscular edema and atrophy associated with myositis or chronic neuropathy.  This report was finalized on 2/13/2025 7:48 AM by Junior Villalobos M.D on Workstation: [id number]      XR Foot 3+ View Right    Result Date: 2/11/2025   There is subcutaneous emphysema and soft tissue loss over the great toe. No radiopaque foreign body. No definite bone erosion.    This report was finalized on 2/11/2025 5:20 PM by Dr. Jason Dan M.D on Workstation: [id number]     Microbiology Results (last 10 days)       Procedure Component Value - Date/Time    Tissue / Bone Culture - Surgical Site, Toe, Right [[id number]]  (Abnormal)  (Susceptibility) Collected: 02/18/25 0754    Lab Status: Final result Specimen: Surgical Site from Toe, Right Updated: 02/21/25 0759     Tissue Culture Scant growth (1+) Citrobacter freundii     Comment:   This organism may develop resistance during prolonged therapy with 3rd generation cephalosporins (e.g. ceftriaxone) as a result of de-repression of AmpC B-lactamase.  Ceftriaxone may be a reasonable treatment option for uncomplicated cystitis or  other lower severity infections when susceptibility is demonstrated.         Scant growth (1+) Enterococcus faecalis     Gram Stain Few (2+) Gram positive cocci      Rare (1+) WBCs seen    Susceptibility        Citrobacter freundii      BHAVIN      Cefepime Susceptible      Ceftazidime Susceptible      Ceftriaxone Susceptible      Gentamicin Susceptible      Levofloxacin Susceptible      Trimethoprim + Sulfamethoxazole Susceptible                       Susceptibility        Enterococcus faecalis      BHAVIN      Ampicillin Susceptible      Vancomycin Susceptible                       Susceptibility Comments       Citrobacter freundii    With the exception of urinary-sourced infections, aminoglycosides should not be used as monotherapy.               MRSA Screen, PCR (Inpatient) - Swab, Nares [[id number]]  (Normal) Collected: 02/13/25 0906    Lab Status: Final result Specimen: Swab from Nares Updated: 02/13/25 1102     MRSA PCR No MRSA Detected    Narrative:      The negative predictive value of this diagnostic test is high and should only be used to consider de-escalating anti-MRSA therapy. A positive result may indicate colonization with MRSA and must be correlated clinically.    Blood Culture - Blood, Arm, Right [[id number]]  (Normal) Collected: 02/11/25 1749    Lab Status: Final result Specimen: Blood from Arm, Right Updated: 02/16/25 1800     Blood Culture No growth at 5 days    Blood Culture - Blood, Arm, Right [[id number]]  (Normal) Collected: 02/11/25 1742    Lab Status: Final result Specimen: Blood from Arm, Right Updated: 02/16/25 1800     Blood Culture No growth at 5 days    Narrative:      Less than seven (7) mL's of blood was collected.  Insufficient quantity may yield false negative results.          Telemetry Scan   Final Result      Telemetry Scan   Final Result      Telemetry Scan   Final Result      Telemetry Scan   Final Result      Telemetry Scan   Final Result      Telemetry Scan   Final Result       Telemetry Scan   Final Result      Telemetry Scan   Final Result      Telemetry Scan   Final Result      Telemetry Scan   Final Result      Telemetry Scan   Final Result      Telemetry Scan   Final Result      Telemetry Scan   Final Result      Telemetry Scan   Final Result      Telemetry Scan   Final Result      Telemetry Scan   Final Result      Telemetry Scan   Final Result      Telemetry Scan   Final Result      Telemetry Scan   Final Result      Telemetry Scan   Final Result              Assessment:    Diabetic toe ulcer    Osteomyelitis of great toe of right foot  39-year-old male with  1-right great toe diabetic/ischemic ulcer with dry gangrene with associated abscess and contiguous focus osteomyelitis of the proximal and distal phalanx with associated flexor hallucis tenosynovitis-status post amputation of right great toe, I&D of multiple compartments of the right foot and sesamoidectomy involving tibia-fibula and right foot on 2/18/2025-operative cultures showing gram-negative rods and Gram stain shows gram-positive cocci-identified as Citrobacter freundii and Enterococcus.  2-significant peripheral vascular disease with element of gangrene involving the great toe  3-type 2 diabetes  4-end-stage renal disease  5-legally blind  6-severe anemia multifactorial  7-hypertension  8-multiple antibiotic allergies/intolerances including allergy to penicillin though it could very well be not a true allergy given the description of his reaction.  9-anemia multifactorial status post transfusion  10-penicillin allergy.     Recommendations/Discussions:  See my discussion and recommendation on 2/13/2025 and 2/19 and 2/20/2025.  Based on the information we have changed ceftriaxone to cefepime and continue vancomycin given presence of Citrobacter, persistent leukocytosis and  penicillin allergy.  Follow-up on surgical pathology sent on 2/18/2025 which is currently pending.  Duration of antibiotic treatment will depend  upon whether or not there is concern for residual osteomyelitis and when final I&D is performed.      Abdiaziz Paz MD  2/21/2025  09:29 EST    Parts of this note may be an electronic transcription/translation of spoken language to printed text using the Dragon dictation system.

## 2025-02-21 NOTE — OP NOTE
Operative Report    Patient: Wilner Menjivar     Patient YOB: 1985     Date of Surgery: 02/21/25     MRN: [id number]       SURGEON: Trevon Garcia III, DPM     ASSISTANT: None    PROCEDURE: Partial first metatarsal excision, right foot  Incision and drainage, right foot    PRE-OPERATIVE DIAGNOSIS: Osteomyelitis, first metatarsal head, right foot  Abscess, right foot    POST-OPERATIVE DIAGNOSIS: Same    ANAESTHESIA: MAC    HEMOSTASIS: None    ESTIMATED BLOOD LOSS: 10 cc    SPECIMEN: First metatarsal sent for pathology    IMPLANTS: None    COMPLICATIONS: None    INDICATION FOR PROCEDURE: This is a 39-year-old male with significant comorbidities here returning to the operating room today for debridement, partial first metatarsal head excision.    Consent was obtained pre-operatively. All questions were answered, risks versus benefits were discussed at length. No guarantees or assurances were given or implied.    PROCEDURE: Patient was brought to the operating room and placed on the operative table in supine position. A surgical timeout was performed and then patients name, date of birth, procedure and surgical site were all verified. A local block of 10 cc 1% lidocaine plain was injected to the surgical site.  No tourniquet was used for this procedure.  The right lower extremity was then scrubbed, prepped and draped in the usual sterile manner.     Attention was directed to the right foot where a 3 cm linear incision was made along the first metatarsal shaft.  Tissue was removed from the first metatarsal distal one half and a sagittal saw was used to remove the first metatarsal.  This was sent for pathology evaluation.  There is noted to be good healthy bleeding cancellous bone for the remaining first metatarsal.  15.  Blade was then used to sharply excise and incise the soft tissue surrounding the foot.  The soft tissue was taken back to the area of the first metatarsal shaft removed from surgical  site was noted to have somewhat viable bleeding.  The foot was then expressed approximately and there is noted to be purulent track on the plantar flexor tendons.  At that time a blunt probe was placed in the tract and a #15 blade was used to sharply incise 0.4 cm in length.  All nonviable tissue was continued removed.  Surgical site was then flushed with copious amounts of sterile saline.  Again, the patient will require return to the operating room for further evaluation likely next week.  Surgical site was then packed with Betadine 4 x 4's Kerlix ABDs and Coban.  Patient tolerated the procedure and anesthesia well.  Patient was transferred from the operating room to the recovery Paraplatin stable and neurovascular intact.    Trevon Garcia III, JESSICA

## 2025-02-21 NOTE — NURSING NOTE
Dialysis complete, removed 1 liter with this treatment and no complications noted.  Pre and post vitals are in epic.   Received In Basket from MD in regards to pt not being able to pay for her Eliquis medication and to speak with pt in regards to possible options for assistance. Called [phone] to discuss message received but pt was not reachable. Message was left for a return call. Will continue to follow and assist.       Delfina Rodríguez LMSW    Ext [phone]/Pager [phone]

## 2025-02-21 NOTE — PROGRESS NOTES
"Nutrition Services    Patient Name:  Wilner Menjivar  YOB: 1985  MRN: [id number]  Admit Date:  2/11/2025Assessment Date:  02/21/25    NUTRITION SCREENING      Reason for Encounter Length of Stay   Diagnosis/Problem Diabetic toe ulcer  NPO today for debridement   HD today 1L removed    PMH ESRD on HD, anemia, DM, HTN, HLD, CAD, GERD, legally blind    PO Diet NPO Diet NPO Type: Strict NPO   Supplements n/a   PO Intake % NPO        Medications MAR reviewed by RD   Labs  Listed below, reviewed   Physical Findings Alert, oriented, legally blind, room air, 2+ edema    GI BM 2/20, nausea, indigestion    Skin Status Right great toe        Height  Weight  BMI  Weight Trend     Height: 170.2 cm (67\")  Weight: 106 kg (234 lb 5.6 oz) (02/19/25 1800)  Body mass index is 36.7 kg/m².  Stable       Nutrition Problem (PES) Inadequate oral intake related to right great toe ulcer as evidence by NPO.       Intervention/Plan Advance diet post op and monitor intakes   Hyperglyemia- CCHO diet     RD to follow up per protocol.     Results from last 7 days   Lab Units 02/21/25  0544 02/20/25  0352 02/19/25  0616   SODIUM mmol/L 132* 134* 130*   POTASSIUM mmol/L 4.5 4.5 4.2   CHLORIDE mmol/L 97* 98 94*   CO2 mmol/L 21.0* 24.5 21.0*   BUN mg/dL 34* 25* 39*   CREATININE mg/dL 6.28* 5.48* 7.27*   CALCIUM mg/dL 9.2 9.2 8.9   GLUCOSE mg/dL 211* 190* 303*     Results from last 7 days   Lab Units 02/21/25  0544   PHOSPHORUS mg/dL 4.3   HEMOGLOBIN g/dL 8.7*   HEMATOCRIT % 26.3*     Lab Results   Component Value Date    HGBA1C 11.10 (H) 02/13/2025         Electronically signed by:  Fang Quiñones RD  02/21/25 11:58 EST  "

## 2025-02-22 LAB
ALBUMIN SERPL-MCNC: 2.8 G/DL (ref 3.5–5.2)
ANION GAP SERPL CALCULATED.3IONS-SCNC: 10.2 MMOL/L (ref 5–15)
BASOPHILS # BLD AUTO: 0.06 10*3/MM3 (ref 0–0.2)
BASOPHILS NFR BLD AUTO: 0.5 % (ref 0–1.5)
BUN SERPL-MCNC: 25 MG/DL (ref 6–20)
BUN/CREAT SERPL: 4.7 (ref 7–25)
CALCIUM SPEC-SCNC: 8.6 MG/DL (ref 8.6–10.5)
CHLORIDE SERPL-SCNC: 100 MMOL/L (ref 98–107)
CO2 SERPL-SCNC: 23.8 MMOL/L (ref 22–29)
CREAT SERPL-MCNC: 5.3 MG/DL (ref 0.76–1.27)
DEPRECATED RDW RBC AUTO: 46.1 FL (ref 37–54)
EGFRCR SERPLBLD CKD-EPI 2021: 13.2 ML/MIN/1.73
EOSINOPHIL # BLD AUTO: 0.23 10*3/MM3 (ref 0–0.4)
EOSINOPHIL NFR BLD AUTO: 1.9 % (ref 0.3–6.2)
ERYTHROCYTE [DISTWIDTH] IN BLOOD BY AUTOMATED COUNT: 13.8 % (ref 12.3–15.4)
GLUCOSE BLDC GLUCOMTR-MCNC: 136 MG/DL (ref 70–130)
GLUCOSE BLDC GLUCOMTR-MCNC: 205 MG/DL (ref 70–130)
GLUCOSE BLDC GLUCOMTR-MCNC: 245 MG/DL (ref 70–130)
GLUCOSE BLDC GLUCOMTR-MCNC: 315 MG/DL (ref 70–130)
GLUCOSE SERPL-MCNC: 259 MG/DL (ref 65–99)
HCT VFR BLD AUTO: 23.4 % (ref 37.5–51)
HGB BLD-MCNC: 7.2 G/DL (ref 13–17.7)
IMM GRANULOCYTES # BLD AUTO: 0.22 10*3/MM3 (ref 0–0.05)
IMM GRANULOCYTES NFR BLD AUTO: 1.9 % (ref 0–0.5)
LYMPHOCYTES # BLD AUTO: 1 10*3/MM3 (ref 0.7–3.1)
LYMPHOCYTES NFR BLD AUTO: 8.4 % (ref 19.6–45.3)
MCH RBC QN AUTO: 28.1 PG (ref 26.6–33)
MCHC RBC AUTO-ENTMCNC: 30.8 G/DL (ref 31.5–35.7)
MCV RBC AUTO: 91.4 FL (ref 79–97)
MONOCYTES # BLD AUTO: 0.8 10*3/MM3 (ref 0.1–0.9)
MONOCYTES NFR BLD AUTO: 6.7 % (ref 5–12)
NEUTROPHILS NFR BLD AUTO: 80.6 % (ref 42.7–76)
NEUTROPHILS NFR BLD AUTO: 9.55 10*3/MM3 (ref 1.7–7)
NRBC BLD AUTO-RTO: 0 /100 WBC (ref 0–0.2)
PHOSPHATE SERPL-MCNC: 3.7 MG/DL (ref 2.5–4.5)
PLATELET # BLD AUTO: 315 10*3/MM3 (ref 140–450)
PMV BLD AUTO: 8.3 FL (ref 6–12)
POTASSIUM SERPL-SCNC: 4.8 MMOL/L (ref 3.5–5.2)
RBC # BLD AUTO: 2.56 10*6/MM3 (ref 4.14–5.8)
SODIUM SERPL-SCNC: 134 MMOL/L (ref 136–145)
WBC NRBC COR # BLD AUTO: 11.86 10*3/MM3 (ref 3.4–10.8)

## 2025-02-22 PROCEDURE — 63710000001 INSULIN LISPRO (HUMAN) PER 5 UNITS: Performed by: PODIATRIST

## 2025-02-22 PROCEDURE — 25010000002 ONDANSETRON PER 1 MG: Performed by: PODIATRIST

## 2025-02-22 PROCEDURE — 63710000001 INSULIN GLARGINE PER 5 UNITS: Performed by: HOSPITALIST

## 2025-02-22 PROCEDURE — 63710000001 INSULIN LISPRO (HUMAN) PER 5 UNITS: Performed by: HOSPITALIST

## 2025-02-22 PROCEDURE — 80069 RENAL FUNCTION PANEL: CPT | Performed by: HOSPITALIST

## 2025-02-22 PROCEDURE — 25010000002 HYDROMORPHONE PER 4 MG: Performed by: PODIATRIST

## 2025-02-22 PROCEDURE — 82948 REAGENT STRIP/BLOOD GLUCOSE: CPT

## 2025-02-22 PROCEDURE — 25010000002 PROCHLORPERAZINE 10 MG/2ML SOLUTION: Performed by: HOSPITALIST

## 2025-02-22 PROCEDURE — 25010000002 HYDROMORPHONE 1 MG/ML SOLUTION: Performed by: PODIATRIST

## 2025-02-22 PROCEDURE — 25010000002 CEFEPIME PER 500 MG: Performed by: PODIATRIST

## 2025-02-22 PROCEDURE — 25010000002 DESMOPRESSIN ACETATE PF 4 MCG/ML SOLUTION 1 ML AMPULE: Performed by: INTERNAL MEDICINE

## 2025-02-22 PROCEDURE — 85025 COMPLETE CBC W/AUTO DIFF WBC: CPT | Performed by: PODIATRIST

## 2025-02-22 RX ORDER — PROCHLORPERAZINE EDISYLATE 5 MG/ML
5 INJECTION INTRAMUSCULAR; INTRAVENOUS EVERY 6 HOURS PRN
Status: DISCONTINUED | OUTPATIENT
Start: 2025-02-22 | End: 2025-03-03

## 2025-02-22 RX ORDER — INSULIN LISPRO 100 [IU]/ML
12 INJECTION, SOLUTION INTRAVENOUS; SUBCUTANEOUS
Status: DISCONTINUED | OUTPATIENT
Start: 2025-02-22 | End: 2025-02-24

## 2025-02-22 RX ADMIN — DOCUSATE SODIUM 100 MG: 100 CAPSULE, LIQUID FILLED ORAL at 08:27

## 2025-02-22 RX ADMIN — ONDANSETRON 4 MG: 2 INJECTION INTRAMUSCULAR; INTRAVENOUS at 17:10

## 2025-02-22 RX ADMIN — DOCUSATE SODIUM 100 MG: 100 CAPSULE, LIQUID FILLED ORAL at 20:10

## 2025-02-22 RX ADMIN — CARVEDILOL 25 MG: 25 TABLET, FILM COATED ORAL at 20:10

## 2025-02-22 RX ADMIN — INSULIN GLARGINE 36 UNITS: 100 INJECTION, SOLUTION SUBCUTANEOUS at 22:54

## 2025-02-22 RX ADMIN — HYDROCODONE BITARTRATE AND ACETAMINOPHEN 2 TABLET: 5; 325 TABLET ORAL at 01:46

## 2025-02-22 RX ADMIN — LOSARTAN POTASSIUM 50 MG: 50 TABLET, FILM COATED ORAL at 08:27

## 2025-02-22 RX ADMIN — PANTOPRAZOLE SODIUM 40 MG: 40 TABLET, DELAYED RELEASE ORAL at 08:27

## 2025-02-22 RX ADMIN — INSULIN LISPRO 12 UNITS: 100 INJECTION, SOLUTION INTRAVENOUS; SUBCUTANEOUS at 17:10

## 2025-02-22 RX ADMIN — HYDROMORPHONE HYDROCHLORIDE 1 MG: 1 INJECTION, SOLUTION INTRAMUSCULAR; INTRAVENOUS; SUBCUTANEOUS at 17:11

## 2025-02-22 RX ADMIN — PROCHLORPERAZINE EDISYLATE 5 MG: 5 INJECTION, SOLUTION INTRAMUSCULAR; INTRAVENOUS at 12:25

## 2025-02-22 RX ADMIN — ASPIRIN 81 MG CHEWABLE TABLET 81 MG: 81 TABLET CHEWABLE at 08:27

## 2025-02-22 RX ADMIN — ATORVASTATIN CALCIUM 10 MG: 10 TABLET, FILM COATED ORAL at 20:10

## 2025-02-22 RX ADMIN — ONDANSETRON 4 MG: 2 INJECTION INTRAMUSCULAR; INTRAVENOUS at 09:11

## 2025-02-22 RX ADMIN — HYDROMORPHONE HYDROCHLORIDE 1 MG: 1 INJECTION, SOLUTION INTRAMUSCULAR; INTRAVENOUS; SUBCUTANEOUS at 11:27

## 2025-02-22 RX ADMIN — BUMETANIDE 2 MG: 2 TABLET ORAL at 08:27

## 2025-02-22 RX ADMIN — INSULIN LISPRO 3 UNITS: 100 INJECTION, SOLUTION INTRAVENOUS; SUBCUTANEOUS at 08:27

## 2025-02-22 RX ADMIN — ZOLPIDEM TARTRATE 5 MG: 5 TABLET, FILM COATED ORAL at 22:54

## 2025-02-22 RX ADMIN — DESMOPRESSIN ACETATE 20 MCG: 4 SOLUTION INTRAVENOUS at 14:38

## 2025-02-22 RX ADMIN — HYDROMORPHONE HYDROCHLORIDE 0.5 MG: 1 INJECTION, SOLUTION INTRAMUSCULAR; INTRAVENOUS; SUBCUTANEOUS at 08:26

## 2025-02-22 RX ADMIN — INSULIN LISPRO 5 UNITS: 100 INJECTION, SOLUTION INTRAVENOUS; SUBCUTANEOUS at 17:11

## 2025-02-22 RX ADMIN — AMLODIPINE BESYLATE 5 MG: 5 TABLET ORAL at 08:27

## 2025-02-22 RX ADMIN — TICAGRELOR 90 MG: 90 TABLET ORAL at 08:26

## 2025-02-22 RX ADMIN — HYDROMORPHONE HYDROCHLORIDE 1 MG: 1 INJECTION, SOLUTION INTRAMUSCULAR; INTRAVENOUS; SUBCUTANEOUS at 22:55

## 2025-02-22 RX ADMIN — HYDROCODONE BITARTRATE AND ACETAMINOPHEN 2 TABLET: 5; 325 TABLET ORAL at 18:32

## 2025-02-22 RX ADMIN — INSULIN LISPRO 3 UNITS: 100 INJECTION, SOLUTION INTRAVENOUS; SUBCUTANEOUS at 22:54

## 2025-02-22 RX ADMIN — TERAZOSIN HYDROCHLORIDE 1 MG: 1 CAPSULE ORAL at 20:10

## 2025-02-22 RX ADMIN — ONDANSETRON 4 MG: 2 INJECTION INTRAMUSCULAR; INTRAVENOUS at 22:55

## 2025-02-22 RX ADMIN — PANTOPRAZOLE SODIUM 40 MG: 40 TABLET, DELAYED RELEASE ORAL at 17:10

## 2025-02-22 RX ADMIN — PROCHLORPERAZINE EDISYLATE 5 MG: 5 INJECTION, SOLUTION INTRAMUSCULAR; INTRAVENOUS at 19:08

## 2025-02-22 RX ADMIN — CARVEDILOL 25 MG: 25 TABLET, FILM COATED ORAL at 08:26

## 2025-02-22 RX ADMIN — BUMETANIDE 2 MG: 2 TABLET ORAL at 17:10

## 2025-02-22 RX ADMIN — HYDROCODONE BITARTRATE AND ACETAMINOPHEN 2 TABLET: 5; 325 TABLET ORAL at 10:32

## 2025-02-22 RX ADMIN — INSULIN LISPRO 10 UNITS: 100 INJECTION, SOLUTION INTRAVENOUS; SUBCUTANEOUS at 08:27

## 2025-02-22 RX ADMIN — CEFEPIME 1000 MG: 1 INJECTION, POWDER, FOR SOLUTION INTRAMUSCULAR; INTRAVENOUS at 10:32

## 2025-02-22 RX ADMIN — HYDROCODONE BITARTRATE AND ACETAMINOPHEN 2 TABLET: 5; 325 TABLET ORAL at 14:38

## 2025-02-22 NOTE — SIGNIFICANT NOTE
02/22/25 1531   OTHER   Discipline physical therapist   Rehab Time/Intention   Session Not Performed other (see comments)  (Spoke to RN who reports pt is too groggy today for PT. RN requests PT be held til tomorrow when pt is more awake. PT will f/u tomorrow)   Recommendation   PT - Next Appointment 02/23/25

## 2025-02-22 NOTE — PROGRESS NOTES
[facility] Progress Note      Patient Name: Wilner Menjivar  : 1985  MRN: [id number]  Primary Care Physician:  Roosevelt Thao MD  Date of admission: 2025    Subjective     Interval History:   Follow-up on ESRD  Dialyzed yesterday, went well  Notes bleeding from foot operative site    Review of Systems:   As noted above    Objective     Vitals:   Temp:  [97.9 °F (36.6 °C)-98.6 °F (37 °C)] 98.4 °F (36.9 °C)  Heart Rate:  [80-97] 94  Resp:  [16-18] 18  BP: ()/(58-74) 123/62  Flow (L/min) (Oxygen Therapy):  [2-3] 2  No intake or output data in the 24 hours ending 25 1126          Physical Exam:    General Appearance: Awake, chronically ill, no acute distress  Skin: warm and dry  HEENT: oral mucosa normal, nonicteric sclera, legally blind  Neck: supple, no JVD  Lungs: CTA, no wheezing, nonlabored on RA  Heart: Regular rate and rhythm  Abdomen: soft, nontender, nondistended, BS +  : no palpable bladder  Extremities: no edema, cyanosis or clubbing; dressing on right foot saturated with blood  Neuro: normal speech and mental status   Vascular: L AVF patent, + thrill and bruit    Scheduled Meds:     amLODIPine, 5 mg, Oral, Q24H  aspirin, 81 mg, Oral, Daily  atorvastatin, 10 mg, Oral, Nightly  bumetanide, 2 mg, Oral, BID Diuretics  carvedilol, 25 mg, Oral, BID  cefepime, 1,000 mg, Intravenous, Q24H  desmopressin (DDAVP) 20 mcg in sodium chloride 0.9 % 50 mL IVPB, 0.3 mcg/kg (Ideal), Intravenous, Once  docusate sodium, 100 mg, Oral, BID  epoetin jamil/jamil-epbx, 10,000 Units, Subcutaneous, Once per day on   insulin glargine, 30 Units, Subcutaneous, Nightly  insulin lispro, 10 Units, Subcutaneous, TID With Meals  insulin lispro, 2-7 Units, Subcutaneous, 4x Daily AC & at Bedtime  losartan, 50 mg, Oral, Q24H  pantoprazole, 40 mg, Oral, BID AC  polyethylene glycol, 17 g, Oral, Daily  terazosin, 1 mg, Oral, Nightly  ticagrelor, 90 mg, Oral,  Q12H  Vancomycin Pharmacy Intermittent/Pulse Dosing, , Not Applicable, Daily      IV Meds:   Pharmacy to dose vancomycin,         Results Reviewed:   I have personally reviewed the results from the time of this admission to 2/22/2025 11:26 EST     Results from last 7 days   Lab Units 02/22/25  0337 02/21/25  0544 02/20/25  0352   SODIUM mmol/L 134* 132* 134*   POTASSIUM mmol/L 4.8 4.5 4.5   CHLORIDE mmol/L 100 97* 98   CO2 mmol/L 23.8 21.0* 24.5   BUN mg/dL 25* 34* 25*   CREATININE mg/dL 5.30* 6.28* 5.48*   CALCIUM mg/dL 8.6 9.2 9.2   GLUCOSE mg/dL 259* 211* 190*       Estimated Creatinine Clearance: 21.5 mL/min (A) (by C-G formula based on SCr of 5.3 mg/dL (H)).    Results from last 7 days   Lab Units 02/22/25  0337 02/21/25  0544 02/19/25  0616   PHOSPHORUS mg/dL 3.7 4.3 3.9             Results from last 7 days   Lab Units 02/22/25  0337 02/21/25  0544 02/20/25  0352 02/19/25  0616 02/18/25  0348   WBC 10*3/mm3 11.86* 13.27* 14.41* 15.41* 15.91*   HEMOGLOBIN g/dL 7.2* 8.7* 9.2* 6.7* 8.5*   PLATELETS 10*3/mm3 315 328 339 331 337             Assessment / Plan     ASSESSMENT:     End  Stage Renal Disease ( ESRD )  since 6/2024 followed by Dr. Wasserman on Hemodialysis on a Monday, Wednesday and Friday schedule via [facility] [place] [place] [facility].    Anemia of CKD on Epogen protocol.  10,000 units subcu. iron studies are pending   History of hyperphosphatemia  Hypertension w/ CKD.  Continue home regimen. We will continue to follow closely  Coronary artery disease status postcardiac cath with stent placement to mid/distal OM2 , and proximal Cx. ( 9/24 ).  On medical management  Diabetes Mellitus type 2 w/ complications ( retinopathy , peripheral vascular disease  nephropathy ) .Continue insulin regimen and Diabetic diet, as per primary team   Right diabetic foot/osteomyelitis follow-up podiatry and vascular surgery status post right fifth toe potation, postop bleeding noted saturating dressing and sheet  Anemia postoperative and anemia of CKD  being transfused today    PLAN:    Continue dialysis on Monday Wednesday and Friday with plan for dialysis Monday  Would hold ticragelor  Add ddavp x1  Continue Epogen on dialysis  Labs in a.m.    Thank you for involving us in the care of Wilner Menjivar.  Please feel free to call with any questions.    Albino Babcock MD  02/22/25  11:26 [facility]    [facility]  [phone]

## 2025-02-22 NOTE — PROGRESS NOTES
"  Infectious Diseases Progress Note    Abdiaziz Paz MD     [facility]  [place]: 11 days  Patient Identification:  Name: Wilner Menjivar  Age: 39 y.o.  Sex: male  :  1985  MRN: [id number]         Primary Care Physician: Roosevelt Thao MD        Subjective: No new complaints.  Denies any fever and chills.  Tolerating antibiotics without any problem.  Interval History: See consultation note.  2025: UnderwentHallux amputation, right foot SESAMOIDECTOMY, INCISION AN DRAINAGE  2025: Patient underwent repeat I&D of the right foot and partial first metatarsal excision of the right foot.  Objective:    Scheduled Meds:amLODIPine, 5 mg, Oral, Q24H  aspirin, 81 mg, Oral, Daily  atorvastatin, 10 mg, Oral, Nightly  bumetanide, 2 mg, Oral, BID Diuretics  carvedilol, 25 mg, Oral, BID  cefepime, 1,000 mg, Intravenous, Q24H  docusate sodium, 100 mg, Oral, BID  epoetin jamil/jamil-epbx, 10,000 Units, Subcutaneous, Once per day on   insulin glargine, 30 Units, Subcutaneous, Nightly  insulin lispro, 10 Units, Subcutaneous, TID With Meals  insulin lispro, 2-7 Units, Subcutaneous, 4x Daily AC & at Bedtime  losartan, 50 mg, Oral, Q24H  pantoprazole, 40 mg, Oral, BID AC  polyethylene glycol, 17 g, Oral, Daily  terazosin, 1 mg, Oral, Nightly  ticagrelor, 90 mg, Oral, Q12H  Vancomycin Pharmacy Intermittent/Pulse Dosing, , Not Applicable, Daily      Continuous Infusions:Pharmacy to dose vancomycin,         Vital signs in last 24 hours:  Temp:  [97.8 °F (36.6 °C)-98.6 °F (37 °C)] 98.4 °F (36.9 °C)  Heart Rate:  [80-97] 94  Resp:  [16-18] 18  BP: ()/(58-74) 123/62    Intake/Output:    Intake/Output Summary (Last 24 hours) at 2025 1111  Last data filed at 2025 1126  Gross per 24 hour   Intake --   Output 1000 ml   Net -1000 ml           Exam:  /62   Pulse 94   Temp 98.4 °F (36.9 °C) (Oral)   Resp 18   Ht 170.2 cm (67\")   Wt 106 kg (234 lb 5.6 oz)   SpO2 100%   BMI " 36.70 kg/m²   Patient is examined using the personal protective equipment as per guidelines from infection control for this particular patient as enacted.  Hand washing was performed before and after patient interaction.  General Appearance:    Alert, cooperative, no distress, AAOx3                          Head:    Normocephalic, without obvious abnormality, atraumatic                           Eyes:  Legally blind                         Throat:   Lips, tongue, gums normal; oral mucosa pink and moist                           Neck:   Supple, symmetrical, trachea midline, no JVD                         Lungs:    Clear to auscultation bilaterally, respirations unlabored                 Chest Wall:    No tenderness or deformity                          Heart:  S1-S2 regular                  Abdomen:   Soft nontender                 Extremities: Right foot after debridement and amputation of the great toe is dressed.                  Neurologic: Legally blind       Data Review:    I reviewed the patient's new clinical results.  Results from last 7 days   Lab Units 02/22/25 0337 02/21/25  0544 02/20/25  0352 02/19/25  0616 02/18/25  0348 02/17/25  0526 02/16/25  0457   WBC 10*3/mm3 11.86* 13.27* 14.41* 15.41* 15.91* 18.20* 14.97*   HEMOGLOBIN g/dL 7.2* 8.7* 9.2* 6.7* 8.5* 8.4* 8.9*   PLATELETS 10*3/mm3 315 328 339 331 337 359 304     Results from last 7 days   Lab Units 02/22/25 0337 02/21/25  0544 02/20/25  0352 02/19/25  0616 02/18/25  0348 02/17/25  0526 02/16/25  0457   SODIUM mmol/L 134* 132* 134* 130* 127* 131* 130*   POTASSIUM mmol/L 4.8 4.5 4.5 4.2 4.3 3.9 3.7   CHLORIDE mmol/L 100 97* 98 94* 93* 92* 95*   CO2 mmol/L 23.8 21.0* 24.5 21.0* 23.0 20.3* 21.6*   BUN mg/dL 25* 34* 25* 39* 31* 46* 35*   CREATININE mg/dL 5.30* 6.28* 5.48* 7.27* 5.65* 6.59* 5.97*   CALCIUM mg/dL 8.6 9.2 9.2 8.9 9.3 9.3 9.1   GLUCOSE mg/dL 259* 211* 190* 303* 326* 166* 133*   XR Foot 3+ View Right    Result Date: 2/21/2025    Postsurgical changes.    This report was finalized on 2/21/2025 3:43 PM by Dr. Chris Sheets M.D on Workstation: [id number]      XR Foot 3+ View Right    Result Date: 2/18/2025  Postoperative changes from amputation of the great toe at the level of the MTP joint.  This report was finalized on 2/18/2025 9:04 AM by Tom Cha MD on Workstation: [id number]      Doppler Arterial Multi Level Lower Extremity - Bilateral CAR    Addendum Date: 2/13/2025      Right Conclusion: The right GIOVANA is moderately reduced. Waveforms are consistent with femoral disease, popliteal disease and tib-peroneal disease. Normal digital pressures.   Left Conclusion: The left GIOVANA is normal. Normal digital pressures.     MRI Foot Right Without Contrast    Result Date: 2/13/2025  1. Osteomyelitis of the distal shaft and head of the 1st proximal phalanx and of the distal phalanx with overlying soft tissue wound/ulcer. Gas within the soft tissues and marrow of the great toe associated with infection. Tenosynovitis flexor hallucis longus tendon sheath. Forefoot cellulitis. 2. No evidence for septic arthritis at the 1st MTP joint. 3. Muscular edema and atrophy associated with myositis or chronic neuropathy.  This report was finalized on 2/13/2025 7:48 AM by Junior Villalobos M.D on Workstation: [id number]      XR Foot 3+ View Right    Result Date: 2/11/2025   There is subcutaneous emphysema and soft tissue loss over the great toe. No radiopaque foreign body. No definite bone erosion.    This report was finalized on 2/11/2025 5:20 PM by Dr. Jason Dan M.D on Workstation: [id number]     Microbiology Results (last 10 days)       Procedure Component Value - Date/Time    Tissue / Bone Culture - Surgical Site, Toe, Right [[id number]]  (Abnormal)  (Susceptibility) Collected: 02/18/25 0754    Lab Status: Final result Specimen: Surgical Site from Toe, Right Updated: 02/21/25 0759     Tissue Culture Scant growth (1+) Citrobacter freundii      Comment:   This organism may develop resistance during prolonged therapy with 3rd generation cephalosporins (e.g. ceftriaxone) as a result of de-repression of AmpC B-lactamase.  Ceftriaxone may be a reasonable treatment option for uncomplicated cystitis or other lower severity infections when susceptibility is demonstrated.         Scant growth (1+) Enterococcus faecalis     Gram Stain Few (2+) Gram positive cocci      Rare (1+) WBCs seen    Susceptibility        Citrobacter freundii      BHAVIN      Cefepime Susceptible      Ceftazidime Susceptible      Ceftriaxone Susceptible      Gentamicin Susceptible      Levofloxacin Susceptible      Trimethoprim + Sulfamethoxazole Susceptible                       Susceptibility        Enterococcus faecalis      BHAVIN      Ampicillin Susceptible      Vancomycin Susceptible                       Susceptibility Comments       Citrobacter freundii    With the exception of urinary-sourced infections, aminoglycosides should not be used as monotherapy.               Anaerobic Culture 10 Day Incubation - Surgical Site, Toe, Right [[id number]]  (Normal) Collected: 02/18/25 0754    Lab Status: Preliminary result Specimen: Surgical Site from Toe, Right Updated: 02/21/25 1052     Anaerobic Culture Screening for Anaerobes    MRSA Screen, PCR (Inpatient) - Swab, Nares [[id number]]  (Normal) Collected: 02/13/25 0906    Lab Status: Final result Specimen: Swab from Nares Updated: 02/13/25 1102     MRSA PCR No MRSA Detected    Narrative:      The negative predictive value of this diagnostic test is high and should only be used to consider de-escalating anti-MRSA therapy. A positive result may indicate colonization with MRSA and must be correlated clinically.          Telemetry Scan   Final Result      Telemetry Scan   Final Result      Telemetry Scan   Final Result      Telemetry Scan   Final Result      Telemetry Scan   Final Result      Telemetry Scan   Final Result      Telemetry Scan   Final  Result      Telemetry Scan   Final Result      Telemetry Scan   Final Result      Telemetry Scan   Final Result      Telemetry Scan   Final Result      Telemetry Scan   Final Result      Telemetry Scan   Final Result      Telemetry Scan   Final Result      Telemetry Scan   Final Result      Telemetry Scan   Final Result      Telemetry Scan   Final Result      Telemetry Scan   Final Result      Telemetry Scan   Final Result      Telemetry Scan   Final Result      Telemetry Scan   Final Result      Telemetry Scan   Final Result              Assessment:    Diabetic toe ulcer    Osteomyelitis of great toe of right foot  39-year-old male with  1-right great toe diabetic/ischemic ulcer with dry gangrene with associated abscess and contiguous focus osteomyelitis of the proximal and distal phalanx with associated flexor hallucis tenosynovitis-status post amputation of right great toe, I&D of multiple compartments of the right foot and sesamoidectomy involving tibia-fibula and right foot on 2/18/2025-operative cultures showing gram-negative rods and Gram stain shows gram-positive cocci-identified as Citrobacter freundii and Enterococcus.  2-significant peripheral vascular disease with element of gangrene involving the great toe  3-type 2 diabetes  4-end-stage renal disease  5-legally blind  6-severe anemia multifactorial  7-hypertension  8-multiple antibiotic allergies/intolerances including allergy to penicillin though it could very well be not a true allergy given the description of his reaction.  9-anemia multifactorial status post transfusion  10-penicillin allergy.     Recommendations/Discussions:  See my discussion and recommendation on 2/13/2025 and 2/19 and 2/20/2025.  Based on the information we have changed ceftriaxone to cefepime and continue vancomycin given presence of Citrobacter, persistent leukocytosis and  penicillin allergy.  Follow-up on surgical pathology sent on 2/18/2025 and on 2/21/2025 which is  currently pending.  Duration of antibiotic treatment will depend upon whether or not there is concern for residual osteomyelitis and when final I&D is performed.      Abdiaziz Paz MD  2/22/2025  11:11 EST    Parts of this note may be an electronic transcription/translation of spoken language to printed text using the Dragon dictation system.

## 2025-02-22 NOTE — PLAN OF CARE
Goal Outcome Evaluation:  Plan of Care Reviewed With: patient        Progress: no change  Outcome Evaluation: dressing changed twice today by podiatry, desmopressin x1, hold brilinta, pain management, 2 episode of vomiting after iv dilaudid, vss, plan for debridement on monday, iv abx, will continue to monitor

## 2025-02-22 NOTE — PROGRESS NOTES
Podiatry Progress Note      Patient: Wilner Menjivar Admit Date: 02/11/2025    Age: 39 y.o.   PCP: Roosevelt Thao MD    MRN: [id number]  Room: [facility]        Subjective     Chief Complaint     Chief Complaint   Patient presents with    Wound Check        HPI     POD #1 from partial 1st metatarsal excision, I&D right foot. Doing well, pain controlled but wanting something stronger.     Past Medical History     Past Medical History:   Diagnosis Date    CKD stage 4 due to type 2 diabetes mellitus     GERD (gastroesophageal reflux disease)     Heart failure     Hypertension     Type 2 diabetes mellitus     Vision impairment         Past Surgical History:   Procedure Laterality Date    AMPUTATION DIGIT Right 2/18/2025    Procedure: Hallux amputation, right foot SESAMOIDECTOMY, INCISION AN DRAINAGE;  Surgeon: Trevon Garcia DPM;  Location: [facility];  Service: Podiatry;  Laterality: Right;    CORONARY STENT PLACEMENT      EYE SURGERY      WISDOM TOOTH EXTRACTION          Allergies   Allergen Reactions    Azithromycin Nausea And Vomiting     Pt given IV azithro, reported nausea/vomiting and hot flashes within 3-5 minutes of admin. Pt also c/o new abd pain with admin.     Pt given IV azithro, reported nausea/vomiting and hot flashes within 3-5 minutes of admin. Pt also c/o new abd pain with admin.    Penicillins Unknown - Low Severity     reaction as a child. Does not recall reaction.     reaction as a child. Does not recall reaction.  Beta lactam allergy details  Antibiotic reaction: unknown  Age at reaction: infant  Dose to reaction time: unknown  Reason for antibiotic: unknown  Epinephrine required for reaction?: unknown  Tolerated antibiotics: unknown           Social History     Tobacco Use   Smoking Status Never   Smokeless Tobacco Never        Objective   Physical Exam    Vitals:    02/22/25 0807   BP: 123/62   Pulse: 94   Resp:    Temp: 98.4 °F (36.9 °C)   SpO2:         Dermatology: exposed 1st  metatarsal, incision on plantar foot. No purulence, edema improved. Tissue viability looks suspect but still improved        Labs     Lab Results   Component Value Date    HGBA1C 11.10 (H) 02/13/2025    POCGLU 245 (H) 02/22/2025        CBC:      Lab 02/22/25  0337 02/21/25  0544 02/20/25  0352 02/19/25  0616 02/18/25  0348   WBC 11.86* 13.27* 14.41* 15.41* 15.91*   HEMOGLOBIN 7.2* 8.7* 9.2* 6.7* 8.5*   HEMATOCRIT 23.4* 26.3* 26.0* 20.3* 25.5*   PLATELETS 315 328 339 331 337   NEUTROS ABS 9.55* 10.66* 11.65* 13.07* 13.52*   IMMATURE GRANS (ABS) 0.22* 0.28* 0.32* 0.24* 0.29*   LYMPHS ABS 1.00 1.04 1.03 0.93 0.81   MONOS ABS 0.80 0.91* 1.07* 0.86 0.98*   EOS ABS 0.23 0.30 0.27 0.25 0.26   MCV 91.4 87.7 86.1 87.9 89.2          Results for orders placed or performed during the hospital encounter of 02/11/25   Blood Culture - Blood, Arm, Right    Specimen: Arm, Right; Blood   Result Value Ref Range    Blood Culture No growth at 5 days         XR Foot 3+ View Right  Narrative: XR FOOT 3+ VW RIGHT-     INDICATIONS: Postoperative evaluation     TECHNIQUE: 3 VIEWS OF THE RIGHT FOOT     COMPARISON: 2/18/2025     FINDINGS:     Partial amputation change of the first ray is noted at the level of the  proximal to mid first metatarsal shaft, with adjacent surgical soft  tissue gas. The bones otherwise appear intact. No dislocation.     Impression:    Postsurgical changes.           This report was finalized on 2/21/2025 3:43 PM by Dr. Chris Sheets M.D on Workstation: [id number]          Assessment/Plan     38yo M POD #1 from right partial 1st metatarsal excision, I&D    -Pt examined and evaluated by myself  -New betadine packing applied to RLE. Tissue looks somewhat improved, still concerned with plantar foot incision  -Discussed continued limb salvage with patient and wife.   -Plan for OR Monday 1630 for debridement, hopefully he can get Dialysis Monday AM again prior to surgery      Trevon Garcia, RENALDOM  Office:  [phone]

## 2025-02-22 NOTE — PROGRESS NOTES
"Daily progress note    Primary care physician  Dr. NERI    Subjective   Status post partial first right metatarsal excision secondary to osteomyelitis and same like yesterday with a lot of pain and bleeding from the surgical site but no other complaints.    History of present illness  39-year-old male with history of end-stage renal disease on hemodialysis chronic anemia diabetes hypertension hyperlipidemia coronary artery disease and gastroesophageal of disease who is also legally blind and has right foot wound which is getting worse mainly right great toe which looks infected and has recently seen by podiatrist and removed the callus from the right great toe.  Patient has intermittent bleeding pain drainage but no fever chills.  Patient also denies any chest pain shortness of breath palpitation abdominal pain nausea vomiting diarrhea.  Patient workup in ER revealed infected right great toe wound admitted for management.     REVIEW OF SYSTEMS  Unremarkable except pain     PHYSICAL EXAM   Blood pressure 123/62, pulse 94, temperature 98.4 °F (36.9 °C), temperature source Oral, resp. rate 18, height 170.2 cm (67\"), weight 106 kg (234 lb 5.6 oz), SpO2 100%.    Constitutional:       General: He is not in acute distress.     Appearance: Normal appearance. He is not ill-appearing, toxic-appearing or diaphoretic.   HENT:      Head: Normocephalic and atraumatic.      Nose: Nose normal.      Mouth/Throat:      Mouth: Mucous membranes are moist.      Pharynx: Oropharynx is clear.   Eyes:      Conjunctiva/sclera: Conjunctivae normal.      Pupils: Pupils are equal, round, and reactive to light.   Cardiovascular:      Rate and Rhythm: Normal rate and regular rhythm.      Pulses: Normal pulses.   Pulmonary:      Effort: Pulmonary effort is normal.   Abdominal:      General: Abdomen is flat.      Palpations: Abdomen is soft.   Musculoskeletal:      Comments: Right great toe is black in color with a deep ulceration and dried blood, " no active purulent drainage, no palpable crepitus or fluctuance   Skin:     General: Skin is warm and dry.   Neurological:      General: No focal deficit present.      Mental Status: He is alert and oriented to person, place, and time. Mental status is at baseline.   Psychiatric:         Mood and Affect: Mood normal.         Behavior: Behavior normal.         Thought Content: Thought content normal.         Judgment: Judgment normal.      LAB RESULTS  Lab Results (last 24 hours)       Procedure Component Value Units Date/Time    POC Glucose Once [[id number]]  (Abnormal) Collected: 02/22/25 1109    Specimen: Blood Updated: 02/22/25 1111     Glucose 136 mg/dL     POC Glucose Once [[id number]]  (Abnormal) Collected: 02/22/25 0623    Specimen: Blood Updated: 02/22/25 0624     Glucose 245 mg/dL     Renal Function Panel [[id number]]  (Abnormal) Collected: 02/22/25 0337    Specimen: Blood Updated: 02/22/25 0439     Glucose 259 mg/dL      BUN 25 mg/dL      Creatinine 5.30 mg/dL      Sodium 134 mmol/L      Potassium 4.8 mmol/L      Chloride 100 mmol/L      CO2 23.8 mmol/L      Calcium 8.6 mg/dL      Albumin 2.8 g/dL      Phosphorus 3.7 mg/dL      Anion Gap 10.2 mmol/L      BUN/Creatinine Ratio 4.7     eGFR 13.2 mL/min/1.73     Narrative:      GFR Categories in Chronic Kidney Disease (CKD)      GFR Category          GFR (mL/min/1.73)    Interpretation  G1                     90 or greater         Normal or high (1)  G2                      60-89                Mild decrease (1)  G3a                   45-59                Mild to moderate decrease  G3b                   30-44                Moderate to severe decrease  G4                    15-29                Severe decrease  G5                    14 or less           Kidney failure          (1)In the absence of evidence of kidney disease, neither GFR category G1 or G2 fulfill the criteria for CKD.    eGFR calculation 2021 CKD-EPI creatinine equation, which does not include  race as a factor    CBC & Differential [[id number]]  (Abnormal) Collected: 02/22/25 0337    Specimen: Blood Updated: 02/22/25 0423    Narrative:      The following orders were created for panel order CBC & Differential.  Procedure                               Abnormality         Status                     ---------                               -----------         ------                     CBC Auto Differential[[id number]]        Abnormal            Final result                 Please view results for these tests on the individual orders.    CBC Auto Differential [[id number]]  (Abnormal) Collected: 02/22/25 0337    Specimen: Blood Updated: 02/22/25 0423     WBC 11.86 10*3/mm3      RBC 2.56 10*6/mm3      Hemoglobin 7.2 g/dL      Hematocrit 23.4 %      MCV 91.4 fL      MCH 28.1 pg      MCHC 30.8 g/dL      RDW 13.8 %      RDW-SD 46.1 fl      MPV 8.3 fL      Platelets 315 10*3/mm3      Neutrophil % 80.6 %      Lymphocyte % 8.4 %      Monocyte % 6.7 %      Eosinophil % 1.9 %      Basophil % 0.5 %      Immature Grans % 1.9 %      Neutrophils, Absolute 9.55 10*3/mm3      Lymphocytes, Absolute 1.00 10*3/mm3      Monocytes, Absolute 0.80 10*3/mm3      Eosinophils, Absolute 0.23 10*3/mm3      Basophils, Absolute 0.06 10*3/mm3      Immature Grans, Absolute 0.22 10*3/mm3      nRBC 0.0 /100 WBC     Tissue Pathology Exam [[id number]] Collected: 02/21/25 1430    Specimen: Tissue from Toe, Right Updated: 02/21/25 2226    POC Glucose Once [[id number]]  (Abnormal) Collected: 02/21/25 2050    Specimen: Blood Updated: 02/21/25 2052     Glucose 187 mg/dL     POC Glucose Once [[id number]]  (Abnormal) Collected: 02/21/25 1623    Specimen: Blood Updated: 02/21/25 1625     Glucose 218 mg/dL     POC Glucose Once [[id number]]  (Abnormal) Collected: 02/21/25 1503    Specimen: Blood Updated: 02/21/25 1504     Glucose 201 mg/dL           Imaging Results (Last 24 Hours)       Procedure Component Value Units Date/Time    XR Foot 3+ View Right  [[id number]] Collected: 02/21/25 1542     Updated: 02/21/25 1546    Narrative:      XR FOOT 3+ VW RIGHT-     INDICATIONS: Postoperative evaluation     TECHNIQUE: 3 VIEWS OF THE RIGHT FOOT     COMPARISON: 2/18/2025     FINDINGS:     Partial amputation change of the first ray is noted at the level of the  proximal to mid first metatarsal shaft, with adjacent surgical soft  tissue gas. The bones otherwise appear intact. No dislocation.       Impression:         Postsurgical changes.           This report was finalized on 2/21/2025 3:43 PM by Dr. Chris Sheets M.D on Workstation: Remote Assistant               Current Facility-Administered Medications:     amLODIPine (NORVASC) tablet 5 mg, 5 mg, Oral, Q24H, Trevon Garcia DPM, 5 mg at 02/22/25 0827    aspirin chewable tablet 81 mg, 81 mg, Oral, Daily, JoseTrevon goodwin, DPM, 81 mg at 02/22/25 0827    atorvastatin (LIPITOR) tablet 10 mg, 10 mg, Oral, Nightly, Trevon Garcia, DPM, 10 mg at 02/21/25 2124    bumetanide (BUMEX) tablet 2 mg, 2 mg, Oral, BID Diuretics, Trevon Garcia, DPM, 2 mg at 02/22/25 0827    calcium carbonate (TUMS) chewable tablet 500 mg (200 mg elemental), 2 tablet, Oral, TID PRN, Trevon Garcia, DPM, 2 tablet at 02/19/25 0022    carvedilol (COREG) tablet 25 mg, 25 mg, Oral, BID, Trevon Garcia DPM, 25 mg at 02/22/25 0826    cefepime 1000 mg IVPB in 100 mL NS (MBP), 1,000 mg, Intravenous, Q24H, Trevon Garcia DPM, 1,000 mg at 02/22/25 1032    desmopressin (DDAVP) 20 mcg in sodium chloride 0.9 % 50 mL IVPB, 0.3 mcg/kg (Ideal), Intravenous, Once, Albino Babcock MD    dextrose (D50W) (25 g/50 mL) IV injection 25 g, 25 g, Intravenous, Q15 Min PRN, Trevon Garcia DPM    dextrose (GLUTOSE) oral gel 15 g, 15 g, Oral, Q15 Min PRN, Trevon Garcia DPM    docusate sodium (COLACE) capsule 100 mg, 100 mg, Oral, BID, Trevon Garcia DPM, 100 mg at 02/22/25 0827    epoetin jamil-epbx (RETACRIT) injection 10,000 Units, 10,000 Units, Subcutaneous,  Once per day on Monday Wednesday Friday, Trevon Garcia DPM, 10,000 Units at 02/21/25 1703    glucagon (GLUCAGEN) injection 1 mg, 1 mg, Intramuscular, Q15 Min PRN, Trevon Garcia DPM    HYDROcodone-acetaminophen (NORCO) 5-325 MG per tablet 2 tablet, 2 tablet, Oral, Q4H PRN, Trevon Garcia DPM, 2 tablet at 02/22/25 1032    HYDROmorphone (DILAUDID) injection 1 mg, 1 mg, Intravenous, Q3H PRN, Trevon Garcia DPM, 1 mg at 02/22/25 1127    insulin glargine (LANTUS, SEMGLEE) injection 30 Units, 30 Units, Subcutaneous, Nightly, Trevon Garcia DPM, 30 Units at 02/21/25 2121    insulin lispro (HUMALOG/ADMELOG) injection 10 Units, 10 Units, Subcutaneous, TID With Meals, Trevon Garcia DPM, 10 Units at 02/22/25 0827    insulin lispro (HUMALOG/ADMELOG) injection 2-7 Units, 2-7 Units, Subcutaneous, 4x Daily AC & at Bedtime, Trevon Garcia DPM, 3 Units at 02/22/25 0827    losartan (COZAAR) tablet 50 mg, 50 mg, Oral, Q24H, Trevon Garcia DPM, 50 mg at 02/22/25 0827    ondansetron (ZOFRAN) injection 4 mg, 4 mg, Intravenous, Q6H PRN, Trevon Garcia DPM, 4 mg at 02/22/25 0911    pantoprazole (PROTONIX) EC tablet 40 mg, 40 mg, Oral, BID AC, Trevon Garcia DPM, 40 mg at 02/22/25 0827    Pharmacy to dose vancomycin, , Not Applicable, Continuous PRN, Trevon Garcia DPM    polyethylene glycol (MIRALAX) packet 17 g, 17 g, Oral, Daily, Trevon Garcia DPM    prochlorperazine (COMPAZINE) injection 5 mg, 5 mg, Intravenous, Q6H PRN, Remy Thornton MD, 5 mg at 02/22/25 1225    terazosin (HYTRIN) capsule 1 mg, 1 mg, Oral, Nightly, Trevon Garcia DPM, 1 mg at 02/21/25 2121    [START ON 2/23/2025] ticagrelor (BRILINTA) tablet 90 mg, 90 mg, Oral, Q12H, Albino Babcock MD    Vancomycin Pharmacy Intermittent/Pulse Dosing, , Not Applicable, Daily, Trevon Garcia DPM, Given at 02/21/25 0802    zolpidem (AMBIEN) tablet 5 mg, 5 mg, Oral, Nightly PRN, Trevon Garcia DPM, 5 mg at 02/21/25 2063      ASSESSMENT  Right great toe wound with infection and surrounding cellulitis and osteomyelitis status post I&D and hallux amputation followed by partial first right metatarsal excision  End-stage renal disease   Diabetes mellitus  Hypertension  Hyperlipidemia  Coronary artery disease  Legally blind  Chronic anemia   Gastroesophageal reflux disease    PLAN  CPM   Postop care  Continue IV antibiotics   Pain management   Hemodialysis TIW  Infectious disease consult appreciated  Podiatry and nephrology to follow patient   Continue home medications  Stress ulcer DVT prophylaxis  Supportive care  Discussed with nursing staff family and podiatry  Follow closely further recommendation current hospital course    JASKARAN LANGFORD MD    Copied text in this note has been reviewed and is accurate as of 02/22/25

## 2025-02-22 NOTE — PLAN OF CARE
Problem: Adult Inpatient Plan of Care  Goal: Optimal Comfort and Wellbeing  Intervention: Monitor Pain and Promote Comfort  Recent Flowsheet Documentation  Taken 2/22/2025 0000 by Paula Falcon RN  Pain Management Interventions: pain management plan reviewed with patient/caregiver  Taken 2/21/2025 2045 by Paula Falcon RN  Pain Management Interventions: pain management plan reviewed with patient/caregiver   Goal Outcome Evaluation:

## 2025-02-23 LAB
ABO GROUP BLD: NORMAL
ANION GAP SERPL CALCULATED.3IONS-SCNC: 11 MMOL/L (ref 5–15)
BACTERIA SPEC ANAEROBE CULT: ABNORMAL
BASOPHILS # BLD AUTO: 0.07 10*3/MM3 (ref 0–0.2)
BASOPHILS NFR BLD AUTO: 0.4 % (ref 0–1.5)
BLD GP AB SCN SERPL QL: NEGATIVE
BUN SERPL-MCNC: 33 MG/DL (ref 6–20)
BUN/CREAT SERPL: 4.8 (ref 7–25)
CALCIUM SPEC-SCNC: 8.6 MG/DL (ref 8.6–10.5)
CHLORIDE SERPL-SCNC: 100 MMOL/L (ref 98–107)
CO2 SERPL-SCNC: 22 MMOL/L (ref 22–29)
CREAT SERPL-MCNC: 6.93 MG/DL (ref 0.76–1.27)
DEPRECATED RDW RBC AUTO: 45.9 FL (ref 37–54)
EGFRCR SERPLBLD CKD-EPI 2021: 9.6 ML/MIN/1.73
EOSINOPHIL # BLD AUTO: 0.24 10*3/MM3 (ref 0–0.4)
EOSINOPHIL NFR BLD AUTO: 1.5 % (ref 0.3–6.2)
ERYTHROCYTE [DISTWIDTH] IN BLOOD BY AUTOMATED COUNT: 14 % (ref 12.3–15.4)
GEN 5 1HR TROPONIN T REFLEX: 99 NG/L
GLUCOSE BLDC GLUCOMTR-MCNC: 121 MG/DL (ref 70–130)
GLUCOSE BLDC GLUCOMTR-MCNC: 136 MG/DL (ref 70–130)
GLUCOSE BLDC GLUCOMTR-MCNC: 138 MG/DL (ref 70–130)
GLUCOSE BLDC GLUCOMTR-MCNC: 160 MG/DL (ref 70–130)
GLUCOSE BLDC GLUCOMTR-MCNC: 207 MG/DL (ref 70–130)
GLUCOSE SERPL-MCNC: 123 MG/DL (ref 65–99)
HCT VFR BLD AUTO: 18.2 % (ref 37.5–51)
HGB BLD-MCNC: 5.7 G/DL (ref 13–17.7)
IMM GRANULOCYTES # BLD AUTO: 0.38 10*3/MM3 (ref 0–0.05)
IMM GRANULOCYTES NFR BLD AUTO: 2.3 % (ref 0–0.5)
LYMPHOCYTES # BLD AUTO: 1.19 10*3/MM3 (ref 0.7–3.1)
LYMPHOCYTES NFR BLD AUTO: 7.3 % (ref 19.6–45.3)
MAGNESIUM SERPL-MCNC: 2 MG/DL (ref 1.6–2.6)
MCH RBC QN AUTO: 28.1 PG (ref 26.6–33)
MCHC RBC AUTO-ENTMCNC: 31.3 G/DL (ref 31.5–35.7)
MCV RBC AUTO: 89.7 FL (ref 79–97)
MONOCYTES # BLD AUTO: 0.87 10*3/MM3 (ref 0.1–0.9)
MONOCYTES NFR BLD AUTO: 5.3 % (ref 5–12)
NEUTROPHILS NFR BLD AUTO: 13.61 10*3/MM3 (ref 1.7–7)
NEUTROPHILS NFR BLD AUTO: 83.2 % (ref 42.7–76)
NRBC BLD AUTO-RTO: 0.1 /100 WBC (ref 0–0.2)
PLATELET # BLD AUTO: 281 10*3/MM3 (ref 140–450)
PMV BLD AUTO: 8.3 FL (ref 6–12)
POTASSIUM SERPL-SCNC: 4.3 MMOL/L (ref 3.5–5.2)
POTASSIUM SERPL-SCNC: 4.6 MMOL/L (ref 3.5–5.2)
RBC # BLD AUTO: 2.03 10*6/MM3 (ref 4.14–5.8)
RH BLD: POSITIVE
SODIUM SERPL-SCNC: 133 MMOL/L (ref 136–145)
T&S EXPIRATION DATE: NORMAL
TROPONIN T % DELTA: -6
TROPONIN T NUMERIC DELTA: -6 NG/L
TROPONIN T SERPL HS-MCNC: 105 NG/L
VANCOMYCIN SERPL-MCNC: 17.2 MCG/ML (ref 5–40)
WBC NRBC COR # BLD AUTO: 16.36 10*3/MM3 (ref 3.4–10.8)
WHOLE BLOOD HOLD SPECIMEN: NORMAL

## 2025-02-23 PROCEDURE — 85025 COMPLETE CBC W/AUTO DIFF WBC: CPT | Performed by: HOSPITALIST

## 2025-02-23 PROCEDURE — 36430 TRANSFUSION BLD/BLD COMPNT: CPT

## 2025-02-23 PROCEDURE — 80048 BASIC METABOLIC PNL TOTAL CA: CPT | Performed by: INTERNAL MEDICINE

## 2025-02-23 PROCEDURE — 82948 REAGENT STRIP/BLOOD GLUCOSE: CPT

## 2025-02-23 PROCEDURE — 93010 ELECTROCARDIOGRAM REPORT: CPT | Performed by: INTERNAL MEDICINE

## 2025-02-23 PROCEDURE — 93005 ELECTROCARDIOGRAM TRACING: CPT | Performed by: HOSPITALIST

## 2025-02-23 PROCEDURE — 63710000001 INSULIN LISPRO (HUMAN) PER 5 UNITS: Performed by: PODIATRIST

## 2025-02-23 PROCEDURE — 86923 COMPATIBILITY TEST ELECTRIC: CPT

## 2025-02-23 PROCEDURE — 63710000001 INSULIN LISPRO (HUMAN) PER 5 UNITS: Performed by: HOSPITALIST

## 2025-02-23 PROCEDURE — 25010000002 VANCOMYCIN HCL 1.25 G RECONSTITUTED SOLUTION 1 EACH VIAL: Performed by: HOSPITALIST

## 2025-02-23 PROCEDURE — 84484 ASSAY OF TROPONIN QUANT: CPT | Performed by: HOSPITALIST

## 2025-02-23 PROCEDURE — 86900 BLOOD TYPING SEROLOGIC ABO: CPT | Performed by: INTERNAL MEDICINE

## 2025-02-23 PROCEDURE — 86901 BLOOD TYPING SEROLOGIC RH(D): CPT | Performed by: INTERNAL MEDICINE

## 2025-02-23 PROCEDURE — 84132 ASSAY OF SERUM POTASSIUM: CPT | Performed by: HOSPITALIST

## 2025-02-23 PROCEDURE — 25010000002 CEFEPIME PER 500 MG: Performed by: PODIATRIST

## 2025-02-23 PROCEDURE — 83735 ASSAY OF MAGNESIUM: CPT | Performed by: HOSPITALIST

## 2025-02-23 PROCEDURE — P9016 RBC LEUKOCYTES REDUCED: HCPCS

## 2025-02-23 PROCEDURE — 86900 BLOOD TYPING SEROLOGIC ABO: CPT

## 2025-02-23 PROCEDURE — 86850 RBC ANTIBODY SCREEN: CPT | Performed by: INTERNAL MEDICINE

## 2025-02-23 PROCEDURE — 80202 ASSAY OF VANCOMYCIN: CPT | Performed by: PODIATRIST

## 2025-02-23 PROCEDURE — 25810000003 SODIUM CHLORIDE 0.9 % SOLUTION 250 ML FLEX CONT: Performed by: HOSPITALIST

## 2025-02-23 RX ORDER — ALBUMIN (HUMAN) 12.5 G/50ML
12.5 SOLUTION INTRAVENOUS AS NEEDED
Status: CANCELLED | OUTPATIENT
Start: 2025-02-23 | End: 2025-02-24

## 2025-02-23 RX ADMIN — BUMETANIDE 2 MG: 2 TABLET ORAL at 17:25

## 2025-02-23 RX ADMIN — INSULIN LISPRO 2 UNITS: 100 INJECTION, SOLUTION INTRAVENOUS; SUBCUTANEOUS at 17:25

## 2025-02-23 RX ADMIN — VANCOMYCIN HYDROCHLORIDE 1250 MG: 1.25 INJECTION, POWDER, LYOPHILIZED, FOR SOLUTION INTRAVENOUS at 09:10

## 2025-02-23 RX ADMIN — CARVEDILOL 25 MG: 25 TABLET, FILM COATED ORAL at 21:25

## 2025-02-23 RX ADMIN — PANTOPRAZOLE SODIUM 40 MG: 40 TABLET, DELAYED RELEASE ORAL at 06:47

## 2025-02-23 RX ADMIN — PANTOPRAZOLE SODIUM 40 MG: 40 TABLET, DELAYED RELEASE ORAL at 17:25

## 2025-02-23 RX ADMIN — INSULIN LISPRO 12 UNITS: 100 INJECTION, SOLUTION INTRAVENOUS; SUBCUTANEOUS at 17:25

## 2025-02-23 RX ADMIN — INSULIN LISPRO 3 UNITS: 100 INJECTION, SOLUTION INTRAVENOUS; SUBCUTANEOUS at 11:40

## 2025-02-23 RX ADMIN — CEFEPIME 1000 MG: 1 INJECTION, POWDER, FOR SOLUTION INTRAMUSCULAR; INTRAVENOUS at 10:26

## 2025-02-23 NOTE — PROGRESS NOTES
Podiatry Progress Note      Patient: Wilner Menjivar Admit Date: 02/11/2025    Age: 39 y.o.   PCP: Roosevelt Thao MD    MRN: [id number]  Room: 14/        Subjective     Chief Complaint     Chief Complaint   Patient presents with    Wound Check        HPI     Postop day #2 from right foot incision and drainage and debridement.  Patient soaked through his dressing yesterday and had a new bandage reapplied by my partner.  The bleeding has significantly slowed down.  Patient's hemoglobin has dropped and 2 units of blood has been ordered.  Per nursing staff, patient did refuse blood draws this morning and type and screen was unable to be taken and that is the delay in the blood at this time.    Past Medical History     Past Medical History:   Diagnosis Date    CKD stage 4 due to type 2 diabetes mellitus     GERD (gastroesophageal reflux disease)     Heart failure     Hypertension     Type 2 diabetes mellitus     Vision impairment         Past Surgical History:   Procedure Laterality Date    AMPUTATION DIGIT Right 2/18/2025    Procedure: Hallux amputation, right foot SESAMOIDECTOMY, INCISION AN DRAINAGE;  Surgeon: Trevon Garcia DPM;  Location: [facility];  Service: Podiatry;  Laterality: Right;    CORONARY STENT PLACEMENT      EYE SURGERY      WISDOM TOOTH EXTRACTION          Allergies   Allergen Reactions    Azithromycin Nausea And Vomiting     Pt given IV azithro, reported nausea/vomiting and hot flashes within 3-5 minutes of admin. Pt also c/o new abd pain with admin.     Pt given IV azithro, reported nausea/vomiting and hot flashes within 3-5 minutes of admin. Pt also c/o new abd pain with admin.    Penicillins Unknown - Low Severity     reaction as a child. Does not recall reaction.     reaction as a child. Does not recall reaction.  Beta lactam allergy details  Antibiotic reaction: unknown  Age at reaction: infant  Dose to reaction time: unknown  Reason for antibiotic: unknown  Epinephrine required  for reaction?: unknown  Tolerated antibiotics: unknown           Social History     Tobacco Use   Smoking Status Never   Smokeless Tobacco Never        Objective   Physical Exam    Vitals:    02/23/25 0808   BP: 103/58   Pulse: 104   Resp: 22   Temp: 98.4 °F (36.9 °C)   SpO2: 99%        Dermatology: Right foot open surgical wound with first metatarsal exposed.  When the dressing was removed, there was no bleeding or pulsating noted.  No purulence expressed    Vascular: Warm skin to touch        Labs     Lab Results   Component Value Date    HGBA1C 11.10 (H) 02/13/2025    POCGLU 138 (H) 02/23/2025        CBC:      Lab 02/23/25  0346 02/22/25  0337 02/21/25  0544 02/20/25  0352 02/19/25  0616   WBC 16.36* 11.86* 13.27* 14.41* 15.41*   HEMOGLOBIN 5.7* 7.2* 8.7* 9.2* 6.7*   HEMATOCRIT 18.2* 23.4* 26.3* 26.0* 20.3*   PLATELETS 281 315 328 339 331   NEUTROS ABS 13.61* 9.55* 10.66* 11.65* 13.07*   IMMATURE GRANS (ABS) 0.38* 0.22* 0.28* 0.32* 0.24*   LYMPHS ABS 1.19 1.00 1.04 1.03 0.93   MONOS ABS 0.87 0.80 0.91* 1.07* 0.86   EOS ABS 0.24 0.23 0.30 0.27 0.25   MCV 89.7 91.4 87.7 86.1 87.9          Results for orders placed or performed during the hospital encounter of 02/11/25   Blood Culture - Blood, Arm, Right    Specimen: Arm, Right; Blood   Result Value Ref Range    Blood Culture No growth at 5 days         XR Foot 3+ View Right  Narrative: XR FOOT 3+ VW RIGHT-     INDICATIONS: Postoperative evaluation     TECHNIQUE: 3 VIEWS OF THE RIGHT FOOT     COMPARISON: 2/18/2025     FINDINGS:     Partial amputation change of the first ray is noted at the level of the  proximal to mid first metatarsal shaft, with adjacent surgical soft  tissue gas. The bones otherwise appear intact. No dislocation.     Impression:    Postsurgical changes.           This report was finalized on 2/21/2025 3:43 PM by Dr. Chris Sheets M.D on Workstation: [id number]          Assessment/Plan     39-year-old male postop day 2 from right foot incision  and drainage, partial metatarsal excision    -Patient examined and evaluated  - New dressing applied with nursing at bedside.  Significant bolster dressing applied.  No active bleeding noted to the right foot.  - 2 units blood ordered  -WBC has been elevated, continue antibiotics per infectious disease  - Elevate right lower extremity  - I will change patient orders to nonweightbearing right lower extremity  -Plan for dialysis tomorrow and surgery in the afternoon.      Trevon Garcia DPM  Office: [phone]

## 2025-02-23 NOTE — PROGRESS NOTES
"  Infectious Diseases Progress Note    Abdiaziz Paz MD     [facility]  [place]: 12 days  Patient Identification:  Name: Wilner Menjivar  Age: 39 y.o.  Sex: male  :  1985  MRN: [id number]         Primary Care Physician: Roosevelt Thao MD        Subjective: Feels tired but denies any fever and chills.  Interval History: See consultation note.  2025: UnderwentHallux amputation, right foot SESAMOIDECTOMY, INCISION AN DRAINAGE  2025: Patient underwent repeat I&D of the right foot and partial first metatarsal excision of the right foot.  Objective:    Scheduled Meds:amLODIPine, 5 mg, Oral, Q24H  aspirin, 81 mg, Oral, Daily  atorvastatin, 10 mg, Oral, Nightly  bumetanide, 2 mg, Oral, BID Diuretics  carvedilol, 25 mg, Oral, BID  cefepime, 1,000 mg, Intravenous, Q24H  docusate sodium, 100 mg, Oral, BID  epoetin jamil/jamil-epbx, 10,000 Units, Subcutaneous, Once per day on   insulin glargine, 36 Units, Subcutaneous, Nightly  insulin lispro, 12 Units, Subcutaneous, TID With Meals  insulin lispro, 2-7 Units, Subcutaneous, 4x Daily AC & at Bedtime  losartan, 50 mg, Oral, Q24H  pantoprazole, 40 mg, Oral, BID AC  polyethylene glycol, 17 g, Oral, Daily  terazosin, 1 mg, Oral, Nightly  ticagrelor, 90 mg, Oral, Q12H  Vancomycin Pharmacy Intermittent/Pulse Dosing, , Not Applicable, Daily      Continuous Infusions:Pharmacy to dose vancomycin,         Vital signs in last 24 hours:  Temp:  [97.7 °F (36.5 °C)-98.4 °F (36.9 °C)] 98.1 °F (36.7 °C)  Heart Rate:  [] 99  Resp:  [18] 18  BP: ()/(59-72) 112/61    Intake/Output:  No intake or output data in the 24 hours ending 25 0703          Exam:  /61 (BP Location: Right arm, Patient Position: Lying)   Pulse 99   Temp 98.1 °F (36.7 °C) (Oral)   Resp 18   Ht 170.2 cm (67\")   Wt 106 kg (234 lb 5.6 oz)   SpO2 100%   BMI 36.70 kg/m²   Patient is examined using the personal protective equipment as per guidelines from " infection control for this particular patient as enacted.  Hand washing was performed before and after patient interaction.  General Appearance:    Alert, cooperative, no distress, AAOx3                          Head:    Normocephalic, without obvious abnormality, atraumatic                           Eyes:  Legally blind                         Throat:   Lips, tongue, gums normal; oral mucosa pink and moist                           Neck:   Supple, symmetrical, trachea midline, no JVD                         Lungs:    Clear to auscultation bilaterally, respirations unlabored                 Chest Wall:    No tenderness or deformity                          Heart:  S1-S2 regular                  Abdomen:   Soft nontender                 Extremities: Right foot after debridement and amputation of the great toe is dressed.                  Neurologic: Legally blind       Data Review:    I reviewed the patient's new clinical results.  Results from last 7 days   Lab Units 02/23/25 0346 02/22/25  0337 02/21/25  0544 02/20/25  0352 02/19/25  0616 02/18/25  0348 02/17/25  0526   WBC 10*3/mm3 16.36* 11.86* 13.27* 14.41* 15.41* 15.91* 18.20*   HEMOGLOBIN g/dL 5.7* 7.2* 8.7* 9.2* 6.7* 8.5* 8.4*   PLATELETS 10*3/mm3 281 315 328 339 331 337 359     Results from last 7 days   Lab Units 02/23/25  0346 02/22/25  0337 02/21/25  0544 02/20/25  0352 02/19/25  0616 02/18/25  0348 02/17/25  0526   SODIUM mmol/L 133* 134* 132* 134* 130* 127* 131*   POTASSIUM mmol/L 4.3 4.8 4.5 4.5 4.2 4.3 3.9   CHLORIDE mmol/L 100 100 97* 98 94* 93* 92*   CO2 mmol/L 22.0 23.8 21.0* 24.5 21.0* 23.0 20.3*   BUN mg/dL 33* 25* 34* 25* 39* 31* 46*   CREATININE mg/dL 6.93* 5.30* 6.28* 5.48* 7.27* 5.65* 6.59*   CALCIUM mg/dL 8.6 8.6 9.2 9.2 8.9 9.3 9.3   GLUCOSE mg/dL 123* 259* 211* 190* 303* 326* 166*   XR Foot 3+ View Right    Result Date: 2/21/2025   Postsurgical changes.    This report was finalized on 2/21/2025 3:43 PM by Dr. Chris Sheets M.D on  Workstation: [id number]      XR Foot 3+ View Right    Result Date: 2/18/2025  Postoperative changes from amputation of the great toe at the level of the MTP joint.  This report was finalized on 2/18/2025 9:04 AM by Tom Cha MD on Workstation: [id number]      Doppler Arterial Multi Level Lower Extremity - Bilateral CAR    Addendum Date: 2/13/2025      Right Conclusion: The right GIOVANA is moderately reduced. Waveforms are consistent with femoral disease, popliteal disease and tib-peroneal disease. Normal digital pressures.   Left Conclusion: The left GIOVANA is normal. Normal digital pressures.     MRI Foot Right Without Contrast    Result Date: 2/13/2025  1. Osteomyelitis of the distal shaft and head of the 1st proximal phalanx and of the distal phalanx with overlying soft tissue wound/ulcer. Gas within the soft tissues and marrow of the great toe associated with infection. Tenosynovitis flexor hallucis longus tendon sheath. Forefoot cellulitis. 2. No evidence for septic arthritis at the 1st MTP joint. 3. Muscular edema and atrophy associated with myositis or chronic neuropathy.  This report was finalized on 2/13/2025 7:48 AM by Junior Villalobos M.D on Workstation: [id number]      XR Foot 3+ View Right    Result Date: 2/11/2025   There is subcutaneous emphysema and soft tissue loss over the great toe. No radiopaque foreign body. No definite bone erosion.    This report was finalized on 2/11/2025 5:20 PM by Dr. Jason Dan M.D on Workstation: [id number]     Microbiology Results (last 10 days)       Procedure Component Value - Date/Time    Tissue / Bone Culture - Surgical Site, Toe, Right [[id number]]  (Abnormal)  (Susceptibility) Collected: 02/18/25 0754    Lab Status: Final result Specimen: Surgical Site from Toe, Right Updated: 02/21/25 0759     Tissue Culture Scant growth (1+) Citrobacter freundii     Comment:   This organism may develop resistance during prolonged therapy with 3rd generation cephalosporins  (e.g. ceftriaxone) as a result of de-repression of AmpC B-lactamase.  Ceftriaxone may be a reasonable treatment option for uncomplicated cystitis or other lower severity infections when susceptibility is demonstrated.         Scant growth (1+) Enterococcus faecalis     Gram Stain Few (2+) Gram positive cocci      Rare (1+) WBCs seen    Susceptibility        Citrobacter freundii      BHAVIN      Cefepime Susceptible      Ceftazidime Susceptible      Ceftriaxone Susceptible      Gentamicin Susceptible      Levofloxacin Susceptible      Trimethoprim + Sulfamethoxazole Susceptible                       Susceptibility        Enterococcus faecalis      BHAVIN      Ampicillin Susceptible      Vancomycin Susceptible                       Susceptibility Comments       Citrobacter freundii    With the exception of urinary-sourced infections, aminoglycosides should not be used as monotherapy.               Anaerobic Culture 10 Day Incubation - Surgical Site, Toe, Right [[id number]]  (Normal) Collected: 02/18/25 0754    Lab Status: Preliminary result Specimen: Surgical Site from Toe, Right Updated: 02/21/25 1052     Anaerobic Culture Screening for Anaerobes    MRSA Screen, PCR (Inpatient) - Swab, Nares [[id number]]  (Normal) Collected: 02/13/25 0906    Lab Status: Final result Specimen: Swab from Nares Updated: 02/13/25 1102     MRSA PCR No MRSA Detected    Narrative:      The negative predictive value of this diagnostic test is high and should only be used to consider de-escalating anti-MRSA therapy. A positive result may indicate colonization with MRSA and must be correlated clinically.          Telemetry Scan   Final Result      Telemetry Scan   Final Result      Telemetry Scan   Final Result      Telemetry Scan   Final Result      Telemetry Scan   Final Result      Telemetry Scan   Final Result      Telemetry Scan   Final Result      Telemetry Scan   Final Result      Telemetry Scan   Final Result      Telemetry Scan   Final Result       Telemetry Scan   Final Result      Telemetry Scan   Final Result      Telemetry Scan   Final Result      Telemetry Scan   Final Result      Telemetry Scan   Final Result      Telemetry Scan   Final Result      Telemetry Scan   Final Result      Telemetry Scan   Final Result      Telemetry Scan   Final Result      Telemetry Scan   Final Result      Telemetry Scan   Final Result      Telemetry Scan   Final Result      Telemetry Scan   Final Result      Telemetry Scan   Final Result      Telemetry Scan   Final Result      Telemetry Scan   Final Result              Assessment:    Diabetic toe ulcer    Osteomyelitis of great toe of right foot  39-year-old male with  1-right great toe diabetic/ischemic ulcer with dry gangrene with associated abscess and contiguous focus osteomyelitis of the proximal and distal phalanx with associated flexor hallucis tenosynovitis-status post amputation of right great toe, I&D of multiple compartments of the right foot and sesamoidectomy involving tibia-fibula and right foot on 2/18/2025-operative cultures showing gram-negative rods and Gram stain shows gram-positive cocci-identified as Citrobacter freundii and Enterococcus.  2-significant peripheral vascular disease with element of gangrene involving the great toe  3-type 2 diabetes  4-end-stage renal disease  5-legally blind  6-severe anemia multifactorial  7-hypertension  8-multiple antibiotic allergies/intolerances including allergy to penicillin though it could very well be not a true allergy given the description of his reaction.  9-anemia multifactorial status post transfusion  10-penicillin allergy.     Recommendations/Discussions:  See my discussion and recommendation on 2/13/2025 and 2/19 and 2/20/2025.  Based on the information we have changed ceftriaxone to cefepime and continue vancomycin given presence of Citrobacter, persistent leukocytosis and  penicillin allergy.  Follow-up on surgical pathology sent on 2/18/2025 and  on 2/21/2025 which is currently pending.  Duration of antibiotic treatment will depend upon whether or not there is concern for residual osteomyelitis and when final I&D is performed.      Abdiaziz Paz MD  2/23/2025  07:03 EST    Parts of this note may be an electronic transcription/translation of spoken language to printed text using the Dragon dictation system.

## 2025-02-23 NOTE — PROGRESS NOTES
[facility] Progress Note      Patient Name: Wilner Menjivar  : 1985  MRN: [id number]  Primary Care Physician:  Roosevelt Thao MD  Date of admission: 2025    Subjective     Interval History:   Follow-up on ESRD  Sleeping, arousable, not hypoxic on room air    Review of Systems:   As noted above    Objective     Vitals:   Temp:  [97.7 °F (36.5 °C)-98.4 °F (36.9 °C)] 98.2 °F (36.8 °C)  Heart Rate:  [] 98  Resp:  [18-22] 18  BP: ()/(55-72) 93/58  No intake or output data in the 24 hours ending 25 1338          Physical Exam:    General Appearance: very sleepy, chronically ill, no acute distress  Skin: warm and dry  HEENT: oral mucosa normal, nonicteric sclera, legally blind  Neck: supple, no JVD  Lungs: CTA, no wheezing, nonlabored on RA  Heart: Regular rate and rhythm  Abdomen: soft, nontender, nondistended, BS +  : no palpable bladder  Extremities: no edema, cyanosis or clubbing; dressing on right foot saturated with blood  Neuro: sleeping moves all extremities  Vascular: L AVF patent, + thrill and bruit    Scheduled Meds:     aspirin, 81 mg, Oral, Daily  atorvastatin, 10 mg, Oral, Nightly  bumetanide, 2 mg, Oral, BID Diuretics  carvedilol, 25 mg, Oral, BID  cefepime, 1,000 mg, Intravenous, Q24H  docusate sodium, 100 mg, Oral, BID  epoetin jamil/jamil-epbx, 10,000 Units, Subcutaneous, Once per day on   insulin glargine, 36 Units, Subcutaneous, Nightly  insulin lispro, 12 Units, Subcutaneous, TID With Meals  insulin lispro, 2-7 Units, Subcutaneous, 4x Daily AC & at Bedtime  pantoprazole, 40 mg, Oral, BID AC  polyethylene glycol, 17 g, Oral, Daily  ticagrelor, 90 mg, Oral, Q12H  Vancomycin Pharmacy Intermittent/Pulse Dosing, , Not Applicable, Daily      IV Meds:   Pharmacy to dose vancomycin,         Results Reviewed:   I have personally reviewed the results from the time of this admission to 2025 13:38 EST     Results from last 7  days   Lab Units 02/23/25  1105 02/23/25  0346 02/22/25  0337 02/21/25  0544   SODIUM mmol/L  --  133* 134* 132*   POTASSIUM mmol/L 4.6 4.3 4.8 4.5   CHLORIDE mmol/L  --  100 100 97*   CO2 mmol/L  --  22.0 23.8 21.0*   BUN mg/dL  --  33* 25* 34*   CREATININE mg/dL  --  6.93* 5.30* 6.28*   CALCIUM mg/dL  --  8.6 8.6 9.2   GLUCOSE mg/dL  --  123* 259* 211*       Estimated Creatinine Clearance: 16.5 mL/min (A) (by C-G formula based on SCr of 6.93 mg/dL (H)).    Results from last 7 days   Lab Units 02/23/25  1105 02/22/25  0337 02/21/25  0544 02/19/25  0616   MAGNESIUM mg/dL 2.0  --   --   --    PHOSPHORUS mg/dL  --  3.7 4.3 3.9             Results from last 7 days   Lab Units 02/23/25  0346 02/22/25  0337 02/21/25  0544 02/20/25  0352 02/19/25  0616   WBC 10*3/mm3 16.36* 11.86* 13.27* 14.41* 15.41*   HEMOGLOBIN g/dL 5.7* 7.2* 8.7* 9.2* 6.7*   PLATELETS 10*3/mm3 281 315 328 339 331             Assessment / Plan     ASSESSMENT:     End  Stage Renal Disease ( ESRD )  since 6/2024 followed by Dr. Wasserman on Hemodialysis on a Monday, Wednesday and Friday schedule via E AVF.    Anemia of CKD on Epogen protocol.  10,000 units subcu. iron studies are pending   History of hyperphosphatemia  Hypertension w/ CKD.  Continue home regimen. We will continue to follow closely  Coronary artery disease status postcardiac cath with stent placement to mid/distal OM2 , and proximal Cx. ( 9/24 ).  On medical management  Diabetes Mellitus type 2 w/ complications ( retinopathy , peripheral vascular disease  nephropathy ) .Continue insulin regimen and Diabetic diet, as per primary team   Right diabetic foot/osteomyelitis follow-up podiatry and vascular surgery status post right fifth toe potation, postop bleeding noted saturating dressing and sheet  Anemia postoperative and anemia of CKD being transfused today    PLAN:    Continue dialysis on Monday Wednesday and Friday with plan for dialysis Monday  Continue Epogen on dialysis      Thank you  for involving us in the care of Wilner Menjivar.  Please feel free to call with any questions.    Albino Babcock MD  02/23/25  13:38 EST    [facility]  [phone]

## 2025-02-23 NOTE — PROGRESS NOTES
[facility] Clinical Pharmacy Services: Vancomycin Level Monitoring Note    Wilner Menjivar is a 39 y.o. male who is on day 13/14 of pharmacy to dose vancomycin for Skin and Soft Tissue.    Estimated Creatinine Clearance: 16.5 mL/min (A) (by C-G formula based on SCr of 6.93 mg/dL (H)).    Current Vanc Dose: intermittent dosing  Results from last 7 days   Lab Units 02/23/25  0346 02/21/25  0544 02/19/25  0616   VANCOMYCIN RM mcg/mL 17.20 23.60 18.90     PLAN: Based on random level this am will give a dose of 1250mg X one.  Noted HD and surgery planned for Mon.  No further levels needed at this time unless duration is extended as stop date of vancomycin in 2/24.    Jayla Doshi, PharmD  Clinical Pharmacist

## 2025-02-23 NOTE — PLAN OF CARE
Goal Outcome Evaluation:  Plan of Care Reviewed With: patient        Progress: no change  Outcome Evaluation: VSS, dressing reinforced, hgb 5.7, 1 unit PRBC ordered, Dilaudid and Zofran X1, IV abx, debridement on Monday, call light in reach

## 2025-02-23 NOTE — PROGRESS NOTES
"Daily progress note    Primary care physician  Dr. NERI    Subjective   Patient still with bleeding from the surgical site and feeling same like yesterday with no new complaints.  Patient family at bedside    History of present illness  39-year-old male with history of end-stage renal disease on hemodialysis chronic anemia diabetes hypertension hyperlipidemia coronary artery disease and gastroesophageal of disease who is also legally blind and has right foot wound which is getting worse mainly right great toe which looks infected and has recently seen by podiatrist and removed the callus from the right great toe.  Patient has intermittent bleeding pain drainage but no fever chills.  Patient also denies any chest pain shortness of breath palpitation abdominal pain nausea vomiting diarrhea.  Patient workup in ER revealed infected right great toe wound admitted for management.     REVIEW OF SYSTEMS  Unremarkable except pain     PHYSICAL EXAM   Blood pressure 93/58, pulse 98, temperature 98.2 °F (36.8 °C), temperature source Oral, resp. rate 18, height 170.2 cm (67\"), weight 106 kg (234 lb 5.6 oz), SpO2 100%.    Constitutional:       General: He is not in acute distress.     Appearance: Normal appearance. He is not ill-appearing, toxic-appearing or diaphoretic.   HENT:      Head: Normocephalic and atraumatic.      Nose: Nose normal.      Mouth/Throat:      Mouth: Mucous membranes are moist.      Pharynx: Oropharynx is clear.   Eyes:      Conjunctiva/sclera: Conjunctivae normal.      Pupils: Pupils are equal, round, and reactive to light.   Cardiovascular:      Rate and Rhythm: Normal rate and regular rhythm.      Pulses: Normal pulses.   Pulmonary:      Effort: Pulmonary effort is normal.   Abdominal:      General: Abdomen is flat.      Palpations: Abdomen is soft.   Musculoskeletal:      Comments: Right great toe is black in color with a deep ulceration and dried blood, no active purulent drainage, no palpable crepitus " or fluctuance   Skin:     General: Skin is warm and dry.   Neurological:      General: No focal deficit present.      Mental Status: He is alert and oriented to person, place, and time. Mental status is at baseline.   Psychiatric:         Mood and Affect: Mood normal.         Behavior: Behavior normal.         Thought Content: Thought content normal.         Judgment: Judgment normal.      LAB RESULTS  Lab Results (last 24 hours)       Procedure Component Value Units Date/Time    High Sensitivity Troponin T 1Hr [[id number]]  (Abnormal) Collected: 02/23/25 1216    Specimen: Blood Updated: 02/23/25 1257     HS Troponin T 99 ng/L      Troponin T Numeric Delta -6 ng/L      Troponin T % Delta -6    Narrative:      High Sensitive Troponin T Reference Range:  <14.0 ng/L- Negative Female for AMI  <22.0 ng/L- Negative Male for AMI  >=14 - Abnormal Female indicating possible myocardial injury.  >=22 - Abnormal Male indicating possible myocardial injury.   Clinicians would have to utilize clinical acumen, EKG, Troponin, and serial changes to determine if it is an Acute Myocardial Infarction or myocardial injury due to an underlying chronic condition.         High Sensitivity Troponin T [[id number]]  (Abnormal) Collected: 02/23/25 1105    Specimen: Blood Updated: 02/23/25 1150     HS Troponin T 105 ng/L     Narrative:      High Sensitive Troponin T Reference Range:  <14.0 ng/L- Negative Female for AMI  <22.0 ng/L- Negative Male for AMI  >=14 - Abnormal Female indicating possible myocardial injury.  >=22 - Abnormal Male indicating possible myocardial injury.   Clinicians would have to utilize clinical acumen, EKG, Troponin, and serial changes to determine if it is an Acute Myocardial Infarction or myocardial injury due to an underlying chronic condition.         Potassium [[id number]]  (Normal) Collected: 02/23/25 1105    Specimen: Blood Updated: 02/23/25 1131     Potassium 4.6 mmol/L     Magnesium [[id number]]  (Normal) Collected:  02/23/25 1105    Specimen: Blood Updated: 02/23/25 1131     Magnesium 2.0 mg/dL     Extra Tubes [[id number]] Collected: 02/23/25 1110    Specimen: Blood, Venous Line Updated: 02/23/25 1130    Narrative:      The following orders were created for panel order Extra Tubes.  Procedure                               Abnormality         Status                     ---------                               -----------         ------                     Lavender Top[[id number]]                                     Final result                 Please view results for these tests on the individual orders.    Lavender Top [[id number]] Collected: 02/23/25 1110    Specimen: Blood Updated: 02/23/25 1130     Extra Tube hold for add-on     Comment: Auto resulted       POC Glucose Once [[id number]]  (Abnormal) Collected: 02/23/25 1110    Specimen: Blood Updated: 02/23/25 1112     Glucose 207 mg/dL     Fungus Culture - Surgical Site, Toe, Right [[id number]] Collected: 02/18/25 0754    Specimen: Surgical Site from Toe, Right Updated: 02/23/25 0830     Fungus Culture No fungus isolated at less than 1 week    POC Glucose Once [[id number]]  (Abnormal) Collected: 02/23/25 0813    Specimen: Blood Updated: 02/23/25 0815     Glucose 138 mg/dL     POC Glucose Once [[id number]]  (Abnormal) Collected: 02/23/25 0622    Specimen: Blood Updated: 02/23/25 0623     Glucose 136 mg/dL     Vancomycin, Random [[id number]]  (Normal) Collected: 02/23/25 0346    Specimen: Blood Updated: 02/23/25 0512     Vancomycin Random 17.20 mcg/mL     Narrative:      Therapeutic Ranges for Vancomycin    Vancomycin Random   5.0-40.0 mcg/mL  Vancomycin Trough   5.0-20.0 mcg/mL  Vancomycin Peak     20.0-40.0 mcg/mL    Basic Metabolic Panel [[id number]]  (Abnormal) Collected: 02/23/25 0346    Specimen: Blood Updated: 02/23/25 0512     Glucose 123 mg/dL      BUN 33 mg/dL      Creatinine 6.93 mg/dL      Sodium 133 mmol/L      Potassium 4.3 mmol/L      Chloride 100 mmol/L      CO2 22.0  mmol/L      Calcium 8.6 mg/dL      BUN/Creatinine Ratio 4.8     Anion Gap 11.0 mmol/L      eGFR 9.6 mL/min/1.73     Narrative:      GFR Categories in Chronic Kidney Disease (CKD)      GFR Category          GFR (mL/min/1.73)    Interpretation  G1                     90 or greater         Normal or high (1)  G2                      60-89                Mild decrease (1)  G3a                   45-59                Mild to moderate decrease  G3b                   30-44                Moderate to severe decrease  G4                    15-29                Severe decrease  G5                    14 or less           Kidney failure          (1)In the absence of evidence of kidney disease, neither GFR category G1 or G2 fulfill the criteria for CKD.    eGFR calculation 2021 CKD-EPI creatinine equation, which does not include race as a factor    CBC & Differential [[id number]]  (Abnormal) Collected: 02/23/25 0346    Specimen: Blood Updated: 02/23/25 0502    Narrative:      The following orders were created for panel order CBC & Differential.  Procedure                               Abnormality         Status                     ---------                               -----------         ------                     CBC Auto Differential[[id number]]        Abnormal            Final result                 Please view results for these tests on the individual orders.    CBC Auto Differential [[id number]]  (Abnormal) Collected: 02/23/25 0346    Specimen: Blood Updated: 02/23/25 0502     WBC 16.36 10*3/mm3      RBC 2.03 10*6/mm3      Hemoglobin 5.7 g/dL      Hematocrit 18.2 %      MCV 89.7 fL      MCH 28.1 pg      MCHC 31.3 g/dL      RDW 14.0 %      RDW-SD 45.9 fl      MPV 8.3 fL      Platelets 281 10*3/mm3      Neutrophil % 83.2 %      Lymphocyte % 7.3 %      Monocyte % 5.3 %      Eosinophil % 1.5 %      Basophil % 0.4 %      Immature Grans % 2.3 %      Neutrophils, Absolute 13.61 10*3/mm3      Lymphocytes, Absolute 1.19 10*3/mm3       Monocytes, Absolute 0.87 10*3/mm3      Eosinophils, Absolute 0.24 10*3/mm3      Basophils, Absolute 0.07 10*3/mm3      Immature Grans, Absolute 0.38 10*3/mm3      nRBC 0.1 /100 WBC     POC Glucose Once [[id number]]  (Abnormal) Collected: 02/22/25 2025    Specimen: Blood Updated: 02/22/25 2026     Glucose 205 mg/dL     POC Glucose Once [[id number]]  (Abnormal) Collected: 02/22/25 1555    Specimen: Blood Updated: 02/22/25 1556     Glucose 315 mg/dL           Imaging Results (Last 24 Hours)       ** No results found for the last 24 hours. **            Current Facility-Administered Medications:     aspirin chewable tablet 81 mg, 81 mg, Oral, Daily, JoseTrevon carr, DPM, 81 mg at 02/23/25 0816    atorvastatin (LIPITOR) tablet 10 mg, 10 mg, Oral, Nightly, JoseTrevon carr, DPM, 10 mg at 02/22/25 2010    bumetanide (BUMEX) tablet 2 mg, 2 mg, Oral, BID Diuretics, JoseTrevon carr, DPM, 2 mg at 02/23/25 0816    calcium carbonate (TUMS) chewable tablet 500 mg (200 mg elemental), 2 tablet, Oral, TID PRN, Trevon Garcia, DPM, 2 tablet at 02/19/25 0022    carvedilol (COREG) tablet 25 mg, 25 mg, Oral, BID, JoseTrevon carr, DPM, 25 mg at 02/23/25 0816    cefepime 1000 mg IVPB in 100 mL NS (MBP), 1,000 mg, Intravenous, Q24H, Trevon Garcia, DPM, 1,000 mg at 02/23/25 1026    dextrose (D50W) (25 g/50 mL) IV injection 25 g, 25 g, Intravenous, Q15 Min PRN, Trevon Garcia, DPM    dextrose (GLUTOSE) oral gel 15 g, 15 g, Oral, Q15 Min PRN, Trevon Garcia, DPM    docusate sodium (COLACE) capsule 100 mg, 100 mg, Oral, BID, JoseTrevon goodwin, DPM, 100 mg at 02/22/25 2010    epoetin jamil-epbx (RETACRIT) injection 10,000 Units, 10,000 Units, Subcutaneous, Once per day on Monday Wednesday Friday, Trevon Garcia DPM, 10,000 Units at 02/21/25 1703    glucagon (GLUCAGEN) injection 1 mg, 1 mg, Intramuscular, Q15 Min PRN, Trevon Garcia DPM    HYDROcodone-acetaminophen (NORCO) 5-325 MG per tablet 2 tablet, 2 tablet, Oral, Q4H PRN,  Trevon Garcia DPM, 2 tablet at 02/22/25 1832    HYDROmorphone (DILAUDID) injection 1 mg, 1 mg, Intravenous, Q3H PRN, Trevon Garcia DPM, 1 mg at 02/22/25 2255    insulin glargine (LANTUS, SEMGLEE) injection 36 Units, 36 Units, Subcutaneous, Nightly, Remy Thornton MD, 36 Units at 02/22/25 2254    insulin lispro (HUMALOG/ADMELOG) injection 12 Units, 12 Units, Subcutaneous, TID With Meals, Remy Thornton MD, 12 Units at 02/22/25 1710    insulin lispro (HUMALOG/ADMELOG) injection 2-7 Units, 2-7 Units, Subcutaneous, 4x Daily AC & at Bedtime, Trevon Garcia DPM, 3 Units at 02/23/25 1140    ondansetron (ZOFRAN) injection 4 mg, 4 mg, Intravenous, Q6H PRN, Trevon Garcia DPM, 4 mg at 02/22/25 2255    pantoprazole (PROTONIX) EC tablet 40 mg, 40 mg, Oral, BID AC, Trevon Garcia DPM, 40 mg at 02/23/25 0647    Pharmacy to dose vancomycin, , Not Applicable, Continuous PRN, Trevon Garcia DPM    polyethylene glycol (MIRALAX) packet 17 g, 17 g, Oral, Daily, Trevon Garcia DPM    prochlorperazine (COMPAZINE) injection 5 mg, 5 mg, Intravenous, Q6H PRN, Remy Thornton MD, 5 mg at 02/22/25 1908    terazosin (HYTRIN) capsule 1 mg, 1 mg, Oral, Nightly, Trevon Garcia DPM, 1 mg at 02/22/25 2010    ticagrelor (BRILINTA) tablet 90 mg, 90 mg, Oral, Q12H, Albino Babcock MD    Vancomycin Pharmacy Intermittent/Pulse Dosing, , Not Applicable, Daily, Remy Thornton MD, Given at 02/21/25 0802    zolpidem (AMBIEN) tablet 5 mg, 5 mg, Oral, Nightly PRN, Trevon Garcia DPM, 5 mg at 02/22/25 5628     ASSESSMENT  Right great toe wound with infection and surrounding cellulitis and osteomyelitis status post I&D and hallux amputation followed by partial first right metatarsal excision  End-stage renal disease   Diabetes mellitus  Hypertension  Hyperlipidemia  Coronary artery disease  Legally blind  Chronic anemia with drop in H&H  Gastroesophageal reflux disease    PLAN  CPM   Postop care  Transfused 2 units packed RBC  Adjust  blood pressure medications  Continue IV antibiotics   Pain management   Hemodialysis TIW  Infectious disease podiatry and nephrology to follow patient   Continue home medications  Stress ulcer DVT prophylaxis  Supportive care  Discussed with nursing staff family and podiatry  Follow closely further recommendation current hospital course    JASKARAN LANGFORD MD    Copied text in this note has been reviewed and is accurate as of 02/23/25

## 2025-02-23 NOTE — PLAN OF CARE
Goal Outcome Evaluation:  Plan of Care Reviewed With: patient        Progress: no change  Outcome Evaluation: 2 urbcs today, vanc x 1 for sepsis coverage, dressing changed by podiatry, bleeding controlled, a little tachy, BP improved, plan for HD and debridement tomorrow, no pain meds today, new iv placed, vss, will continue to monitor

## 2025-02-23 NOTE — SIGNIFICANT NOTE
02/23/25 1156   OTHER   Discipline physical therapist   Rehab Time/Intention   Session Not Performed unable to treat, medical status change;other (see comments)  (Pt's Hgb levels have dropped below what is appropriate for therapy. PT will be held until this improves.)   Recommendation   PT - Next Appointment 02/24/25

## 2025-02-24 ENCOUNTER — ANESTHESIA (OUTPATIENT)
Dept: PERIOP | Facility: HOSPITAL | Age: 40
End: 2025-02-24
Payer: MEDICARE

## 2025-02-24 ENCOUNTER — ANESTHESIA EVENT (OUTPATIENT)
Dept: PERIOP | Facility: HOSPITAL | Age: 40
End: 2025-02-24
Payer: MEDICARE

## 2025-02-24 LAB
ANION GAP SERPL CALCULATED.3IONS-SCNC: 11.3 MMOL/L (ref 5–15)
BASOPHILS # BLD AUTO: 0.07 10*3/MM3 (ref 0–0.2)
BASOPHILS NFR BLD AUTO: 0.5 % (ref 0–1.5)
BH BB BLOOD EXPIRATION DATE: NORMAL
BH BB BLOOD EXPIRATION DATE: NORMAL
BH BB BLOOD TYPE BARCODE: 5100
BH BB BLOOD TYPE BARCODE: 5100
BH BB DISPENSE STATUS: NORMAL
BH BB DISPENSE STATUS: NORMAL
BH BB PRODUCT CODE: NORMAL
BH BB PRODUCT CODE: NORMAL
BH BB UNIT NUMBER: NORMAL
BH BB UNIT NUMBER: NORMAL
BUN SERPL-MCNC: 40 MG/DL (ref 6–20)
BUN/CREAT SERPL: 5 (ref 7–25)
CALCIUM SPEC-SCNC: 8.3 MG/DL (ref 8.6–10.5)
CHLORIDE SERPL-SCNC: 98 MMOL/L (ref 98–107)
CO2 SERPL-SCNC: 19.7 MMOL/L (ref 22–29)
CREAT SERPL-MCNC: 7.99 MG/DL (ref 0.76–1.27)
CROSSMATCH INTERPRETATION: NORMAL
CROSSMATCH INTERPRETATION: NORMAL
CYTO UR: NORMAL
DEPRECATED RDW RBC AUTO: 46.6 FL (ref 37–54)
EGFRCR SERPLBLD CKD-EPI 2021: 8.1 ML/MIN/1.73
EOSINOPHIL # BLD AUTO: 0.24 10*3/MM3 (ref 0–0.4)
EOSINOPHIL NFR BLD AUTO: 1.7 % (ref 0.3–6.2)
ERYTHROCYTE [DISTWIDTH] IN BLOOD BY AUTOMATED COUNT: 14.2 % (ref 12.3–15.4)
GLUCOSE BLDC GLUCOMTR-MCNC: 108 MG/DL (ref 70–130)
GLUCOSE BLDC GLUCOMTR-MCNC: 126 MG/DL (ref 70–130)
GLUCOSE BLDC GLUCOMTR-MCNC: 230 MG/DL (ref 70–130)
GLUCOSE BLDC GLUCOMTR-MCNC: 330 MG/DL (ref 70–130)
GLUCOSE SERPL-MCNC: 312 MG/DL (ref 65–99)
HCT VFR BLD AUTO: 21.4 % (ref 37.5–51)
HGB BLD-MCNC: 7 G/DL (ref 13–17.7)
IMM GRANULOCYTES # BLD AUTO: 0.29 10*3/MM3 (ref 0–0.05)
IMM GRANULOCYTES NFR BLD AUTO: 2.1 % (ref 0–0.5)
LAB AP CASE REPORT: NORMAL
LAB AP CLINICAL INFORMATION: NORMAL
LYMPHOCYTES # BLD AUTO: 1.02 10*3/MM3 (ref 0.7–3.1)
LYMPHOCYTES NFR BLD AUTO: 7.4 % (ref 19.6–45.3)
MCH RBC QN AUTO: 29 PG (ref 26.6–33)
MCHC RBC AUTO-ENTMCNC: 32.7 G/DL (ref 31.5–35.7)
MCV RBC AUTO: 88.8 FL (ref 79–97)
MONOCYTES # BLD AUTO: 0.89 10*3/MM3 (ref 0.1–0.9)
MONOCYTES NFR BLD AUTO: 6.4 % (ref 5–12)
NEUTROPHILS NFR BLD AUTO: 11.35 10*3/MM3 (ref 1.7–7)
NEUTROPHILS NFR BLD AUTO: 81.9 % (ref 42.7–76)
NRBC BLD AUTO-RTO: 0.3 /100 WBC (ref 0–0.2)
PATH REPORT.FINAL DX SPEC: NORMAL
PATH REPORT.GROSS SPEC: NORMAL
PLATELET # BLD AUTO: 262 10*3/MM3 (ref 140–450)
PMV BLD AUTO: 8.4 FL (ref 6–12)
POTASSIUM SERPL-SCNC: 4.8 MMOL/L (ref 3.5–5.2)
QT INTERVAL: 385 MS
QTC INTERVAL: 484 MS
RBC # BLD AUTO: 2.41 10*6/MM3 (ref 4.14–5.8)
SODIUM SERPL-SCNC: 129 MMOL/L (ref 136–145)
UNIT  ABO: NORMAL
UNIT  ABO: NORMAL
UNIT  RH: NORMAL
UNIT  RH: NORMAL
WBC NRBC COR # BLD AUTO: 13.86 10*3/MM3 (ref 3.4–10.8)

## 2025-02-24 PROCEDURE — 0QBN0ZZ EXCISION OF RIGHT METATARSAL, OPEN APPROACH: ICD-10-PCS | Performed by: PODIATRIST

## 2025-02-24 PROCEDURE — 25810000003 LACTATED RINGERS PER 1000 ML: Performed by: ANESTHESIOLOGY

## 2025-02-24 PROCEDURE — P9040 RBC LEUKOREDUCED IRRADIATED: HCPCS

## 2025-02-24 PROCEDURE — 25010000002 BUPIVACAINE 0.5 % SOLUTION: Performed by: PODIATRIST

## 2025-02-24 PROCEDURE — 25010000002 CEFEPIME PER 500 MG: Performed by: INTERNAL MEDICINE

## 2025-02-24 PROCEDURE — 25010000002 ONDANSETRON PER 1 MG: Performed by: PODIATRIST

## 2025-02-24 PROCEDURE — 25010000002 PROCHLORPERAZINE 10 MG/2ML SOLUTION: Performed by: PODIATRIST

## 2025-02-24 PROCEDURE — 25010000002 PROPOFOL 10 MG/ML EMULSION: Performed by: NURSE ANESTHETIST, CERTIFIED REGISTERED

## 2025-02-24 PROCEDURE — 25010000002 EPOETIN ALFA-EPBX 10000 UNIT/ML SOLUTION: Performed by: PODIATRIST

## 2025-02-24 PROCEDURE — 25010000002 HYDROMORPHONE 1 MG/ML SOLUTION: Performed by: PODIATRIST

## 2025-02-24 PROCEDURE — 63710000001 INSULIN LISPRO (HUMAN) PER 5 UNITS: Performed by: PODIATRIST

## 2025-02-24 PROCEDURE — 80048 BASIC METABOLIC PNL TOTAL CA: CPT | Performed by: HOSPITALIST

## 2025-02-24 PROCEDURE — 86900 BLOOD TYPING SEROLOGIC ABO: CPT

## 2025-02-24 PROCEDURE — 85025 COMPLETE CBC W/AUTO DIFF WBC: CPT | Performed by: HOSPITALIST

## 2025-02-24 PROCEDURE — 25010000002 VANCOMYCIN PER 500 MG: Performed by: PODIATRIST

## 2025-02-24 PROCEDURE — 63710000001 INSULIN GLARGINE PER 5 UNITS: Performed by: PODIATRIST

## 2025-02-24 PROCEDURE — 25010000002 FENTANYL CITRATE (PF) 50 MCG/ML SOLUTION: Performed by: ANESTHESIOLOGY

## 2025-02-24 PROCEDURE — 88305 TISSUE EXAM BY PATHOLOGIST: CPT | Performed by: PODIATRIST

## 2025-02-24 PROCEDURE — 25010000002 LIDOCAINE 2% SOLUTION: Performed by: NURSE ANESTHETIST, CERTIFIED REGISTERED

## 2025-02-24 PROCEDURE — 25010000002 MIDAZOLAM PER 1 MG: Performed by: ANESTHESIOLOGY

## 2025-02-24 PROCEDURE — P9016 RBC LEUKOCYTES REDUCED: HCPCS

## 2025-02-24 PROCEDURE — 88311 DECALCIFY TISSUE: CPT | Performed by: PODIATRIST

## 2025-02-24 PROCEDURE — 25010000002 LIDOCAINE 1 % SOLUTION: Performed by: PODIATRIST

## 2025-02-24 PROCEDURE — 63710000001 INSULIN LISPRO (HUMAN) PER 5 UNITS: Performed by: HOSPITALIST

## 2025-02-24 PROCEDURE — 99222 1ST HOSP IP/OBS MODERATE 55: CPT | Performed by: INTERNAL MEDICINE

## 2025-02-24 PROCEDURE — 82948 REAGENT STRIP/BLOOD GLUCOSE: CPT

## 2025-02-24 RX ORDER — EPHEDRINE SULFATE 50 MG/ML
5 INJECTION, SOLUTION INTRAVENOUS ONCE AS NEEDED
Status: DISCONTINUED | OUTPATIENT
Start: 2025-02-24 | End: 2025-02-24 | Stop reason: HOSPADM

## 2025-02-24 RX ORDER — ALUMINA, MAGNESIA, AND SIMETHICONE 2400; 2400; 240 MG/30ML; MG/30ML; MG/30ML
15 SUSPENSION ORAL AS NEEDED
Status: DISCONTINUED | OUTPATIENT
Start: 2025-02-24 | End: 2025-03-03

## 2025-02-24 RX ORDER — FENTANYL CITRATE 50 UG/ML
25 INJECTION, SOLUTION INTRAMUSCULAR; INTRAVENOUS
Status: DISCONTINUED | OUTPATIENT
Start: 2025-02-24 | End: 2025-02-24 | Stop reason: HOSPADM

## 2025-02-24 RX ORDER — FLUMAZENIL 0.1 MG/ML
0.2 INJECTION INTRAVENOUS AS NEEDED
Status: DISCONTINUED | OUTPATIENT
Start: 2025-02-24 | End: 2025-02-24 | Stop reason: HOSPADM

## 2025-02-24 RX ORDER — LIDOCAINE HYDROCHLORIDE 10 MG/ML
INJECTION, SOLUTION INFILTRATION; PERINEURAL AS NEEDED
Status: DISCONTINUED | OUTPATIENT
Start: 2025-02-24 | End: 2025-02-24 | Stop reason: HOSPADM

## 2025-02-24 RX ORDER — MAGNESIUM HYDROXIDE 1200 MG/15ML
LIQUID ORAL AS NEEDED
Status: DISCONTINUED | OUTPATIENT
Start: 2025-02-24 | End: 2025-02-24 | Stop reason: HOSPADM

## 2025-02-24 RX ORDER — ATROPINE SULFATE 0.4 MG/ML
0.4 INJECTION, SOLUTION INTRAMUSCULAR; INTRAVENOUS; SUBCUTANEOUS ONCE AS NEEDED
Status: DISCONTINUED | OUTPATIENT
Start: 2025-02-24 | End: 2025-02-24 | Stop reason: HOSPADM

## 2025-02-24 RX ORDER — LIDOCAINE HYDROCHLORIDE 10 MG/ML
0.5 INJECTION, SOLUTION INFILTRATION; PERINEURAL ONCE AS NEEDED
Status: DISCONTINUED | OUTPATIENT
Start: 2025-02-24 | End: 2025-02-24 | Stop reason: HOSPADM

## 2025-02-24 RX ORDER — ONDANSETRON 2 MG/ML
4 INJECTION INTRAMUSCULAR; INTRAVENOUS ONCE AS NEEDED
Status: DISCONTINUED | OUTPATIENT
Start: 2025-02-24 | End: 2025-02-24 | Stop reason: HOSPADM

## 2025-02-24 RX ORDER — SODIUM CHLORIDE 0.9 % (FLUSH) 0.9 %
3-10 SYRINGE (ML) INJECTION AS NEEDED
Status: DISCONTINUED | OUTPATIENT
Start: 2025-02-24 | End: 2025-02-24 | Stop reason: HOSPADM

## 2025-02-24 RX ORDER — ALBUMIN (HUMAN) 12.5 G/50ML
12.5 SOLUTION INTRAVENOUS AS NEEDED
Status: CANCELLED | OUTPATIENT
Start: 2025-02-25 | End: 2025-02-25

## 2025-02-24 RX ORDER — NALOXONE HCL 0.4 MG/ML
0.2 VIAL (ML) INJECTION AS NEEDED
Status: DISCONTINUED | OUTPATIENT
Start: 2025-02-24 | End: 2025-02-24 | Stop reason: HOSPADM

## 2025-02-24 RX ORDER — METRONIDAZOLE 500 MG/1
500 TABLET ORAL EVERY 8 HOURS SCHEDULED
Status: DISCONTINUED | OUTPATIENT
Start: 2025-02-24 | End: 2025-02-26

## 2025-02-24 RX ORDER — HYDROCODONE BITARTRATE AND ACETAMINOPHEN 7.5; 325 MG/1; MG/1
1 TABLET ORAL EVERY 4 HOURS PRN
Status: DISCONTINUED | OUTPATIENT
Start: 2025-02-24 | End: 2025-02-24 | Stop reason: HOSPADM

## 2025-02-24 RX ORDER — DIPHENHYDRAMINE HYDROCHLORIDE 50 MG/ML
12.5 INJECTION INTRAMUSCULAR; INTRAVENOUS
Status: DISCONTINUED | OUTPATIENT
Start: 2025-02-24 | End: 2025-02-24 | Stop reason: HOSPADM

## 2025-02-24 RX ORDER — BUPIVACAINE HYDROCHLORIDE 5 MG/ML
INJECTION, SOLUTION PERINEURAL AS NEEDED
Status: DISCONTINUED | OUTPATIENT
Start: 2025-02-24 | End: 2025-02-24 | Stop reason: HOSPADM

## 2025-02-24 RX ORDER — IPRATROPIUM BROMIDE AND ALBUTEROL SULFATE 2.5; .5 MG/3ML; MG/3ML
3 SOLUTION RESPIRATORY (INHALATION) ONCE AS NEEDED
Status: DISCONTINUED | OUTPATIENT
Start: 2025-02-24 | End: 2025-02-24 | Stop reason: HOSPADM

## 2025-02-24 RX ORDER — PROMETHAZINE HYDROCHLORIDE 25 MG/1
25 TABLET ORAL ONCE AS NEEDED
Status: DISCONTINUED | OUTPATIENT
Start: 2025-02-24 | End: 2025-02-24 | Stop reason: HOSPADM

## 2025-02-24 RX ORDER — HYDRALAZINE HYDROCHLORIDE 20 MG/ML
5 INJECTION INTRAMUSCULAR; INTRAVENOUS
Status: DISCONTINUED | OUTPATIENT
Start: 2025-02-24 | End: 2025-02-24 | Stop reason: HOSPADM

## 2025-02-24 RX ORDER — MIDAZOLAM HYDROCHLORIDE 1 MG/ML
1 INJECTION, SOLUTION INTRAMUSCULAR; INTRAVENOUS
Status: DISCONTINUED | OUTPATIENT
Start: 2025-02-24 | End: 2025-02-24 | Stop reason: HOSPADM

## 2025-02-24 RX ORDER — FENTANYL CITRATE 50 UG/ML
50 INJECTION, SOLUTION INTRAMUSCULAR; INTRAVENOUS ONCE AS NEEDED
Status: COMPLETED | OUTPATIENT
Start: 2025-02-24 | End: 2025-02-24

## 2025-02-24 RX ORDER — SODIUM CHLORIDE, SODIUM LACTATE, POTASSIUM CHLORIDE, CALCIUM CHLORIDE 600; 310; 30; 20 MG/100ML; MG/100ML; MG/100ML; MG/100ML
9 INJECTION, SOLUTION INTRAVENOUS CONTINUOUS
Status: DISCONTINUED | OUTPATIENT
Start: 2025-02-24 | End: 2025-02-25

## 2025-02-24 RX ORDER — LIDOCAINE HYDROCHLORIDE 20 MG/ML
INJECTION, SOLUTION INFILTRATION; PERINEURAL AS NEEDED
Status: DISCONTINUED | OUTPATIENT
Start: 2025-02-24 | End: 2025-02-24 | Stop reason: SURG

## 2025-02-24 RX ORDER — PROPOFOL 10 MG/ML
VIAL (ML) INTRAVENOUS AS NEEDED
Status: DISCONTINUED | OUTPATIENT
Start: 2025-02-24 | End: 2025-02-24 | Stop reason: SURG

## 2025-02-24 RX ORDER — HYDROCODONE BITARTRATE AND ACETAMINOPHEN 5; 325 MG/1; MG/1
1 TABLET ORAL ONCE AS NEEDED
Status: DISCONTINUED | OUTPATIENT
Start: 2025-02-24 | End: 2025-02-24 | Stop reason: HOSPADM

## 2025-02-24 RX ORDER — DEXMEDETOMIDINE HYDROCHLORIDE 100 UG/ML
INJECTION, SOLUTION INTRAVENOUS AS NEEDED
Status: DISCONTINUED | OUTPATIENT
Start: 2025-02-24 | End: 2025-02-24 | Stop reason: SURG

## 2025-02-24 RX ORDER — FAMOTIDINE 10 MG/ML
20 INJECTION, SOLUTION INTRAVENOUS ONCE
Status: COMPLETED | OUTPATIENT
Start: 2025-02-24 | End: 2025-02-24

## 2025-02-24 RX ORDER — SUCRALFATE 1 G/1
1 TABLET ORAL
Status: DISCONTINUED | OUTPATIENT
Start: 2025-02-24 | End: 2025-03-03 | Stop reason: HOSPADM

## 2025-02-24 RX ORDER — HYDROMORPHONE HYDROCHLORIDE 1 MG/ML
0.25 INJECTION, SOLUTION INTRAMUSCULAR; INTRAVENOUS; SUBCUTANEOUS
Status: DISCONTINUED | OUTPATIENT
Start: 2025-02-24 | End: 2025-02-24 | Stop reason: HOSPADM

## 2025-02-24 RX ORDER — SODIUM CHLORIDE 0.9 % (FLUSH) 0.9 %
3 SYRINGE (ML) INJECTION EVERY 12 HOURS SCHEDULED
Status: DISCONTINUED | OUTPATIENT
Start: 2025-02-24 | End: 2025-02-24 | Stop reason: HOSPADM

## 2025-02-24 RX ORDER — LABETALOL HYDROCHLORIDE 5 MG/ML
5 INJECTION, SOLUTION INTRAVENOUS
Status: DISCONTINUED | OUTPATIENT
Start: 2025-02-24 | End: 2025-02-24 | Stop reason: HOSPADM

## 2025-02-24 RX ORDER — PROMETHAZINE HYDROCHLORIDE 25 MG/1
25 SUPPOSITORY RECTAL ONCE AS NEEDED
Status: DISCONTINUED | OUTPATIENT
Start: 2025-02-24 | End: 2025-02-24 | Stop reason: HOSPADM

## 2025-02-24 RX ORDER — INSULIN LISPRO 100 [IU]/ML
14 INJECTION, SOLUTION INTRAVENOUS; SUBCUTANEOUS
Status: DISCONTINUED | OUTPATIENT
Start: 2025-02-24 | End: 2025-03-03 | Stop reason: HOSPADM

## 2025-02-24 RX ADMIN — DEXMEDETOMIDINE HYDROCHLORIDE 5 MCG: 100 INJECTION, SOLUTION, CONCENTRATE INTRAVENOUS at 16:44

## 2025-02-24 RX ADMIN — ATORVASTATIN CALCIUM 10 MG: 10 TABLET, FILM COATED ORAL at 21:34

## 2025-02-24 RX ADMIN — LIDOCAINE HYDROCHLORIDE 40 MG: 20 INJECTION, SOLUTION INFILTRATION; PERINEURAL at 16:44

## 2025-02-24 RX ADMIN — DOCUSATE SODIUM 100 MG: 100 CAPSULE, LIQUID FILLED ORAL at 21:34

## 2025-02-24 RX ADMIN — PROPOFOL 60 MG: 10 INJECTION, EMULSION INTRAVENOUS at 16:44

## 2025-02-24 RX ADMIN — INSULIN LISPRO 3 UNITS: 100 INJECTION, SOLUTION INTRAVENOUS; SUBCUTANEOUS at 11:49

## 2025-02-24 RX ADMIN — PROCHLORPERAZINE EDISYLATE 5 MG: 5 INJECTION, SOLUTION INTRAMUSCULAR; INTRAVENOUS at 23:57

## 2025-02-24 RX ADMIN — EPOETIN ALFA-EPBX 10000 UNITS: 10000 INJECTION, SOLUTION INTRAVENOUS; SUBCUTANEOUS at 11:49

## 2025-02-24 RX ADMIN — HYDROMORPHONE HYDROCHLORIDE 1 MG: 1 INJECTION, SOLUTION INTRAMUSCULAR; INTRAVENOUS; SUBCUTANEOUS at 21:46

## 2025-02-24 RX ADMIN — CARVEDILOL 25 MG: 25 TABLET, FILM COATED ORAL at 10:07

## 2025-02-24 RX ADMIN — ONDANSETRON 4 MG: 2 INJECTION INTRAMUSCULAR; INTRAVENOUS at 21:46

## 2025-02-24 RX ADMIN — ZOLPIDEM TARTRATE 5 MG: 5 TABLET, FILM COATED ORAL at 00:28

## 2025-02-24 RX ADMIN — ZOLPIDEM TARTRATE 5 MG: 5 TABLET, FILM COATED ORAL at 23:50

## 2025-02-24 RX ADMIN — PANTOPRAZOLE SODIUM 40 MG: 40 TABLET, DELAYED RELEASE ORAL at 18:54

## 2025-02-24 RX ADMIN — VANCOMYCIN HYDROCHLORIDE 500 MG: 500 INJECTION, POWDER, LYOPHILIZED, FOR SOLUTION INTRAVENOUS at 18:55

## 2025-02-24 RX ADMIN — PROPOFOL 150 MCG/KG/MIN: 10 INJECTION, EMULSION INTRAVENOUS at 16:45

## 2025-02-24 RX ADMIN — SODIUM CHLORIDE, POTASSIUM CHLORIDE, SODIUM LACTATE AND CALCIUM CHLORIDE: 600; 310; 30; 20 INJECTION, SOLUTION INTRAVENOUS at 16:33

## 2025-02-24 RX ADMIN — ANTACID TABLETS 2 TABLET: 500 TABLET, CHEWABLE ORAL at 08:12

## 2025-02-24 RX ADMIN — BUMETANIDE 2 MG: 2 TABLET ORAL at 10:07

## 2025-02-24 RX ADMIN — DEXMEDETOMIDINE HYDROCHLORIDE 5 MCG: 100 INJECTION, SOLUTION, CONCENTRATE INTRAVENOUS at 16:47

## 2025-02-24 RX ADMIN — BUMETANIDE 2 MG: 2 TABLET ORAL at 18:54

## 2025-02-24 RX ADMIN — METRONIDAZOLE 500 MG: 500 TABLET ORAL at 21:34

## 2025-02-24 RX ADMIN — CEFEPIME 2000 MG: 2 INJECTION, POWDER, FOR SOLUTION INTRAVENOUS at 13:00

## 2025-02-24 RX ADMIN — SUCRALFATE 1 G: 1 TABLET ORAL at 18:54

## 2025-02-24 RX ADMIN — SUCRALFATE 1 G: 1 TABLET ORAL at 21:34

## 2025-02-24 RX ADMIN — INSULIN LISPRO 5 UNITS: 100 INJECTION, SOLUTION INTRAVENOUS; SUBCUTANEOUS at 10:07

## 2025-02-24 RX ADMIN — MIDAZOLAM HYDROCHLORIDE 1 MG: 1 INJECTION, SOLUTION INTRAMUSCULAR; INTRAVENOUS at 15:52

## 2025-02-24 RX ADMIN — PANTOPRAZOLE SODIUM 40 MG: 40 TABLET, DELAYED RELEASE ORAL at 05:58

## 2025-02-24 RX ADMIN — INSULIN LISPRO 12 UNITS: 100 INJECTION, SOLUTION INTRAVENOUS; SUBCUTANEOUS at 10:07

## 2025-02-24 RX ADMIN — HYDROCODONE BITARTRATE AND ACETAMINOPHEN 2 TABLET: 5; 325 TABLET ORAL at 23:50

## 2025-02-24 RX ADMIN — INSULIN GLARGINE 42 UNITS: 100 INJECTION, SOLUTION SUBCUTANEOUS at 21:46

## 2025-02-24 RX ADMIN — ANTACID TABLETS 2 TABLET: 500 TABLET, CHEWABLE ORAL at 14:17

## 2025-02-24 RX ADMIN — FAMOTIDINE 20 MG: 10 INJECTION INTRAVENOUS at 15:52

## 2025-02-24 RX ADMIN — FENTANYL CITRATE 50 MCG: 50 INJECTION, SOLUTION INTRAMUSCULAR; INTRAVENOUS at 15:52

## 2025-02-24 RX ADMIN — ALUMINUM HYDROXIDE, MAGNESIUM HYDROXIDE, DIMETHICONE 15 ML: 400; 400; 40 SUSPENSION ORAL at 10:07

## 2025-02-24 RX ADMIN — CARVEDILOL 25 MG: 25 TABLET, FILM COATED ORAL at 21:34

## 2025-02-24 RX ADMIN — DEXMEDETOMIDINE HYDROCHLORIDE 5 MCG: 100 INJECTION, SOLUTION, CONCENTRATE INTRAVENOUS at 16:51

## 2025-02-24 NOTE — PLAN OF CARE
Goal Outcome Evaluation:              Outcome Evaluation: 1 PRBCs ordered, hgb 7. Consent signed for I&D. No pain meds this shift. Dialysis scheduled for 0730. VSS, A&O4, RA. Plan of care ongoing.       Problem: Adult Inpatient Plan of Care  Goal: Plan of Care Review  Outcome: Progressing  Flowsheets (Taken 2/24/2025 0702)  Outcome Evaluation: 1 PRBCs ordered, hgb 7. Consent signed for I&D. No pain meds this shift. Dialysis scheduled for 0730. VSS, A&O4, RA. Plan of care ongoing.  Goal: Patient-Specific Goal (Individualized)  Outcome: Progressing  Goal: Absence of Hospital-Acquired Illness or Injury  Outcome: Progressing  Intervention: Identify and Manage Fall Risk  Recent Flowsheet Documentation  Taken 2/24/2025 0600 by Meme Zuniga, RN  Safety Promotion/Fall Prevention:   assistive device/personal items within reach   activity supervised   clutter free environment maintained   fall prevention program maintained   lighting adjusted   nonskid shoes/slippers when out of bed   room organization consistent   safety round/check completed  Taken 2/24/2025 0415 by Meme Zuniga, RN  Safety Promotion/Fall Prevention:   assistive device/personal items within reach   activity supervised   clutter free environment maintained   fall prevention program maintained   lighting adjusted   nonskid shoes/slippers when out of bed   room organization consistent   safety round/check completed  Taken 2/24/2025 0222 by Meme Zuniga, RN  Safety Promotion/Fall Prevention:   assistive device/personal items within reach   activity supervised   clutter free environment maintained   fall prevention program maintained   lighting adjusted   nonskid shoes/slippers when out of bed   room organization consistent   safety round/check completed  Taken 2/24/2025 0014 by Meme Zuniga, RN  Safety Promotion/Fall Prevention:   assistive device/personal items within reach   activity supervised   clutter free environment maintained   fall  prevention program maintained   lighting adjusted   nonskid shoes/slippers when out of bed   room organization consistent   safety round/check completed  Taken 2/23/2025 2225 by Meme Zuniga RN  Safety Promotion/Fall Prevention:   assistive device/personal items within reach   activity supervised   clutter free environment maintained   fall prevention program maintained   lighting adjusted   nonskid shoes/slippers when out of bed   room organization consistent   safety round/check completed  Taken 2/23/2025 2058 by Meme Zuniga RN  Safety Promotion/Fall Prevention:   assistive device/personal items within reach   activity supervised   clutter free environment maintained   fall prevention program maintained   lighting adjusted   nonskid shoes/slippers when out of bed   room organization consistent   safety round/check completed  Goal: Optimal Comfort and Wellbeing  Outcome: Progressing  Intervention: Provide Person-Centered Care  Recent Flowsheet Documentation  Taken 2/24/2025 0014 by Meme Zuniga RN  Trust Relationship/Rapport:   care explained   choices provided   emotional support provided   empathic listening provided  Taken 2/23/2025 2058 by Meme Zuniga RN  Trust Relationship/Rapport:   care explained   choices provided   emotional support provided   empathic listening provided  Goal: Readiness for Transition of Care  Outcome: Progressing     Problem: Comorbidity Management  Goal: Maintenance of Asthma Control  Outcome: Progressing  Intervention: Maintain Asthma Symptom Control  Recent Flowsheet Documentation  Taken 2/24/2025 0600 by Meme Zuniga RN  Medication Review/Management: medications reviewed  Taken 2/24/2025 0415 by Meme Zuniga RN  Medication Review/Management: medications reviewed  Taken 2/24/2025 0222 by Meme Zuniga, RN  Medication Review/Management: medications reviewed  Taken 2/24/2025 0014 by Meme Zuniga RN  Medication Review/Management:  medications reviewed  Taken 2/23/2025 2225 by Meme Zuniga RN  Medication Review/Management: medications reviewed  Taken 2/23/2025 2058 by Meme Zuniga RN  Medication Review/Management: medications reviewed  Goal: Maintenance of Behavioral Health Symptom Control  Outcome: Progressing  Intervention: Maintain Behavioral Health Symptom Control  Recent Flowsheet Documentation  Taken 2/24/2025 0600 by Meme Zuniga RN  Medication Review/Management: medications reviewed  Taken 2/24/2025 0415 by Meme Zuniga RN  Medication Review/Management: medications reviewed  Taken 2/24/2025 0222 by Meme Zuniga RN  Medication Review/Management: medications reviewed  Taken 2/24/2025 0014 by Meme Zuniga RN  Medication Review/Management: medications reviewed  Taken 2/23/2025 2225 by Meme Zuniga RN  Medication Review/Management: medications reviewed  Taken 2/23/2025 2058 by Meme Zuniga RN  Medication Review/Management: medications reviewed  Goal: Maintenance of COPD Symptom Control  Outcome: Progressing  Intervention: Maintain COPD (Chronic Obstructive Pulmonary Disease) Symptom Control  Recent Flowsheet Documentation  Taken 2/24/2025 0600 by Meme Zuniga RN  Medication Review/Management: medications reviewed  Taken 2/24/2025 0415 by Meme Zuniga RN  Medication Review/Management: medications reviewed  Taken 2/24/2025 0222 by Meme Zuniga RN  Medication Review/Management: medications reviewed  Taken 2/24/2025 0014 by Meme Zuniga RN  Medication Review/Management: medications reviewed  Taken 2/23/2025 2225 by Meme Zuniga RN  Medication Review/Management: medications reviewed  Taken 2/23/2025 2058 by Meme Zuniga RN  Medication Review/Management: medications reviewed  Goal: Blood Glucose Level Within Target Range  Outcome: Progressing  Intervention: Monitor and Manage Glycemia  Recent Flowsheet Documentation  Taken 2/24/2025 0600 by Meme Zuniga  RN  Medication Review/Management: medications reviewed  Taken 2/24/2025 0415 by Meme Zuniga RN  Medication Review/Management: medications reviewed  Taken 2/24/2025 0222 by Meme Zuniga RN  Medication Review/Management: medications reviewed  Taken 2/24/2025 0014 by Meme Zuniga RN  Medication Review/Management: medications reviewed  Taken 2/23/2025 2225 by Meme Zuniga RN  Medication Review/Management: medications reviewed  Taken 2/23/2025 2058 by Meme Zuniga RN  Medication Review/Management: medications reviewed  Goal: Maintenance of Heart Failure Symptom Control  Outcome: Progressing  Intervention: Maintain Heart Failure Management  Recent Flowsheet Documentation  Taken 2/24/2025 0600 by Meme Zuniga RN  Medication Review/Management: medications reviewed  Taken 2/24/2025 0415 by Meme Zuniga RN  Medication Review/Management: medications reviewed  Taken 2/24/2025 0222 by Meme Zuniga RN  Medication Review/Management: medications reviewed  Taken 2/24/2025 0014 by Meme Zuniga RN  Medication Review/Management: medications reviewed  Taken 2/23/2025 2225 by Meme Zuniga RN  Medication Review/Management: medications reviewed  Taken 2/23/2025 2058 by Meme Zuniga RN  Medication Review/Management: medications reviewed  Goal: Blood Pressure in Desired Range  Outcome: Progressing  Intervention: Maintain Blood Pressure Management  Recent Flowsheet Documentation  Taken 2/24/2025 0600 by Meme Zuniga RN  Medication Review/Management: medications reviewed  Taken 2/24/2025 0415 by Meme Zuniga RN  Medication Review/Management: medications reviewed  Taken 2/24/2025 0222 by Meme Zuniga RN  Medication Review/Management: medications reviewed  Taken 2/24/2025 0014 by Meme Zuniga RN  Medication Review/Management: medications reviewed  Taken 2/23/2025 2225 by Meme Zuniga RN  Medication Review/Management: medications reviewed  Taken  2/23/2025 2058 by Meme Zuniga RN  Medication Review/Management: medications reviewed  Goal: Maintenance of Osteoarthritis Symptom Control  Outcome: Progressing  Intervention: Maintain Osteoarthritis Symptom Control  Recent Flowsheet Documentation  Taken 2/24/2025 0600 by Meme Zuniga RN  Medication Review/Management: medications reviewed  Taken 2/24/2025 0415 by Meme Zuniga RN  Medication Review/Management: medications reviewed  Taken 2/24/2025 0222 by Meme Zuniga RN  Medication Review/Management: medications reviewed  Taken 2/24/2025 0014 by Meme Zuniga RN  Medication Review/Management: medications reviewed  Taken 2/23/2025 2225 by Meme Zuniga RN  Medication Review/Management: medications reviewed  Taken 2/23/2025 2058 by Meme Zuniga RN  Activity Management: activity encouraged  Medication Review/Management: medications reviewed  Goal: Bariatric Home Regimen Maintained  Outcome: Progressing  Intervention: Maintain and Manage Postbariatric Surgery Care  Recent Flowsheet Documentation  Taken 2/24/2025 0600 by Meme Zuniga RN  Medication Review/Management: medications reviewed  Taken 2/24/2025 0415 by Meme Zuniga RN  Medication Review/Management: medications reviewed  Taken 2/24/2025 0222 by Meme Zuniga RN  Medication Review/Management: medications reviewed  Taken 2/24/2025 0014 by Meme Zuniga RN  Medication Review/Management: medications reviewed  Taken 2/23/2025 2225 by Meme Zuniga RN  Medication Review/Management: medications reviewed  Taken 2/23/2025 2058 by Meme Zuniga RN  Medication Review/Management: medications reviewed  Goal: Maintenance of Seizure Control  Outcome: Progressing  Intervention: Maintain Seizure Symptom Control  Recent Flowsheet Documentation  Taken 2/24/2025 0600 by Meme Zuniga RN  Medication Review/Management: medications reviewed  Taken 2/24/2025 0415 by Meme Zuniga RN  Medication  Review/Management: medications reviewed  Taken 2/24/2025 0222 by Meme Zuniga RN  Medication Review/Management: medications reviewed  Taken 2/24/2025 0014 by Meme Zuniga RN  Medication Review/Management: medications reviewed  Taken 2/23/2025 2225 by Meme Zuniga RN  Medication Review/Management: medications reviewed  Taken 2/23/2025 2058 by Meme Zuniga RN  Medication Review/Management: medications reviewed  Goal: Maintenance of Asthma Control  Outcome: Progressing  Intervention: Maintain Asthma Symptom Control  Recent Flowsheet Documentation  Taken 2/24/2025 0600 by Meme Zuniga RN  Medication Review/Management: medications reviewed  Taken 2/24/2025 0415 by Meme Zuniga RN  Medication Review/Management: medications reviewed  Taken 2/24/2025 0222 by Meme Zuniga RN  Medication Review/Management: medications reviewed  Taken 2/24/2025 0014 by Meme Zuniga RN  Medication Review/Management: medications reviewed  Taken 2/23/2025 2225 by Meme Zuniga RN  Medication Review/Management: medications reviewed  Taken 2/23/2025 2058 by Meme Zuniga RN  Medication Review/Management: medications reviewed  Goal: Maintenance of Behavioral Health Symptom Control  Outcome: Progressing  Intervention: Maintain Behavioral Health Symptom Control  Recent Flowsheet Documentation  Taken 2/24/2025 0600 by Meme Zuniga RN  Medication Review/Management: medications reviewed  Taken 2/24/2025 0415 by Meme Zuniga RN  Medication Review/Management: medications reviewed  Taken 2/24/2025 0222 by Meme Zuniga RN  Medication Review/Management: medications reviewed  Taken 2/24/2025 0014 by Meme Zuniga RN  Medication Review/Management: medications reviewed  Taken 2/23/2025 2225 by Meme Zuniga RN  Medication Review/Management: medications reviewed  Taken 2/23/2025 2058 by Meme Zuniga RN  Medication Review/Management: medications reviewed  Goal: Maintenance  of COPD Symptom Control  Outcome: Progressing  Intervention: Maintain COPD (Chronic Obstructive Pulmonary Disease) Symptom Control  Recent Flowsheet Documentation  Taken 2/24/2025 0600 by Meme Zuniga RN  Medication Review/Management: medications reviewed  Taken 2/24/2025 0415 by Meme Zuniga RN  Medication Review/Management: medications reviewed  Taken 2/24/2025 0222 by Meme Zuniga RN  Medication Review/Management: medications reviewed  Taken 2/24/2025 0014 by Meme Zuniga RN  Medication Review/Management: medications reviewed  Taken 2/23/2025 2225 by Meme Zuniga RN  Medication Review/Management: medications reviewed  Taken 2/23/2025 2058 by Meme Zuniga RN  Medication Review/Management: medications reviewed  Goal: Blood Glucose Level Within Target Range  Outcome: Progressing  Intervention: Monitor and Manage Glycemia  Recent Flowsheet Documentation  Taken 2/24/2025 0600 by Meme Zuniga RN  Medication Review/Management: medications reviewed  Taken 2/24/2025 0415 by Meme Zungia RN  Medication Review/Management: medications reviewed  Taken 2/24/2025 0222 by Meme Zuniga RN  Medication Review/Management: medications reviewed  Taken 2/24/2025 0014 by Meme Zuniga RN  Medication Review/Management: medications reviewed  Taken 2/23/2025 2225 by Meme Zuniga RN  Medication Review/Management: medications reviewed  Taken 2/23/2025 2058 by Meme Zuniga RN  Medication Review/Management: medications reviewed  Goal: Maintenance of Heart Failure Symptom Control  Outcome: Progressing  Intervention: Maintain Heart Failure Management  Recent Flowsheet Documentation  Taken 2/24/2025 0600 by Meme Zuniga RN  Medication Review/Management: medications reviewed  Taken 2/24/2025 0415 by Meme Zuniga RN  Medication Review/Management: medications reviewed  Taken 2/24/2025 0222 by Meme Zuniga RN  Medication Review/Management: medications  reviewed  Taken 2/24/2025 0014 by Meme Zuniga RN  Medication Review/Management: medications reviewed  Taken 2/23/2025 2225 by Meme Zuniga RN  Medication Review/Management: medications reviewed  Taken 2/23/2025 2058 by Meme Zuniga RN  Medication Review/Management: medications reviewed  Goal: Blood Pressure in Desired Range  Outcome: Progressing  Intervention: Maintain Blood Pressure Management  Recent Flowsheet Documentation  Taken 2/24/2025 0600 by Meme Zuniga RN  Medication Review/Management: medications reviewed  Taken 2/24/2025 0415 by Meme Zuniga RN  Medication Review/Management: medications reviewed  Taken 2/24/2025 0222 by Meme Zuniga RN  Medication Review/Management: medications reviewed  Taken 2/24/2025 0014 by Meme Zuniga RN  Medication Review/Management: medications reviewed  Taken 2/23/2025 2225 by Meme Zuniga RN  Medication Review/Management: medications reviewed  Taken 2/23/2025 2058 by Meme Zuniga RN  Medication Review/Management: medications reviewed  Goal: Maintenance of Osteoarthritis Symptom Control  Outcome: Progressing  Intervention: Maintain Osteoarthritis Symptom Control  Recent Flowsheet Documentation  Taken 2/24/2025 0600 by Meme Zuniga RN  Medication Review/Management: medications reviewed  Taken 2/24/2025 0415 by Meme Zuniga RN  Medication Review/Management: medications reviewed  Taken 2/24/2025 0222 by Meme Zuniga RN  Medication Review/Management: medications reviewed  Taken 2/24/2025 0014 by Meme Zuniga RN  Medication Review/Management: medications reviewed  Taken 2/23/2025 2225 by Meme Zuniga RN  Medication Review/Management: medications reviewed  Taken 2/23/2025 2058 by Meme Zuniga RN  Activity Management: activity encouraged  Medication Review/Management: medications reviewed  Goal: Bariatric Home Regimen Maintained  Outcome: Progressing  Intervention: Maintain and Manage  Postbariatric Surgery Care  Recent Flowsheet Documentation  Taken 2/24/2025 0600 by Meme Zuniga RN  Medication Review/Management: medications reviewed  Taken 2/24/2025 0415 by Meme Zuniga RN  Medication Review/Management: medications reviewed  Taken 2/24/2025 0222 by Meme Zuniga RN  Medication Review/Management: medications reviewed  Taken 2/24/2025 0014 by Meme Zuniga RN  Medication Review/Management: medications reviewed  Taken 2/23/2025 2225 by Meme Zuniga RN  Medication Review/Management: medications reviewed  Taken 2/23/2025 2058 by Meme Zuniga RN  Medication Review/Management: medications reviewed  Goal: Maintenance of Seizure Control  Outcome: Progressing  Intervention: Maintain Seizure Symptom Control  Recent Flowsheet Documentation  Taken 2/24/2025 0600 by Meme Zuniga RN  Medication Review/Management: medications reviewed  Taken 2/24/2025 0415 by Meme Zuniga RN  Medication Review/Management: medications reviewed  Taken 2/24/2025 0222 by Meme Zuniga RN  Medication Review/Management: medications reviewed  Taken 2/24/2025 0014 by Meme Zuniga RN  Medication Review/Management: medications reviewed  Taken 2/23/2025 2225 by Meme Zuniga RN  Medication Review/Management: medications reviewed  Taken 2/23/2025 2058 by Meme Zuniga RN  Medication Review/Management: medications reviewed     Problem: Skin Injury Risk Increased  Goal: Skin Health and Integrity  Outcome: Progressing  Intervention: Optimize Skin Protection  Recent Flowsheet Documentation  Taken 2/23/2025 2058 by Meme Zuniga RN  Activity Management: activity encouraged     Problem: Pain Acute  Goal: Optimal Pain Control and Function  Outcome: Progressing  Intervention: Prevent or Manage Pain  Recent Flowsheet Documentation  Taken 2/24/2025 0600 by Meme Zuniga RN  Medication Review/Management: medications reviewed  Taken 2/24/2025 0415 by Meme Zuniga  RN  Medication Review/Management: medications reviewed  Taken 2/24/2025 0222 by Meme Zuniga RN  Medication Review/Management: medications reviewed  Taken 2/24/2025 0014 by Meme Zuniga RN  Medication Review/Management: medications reviewed  Taken 2/23/2025 2225 by Meme Zuniga RN  Medication Review/Management: medications reviewed  Taken 2/23/2025 2058 by Meme Zuniga RN  Medication Review/Management: medications reviewed     Problem: Fall Injury Risk  Goal: Absence of Fall and Fall-Related Injury  Outcome: Progressing  Intervention: Identify and Manage Contributors  Recent Flowsheet Documentation  Taken 2/24/2025 0600 by Meme Zuniga RN  Medication Review/Management: medications reviewed  Taken 2/24/2025 0415 by Meme Zuniga RN  Medication Review/Management: medications reviewed  Taken 2/24/2025 0222 by Meme Zuniga RN  Medication Review/Management: medications reviewed  Taken 2/24/2025 0014 by Meme Zuniga RN  Medication Review/Management: medications reviewed  Taken 2/23/2025 2225 by Meme Zuniga RN  Medication Review/Management: medications reviewed  Taken 2/23/2025 2058 by Meme Zuniga RN  Medication Review/Management: medications reviewed  Intervention: Promote Injury-Free Environment  Recent Flowsheet Documentation  Taken 2/24/2025 0600 by Meme Zuniga RN  Safety Promotion/Fall Prevention:   assistive device/personal items within reach   activity supervised   clutter free environment maintained   fall prevention program maintained   lighting adjusted   nonskid shoes/slippers when out of bed   room organization consistent   safety round/check completed  Taken 2/24/2025 0415 by Meme Zuniga RN  Safety Promotion/Fall Prevention:   assistive device/personal items within reach   activity supervised   clutter free environment maintained   fall prevention program maintained   lighting adjusted   nonskid shoes/slippers when out of bed   room  organization consistent   safety round/check completed  Taken 2/24/2025 0222 by Meme Zuniga, RN  Safety Promotion/Fall Prevention:   assistive device/personal items within reach   activity supervised   clutter free environment maintained   fall prevention program maintained   lighting adjusted   nonskid shoes/slippers when out of bed   room organization consistent   safety round/check completed  Taken 2/24/2025 0014 by Meme Zuniga, RN  Safety Promotion/Fall Prevention:   assistive device/personal items within reach   activity supervised   clutter free environment maintained   fall prevention program maintained   lighting adjusted   nonskid shoes/slippers when out of bed   room organization consistent   safety round/check completed  Taken 2/23/2025 2225 by Meme Zuniga, RN  Safety Promotion/Fall Prevention:   assistive device/personal items within reach   activity supervised   clutter free environment maintained   fall prevention program maintained   lighting adjusted   nonskid shoes/slippers when out of bed   room organization consistent   safety round/check completed  Taken 2/23/2025 2058 by Meme Zuniga, RN  Safety Promotion/Fall Prevention:   assistive device/personal items within reach   activity supervised   clutter free environment maintained   fall prevention program maintained   lighting adjusted   nonskid shoes/slippers when out of bed   room organization consistent   safety round/check completed     Problem: Sepsis/Septic Shock  Goal: Optimal Coping  Outcome: Progressing  Goal: Absence of Bleeding  Outcome: Progressing  Goal: Blood Glucose Level Within Target Range  Outcome: Progressing  Goal: Absence of Infection Signs and Symptoms  Outcome: Progressing  Intervention: Promote Recovery  Recent Flowsheet Documentation  Taken 2/23/2025 2058 by Meme Zuniga, RN  Activity Management: activity encouraged  Goal: Optimal Nutrition Delivery  Outcome: Progressing

## 2025-02-24 NOTE — ANESTHESIA PREPROCEDURE EVALUATION
Anesthesia Evaluation     NPO Solid Status: > 8 hours             Airway   Mallampati: III  TM distance: >3 FB  Neck ROM: full  Comment: Full beard  Dental - normal exam     Pulmonary - normal exam   Cardiovascular - normal exam    (+) hypertension, CAD, cardiac stents (2 stents in 2024)       Neuro/Psych  GI/Hepatic/Renal/Endo    (+) obesity, renal disease (MWF dialysis)- ESRD and dialysis, diabetes mellitus poorly controlled    Musculoskeletal     Abdominal    Substance History      OB/GYN          Other                      Anesthesia Plan    ASA 3     MAC       Anesthetic plan, risks, benefits, and alternatives have been provided, discussed and informed consent has been obtained with: patient.    CODE STATUS:    Level Of Support Discussed With: Patient  Code Status (Patient has no pulse and is not breathing): CPR (Attempt to Resuscitate)  Medical Interventions (Patient has pulse or is breathing): Full Support

## 2025-02-24 NOTE — OP NOTE
Operative Report    Patient: Wilner Menjivar     Patient YOB: 1985     Date of Surgery: 02/24/25     MRN: [id number]       SURGEON: Trevon Garcia III, DPM     ASSISTANT: None    PROCEDURE: Partial bone excision, first metatarsal, right foot  Debridement, level of and including bone, right foot    PRE-OPERATIVE DIAGNOSIS: Osteomyelitis, first metatarsal  Diabetic foot ulcer with exposed muscle necrosis of bone, 7.5 x 6.0 x 1.0cm  Abscess, right foot    POST-OPERATIVE DIAGNOSIS: Diabetic foot ulcer, 8.5 x 6.7 x 1.0cm    ANAESTHESIA: MAC    HEMOSTASIS: None    ESTIMATED BLOOD LOSS: 20 cc    SPECIMEN: First metatarsal clearance fragment sent for pathology    IMPLANTS: None    COMPLICATIONS: None    INDICATION FOR PROCEDURE: This is a 3-year-old male who had significant infection and loss of tissue with osteomyelitis.  He is requiring return to the operating room today for debridement and partial bone excision.  His previous pathology examination did show positive osteomyelitis of the first metatarsal head so we have discussed and agreed to move forward with taking a small piece of the first metatarsal for pathology evaluation.    Consent was obtained pre-operatively. All questions were answered, risks versus benefits were discussed at length. No guarantees or assurances were given or implied.    PROCEDURE: Patient was brought to the operating room and placed on the operative table in supine position. A surgical timeout was performed and then patients name, date of birth, procedure and surgical site were all verified. A local block of 20 cc 1% lidocaine plain was injected to the surgical site.  No tourniquet was used for this procedure.  The right lower extremity was then scrubbed, prepped and draped in the usual sterile manner.     Attention was directed to the right foot where the predebridement measurements were noted to be 7.5 cm x 6.0 cm x 1.0 cm.  There is exposed first metatarsal.  The plantar  incision from the prior procedure was noted to be nonviable with cool to touch skin and no bleeding noted.  #15 blade was used to sharply excise this area down to the muscle including bone capsule.  There is noted be good healthy bleeding tissue.  There is no purulent drainage expressed.  A curette was used to debride all subcutaneous, muscular and bony tissue.  Surgical site was then flushed with sterile saline.  Sagittal saw was then used to remove and freshen the very distal aspect of the remaining first metatarsal.  A 2 mm clearance fragment was then taken from the first metatarsal and sent for pathology evaluation.  The surgical site was again flushed with copious amounts of sterile saline.  All bleeders and skin edges were ligated for bleeding control.  Postdebridement measurements were 8.5 cm x 6.7 cm x 1.0 cm. 5 cc of 0.5% Marcaine plain was injected the surgical site.  A dry sterile dressing and bolster dressing of 4 x 4's, Kerlix, cast padding, ABD pads and Coban was applied to right lower extremity.  Patient tolerated procedure and anesthesia well.  Patient was transferred from the operating room to the recovery room with vital signs stable and neurovascular status intact.    Trevon Garcia III, JESSICA

## 2025-02-24 NOTE — PROGRESS NOTES
"[facility] Clinical Pharmacy Services: Vancomycin Monitoring Note    Wilner Menjivar is a 39 y.o. male who is on day 14 of pharmacy to dose vancomycin for Bone and/or Joint Infection.    Previous Vancomycin Dose:   intermittent dosing  Updated Cultures and Sensitivities:   2/18 Surgical site right toe - C.freundii, E.faecalis, Parvimonas micra  Results from last 7 days   Lab Units 02/23/25  0346 02/21/25  0544 02/19/25  0616   VANCOMYCIN RM mcg/mL 17.20 23.60 18.90     Vitals/Labs  Ht: 170.2 cm (67\"); Wt: 106 kg (234 lb 5.6 oz)   Temp Readings from Last 1 Encounters:   02/24/25 98.1 °F (36.7 °C) (Oral)     Estimated Creatinine Clearance: 14.3 mL/min (A) (by C-G formula based on SCr of 7.99 mg/dL (H)).   Dialysis MWF    Results from last 7 days   Lab Units 02/24/25  0517 02/23/25  0346 02/22/25  0337   CREATININE mg/dL 7.99* 6.93* 5.30*   WBC 10*3/mm3 13.86* 16.36* 11.86*     Assessment/Plan    Current Vancomycin Dose: Pulse dosing with dialysis  Next Level Date and Time: Vanc Random on 2/26 at 0600 prior to HD.  We will continue to monitor patient changes and renal function     Thank you for involving pharmacy in this patient's care. Please contact pharmacy with any questions or concerns.       Lefty Proctor III [facility]  Clinical Pharmacist          "

## 2025-02-24 NOTE — NURSING NOTE
HD treatment ended after 30 minutes at patients request. He refused to use a bedpan. Dr. Carmen notified. Mo fluid removed. Post /62 HR 98 T 98.2. Verbal report given.

## 2025-02-24 NOTE — PROGRESS NOTES
"Nutrition Services    Patient Name:  Wilner Menjivar  YOB: 1985  MRN: [id number]  Admit Date:  2/11/2025  Assessment Date:  02/24/25    NUTRITION SCREENING      Reason for Encounter Follow-up Protocol   Diagnosis/Problem Diabetic toe ulcer  NPO today for debridement   HD today   PMH ESRD on HD, anemia, DM, HTN, HLD, CAD, GERD, legally blind    PO Diet NPO Diet NPO Type: Strict NPO   Supplements n/a   PO Intake % NPO        Medications MAR reviewed by RD   Labs  Listed below, reviewed   Physical Findings Obese  Mild edema to R foot   GI BM 2/24, nausea   Skin Status Right great toe wound       Height  Weight  BMI  Weight Trend     Height: 170.2 cm (67\")  Weight: 107 kg (234 lb 12.6 oz) (02/24/25 0930)  Body mass index is 36.77 kg/m².  Stable       Nutrition Problem (PES) Inadequate oral intake related to right great toe ulcer as evidence by NPO.       Intervention/Plan Advance diet post op and monitor intakes   Hyperglyemia- CCHO diet     RD to follow up per protocol.     Results from last 7 days   Lab Units 02/24/25 0517 02/23/25  1105 02/23/25 0346 02/22/25  0337   SODIUM mmol/L 129*  --  133* 134*   POTASSIUM mmol/L 4.8 4.6 4.3 4.8   CHLORIDE mmol/L 98  --  100 100   CO2 mmol/L 19.7*  --  22.0 23.8   BUN mg/dL 40*  --  33* 25*   CREATININE mg/dL 7.99*  --  6.93* 5.30*   CALCIUM mg/dL 8.3*  --  8.6 8.6   GLUCOSE mg/dL 312*  --  123* 259*     Results from last 7 days   Lab Units 02/24/25  0517 02/23/25  1105 02/23/25 0346 02/22/25  0337   MAGNESIUM mg/dL  --  2.0  --   --    PHOSPHORUS mg/dL  --   --   --  3.7   HEMOGLOBIN g/dL 7.0*  --    < > 7.2*   HEMATOCRIT % 21.4*  --    < > 23.4*    < > = values in this interval not displayed.     Lab Results   Component Value Date    HGBA1C 11.10 (H) 02/13/2025         Electronically signed by:  Yris Parker RD  02/24/25 15:31 EST  "

## 2025-02-24 NOTE — CASE MANAGEMENT/SOCIAL WORK
Continued Stay Note  [facility]     Patient Name: Wilner Menjivar  MRN: [id number]  Today's Date: 2/24/2025    Admit Date: 2/11/2025    Plan: Home, spouse to transport.   Discharge Plan       Row Name 02/24/25 0820       Plan    Plan Comments Clinicals reviewed. Pt to OR for another debridement today. CCP continues to follow for medical readiness for DC planning. Saad RN/CCP                   Discharge Codes    No documentation.                 Expected Discharge Date and Time       Expected Discharge Date Expected Discharge Time    Feb 25, 2025               Una Garcia, RN

## 2025-02-24 NOTE — PROGRESS NOTES
Remains in OR.  Will reassess tomorrow.  Rearrange HD for tomorrow as patient terminated dialysis today 30 minutes into treatment.

## 2025-02-24 NOTE — PLAN OF CARE
Goal Outcome Evaluation:  Plan of Care Reviewed With: patient        Progress: no change  Outcome Evaluation: 1 URBC given, diaylsis moved to tomorrow, acid reflux been an issue today, start carafate, no other changes, down for debridement, vss, will continue to monitor

## 2025-02-24 NOTE — SIGNIFICANT NOTE
02/24/25 1604   OTHER   Discipline physical therapist   Rehab Time/Intention   Session Not Performed other (see comments);patient/family declined, not feeling well;patient/family declined evaluation  (Pt declined PT this afternoon. Will f/u post op.)

## 2025-02-24 NOTE — CONSULTS
[facility] Consult Note       Encounter Date:25  Patient:Wilner Menjivar  :1985  MRN:[id number]    Date of Admission: 2025  Date of Encounter Visit: 25  Encounter Provider: Everett Lake MD  Referring Provider: Remy Thornton MD  Place of Service: [facility]  Patient Care Team:  Roosevelt Thao MD as PCP - General (Internal Medicine)      Consulted for: Chest tightness    Chief Complaint: Foot infection      History of Presenting Illness:      Mr. Menjivar is a 39 y.o. gentleman the past medical history notable for coronary artery disease status post percutaneous intervention, chronic systolic congestive heart failure with recovered ejection fraction, type 2 diabetes, legally blind, end-stage renal disease on hemodialysis, severe anemia multifactorial, hypertension, and osteomyelitis of the feet who presented to the hospital on  for concerns over foot infection has been here since with multiple revisions of toe amputation and treatment of his osteomyelitis this has been complicated by bleeding he has had profound anemia at times requiring transfusions.  Yesterday he felt worse did have some chest tightness blood pressure was somewhat soft.  Labs were drawn and show that his hemoglobin had dropped significantly hemoglobin was 5 at the time was transfused is feeling better today.  During that time he had some chest tightness which has since resolved.  For his history of coronary disease and chest tightness I was asked to evaluate him.      Review of Systems:  Review of Systems   Constitutional: Positive for malaise/fatigue.   HENT: Negative.     Eyes: Negative.    Cardiovascular:  Positive for chest pain.   Respiratory: Negative.     Endocrine: Negative.    Hematologic/Lymphatic: Positive for bleeding problem.   Skin:  Positive for poor wound healing.   Musculoskeletal: Negative.    Gastrointestinal: Negative.    Genitourinary: Negative.    Neurological:  Negative.    Psychiatric/Behavioral: Negative.     Allergic/Immunologic: Negative.        Medications:    Current Facility-Administered Medications:     aluminum-magnesium hydroxide-simethicone (MAALOX MAX) 400-400-40 MG/5ML suspension 15 mL, 15 mL, Oral, PRN, Remy Thornton MD    aspirin chewable tablet 81 mg, 81 mg, Oral, Daily, JoseTrevon goodwin, DPM, 81 mg at 02/22/25 0827    atorvastatin (LIPITOR) tablet 10 mg, 10 mg, Oral, Nightly, JoseTrevon, DPM, 10 mg at 02/22/25 2010    bumetanide (BUMEX) tablet 2 mg, 2 mg, Oral, BID Diuretics, JoseTrevon carr, DPM, 2 mg at 02/23/25 1725    calcium carbonate (TUMS) chewable tablet 500 mg (200 mg elemental), 2 tablet, Oral, TID PRN, Trevon Garcia DPM, 2 tablet at 02/24/25 0812    carvedilol (COREG) tablet 25 mg, 25 mg, Oral, BID, JoseTrevon carr, DPM, 25 mg at 02/23/25 2125    cefepime 2000 mg IVPB in 100 mL NS (MBP), 2,000 mg, Intravenous, Q24H, Abdiaziz Paz MD    dextrose (D50W) (25 g/50 mL) IV injection 25 g, 25 g, Intravenous, Q15 Min PRN, Trevon Garcia DPM    dextrose (GLUTOSE) oral gel 15 g, 15 g, Oral, Q15 Min PRN, Trevon Garcia DPM    docusate sodium (COLACE) capsule 100 mg, 100 mg, Oral, BID, Trevon Garcia DPM, 100 mg at 02/22/25 2010    epoetin jamil-epbx (RETACRIT) injection 10,000 Units, 10,000 Units, Subcutaneous, Once per day on Monday Wednesday Friday, Trevon Garcia DPM, 10,000 Units at 02/21/25 1703    glucagon (GLUCAGEN) injection 1 mg, 1 mg, Intramuscular, Q15 Min PRN, Trevon Garcia DPM    HYDROcodone-acetaminophen (NORCO) 5-325 MG per tablet 2 tablet, 2 tablet, Oral, Q4H PRN, Remy Thornton MD, 2 tablet at 02/22/25 1832    HYDROmorphone (DILAUDID) injection 1 mg, 1 mg, Intravenous, Q3H PRN, Trevon Garcia DPM, 1 mg at 02/22/25 2255    insulin glargine (LANTUS, SEMGLEE) injection 36 Units, 36 Units, Subcutaneous, Nightly, Remy Thornton MD, 36 Units at 02/22/25 2254    insulin lispro (HUMALOG/ADMELOG) injection 12 Units, 12  Units, Subcutaneous, TID With Meals, Remy Thornton MD, 12 Units at 02/23/25 1725    insulin lispro (HUMALOG/ADMELOG) injection 2-7 Units, 2-7 Units, Subcutaneous, 4x Daily AC & at Bedtime, Trevon Garcia DPM, 2 Units at 02/23/25 1725    ondansetron (ZOFRAN) injection 4 mg, 4 mg, Intravenous, Q6H PRN, Trevon Garcia DPM, 4 mg at 02/22/25 2255    pantoprazole (PROTONIX) EC tablet 40 mg, 40 mg, Oral, BID AC, Trevon Garcia DPM, 40 mg at 02/24/25 0558    Pharmacy to dose vancomycin, , Not Applicable, Continuous PRN, JacksonAbdiaziz MD    polyethylene glycol (MIRALAX) packet 17 g, 17 g, Oral, Daily, Trevon Garcia DPM    prochlorperazine (COMPAZINE) injection 5 mg, 5 mg, Intravenous, Q6H PRN, Remy Thornton MD, 5 mg at 02/22/25 1908    ticagrelor (BRILINTA) tablet 90 mg, 90 mg, Oral, Q12H, Albino Babcock MD    Vancomycin Pharmacy Intermittent/Pulse Dosing, , Not Applicable, Daily, Remy Thornton MD, Given at 02/21/25 0802    zolpidem (AMBIEN) tablet 5 mg, 5 mg, Oral, Nightly PRN, Trevon Garcia DPM, 5 mg at 02/24/25 0028    Allergies   Allergen Reactions    Azithromycin Nausea And Vomiting     Pt given IV azithro, reported nausea/vomiting and hot flashes within 3-5 minutes of admin. Pt also c/o new abd pain with admin.     Pt given IV azithro, reported nausea/vomiting and hot flashes within 3-5 minutes of admin. Pt also c/o new abd pain with admin.    Penicillins Unknown - Low Severity     reaction as a child. Does not recall reaction.     reaction as a child. Does not recall reaction.  Beta lactam allergy details  Antibiotic reaction: unknown  Age at reaction: infant  Dose to reaction time: unknown  Reason for antibiotic: unknown  Epinephrine required for reaction?: unknown  Tolerated antibiotics: unknown          Past Medical History:   Diagnosis Date    CKD stage 4 due to type 2 diabetes mellitus     GERD (gastroesophageal reflux disease)     Heart failure     Hypertension     Type 2 diabetes mellitus      Vision impairment        Past Surgical History:   Procedure Laterality Date    AMPUTATION DIGIT Right 2/18/2025    Procedure: Hallux amputation, right foot SESAMOIDECTOMY, INCISION AN DRAINAGE;  Surgeon: Trevon Garcia DPM;  Location: [facility];  Service: Podiatry;  Laterality: Right;    CORONARY STENT PLACEMENT      EYE SURGERY      WISDOM TOOTH EXTRACTION         Social History     Socioeconomic History    Marital status:    Tobacco Use    Smoking status: Never    Smokeless tobacco: Never   Vaping Use    Vaping status: Never Used   Substance and Sexual Activity    Alcohol use: Yes     Comment: occ    Drug use: Yes     Types: Marijuana    Sexual activity: Defer       Family History   Problem Relation Age of Onset    Malig Hyperthermia Neg Hx        The following portions of the patient's history were reviewed and updated as appropriate: allergies, current medications, past family history, past medical history, past social history, past surgical history and problem list.         Objective:      Temp:  [98.1 °F (36.7 °C)-98.6 °F (37 °C)] 98.1 °F (36.7 °C)  Heart Rate:  [] 96  Resp:  [18-20] 18  BP: ()/(55-88) 134/77     Intake/Output Summary (Last 24 hours) at 2/24/2025 0922  Last data filed at 2/23/2025 1648  Gross per 24 hour   Intake 723.75 ml   Output --   Net 723.75 ml     Body mass index is 36.7 kg/m².      02/11/25  1732 02/11/25  2049 02/19/25  1800   Weight: 108 kg (237 lb 10.5 oz) 108 kg (238 lb 1.6 oz) 106 kg (234 lb 5.6 oz)           Physical Exam:  Constitutional: Well appearing, well developed, no acute distress   HENT: Oropharynx clear and membrane moist  Eyes: Normal conjunctiva, no sclera icterus.  Neck: Supple, cloudy pupils  Cardiovascular: Regular rate and rhythm, No Murmur, No bilateral lower extremity edema.  Pulmonary: Normal respiratory effort, normal lung sounds, no wheezing.  Neurological: Alert and orient x 3.   Psych: Appropriate mood and affect. Normal  "judgment and insight.         Lab Review:   Results from last 7 days   Lab Units 02/24/25  0517 02/23/25  1105 02/23/25  0346 02/22/25  0337 02/21/25  0544 02/20/25  0352 02/19/25  0616 02/18/25  0348   SODIUM mmol/L 129*  --  133* 134* 132* 134* 130* 127*   POTASSIUM mmol/L 4.8 4.6 4.3 4.8 4.5 4.5 4.2 4.3   CHLORIDE mmol/L 98  --  100 100 97* 98 94* 93*   CO2 mmol/L 19.7*  --  22.0 23.8 21.0* 24.5 21.0* 23.0   BUN mg/dL 40*  --  33* 25* 34* 25* 39* 31*   CREATININE mg/dL 7.99*  --  6.93* 5.30* 6.28* 5.48* 7.27* 5.65*   GLUCOSE mg/dL 312*  --  123* 259* 211* 190* 303* 326*   CALCIUM mg/dL 8.3*  --  8.6 8.6 9.2 9.2 8.9 9.3     Results from last 7 days   Lab Units 02/23/25  1216 02/23/25  1105   HSTROP T ng/L 99* 105*     Results from last 7 days   Lab Units 02/24/25  0517 02/23/25  0346 02/22/25  0337 02/21/25  0544 02/20/25  0352 02/19/25  0616 02/18/25  0348   WBC 10*3/mm3 13.86* 16.36* 11.86* 13.27* 14.41* 15.41* 15.91*   HEMOGLOBIN g/dL 7.0* 5.7* 7.2* 8.7* 9.2* 6.7* 8.5*   HEMATOCRIT % 21.4* 18.2* 23.4* 26.3* 26.0* 20.3* 25.5*   PLATELETS 10*3/mm3 262 281 315 328 339 331 337         Results from last 7 days   Lab Units 02/23/25  1105   MAGNESIUM mg/dL 2.0           Invalid input(s): \"LDLCALC\"  The ASCVD Risk score (Selam SÁNCHEZ, et al., 2019) failed to calculate for the following reasons:    The valid total cholesterol range is 130 to 320 mg/dL               Echocardiogram 2/4/2025 [facility]:  Technically difficult examination; Lumason echocontrast utilized to enhance endocardial definition.   The estimated ejection fraction is 55-60% with no clear regional wall motion abnormalities.   There is mild, concentric, left ventricular hypertrophy.   There is grade I left ventricular diastolic dysfunction (impaired relaxation).   The right ventricular chamber size and systolic function are within normal limits.   No significant valvular abnormalities are visualized.   There is no evidence of a pericardial " effusion.     Cardiac catheterization 9/20/2024 [facility]:  Successful percutaneous intervention to 80% mid marginal branch stenoses with 2.25 x 38 mm Synergy drug-eluting stent with a 2.5 x 48 mm Synergy from the mid marginal into the proximal circumflex postdilated the 2.75 mm noncompliant balloon  Patent stent within the mid LAD  Right coronary artery 100% mid with left-to-right collaterals filling the distal vascular bed    Stress MPI 9/17/2024 [facility]:  Gated SPECT analysis demonstrates a post stress ejection fraction of 21%. Global LV systolic function is severely reduced. Severely decreased wall thickening and severe hypokinesis is noted in the basal inferior, basal inferolateral, mid inferior, mid inferolateral, apical septal, apical inferior, apical lateral wall segments. Otherwise, there is moderately decreased wall thickening and mild hypokinesis in all the remaining left ventricular wall segments.     Echocardiogram 5/3/2024 [facility]:  The ejection fraction biplane was calculated at 43%. Left ventricle size is normal. Mild left ventricular hypertrophy. The mid to distal anteroseptal mid anterior and apical walls are severely hypokinetic   Structurally normal mitral valve. Mild mitral regurgitation is present.   Right ventricular systolic pressure of 36 mmHg.   Mild tricuspid regurgitation.   There is no evidence of pericardial effusion.     Cardiac Catheterization 9/6/2022 [facility]:  Mildly elevated right heart catheterization pressures are noted with   cardiac index between 2.5 and 3.0 L/min/m^2.   Severe multivessel CAD is identified including high-grade 99% LAD stenosis   (clear culprit for ACS).   Successful PCI to the mid LAD was completed with 2 Synergy drug-eluting stents (2.5 x 38 mm and 3.0 x 24 mm).   Intravascular ultrasound imaging with diffuse lipidic plaquing with mild calcification.   70% mid marginal branch stenoses  100% mid RCA segment stenoses  with left-to-right collaterals          Assessment:           Diabetic toe ulcer    Osteomyelitis of great toe of right foot         Plan:       Mr. Menjivar is a 39 y.o. gentleman the past medical history notable for coronary artery disease status post percutaneous intervention, chronic systolic congestive heart failure with recovered ejection fraction, type 2 diabetes, legally blind, end-stage renal disease on hemodialysis, severe anemia multifactorial, hypertension, and osteomyelitis of the feet who presented to the hospital on 2/11 for concerns over foot infection has been here since with multiple revisions of toe amputation and treatment of his osteomyelitis this has been complicated by bleeding he has had profound anemia at times requiring transfusions and did have some chest discomfort on 2/23 in the setting of a hemoglobin of 5.  This is since resolved since transfusion.  I was asked to see him due to his chest pain and known coronary disease.  From a cardiac standpoint I think the etiology of his discomfort was likely his severe anemia.  His troponins are elevated but in the setting of end-stage renal disease and anemia I think this is more of a demand ischemia in the setting of anemia.  His symptoms have resolved since transfusion and I do not think a repeat ischemic evaluation is needed at this time.  He did have an echocardiogram earlier this month showing improvement of his ejection fraction after recent revascularization just a few months ago.  His EKG yesterday shows some nonspecific T wave abnormalities in the lateral leads which unfortunately do not have anything to compare with but according to reports he did have LVH with repolarization abnormalities on his EKG at [facility] when last checked during his cath in August which would be similar findings to what we see here.  I would recommend continued supportive therapy avoiding severe anemia.  With respect to this he does have a recent stent  he has been on dual antiplatelet therapy with aspirin and Brilinta given his bleeding issues at this juncture think it would be reasonable to transition over to aspirin and clopidogrel.  If he still having a lot of bleeding issues he is close to being 6 months out it might be reasonable to consider single antiplatelet therapy but again would try and see how he does with transitioning to aspirin and clopidogrel first to see if this would help minimize his blood loss his anemia is multifactorial likely not able to address all of his issues but at least can help especially in light of recent surgical procedures.      Chest pain/type II myocardial infarction:  Patient had chest discomfort and elevated troponins in the setting of anemia which is consistent with a type II myocardial infarction  His EKG is likely unchanged compared to reports from outside hospitals  Symptoms improved with treating his anemia  Echocardiogram earlier this month demonstrates recovery of left ventricular function  Would hold off on any further ischemic testing at this time unless significant changes in clinical status  Does have known RCA  which could be contributing as well    Coronary artery disease without angina:  Prior to this patient was doing well clinically  Did have revascularization August 2024  Currently on aspirin and Brilinta given his anemia needs for procedures and bleeding issues would be reasonable to try transitioning to aspirin and clopidogrel first if still significant issues is close to 6 months out could consider single antiplatelet therapy if reasonable but would defer to primary cardiology at [place] who he normally follows  Continue statin and beta-blocker    Chronic systolic and diastolic congestive heart failure:  Actually had recovery of his left ventricular function after revascularization of his circumflex in August based upon echocardiogram 2/5/2025  Continue carvedilol  No ACE/ARB given instead renal  disease        Thank you for allowing me to participate in the care of Wilner Menjivar. Feel free to contact me directly with any further questions or concerns.    Everett Lake MD  [facility]  02/24/25  09:22 EST

## 2025-02-24 NOTE — PROGRESS NOTES
"Daily progress note    Primary care physician  Dr. NERI    Subjective   Doing same with no new complaints and family at bedside    History of present illness  39-year-old male with history of end-stage renal disease on hemodialysis chronic anemia diabetes hypertension hyperlipidemia coronary artery disease and gastroesophageal of disease who is also legally blind and has right foot wound which is getting worse mainly right great toe which looks infected and has recently seen by podiatrist and removed the callus from the right great toe.  Patient has intermittent bleeding pain drainage but no fever chills.  Patient also denies any chest pain shortness of breath palpitation abdominal pain nausea vomiting diarrhea.  Patient workup in ER revealed infected right great toe wound admitted for management.     REVIEW OF SYSTEMS  Unremarkable except pain     PHYSICAL EXAM   Blood pressure 100/84, pulse 93, temperature 98.1 °F (36.7 °C), temperature source Oral, resp. rate 18, height 170.2 cm (67\"), weight 107 kg (234 lb 12.6 oz), SpO2 100%.    Constitutional:       General: He is not in acute distress.     Appearance: Normal appearance. He is not ill-appearing, toxic-appearing or diaphoretic.   HENT:      Head: Normocephalic and atraumatic.      Nose: Nose normal.      Mouth/Throat:      Mouth: Mucous membranes are moist.      Pharynx: Oropharynx is clear.   Eyes:      Conjunctiva/sclera: Conjunctivae normal.      Pupils: Pupils are equal, round, and reactive to light.   Cardiovascular:      Rate and Rhythm: Normal rate and regular rhythm.      Pulses: Normal pulses.   Pulmonary:      Effort: Pulmonary effort is normal.   Abdominal:      General: Abdomen is flat.      Palpations: Abdomen is soft.   Musculoskeletal:      Comments: Right great toe is black in color with a deep ulceration and dried blood, no active purulent drainage, no palpable crepitus or fluctuance   Skin:     General: Skin is warm and dry.   Neurological:    " "  General: No focal deficit present.      Mental Status: He is alert and oriented to person, place, and time. Mental status is at baseline.   Psychiatric:         Mood and Affect: Mood normal.         Behavior: Behavior normal.         Thought Content: Thought content normal.         Judgment: Judgment normal.      LAB RESULTS  Lab Results (last 24 hours)       Procedure Component Value Units Date/Time    POC Glucose Once [[id number]]  (Abnormal) Collected: 02/24/25 1104    Specimen: Blood Updated: 02/24/25 1106     Glucose 230 mg/dL     Tissue Pathology Exam [[id number]] Collected: 02/21/25 1430    Specimen: Tissue from Toe, Right Updated: 02/24/25 0947     Case Report --     Surgical Pathology Report                         Case: [id number]                                  Authorizing Provider:  Trevon Garcia DPM      Collected:           02/21/2025 02:30 PM          Ordering Location:     [facility]  Received:            02/21/2025 10:26 PM                                 MAIN OR                                                                      Pathologist:           Johanna Solis MD                                                          Specimen:    Toe, Right, RIGHT FOOT FIRST METATARSAL -                                                   Clinical Information --     RIGHT  FOOT FIRST METATARSAL  NO PATHOLOGY EXAM, ONLY TISSUE/BONE CULTURE       Final Diagnosis --     1.  Bone and soft tissue, metatarsal right first, debridement:   A.  Necrotizing acute soft tissue inflammation and fragments of trabecular bone with acute osteomyelitis.       Gross Description --     1. Toe, Right.  Received in formalin labeled \"right foot first metatarsal\" is a 4.5 x 3.2 x 2 cm right first metatarsal.  The surgical margin of resection is smooth and glistening.  The articular surface is brown-tan and smooth.  There is a minimal amount of attached soft tissue.  Sectioning reveals focal areas of red-brown " "necrotic cut surfaces.  Presented sections are submitted as follows:    1A.  Overlying soft tissue  1B.  Surgical margin of resection, submitted following decalcification with decal stat  1C-1D.  Necrotic/hemorrhagic bone submitted following decalcification with decal stat    [facility]       Microscopic Description --     Unless \"gross only\" is specified, the final diagnosis for each specimen is based on microscopic examination of tissue.      Basic Metabolic Panel [[id number]]  (Abnormal) Collected: 02/24/25 0517    Specimen: Blood Updated: 02/24/25 0642     Glucose 312 mg/dL      BUN 40 mg/dL      Creatinine 7.99 mg/dL      Sodium 129 mmol/L      Potassium 4.8 mmol/L      Chloride 98 mmol/L      CO2 19.7 mmol/L      Calcium 8.3 mg/dL      BUN/Creatinine Ratio 5.0     Anion Gap 11.3 mmol/L      eGFR 8.1 mL/min/1.73     Narrative:      GFR Categories in Chronic Kidney Disease (CKD)      GFR Category          GFR (mL/min/1.73)    Interpretation  G1                     90 or greater         Normal or high (1)  G2                      60-89                Mild decrease (1)  G3a                   45-59                Mild to moderate decrease  G3b                   30-44                Moderate to severe decrease  G4                    15-29                Severe decrease  G5                    14 or less           Kidney failure          (1)In the absence of evidence of kidney disease, neither GFR category G1 or G2 fulfill the criteria for CKD.    eGFR calculation 2021 CKD-EPI creatinine equation, which does not include race as a factor    CBC & Differential [[id number]]  (Abnormal) Collected: 02/24/25 0517    Specimen: Blood Updated: 02/24/25 0630    Narrative:      The following orders were created for panel order CBC & Differential.  Procedure                               Abnormality         Status                     ---------                               -----------         ------                     CBC Auto " Differential[[id number]]        Abnormal            Final result                 Please view results for these tests on the individual orders.    CBC Auto Differential [[id number]]  (Abnormal) Collected: 02/24/25 0517    Specimen: Blood Updated: 02/24/25 0630     WBC 13.86 10*3/mm3      RBC 2.41 10*6/mm3      Hemoglobin 7.0 g/dL      Hematocrit 21.4 %      MCV 88.8 fL      MCH 29.0 pg      MCHC 32.7 g/dL      RDW 14.2 %      RDW-SD 46.6 fl      MPV 8.4 fL      Platelets 262 10*3/mm3      Neutrophil % 81.9 %      Lymphocyte % 7.4 %      Monocyte % 6.4 %      Eosinophil % 1.7 %      Basophil % 0.5 %      Immature Grans % 2.1 %      Neutrophils, Absolute 11.35 10*3/mm3      Lymphocytes, Absolute 1.02 10*3/mm3      Monocytes, Absolute 0.89 10*3/mm3      Eosinophils, Absolute 0.24 10*3/mm3      Basophils, Absolute 0.07 10*3/mm3      Immature Grans, Absolute 0.29 10*3/mm3      nRBC 0.3 /100 WBC     POC Glucose Once [[id number]]  (Abnormal) Collected: 02/24/25 0618    Specimen: Blood Updated: 02/24/25 0620     Glucose 330 mg/dL     POC Glucose Once [[id number]]  (Normal) Collected: 02/23/25 2053    Specimen: Blood Updated: 02/23/25 2054     Glucose 121 mg/dL     POC Glucose Once [[id number]]  (Abnormal) Collected: 02/23/25 1532    Specimen: Blood Updated: 02/23/25 1537     Glucose 160 mg/dL     Anaerobic Culture 10 Day Incubation - Surgical Site, Toe, Right [[id number]]  (Abnormal) Collected: 02/18/25 0754    Specimen: Surgical Site from Toe, Right Updated: 02/23/25 1517     Anaerobic Culture Parvimonas micra    Narrative:      This organism is Beta-lactamase negative and is predictably susceptible to ampicillin or amoxicillin             Imaging Results (Last 24 Hours)       ** No results found for the last 24 hours. **            Current Facility-Administered Medications:     aluminum-magnesium hydroxide-simethicone (MAALOX MAX) 400-400-40 MG/5ML suspension 15 mL, 15 mL, Oral, PRN, Remy Thornton MD, 15 mL at 02/24/25 1007     aspirin chewable tablet 81 mg, 81 mg, Oral, Daily, Trevon Garcia DPANNE MARIE, 81 mg at 02/22/25 0827    atorvastatin (LIPITOR) tablet 10 mg, 10 mg, Oral, Nightly, Trevon Garcia DPM, 10 mg at 02/22/25 2010    bumetanide (BUMEX) tablet 2 mg, 2 mg, Oral, BID Diuretics, Trevon Garcia DPM, 2 mg at 02/24/25 1007    calcium carbonate (TUMS) chewable tablet 500 mg (200 mg elemental), 2 tablet, Oral, TID PRN, Trevon Garcia DPM, 2 tablet at 02/24/25 1417    carvedilol (COREG) tablet 25 mg, 25 mg, Oral, BID, Trevon Garcia DPANNE MARIE, 25 mg at 02/24/25 1007    cefepime 2000 mg IVPB in 100 mL NS (MBP), 2,000 mg, Intravenous, Q24H, Abdiaziz Paz MD, 2,000 mg at 02/24/25 1300    dextrose (D50W) (25 g/50 mL) IV injection 25 g, 25 g, Intravenous, Q15 Min PRN, Trevon Garcia DPM    dextrose (GLUTOSE) oral gel 15 g, 15 g, Oral, Q15 Min PRN, Trevon Garcia DPM    docusate sodium (COLACE) capsule 100 mg, 100 mg, Oral, BID, Trevon Garcia DPANNE MARIE, 100 mg at 02/22/25 2010    epoetin jamil-epbx (RETACRIT) injection 10,000 Units, 10,000 Units, Subcutaneous, Once per day on Monday Wednesday Friday, Trevon Garcia DPM, 10,000 Units at 02/24/25 1149    glucagon (GLUCAGEN) injection 1 mg, 1 mg, Intramuscular, Q15 Min PRN, Trevon Garcia DPM    HYDROcodone-acetaminophen (NORCO) 5-325 MG per tablet 2 tablet, 2 tablet, Oral, Q4H PRN, Remy Thornton MD, 2 tablet at 02/22/25 1832    HYDROmorphone (DILAUDID) injection 1 mg, 1 mg, Intravenous, Q3H PRN, Trevon Garcia DPM, 1 mg at 02/22/25 2255    insulin glargine (LANTUS, SEMGLEE) injection 36 Units, 36 Units, Subcutaneous, Nightly, Remy Thornton MD, 36 Units at 02/22/25 2254    insulin lispro (HUMALOG/ADMELOG) injection 12 Units, 12 Units, Subcutaneous, TID With Meals, Remy Thornton MD, 12 Units at 02/24/25 1007    insulin lispro (HUMALOG/ADMELOG) injection 2-7 Units, 2-7 Units, Subcutaneous, 4x Daily AC & at Bedtime, Trevon Garcia, DPM, 3 Units at 02/24/25 1149    ondansetron  (ZOFRAN) injection 4 mg, 4 mg, Intravenous, Q6H PRN, Trevon Garcia DPM, 4 mg at 02/22/25 2255    pantoprazole (PROTONIX) EC tablet 40 mg, 40 mg, Oral, BID AC, Trevon Garcia DPM, 40 mg at 02/24/25 0558    Pharmacy to dose vancomycin, , Not Applicable, Continuous PRN, Abdiaziz Paz MD    polyethylene glycol (MIRALAX) packet 17 g, 17 g, Oral, Daily, Trevon Garcia DPM    prochlorperazine (COMPAZINE) injection 5 mg, 5 mg, Intravenous, Q6H PRN, Jaskaran Thornton MD, 5 mg at 02/22/25 1908    sucralfate (CARAFATE) tablet 1 g, 1 g, Oral, 4x Daily AC & at Bedtime, Jaskaran Thornton MD    ticagrelor (BRILINTA) tablet 90 mg, 90 mg, Oral, Q12H, Albino Babcock MD    vancomycin (VANCOCIN) 500 mg IVPB in 100 mL NS (MBP), 500 mg, Intravenous, Once, Abdiaziz Paz MD    Vancomycin Pharmacy Intermittent/Pulse Dosing, , Not Applicable, Daily, Jaskaran Thornton MD, Given at 02/21/25 0802    zolpidem (AMBIEN) tablet 5 mg, 5 mg, Oral, Nightly PRN, Trevon Garcia DPM, 5 mg at 02/24/25 0028     ASSESSMENT  Right great toe wound with infection and surrounding cellulitis and osteomyelitis status post I&D and hallux amputation followed by partial first right metatarsal excision  End-stage renal disease   Diabetes mellitus  Hypertension  Hyperlipidemia  Coronary artery disease  Legally blind  Chronic anemia with drop in H&H  Gastroesophageal reflux disease    PLAN  CPM   Postop care  Transfused 1 more unit packed RBC  Adjust blood pressure medications  Continue IV antibiotics   Pain management   Hemodialysis TIW  Infectious disease podiatry and nephrology to follow patient   Continue home medications  Stress ulcer DVT prophylaxis  Supportive care  Discussed with nursing staff family and podiatry  Follow closely further recommendation current hospital course    JASKARAN THORNTON MD    Copied text in this note has been reviewed and is accurate as of 02/24/25

## 2025-02-25 LAB
ANION GAP SERPL CALCULATED.3IONS-SCNC: 10.8 MMOL/L (ref 5–15)
BACTERIA SPEC AEROBE CULT: ABNORMAL
BACTERIA SPEC AEROBE CULT: ABNORMAL
BASOPHILS # BLD AUTO: 0.07 10*3/MM3 (ref 0–0.2)
BASOPHILS NFR BLD AUTO: 0.8 % (ref 0–1.5)
BH BB BLOOD EXPIRATION DATE: NORMAL
BH BB BLOOD TYPE BARCODE: 5100
BH BB DISPENSE STATUS: NORMAL
BH BB PRODUCT CODE: NORMAL
BH BB UNIT NUMBER: NORMAL
BUN SERPL-MCNC: 38 MG/DL (ref 6–20)
BUN/CREAT SERPL: 6.2 (ref 7–25)
CALCIUM SPEC-SCNC: 8.6 MG/DL (ref 8.6–10.5)
CHLORIDE SERPL-SCNC: 100 MMOL/L (ref 98–107)
CO2 SERPL-SCNC: 23.2 MMOL/L (ref 22–29)
CREAT SERPL-MCNC: 6.13 MG/DL (ref 0.76–1.27)
CROSSMATCH INTERPRETATION: NORMAL
DEPRECATED RDW RBC AUTO: 44.9 FL (ref 37–54)
EGFRCR SERPLBLD CKD-EPI 2021: 11.1 ML/MIN/1.73
EOSINOPHIL # BLD AUTO: 0.19 10*3/MM3 (ref 0–0.4)
EOSINOPHIL NFR BLD AUTO: 2.1 % (ref 0.3–6.2)
ERYTHROCYTE [DISTWIDTH] IN BLOOD BY AUTOMATED COUNT: 14.2 % (ref 12.3–15.4)
GLUCOSE BLDC GLUCOMTR-MCNC: 109 MG/DL (ref 70–130)
GLUCOSE BLDC GLUCOMTR-MCNC: 121 MG/DL (ref 70–130)
GLUCOSE BLDC GLUCOMTR-MCNC: 129 MG/DL (ref 70–130)
GLUCOSE BLDC GLUCOMTR-MCNC: 177 MG/DL (ref 70–130)
GLUCOSE SERPL-MCNC: 216 MG/DL (ref 65–99)
GRAM STN SPEC: ABNORMAL
GRAM STN SPEC: ABNORMAL
HCT VFR BLD AUTO: 24.1 % (ref 37.5–51)
HGB BLD-MCNC: 8.1 G/DL (ref 13–17.7)
IMM GRANULOCYTES # BLD AUTO: 0.2 10*3/MM3 (ref 0–0.05)
IMM GRANULOCYTES NFR BLD AUTO: 2.2 % (ref 0–0.5)
LYMPHOCYTES # BLD AUTO: 1.01 10*3/MM3 (ref 0.7–3.1)
LYMPHOCYTES NFR BLD AUTO: 11 % (ref 19.6–45.3)
MCH RBC QN AUTO: 29.6 PG (ref 26.6–33)
MCHC RBC AUTO-ENTMCNC: 33.6 G/DL (ref 31.5–35.7)
MCV RBC AUTO: 88 FL (ref 79–97)
MONOCYTES # BLD AUTO: 0.71 10*3/MM3 (ref 0.1–0.9)
MONOCYTES NFR BLD AUTO: 7.7 % (ref 5–12)
NEUTROPHILS NFR BLD AUTO: 7.01 10*3/MM3 (ref 1.7–7)
NEUTROPHILS NFR BLD AUTO: 76.2 % (ref 42.7–76)
NRBC BLD AUTO-RTO: 0.3 /100 WBC (ref 0–0.2)
PLATELET # BLD AUTO: 215 10*3/MM3 (ref 140–450)
PMV BLD AUTO: 8.1 FL (ref 6–12)
POTASSIUM SERPL-SCNC: 4.5 MMOL/L (ref 3.5–5.2)
RBC # BLD AUTO: 2.74 10*6/MM3 (ref 4.14–5.8)
SODIUM SERPL-SCNC: 134 MMOL/L (ref 136–145)
UNIT  ABO: NORMAL
UNIT  RH: NORMAL
WBC NRBC COR # BLD AUTO: 9.19 10*3/MM3 (ref 3.4–10.8)

## 2025-02-25 PROCEDURE — 25010000002 HYDROMORPHONE 1 MG/ML SOLUTION: Performed by: PODIATRIST

## 2025-02-25 PROCEDURE — 63710000001 INSULIN GLARGINE PER 5 UNITS: Performed by: PODIATRIST

## 2025-02-25 PROCEDURE — 63710000001 INSULIN LISPRO (HUMAN) PER 5 UNITS: Performed by: PODIATRIST

## 2025-02-25 PROCEDURE — 85025 COMPLETE CBC W/AUTO DIFF WBC: CPT | Performed by: PODIATRIST

## 2025-02-25 PROCEDURE — 25010000002 PIPERACILLIN SOD-TAZOBACTAM PER 1 G: Performed by: INTERNAL MEDICINE

## 2025-02-25 PROCEDURE — 25010000002 CEFEPIME PER 500 MG: Performed by: PODIATRIST

## 2025-02-25 PROCEDURE — 82948 REAGENT STRIP/BLOOD GLUCOSE: CPT

## 2025-02-25 PROCEDURE — 25010000002 PROCHLORPERAZINE 10 MG/2ML SOLUTION: Performed by: PODIATRIST

## 2025-02-25 PROCEDURE — 25010000002 ONDANSETRON PER 1 MG: Performed by: PODIATRIST

## 2025-02-25 PROCEDURE — 80048 BASIC METABOLIC PNL TOTAL CA: CPT | Performed by: PODIATRIST

## 2025-02-25 PROCEDURE — 99232 SBSQ HOSP IP/OBS MODERATE 35: CPT

## 2025-02-25 RX ORDER — DIPHENHYDRAMINE HCL 25 MG
50 CAPSULE ORAL EVERY 4 HOURS PRN
Status: DISCONTINUED | OUTPATIENT
Start: 2025-02-25 | End: 2025-03-01

## 2025-02-25 RX ORDER — SCOPOLAMINE 1 MG/3D
1 PATCH, EXTENDED RELEASE TRANSDERMAL
Status: DISCONTINUED | OUTPATIENT
Start: 2025-02-25 | End: 2025-03-03 | Stop reason: HOSPADM

## 2025-02-25 RX ORDER — DIPHENHYDRAMINE HYDROCHLORIDE 50 MG/ML
50 INJECTION INTRAMUSCULAR; INTRAVENOUS EVERY 6 HOURS PRN
Status: DISCONTINUED | OUTPATIENT
Start: 2025-02-25 | End: 2025-03-01

## 2025-02-25 RX ADMIN — HYDROMORPHONE HYDROCHLORIDE 1 MG: 1 INJECTION, SOLUTION INTRAMUSCULAR; INTRAVENOUS; SUBCUTANEOUS at 18:42

## 2025-02-25 RX ADMIN — ZOLPIDEM TARTRATE 5 MG: 5 TABLET, FILM COATED ORAL at 22:58

## 2025-02-25 RX ADMIN — PROCHLORPERAZINE EDISYLATE 5 MG: 5 INJECTION, SOLUTION INTRAMUSCULAR; INTRAVENOUS at 22:59

## 2025-02-25 RX ADMIN — HYDROMORPHONE HYDROCHLORIDE 1 MG: 1 INJECTION, SOLUTION INTRAMUSCULAR; INTRAVENOUS; SUBCUTANEOUS at 15:12

## 2025-02-25 RX ADMIN — SUCRALFATE 1 G: 1 TABLET ORAL at 06:46

## 2025-02-25 RX ADMIN — CEFEPIME 2000 MG: 2 INJECTION, POWDER, FOR SOLUTION INTRAVENOUS at 14:12

## 2025-02-25 RX ADMIN — CARVEDILOL 25 MG: 25 TABLET, FILM COATED ORAL at 22:59

## 2025-02-25 RX ADMIN — SUCRALFATE 1 G: 1 TABLET ORAL at 22:58

## 2025-02-25 RX ADMIN — HYDROMORPHONE HYDROCHLORIDE 1 MG: 1 INJECTION, SOLUTION INTRAMUSCULAR; INTRAVENOUS; SUBCUTANEOUS at 22:58

## 2025-02-25 RX ADMIN — ONDANSETRON 4 MG: 2 INJECTION INTRAMUSCULAR; INTRAVENOUS at 19:05

## 2025-02-25 RX ADMIN — DOCUSATE SODIUM 100 MG: 100 CAPSULE, LIQUID FILLED ORAL at 14:15

## 2025-02-25 RX ADMIN — INSULIN LISPRO 14 UNITS: 100 INJECTION, SOLUTION INTRAVENOUS; SUBCUTANEOUS at 17:33

## 2025-02-25 RX ADMIN — SUCRALFATE 1 G: 1 TABLET ORAL at 17:33

## 2025-02-25 RX ADMIN — PANTOPRAZOLE SODIUM 40 MG: 40 TABLET, DELAYED RELEASE ORAL at 06:46

## 2025-02-25 RX ADMIN — SUCRALFATE 1 G: 1 TABLET ORAL at 07:53

## 2025-02-25 RX ADMIN — PANTOPRAZOLE SODIUM 40 MG: 40 TABLET, DELAYED RELEASE ORAL at 17:33

## 2025-02-25 RX ADMIN — BUMETANIDE 2 MG: 2 TABLET ORAL at 17:33

## 2025-02-25 RX ADMIN — ASPIRIN 81 MG CHEWABLE TABLET 81 MG: 81 TABLET CHEWABLE at 07:52

## 2025-02-25 RX ADMIN — INSULIN LISPRO 14 UNITS: 100 INJECTION, SOLUTION INTRAVENOUS; SUBCUTANEOUS at 07:52

## 2025-02-25 RX ADMIN — METRONIDAZOLE 500 MG: 500 TABLET ORAL at 14:14

## 2025-02-25 RX ADMIN — INSULIN GLARGINE 42 UNITS: 100 INJECTION, SOLUTION SUBCUTANEOUS at 22:59

## 2025-02-25 RX ADMIN — INSULIN LISPRO 14 UNITS: 100 INJECTION, SOLUTION INTRAVENOUS; SUBCUTANEOUS at 15:11

## 2025-02-25 RX ADMIN — ATORVASTATIN CALCIUM 10 MG: 10 TABLET, FILM COATED ORAL at 22:58

## 2025-02-25 RX ADMIN — ANTACID TABLETS 2 TABLET: 500 TABLET, CHEWABLE ORAL at 09:04

## 2025-02-25 RX ADMIN — PROCHLORPERAZINE EDISYLATE 5 MG: 5 INJECTION, SOLUTION INTRAMUSCULAR; INTRAVENOUS at 15:11

## 2025-02-25 RX ADMIN — METRONIDAZOLE 500 MG: 500 TABLET ORAL at 06:46

## 2025-02-25 RX ADMIN — HYDROMORPHONE HYDROCHLORIDE 1 MG: 1 INJECTION, SOLUTION INTRAMUSCULAR; INTRAVENOUS; SUBCUTANEOUS at 06:46

## 2025-02-25 RX ADMIN — ONDANSETRON 4 MG: 2 INJECTION INTRAMUSCULAR; INTRAVENOUS at 06:56

## 2025-02-25 RX ADMIN — HYDROCODONE BITARTRATE AND ACETAMINOPHEN 2 TABLET: 5; 325 TABLET ORAL at 16:07

## 2025-02-25 RX ADMIN — INSULIN LISPRO 2 UNITS: 100 INJECTION, SOLUTION INTRAVENOUS; SUBCUTANEOUS at 07:52

## 2025-02-25 RX ADMIN — METRONIDAZOLE 500 MG: 500 TABLET ORAL at 22:58

## 2025-02-25 RX ADMIN — SUCRALFATE 1 G: 1 TABLET ORAL at 14:13

## 2025-02-25 RX ADMIN — DOCUSATE SODIUM 100 MG: 100 CAPSULE, LIQUID FILLED ORAL at 22:58

## 2025-02-25 RX ADMIN — SCOPOLAMINE 1 PATCH: 1.5 PATCH, EXTENDED RELEASE TRANSDERMAL at 22:58

## 2025-02-25 RX ADMIN — PIPERACILLIN AND TAZOBACTAM 3.38 G: 3; .375 INJECTION, POWDER, FOR SOLUTION INTRAVENOUS at 15:11

## 2025-02-25 NOTE — PROGRESS NOTES
[facility] Progress Note       Encounter Date:25  Patient:Wilner Menjivar  :1985  MRN:[id number]      Chief Complaint: Chest tightness      Subjective:      Overall patient reports that he has been doing well following his surgery yesterday.  Denies chest pain, shortness of breath, or palpitations.       Review of Systems:  Review of Systems   Eyes:  Positive for vision loss in left eye and vision loss in right eye.   Cardiovascular:  Negative for chest pain, dyspnea on exertion, leg swelling and palpitations.   Respiratory:  Negative for cough and shortness of breath.        Medications:  Scheduled Meds:  aspirin, 81 mg, Oral, Daily  atorvastatin, 10 mg, Oral, Nightly  bumetanide, 2 mg, Oral, BID Diuretics  carvedilol, 25 mg, Oral, BID  cefepime, 2,000 mg, Intravenous, Q24H  docusate sodium, 100 mg, Oral, BID  epoetin jamil/jamil-epbx, 10,000 Units, Subcutaneous, Once per day on   insulin glargine, 42 Units, Subcutaneous, Nightly  insulin lispro, 14 Units, Subcutaneous, TID With Meals  insulin lispro, 2-7 Units, Subcutaneous, 4x Daily AC & at Bedtime  metroNIDAZOLE, 500 mg, Oral, Q8H  pantoprazole, 40 mg, Oral, BID AC  piperacillin-tazobactam, 3.375 g, Intravenous, Once  polyethylene glycol, 17 g, Oral, Daily  sucralfate, 1 g, Oral, 4x Daily AC & at Bedtime  Vancomycin Pharmacy Intermittent/Pulse Dosing, , Not Applicable, Daily    Continuous Infusions:  lactated ringers, 9 mL/hr, Last Rate: 75 mL/hr (25 1633)  Pharmacy Consult,   Pharmacy to dose vancomycin,     PRN Meds:    aluminum-magnesium hydroxide-simethicone    calcium carbonate    dextrose    dextrose    diphenhydrAMINE **OR** diphenhydrAMINE    EPINEPHrine    glucagon (human recombinant)    HYDROcodone-acetaminophen    HYDROmorphone    ondansetron    Pharmacy Consult    Pharmacy to dose vancomycin    prochlorperazine    zolpidem         Objective:       Vitals:    25 1815 25  02/24/25 2349 02/25/25 0720   BP: 149/76 158/75 129/66 138/57   BP Location:  Right arm Right arm Right arm   Patient Position:  Lying Lying Lying   Pulse: 98 100 94 85   Resp: 16 16 18 18   Temp:  97.9 °F (36.6 °C) 98.1 °F (36.7 °C) 97.7 °F (36.5 °C)   TempSrc:  Oral Oral Oral   SpO2: 100%  100%    Weight:       Height:               Physical Exam:  Constitutional: Well appearing, well developed, no acute distress   HENT: Oropharynx clear and membrane moist  Eyes: Normal conjunctiva, no sclera icterus.  Neck: Supple, no carotid bruit bilaterally.  Cardiovascular: Regular rate and rhythm, No Murmur, No bilateral lower extremity edema.  Pulmonary: Normal respiratory effort, normal lung sounds, no wheezing.  Neurological: Alert and orient x 3.   Skin: Warm, dry, no ecchymosis, no rash.  Psych: Appropriate mood and affect. Normal judgment and insight.           Lab Review:   Results from last 7 days   Lab Units 02/25/25  0348 02/24/25  0517 02/23/25  1105 02/23/25  0346 02/22/25  0337 02/21/25  0544 02/20/25  0352 02/19/25  0616   SODIUM mmol/L 134* 129*  --  133* 134* 132* 134* 130*   POTASSIUM mmol/L 4.5 4.8 4.6 4.3 4.8 4.5 4.5 4.2   CHLORIDE mmol/L 100 98  --  100 100 97* 98 94*   CO2 mmol/L 23.2 19.7*  --  22.0 23.8 21.0* 24.5 21.0*   BUN mg/dL 38* 40*  --  33* 25* 34* 25* 39*   CREATININE mg/dL 6.13* 7.99*  --  6.93* 5.30* 6.28* 5.48* 7.27*   GLUCOSE mg/dL 216* 312*  --  123* 259* 211* 190* 303*   CALCIUM mg/dL 8.6 8.3*  --  8.6 8.6 9.2 9.2 8.9     Results from last 7 days   Lab Units 02/23/25  1216 02/23/25  1105   HSTROP T ng/L 99* 105*     Results from last 7 days   Lab Units 02/25/25  0348 02/24/25  0517 02/23/25  0346 02/22/25  0337 02/21/25  0544 02/20/25  0352 02/19/25  0616   WBC 10*3/mm3 9.19 13.86* 16.36* 11.86* 13.27* 14.41* 15.41*   HEMOGLOBIN g/dL 8.1* 7.0* 5.7* 7.2* 8.7* 9.2* 6.7*   HEMATOCRIT % 24.1* 21.4* 18.2* 23.4* 26.3* 26.0* 20.3*   PLATELETS 10*3/mm3 215 262 281 315 328 339 331         Results  "from last 7 days   Lab Units 02/23/25  1105   MAGNESIUM mg/dL 2.0           Invalid input(s): \"LDLCALC\"          Echocardiogram 2/4/2025 [facility]:  Technically difficult examination; Lumason echocontrast utilized to enhance endocardial definition.   The estimated ejection fraction is 55-60% with no clear regional wall motion abnormalities.   There is mild, concentric, left ventricular hypertrophy.   There is grade I left ventricular diastolic dysfunction (impaired relaxation).   The right ventricular chamber size and systolic function are within normal limits.   No significant valvular abnormalities are visualized.   There is no evidence of a pericardial effusion.      Cardiac catheterization 9/20/2024 [facility]:  Successful percutaneous intervention to 80% mid marginal branch stenoses with 2.25 x 38 mm Synergy drug-eluting stent with a 2.5 x 48 mm Synergy from the mid marginal into the proximal circumflex postdilated the 2.75 mm noncompliant balloon  Patent stent within the mid LAD  Right coronary artery 100% mid with left-to-right collaterals filling the distal vascular bed     Stress MPI 9/17/2024 [facility]:  Gated SPECT analysis demonstrates a post stress ejection fraction of 21%. Global LV systolic function is severely reduced. Severely decreased wall thickening and severe hypokinesis is noted in the basal inferior, basal inferolateral, mid inferior, mid inferolateral, apical septal, apical inferior, apical lateral wall segments. Otherwise, there is moderately decreased wall thickening and mild hypokinesis in all the remaining left ventricular wall segments.      Echocardiogram 5/3/2024 [facility]:  The ejection fraction biplane was calculated at 43%. Left ventricle size is normal. Mild left ventricular hypertrophy. The mid to distal anteroseptal mid anterior and apical walls are severely hypokinetic   Structurally normal mitral valve. Mild mitral regurgitation is " present.   Right ventricular systolic pressure of 36 mmHg.   Mild tricuspid regurgitation.   There is no evidence of pericardial effusion.      Cardiac Catheterization 9/6/2022 [facility]:  Mildly elevated right heart catheterization pressures are noted with   cardiac index between 2.5 and 3.0 L/min/m^2.   Severe multivessel CAD is identified including high-grade 99% LAD stenosis   (clear culprit for ACS).   Successful PCI to the mid LAD was completed with 2 Synergy drug-eluting stents (2.5 x 38 mm and 3.0 x 24 mm).   Intravascular ultrasound imaging with diffuse lipidic plaquing with mild calcification.   70% mid marginal branch stenoses  100% mid RCA segment stenoses with left-to-right collaterals       Assessment:          Diagnosis Plan   1. Diabetic ulcer of right great toe  Ambulatory Referral to Vascular Surgery      2. Hyperglycemia        3. Diabetic ulcer of toe of right foot associated with type 2 diabetes mellitus, with necrosis of bone  Ambulatory Referral to Vascular Surgery    Case request    Case request    Tissue Pathology Exam    Tissue Pathology Exam      4. Osteomyelitis of great toe of right foot  Case request    Case request    Fungus Culture - Bone, Toe, Right    Fungus Culture - Surgical Site, Toe, Right    Tissue / Bone Culture - Bone, Toe, Right    Tissue / Bone Culture - Surgical Site, Toe, Right    Anaerobic Culture 10 Day Incubation - Bone, Toe, Right    Anaerobic Culture 10 Day Incubation - Surgical Site, Toe, Right    Tissue Pathology Exam    Tissue Pathology Exam    Case request    Case request    Tissue Pathology Exam    Tissue Pathology Exam    Case request    Case request    Tissue Pathology Exam    Tissue Pathology Exam             Plan:       Wilner Menjivar is a 39 y.o. gentleman follows with [facility].  He has a past medical history notable for coronary artery disease status post PCI, chronic systolic congestive heart failure with recovered ejection fraction, type  2 diabetes, legally blind, end-stage renal disease on hemodialysis, severe anemia multifactorial, hypertension, and osteomyelitis of the feet.  He presented to the hospital on 211 for concerns over foot infection.  He has been here before with multiple revisions of toe amputation and treatment of his osteomyelitis.  This has been complicated by bleeding.  He has known history of profound anemia at time requiring transfusions.  He was having some chest discomfort in the setting of having a hemoglobin of 5.  This has since resolved since transfusion.  Cardiology was consulted to see him due to chest pain and known coronary artery disease.  Patient underwent surgery yesterday which he reports that he tolerated well and is doing well.  We have stop Brilinta.  Would be reasonable to transition to aspirin and Plavix when safe from a surgical standpoint.  If continued issues with bleeding.  Potentially transition to single antiplatelet therapy.      Chest pain/type II myocardial infarction:  Denies any anginal symptoms today.  His EKG is likely unchanged compared to reports from outside hospitals  Symptoms improved after treating his anemia  Echocardiogram demonstrates recovery of left ventricular function  Dr. Lake did not recommend any further ischemic testing unless patient has significant changes in clinical status.  History of RCA      Coronary artery disease without angina:  Did have revascularization August 2024  Discontinue Brilinta today.  Would be reasonable to transition to Plavix when safe from a surgical standpoint.  Plan to follow with [facility] following discharge.  Dr. Lake noted if issues with anemia and bleeding on dual platelet therapy could potentially transition to single antiplatelet therapy.  Continue atorvastatin 10 mg  Continue carvedilol 12.5 mg twice daily     Chronic systolic and diastolic congestive heart failure:  Actually had recovery of his left ventricular function after  revascularization of his circumflex in August based upon echocardiogram 2/5/2025  Continue carvedilol  No ACE/ARB due to renal disease       Thank you for allowing me to participate in the care of Wilner Menjivar. Feel free to contact me directly with any further questions or concerns.         PAYTON Quintanilla  [facility]  02/25/25  09:14 EST

## 2025-02-25 NOTE — PROGRESS NOTES
[facility] Progress Note      Patient Name: Wilner Menjivar  : 1985  MRN: [id number]  Primary Care Physician:  Roosevelt Thao MD  Date of admission: 2025    Subjective     Interval History:   F/u ESRD     Review of Systems:   Dialyzed only 30 min before insisting to come off treatment   Underwent I&D and partial first metatarsal exision yesterday     Objective     Vitals:   Temp:  [97.5 °F (36.4 °C)-98.2 °F (36.8 °C)] 97.7 °F (36.5 °C)  Heart Rate:  [] 85  Resp:  [16-18] 18  BP: (100-158)/(57-88) 138/57  Flow (L/min) (Oxygen Therapy):  [10] 10    Intake/Output Summary (Last 24 hours) at 2025 1108  Last data filed at 2025 1715  Gross per 24 hour   Intake 913.75 ml   Output --   Net 913.75 ml       Physical Exam:    General Appearance: blind AAM comfortable alert  Neck: supple, no JVD  Lungs: CTA bilat no rales  Heart: RRR, normal S1 and S2  Abdomen: soft, nontender, nondistended  Extremities: no edema, LUE AVF +T/B    Scheduled Meds:     aspirin, 81 mg, Oral, Daily  atorvastatin, 10 mg, Oral, Nightly  bumetanide, 2 mg, Oral, BID Diuretics  carvedilol, 25 mg, Oral, BID  cefepime, 2,000 mg, Intravenous, Q24H  docusate sodium, 100 mg, Oral, BID  epoetin jamil/jamil-epbx, 10,000 Units, Subcutaneous, Once per day on   insulin glargine, 42 Units, Subcutaneous, Nightly  insulin lispro, 14 Units, Subcutaneous, TID With Meals  insulin lispro, 2-7 Units, Subcutaneous, 4x Daily AC & at Bedtime  metroNIDAZOLE, 500 mg, Oral, Q8H  pantoprazole, 40 mg, Oral, BID AC  piperacillin-tazobactam, 3.375 g, Intravenous, Once  polyethylene glycol, 17 g, Oral, Daily  sucralfate, 1 g, Oral, 4x Daily AC & at Bedtime  Vancomycin Pharmacy Intermittent/Pulse Dosing, , Not Applicable, Daily      IV Meds:   lactated ringers, 9 mL/hr, Last Rate: 75 mL/hr (25 0034)  Pharmacy Consult,   Pharmacy to dose vancomycin,         Results Reviewed:   I have personally  reviewed the results from the time of this admission to 2/25/2025 11:08 EST     Results from last 7 days   Lab Units 02/25/25  0348 02/24/25  0517 02/23/25  1105 02/23/25  0346   SODIUM mmol/L 134* 129*  --  133*   POTASSIUM mmol/L 4.5 4.8 4.6 4.3   CHLORIDE mmol/L 100 98  --  100   CO2 mmol/L 23.2 19.7*  --  22.0   BUN mg/dL 38* 40*  --  33*   CREATININE mg/dL 6.13* 7.99*  --  6.93*   CALCIUM mg/dL 8.6 8.3*  --  8.6   GLUCOSE mg/dL 216* 312*  --  123*     Estimated Creatinine Clearance: 18.7 mL/min (A) (by C-G formula based on SCr of 6.13 mg/dL (H)).  Results from last 7 days   Lab Units 02/23/25  1105 02/22/25  0337 02/21/25  0544 02/19/25  0616   MAGNESIUM mg/dL 2.0  --   --   --    PHOSPHORUS mg/dL  --  3.7 4.3 3.9         Results from last 7 days   Lab Units 02/25/25  0348 02/24/25  0517 02/23/25  0346 02/22/25  0337 02/21/25  0544   WBC 10*3/mm3 9.19 13.86* 16.36* 11.86* 13.27*   HEMOGLOBIN g/dL 8.1* 7.0* 5.7* 7.2* 8.7*   PLATELETS 10*3/mm3 215 262 281 315 328           Assessment / Plan     ASSESSMENT:  End Stage Renal Disease ( ESRD ) since 6/2024 followed by Dr. Wasserman on Hemodialysis on a Monday, Wednesday and Friday schedule via LUE AVF.  Only dialyzed 30 minutes yesterday before insisting to come off treatment.  Volume/lytes stable.  Has residual fcn and takes PO bumex  Anemia of CKD & ABL. Transfused.  Hgb up 8.1   Hypertension w/ CKD.  BP controlled  Coronary artery disease s/p PCI in Sept 2024  Diabetes Mellitus type 2 w/ complications (retinopathy , peripheral vascular disease  nephropathy ) .Mgmt per primary team   Right diabetic foot ulcer + osteomyelitis, POD1 I&D.  Abx per ID (neel/zosyn currently)     PLAN:  HD today and again tomorrow to get back on MWF schedule      Roman Carmen MD  02/25/25  11:08 [facility]    [facility]  [phone]

## 2025-02-25 NOTE — PLAN OF CARE
Goal Outcome Evaluation:              Outcome Evaluation: patient is alert and oriented. states pain in right amputation, PRN medications given along with nausea medications per patient request.. Reached out to Dr. Garcia per patient request to discuss the wound vac, doctor said he would round on patient on 2/26/25, patient statisfied with that. Patient recieved dialysis today and tolerated well.

## 2025-02-25 NOTE — PROGRESS NOTES
[facility] Clinical Pharmacy Services: Unasyn Challenge Consult    Pt Name: Wilner Menjivar   : 1985  Weight: 107 kg (234 lb 12.6 oz)  Antibiotic: Unasyn  Indication: historical psn allergy- childhood     Relevant clinical data and objective history reviewed:    Past Medical History:   Diagnosis Date    CKD stage 4 due to type 2 diabetes mellitus     GERD (gastroesophageal reflux disease)     Heart failure     Hypertension     Type 2 diabetes mellitus     Vision impairment        Assessment/Plan  Pt has tolerated cephalosporins in the past  Actual monitoring of reaction is outside pharmacist scope of practice. Will defer to day shift to support this trial more appropriately.     Thank you for this consult and please contact pharmacy with any questions or concerns.     Teresa Bowman, PharmD  Clinical Pharmacist

## 2025-02-25 NOTE — NURSING NOTE
Dialysis treatment was performed to MD orders. Hemostasis achieved, AVF cannulation sites were dressed with gauze and tape. Pt tolerated treatment well, access flow good, no issues    Post Vitals  BP- 127/67  MT- 61  RR- 16  Temp- 98    BVP- 78.2L  UF- 3000mL

## 2025-02-25 NOTE — PROGRESS NOTES
"Daily progress note    Primary care physician  Dr. NERI    Subjective   Status post partial bone excision first metatarsal right foot debridement doing same with no new complaints and getting hemodialysis    History of present illness  39-year-old male with history of end-stage renal disease on hemodialysis chronic anemia diabetes hypertension hyperlipidemia coronary artery disease and gastroesophageal of disease who is also legally blind and has right foot wound which is getting worse mainly right great toe which looks infected and has recently seen by podiatrist and removed the callus from the right great toe.  Patient has intermittent bleeding pain drainage but no fever chills.  Patient also denies any chest pain shortness of breath palpitation abdominal pain nausea vomiting diarrhea.  Patient workup in ER revealed infected right great toe wound admitted for management.     REVIEW OF SYSTEMS  Unremarkable except pain     PHYSICAL EXAM   Blood pressure 124/79, pulse 81, temperature 97.8 °F (36.6 °C), temperature source Temporal, resp. rate 16, height 170.2 cm (67\"), weight 107 kg (234 lb 12.6 oz), SpO2 100%.    Constitutional:       General: He is not in acute distress.     Appearance: Normal appearance. He is not ill-appearing, toxic-appearing or diaphoretic.   HENT:      Head: Normocephalic and atraumatic.      Nose: Nose normal.      Mouth/Throat:      Mouth: Mucous membranes are moist.      Pharynx: Oropharynx is clear.   Eyes:      Conjunctiva/sclera: Conjunctivae normal.      Pupils: Pupils are equal, round, and reactive to light.   Cardiovascular:      Rate and Rhythm: Normal rate and regular rhythm.      Pulses: Normal pulses.   Pulmonary:      Effort: Pulmonary effort is normal.   Abdominal:      General: Abdomen is flat.      Palpations: Abdomen is soft.   Musculoskeletal:      Comments: Right great toe is black in color with a deep ulceration and dried blood, no active purulent drainage, no palpable " crepitus or fluctuance   Skin:     General: Skin is warm and dry.   Neurological:      General: No focal deficit present.      Mental Status: He is alert and oriented to person, place, and time. Mental status is at baseline.   Psychiatric:         Mood and Affect: Mood normal.         Behavior: Behavior normal.         Thought Content: Thought content normal.         Judgment: Judgment normal.      LAB RESULTS  Lab Results (last 24 hours)       Procedure Component Value Units Date/Time    POC Glucose Once [[id number]]  (Normal) Collected: 02/25/25 1355    Specimen: Blood Updated: 02/25/25 1357     Glucose 109 mg/dL     Fungus Culture - Surgical Site, Toe, Right [[id number]] Collected: 02/18/25 0754    Specimen: Surgical Site from Toe, Right Updated: 02/25/25 0830     Fungus Culture No fungus isolated at 1 week    Tissue / Bone Culture - Surgical Site, Toe, Right [[id number]]  (Abnormal)  (Susceptibility) Collected: 02/18/25 0754    Specimen: Surgical Site from Toe, Right Updated: 02/25/25 0827     Tissue Culture Scant growth (1+) Citrobacter freundii     Comment:   This organism may develop resistance during prolonged therapy with 3rd generation cephalosporins (e.g. ceftriaxone) as a result of de-repression of AmpC B-lactamase.  Ceftriaxone may be a reasonable treatment option for uncomplicated cystitis or other lower severity infections when susceptibility is demonstrated.         Scant growth (1+) Enterococcus faecalis     Gram Stain Few (2+) Gram positive cocci      Rare (1+) WBCs seen    Susceptibility        Citrobacter freundii      BHAVIN      Amoxicillin + Clavulanate Resistant (C)  [1]       Cefepime Susceptible      Ceftazidime Susceptible      Ceftriaxone Susceptible      Gentamicin Susceptible      Levofloxacin Susceptible      Piperacillin + Tazobactam Susceptible (C)  [1]       Trimethoprim + Sulfamethoxazole Susceptible                   [1]  Appended report. These results have been appended to a previously  final verified report.                Susceptibility        Enterococcus faecalis      BHAVIN      Ampicillin Susceptible      Vancomycin Susceptible                       Susceptibility Comments       Citrobacter freundii    With the exception of urinary-sourced infections, aminoglycosides should not be used as monotherapy.               POC Glucose Once [[id number]]  (Abnormal) Collected: 02/25/25 0645    Specimen: Blood Updated: 02/25/25 0646     Glucose 177 mg/dL     Basic Metabolic Panel [[id number]]  (Abnormal) Collected: 02/25/25 0348    Specimen: Blood Updated: 02/25/25 0447     Glucose 216 mg/dL      BUN 38 mg/dL      Creatinine 6.13 mg/dL      Sodium 134 mmol/L      Potassium 4.5 mmol/L      Chloride 100 mmol/L      CO2 23.2 mmol/L      Calcium 8.6 mg/dL      BUN/Creatinine Ratio 6.2     Anion Gap 10.8 mmol/L      eGFR 11.1 mL/min/1.73     Narrative:      GFR Categories in Chronic Kidney Disease (CKD)      GFR Category          GFR (mL/min/1.73)    Interpretation  G1                     90 or greater         Normal or high (1)  G2                      60-89                Mild decrease (1)  G3a                   45-59                Mild to moderate decrease  G3b                   30-44                Moderate to severe decrease  G4                    15-29                Severe decrease  G5                    14 or less           Kidney failure          (1)In the absence of evidence of kidney disease, neither GFR category G1 or G2 fulfill the criteria for CKD.    eGFR calculation 2021 CKD-EPI creatinine equation, which does not include race as a factor    CBC & Differential [[id number]]  (Abnormal) Collected: 02/25/25 0348    Specimen: Blood Updated: 02/25/25 0426    Narrative:      The following orders were created for panel order CBC & Differential.  Procedure                               Abnormality         Status                     ---------                               -----------         ------                      CBC Auto Differential[[id number]]        Abnormal            Final result                 Please view results for these tests on the individual orders.    CBC Auto Differential [[id number]]  (Abnormal) Collected: 02/25/25 0348    Specimen: Blood Updated: 02/25/25 0426     WBC 9.19 10*3/mm3      RBC 2.74 10*6/mm3      Hemoglobin 8.1 g/dL      Hematocrit 24.1 %      MCV 88.0 fL      MCH 29.6 pg      MCHC 33.6 g/dL      RDW 14.2 %      RDW-SD 44.9 fl      MPV 8.1 fL      Platelets 215 10*3/mm3      Neutrophil % 76.2 %      Lymphocyte % 11.0 %      Monocyte % 7.7 %      Eosinophil % 2.1 %      Basophil % 0.8 %      Immature Grans % 2.2 %      Neutrophils, Absolute 7.01 10*3/mm3      Lymphocytes, Absolute 1.01 10*3/mm3      Monocytes, Absolute 0.71 10*3/mm3      Eosinophils, Absolute 0.19 10*3/mm3      Basophils, Absolute 0.07 10*3/mm3      Immature Grans, Absolute 0.20 10*3/mm3      nRBC 0.3 /100 WBC     Tissue Pathology Exam [[id number]] Collected: 02/24/25 1704    Specimen: Tissue from Foot, Right Updated: 02/24/25 2221    POC Glucose Once [[id number]]  (Normal) Collected: 02/24/25 2009    Specimen: Blood Updated: 02/24/25 2011     Glucose 126 mg/dL     POC Glucose Once [[id number]]  (Normal) Collected: 02/24/25 1725    Specimen: Blood Updated: 02/24/25 1726     Glucose 108 mg/dL           Imaging Results (Last 24 Hours)       ** No results found for the last 24 hours. **            Current Facility-Administered Medications:     aluminum-magnesium hydroxide-simethicone (MAALOX MAX) 400-400-40 MG/5ML suspension 15 mL, 15 mL, Oral, PRN, Trevon Garcia, DPM, 15 mL at 02/24/25 1007    aspirin chewable tablet 81 mg, 81 mg, Oral, Daily, Trevon Garcia DPM, 81 mg at 02/25/25 0752    atorvastatin (LIPITOR) tablet 10 mg, 10 mg, Oral, Nightly, Trevon Garcia, DPM, 10 mg at 02/24/25 2134    bumetanide (BUMEX) tablet 2 mg, 2 mg, Oral, BID Diuretics, Trevon Garcia DPM, 2 mg at 02/24/25 1854    calcium carbonate  (TUMS) chewable tablet 500 mg (200 mg elemental), 2 tablet, Oral, TID PRN, Trevon Garcia DPM, 2 tablet at 02/25/25 0904    carvedilol (COREG) tablet 25 mg, 25 mg, Oral, BID, Trevon Garcia, DPM, 25 mg at 02/24/25 2134    cefepime 2000 mg IVPB in 100 mL NS (MBP), 2,000 mg, Intravenous, Q24H, Trevon Garcia DPM, 2,000 mg at 02/25/25 1412    dextrose (D50W) (25 g/50 mL) IV injection 25 g, 25 g, Intravenous, Q15 Min PRN, Trevon Garcia DPM    dextrose (GLUTOSE) oral gel 15 g, 15 g, Oral, Q15 Min PRN, Trevon Garcia DPM    diphenhydrAMINE (BENADRYL) capsule 50 mg, 50 mg, Oral, Q4H PRN **OR** diphenhydrAMINE (BENADRYL) injection 50 mg, 50 mg, Intravenous, Q6H PRN, Abdiaziz Paz MD    docusate sodium (COLACE) capsule 100 mg, 100 mg, Oral, BID, Trevon Garcia DPM, 100 mg at 02/25/25 1415    EPINEPHrine (ANAPHYLAXIS) 1 mg/ml injection kit, 0.3 mg, Intramuscular, PRN, Abdiaziz Paz MD    epoetin jamil-epbx (RETACRIT) injection 10,000 Units, 10,000 Units, Subcutaneous, Once per day on Monday Wednesday Friday, Trevon Garcia DPM, 10,000 Units at 02/24/25 1149    glucagon (GLUCAGEN) injection 1 mg, 1 mg, Intramuscular, Q15 Min PRN, Trevon Garcia DPM    HYDROcodone-acetaminophen (NORCO) 5-325 MG per tablet 2 tablet, 2 tablet, Oral, Q4H PRN, Trevon Garcia DPM, 2 tablet at 02/24/25 2350    HYDROmorphone (DILAUDID) injection 1 mg, 1 mg, Intravenous, Q3H PRN, Trevon Garcia DPM, 1 mg at 02/25/25 0646    insulin glargine (LANTUS, SEMGLEE) injection 42 Units, 42 Units, Subcutaneous, Nightly, Trevon Garcia DPM, 42 Units at 02/24/25 2146    insulin lispro (HUMALOG/ADMELOG) injection 14 Units, 14 Units, Subcutaneous, TID With Meals, Trevon Garcia DPM, 14 Units at 02/25/25 0752    insulin lispro (HUMALOG/ADMELOG) injection 2-7 Units, 2-7 Units, Subcutaneous, 4x Daily AC & at Bedtime, Trevon Garcia DPM, 2 Units at 02/25/25 0752    lactated ringers infusion, 9 mL/hr, Intravenous, Continuous, Dash,  Chapincito Goel MD, Last Rate: 75 mL/hr at 02/24/25 1633, New Bag at 02/24/25 1633    metroNIDAZOLE (FLAGYL) tablet 500 mg, 500 mg, Oral, Q8H, Abdiaziz Paz MD, 500 mg at 02/25/25 1414    ondansetron (ZOFRAN) injection 4 mg, 4 mg, Intravenous, Q6H PRN, Trevon Garcia DPM, 4 mg at 02/25/25 0656    pantoprazole (PROTONIX) EC tablet 40 mg, 40 mg, Oral, BID AC, Trevon Garcia DPM, 40 mg at 02/25/25 0646    Pharmacy Consult, , Not Applicable, Continuous PRN, Abdiaziz Paz MD    Pharmacy to dose vancomycin, , Not Applicable, Continuous PRN, Trevon Garcia DPM    piperacillin-tazobactam (ZOSYN) 3.375 g IVPB in 100 mL NS MBP (CD), 3.375 g, Intravenous, Once, Abdiaziz Paz MD    polyethylene glycol (MIRALAX) packet 17 g, 17 g, Oral, Daily, Trevon Garcia DPM    prochlorperazine (COMPAZINE) injection 5 mg, 5 mg, Intravenous, Q6H PRN, Trevon Garcia DPM, 5 mg at 02/24/25 2357    sucralfate (CARAFATE) tablet 1 g, 1 g, Oral, 4x Daily AC & at Bedtime, Trevon Garcia DPM, 1 g at 02/25/25 1413    Vancomycin Pharmacy Intermittent/Pulse Dosing, , Not Applicable, Daily, Trevon Garcia DPM, Given at 02/21/25 0802    zolpidem (AMBIEN) tablet 5 mg, 5 mg, Oral, Nightly PRN, Trevon Garcia DPM, 5 mg at 02/24/25 2350     ASSESSMENT  Right great toe wound with infection and surrounding cellulitis and osteomyelitis status post I&D and hallux amputation followed by partial first right metatarsal excision  End-stage renal disease   Diabetes mellitus  Hypertension  Hyperlipidemia  Coronary artery disease  Legally blind  Chronic anemia with drop in H&H  Gastroesophageal reflux disease    PLAN  CPM   Postop care  Transfused as needed  Adjust blood pressure medications  Continue IV antibiotics   Pain management   Hemodialysis today  Infectious disease podiatry and nephrology to follow patient   Continue home medications  Stress ulcer DVT prophylaxis  Supportive care  Discussed with nursing staff family and podiatry  Follow  closely further recommendation current hospital course    JASKARAN LANGFORD MD    Copied text in this note has been reviewed and is accurate as of 02/25/25

## 2025-02-25 NOTE — PROGRESS NOTES
[facility] Clinical Pharmacy Services: Piperacillin/tazobactam Challenge Consult    Pt Name: Wilner Menjivar   : 1985  Weight: 107 kg (234 lb 12.6 oz)  Antibiotic: Unasyn  Indication: historical psn allergy- childhood     Relevant clinical data and objective history reviewed:    Past Medical History:   Diagnosis Date    CKD stage 4 due to type 2 diabetes mellitus     GERD (gastroesophageal reflux disease)     Heart failure     Hypertension     Type 2 diabetes mellitus     Vision impairment        Assessment/Plan  Pt has tolerated cephalosporins in the past and history reveals index reaction was as a child   Piperacillin/tazobactam test dose has been ordered and assuming the patient tolerates he should be fine to receive additional pip/nicolasa.   Pip/nicolasa 3.375g x1 ordered in addition to epinephrine/Diphenhydramine in case of hypersensitivity     Thank you for this consult and please contact pharmacy with any questions or concerns.     Milton Marcial RPH  Clinical Pharmacist

## 2025-02-25 NOTE — SIGNIFICANT NOTE
02/25/25 0946   OTHER   Discipline physical therapist   Rehab Time/Intention   Session Not Performed patient unavailable for evaluation  (Dialysis. Will follow up as time allows)

## 2025-02-25 NOTE — PROGRESS NOTES
"[facility] Clinical Pharmacy Services: Vancomycin Monitoring Note    Wilner Menjivar is a 39 y.o. male who is on day 14 of pharmacy to dose vancomycin for Bone and/or Joint Infection.    Previous Vancomycin Dose:   intermittent dosing  Updated Cultures and Sensitivities:   2/18 Surgical site right toe - C.freundii, E.faecalis, Parvimonas micra  Results from last 7 days   Lab Units 02/23/25  0346 02/21/25  0544 02/19/25  0616   VANCOMYCIN RM mcg/mL 17.20 23.60 18.90     Vitals/Labs  Ht: 170.2 cm (67\"); Wt: 107 kg (234 lb 12.6 oz)   Temp Readings from Last 1 Encounters:   02/25/25 97.7 °F (36.5 °C) (Oral)     Estimated Creatinine Clearance: 18.7 mL/min (A) (by C-G formula based on SCr of 6.13 mg/dL (H)).   Dialysis MWF    Results from last 7 days   Lab Units 02/25/25  0348 02/24/25  0517 02/23/25  0346   CREATININE mg/dL 6.13* 7.99* 6.93*   WBC 10*3/mm3 9.19 13.86* 16.36*     Assessment/Plan    Only ended up receiving 30 minutes of dialysis yesterday.  Vancomycin 500 mg IV given last night.  Will have dialysis today and tomorrow.  Random level scheduled prior to dialysis 2/26.     Current Vancomycin Dose: Pulse dosing with dialysis  Next Level Date and Time: Vanc Random on 2/26 at 0600 prior to HD.  We will continue to monitor patient changes and renal function     Thank you for involving pharmacy in this patient's care. Please contact pharmacy with any questions or concerns.       Lefty Proctor III, [facility]  Clinical Pharmacist            "

## 2025-02-25 NOTE — NURSING NOTE
02/25/25 1610   Wound 02/25/25 1610 Right medial foot   Placement Date/Time: 02/25/25 1610   Side: Right  Orientation: medial  Location: foot  Primary Wound Type: Incision   Wound Image    Dressing Appearance moist drainage   Closure None   Base moist;red;exposed structure   Periwound redness;swelling   Periwound Temperature warm   Periwound Skin Turgor soft   Edges open   Wound Length (cm) 8 cm   Wound Width (cm) 7 cm   Wound Depth (cm) 6 cm   Wound Surface Area (cm^2) 56 cm^2   Wound Volume (cm^3) 336 cm^3   Drainage Characteristics/Odor serosanguineous   Drainage Amount small   Care, Wound cleansed with;sterile water;negative pressure wound therapy   Dressing Care dressing changed;foam;transparent film   Periwound Care barrier film applied   NPWT (Negative Pressure Wound Therapy) 02/25/25 1610 Rt medial Foot   Placement Date/Time: 02/25/25 1610   Location: Rt medial Foot   Therapy Setting continuous therapy   Dressing foam, black;transparent dressing   Contact Layer   (Versatel dressing to the wound bed)   Pressure Setting 125 mmHg   Sponges Inserted 1   Finger sweep complete Yes     Reason for Visit: CWCN: We see patient to start with  NPWT per   Order.  Patient is alert, post surgical debridement partial amputation of rt medial foot. Existing dressing removed, area cleaned, wound bed described above , Strip paste placed around wound to manage seal, Versatel dressing placed to the wound bed to protect structures, followed by black foam, cover with drape, track  pad attached and connected to wound vac machine, good seal noted.  Treatment Plan/Recommendations: NPWT  Wound Team Follow up Plan: Plan to change will be m/w/f, but the next day to change will be by Friday. Please re-consult for any additional needs.

## 2025-02-25 NOTE — NURSING NOTE
Dialysis treatment was performed to MD orders. Hemostasis achieved, AVF cannulation sites were dressed with gauze and tape. Pt tolerated treatment well, access flow good, no issues    Post Vitals  BP- 124/79  TN- 88  RR- 16  Temp- 97.9    BVP- 67.6L  UF- 2000mL

## 2025-02-25 NOTE — ANESTHESIA POSTPROCEDURE EVALUATION
Patient: Wilner Menjivar    Procedure Summary       Date: 02/24/25 Room / Location: [facility] OR 08 / [facility] MAIN OR    Anesthesia Start: 1636 Anesthesia Stop: 1725    Procedure: INCISION AND DRAINAGE LOWER EXTREMITY, PARTIAL BONE EXCISION (Right) Diagnosis:       Diabetic ulcer of toe of right foot associated with type 2 diabetes mellitus, with necrosis of bone      Osteomyelitis of great toe of right foot      (Diabetic ulcer of toe of right foot associated with type 2 diabetes mellitus, with necrosis of bone [E11.621, L97.514])      (Osteomyelitis of great toe of right foot [M86.9])    Surgeons: Trevon Garcia DPM Provider: Chapincito Cummins MD    Anesthesia Type: MAC ASA Status: 3            Anesthesia Type: MAC    Vitals  Vitals Value Taken Time   /76 02/24/25 1830   Temp 36.4 °C (97.6 °F) 02/24/25 1800   Pulse 97 02/24/25 1831   Resp 16 02/24/25 1815   SpO2 100 % 02/24/25 1831   Vitals shown include unfiled device data.        Anesthesia Post Evaluation

## 2025-02-25 NOTE — PROGRESS NOTES
Infectious Diseases Progress Note    Abdiaziz Paz MD     [facility]  [place]: 13 days  Patient Identification:  Name: Wilner Menjivar  Age: 39 y.o.  Sex: male  :  1985  MRN: [id number]         Primary Care Physician: Roosevelt Thao MD        Subjective: Came back after surgery and feels a lot better.  Interval History: See consultation note.  2025: UnderwentHallux amputation, right foot SESAMOIDECTOMY, INCISION AN DRAINAGE  2025: Patient underwent repeat I&D of the right foot and partial first metatarsal excision of the right foot.  2025 underwent partial bone excision of the first metatarsal as well as right foot debridement at the level of bone.  Objective:    Scheduled Meds:aspirin, 81 mg, Oral, Daily  atorvastatin, 10 mg, Oral, Nightly  bumetanide, 2 mg, Oral, BID Diuretics  carvedilol, 25 mg, Oral, BID  cefepime, 2,000 mg, Intravenous, Q24H  docusate sodium, 100 mg, Oral, BID  epoetin jamil/jamil-epbx, 10,000 Units, Subcutaneous, Once per day on   insulin glargine, 42 Units, Subcutaneous, Nightly  insulin lispro, 14 Units, Subcutaneous, TID With Meals  insulin lispro, 2-7 Units, Subcutaneous, 4x Daily AC & at Bedtime  pantoprazole, 40 mg, Oral, BID AC  polyethylene glycol, 17 g, Oral, Daily  sucralfate, 1 g, Oral, 4x Daily AC & at Bedtime  ticagrelor, 90 mg, Oral, Q12H  Vancomycin Pharmacy Intermittent/Pulse Dosing, , Not Applicable, Daily      Continuous Infusions:lactated ringers, 9 mL/hr, Last Rate: 75 mL/hr (25 1633)  Pharmacy to dose vancomycin,         Vital signs in last 24 hours:  Temp:  [97.5 °F (36.4 °C)-98.4 °F (36.9 °C)] 97.6 °F (36.4 °C)  Heart Rate:  [] 98  Resp:  [16-18] 16  BP: (100-157)/(61-88) 149/76    Intake/Output:    Intake/Output Summary (Last 24 hours) at 2025  Last data filed at 2025 1715  Gross per 24 hour   Intake 913.75 ml   Output 200 ml   Net 713.75 ml             Exam:  /76   Pulse 98    "Temp 97.6 °F (36.4 °C) (Oral)   Resp 16   Ht 170.2 cm (67\")   Wt 107 kg (234 lb 12.6 oz)   SpO2 100%   BMI 36.77 kg/m²   Patient is examined using the personal protective equipment as per guidelines from infection control for this particular patient as enacted.  Hand washing was performed before and after patient interaction.  General Appearance:  Alert cooperative sitting at the edge of the bed about to eat his dinner.                          Head:    Normocephalic, without obvious abnormality, atraumatic                           Eyes:  Legally blind                         Throat:   Lips, tongue, gums normal; oral mucosa pink and moist                           Neck:   Supple, symmetrical, trachea midline, no JVD                         Lungs:    Clear to auscultation bilaterally, respirations unlabored                 Chest Wall:    No tenderness or deformity                          Heart:  S1-S2 regular                  Abdomen:   Soft nontender                 Extremities: Right foot after debridement and amputation of the great toe is dressed.                  Neurologic: Legally blind       Data Review:    I reviewed the patient's new clinical results.  Results from last 7 days   Lab Units 02/24/25  0517 02/23/25  0346 02/22/25  0337 02/21/25  0544 02/20/25  0352 02/19/25  0616 02/18/25  0348   WBC 10*3/mm3 13.86* 16.36* 11.86* 13.27* 14.41* 15.41* 15.91*   HEMOGLOBIN g/dL 7.0* 5.7* 7.2* 8.7* 9.2* 6.7* 8.5*   PLATELETS 10*3/mm3 262 281 315 328 339 331 337     Results from last 7 days   Lab Units 02/24/25  0517 02/23/25  1105 02/23/25  0346 02/22/25  0337 02/21/25  0544 02/20/25  0352 02/19/25  0616 02/18/25  0348   SODIUM mmol/L 129*  --  133* 134* 132* 134* 130* 127*   POTASSIUM mmol/L 4.8 4.6 4.3 4.8 4.5 4.5 4.2 4.3   CHLORIDE mmol/L 98  --  100 100 97* 98 94* 93*   CO2 mmol/L 19.7*  --  22.0 23.8 21.0* 24.5 21.0* 23.0   BUN mg/dL 40*  --  33* 25* 34* 25* 39* 31*   CREATININE mg/dL 7.99*  --  6.93* " 5.30* 6.28* 5.48* 7.27* 5.65*   CALCIUM mg/dL 8.3*  --  8.6 8.6 9.2 9.2 8.9 9.3   GLUCOSE mg/dL 312*  --  123* 259* 211* 190* 303* 326*   XR Foot 3+ View Right    Result Date: 2/21/2025   Postsurgical changes.    This report was finalized on 2/21/2025 3:43 PM by Dr. Chris Sheets M.D on Workstation: [id number]      XR Foot 3+ View Right    Result Date: 2/18/2025  Postoperative changes from amputation of the great toe at the level of the MTP joint.  This report was finalized on 2/18/2025 9:04 AM by Tom Cha MD on Workstation: [id number]      Doppler Arterial Multi Level Lower Extremity - Bilateral CAR    Addendum Date: 2/13/2025      Right Conclusion: The right GIOVANA is moderately reduced. Waveforms are consistent with femoral disease, popliteal disease and tib-peroneal disease. Normal digital pressures.   Left Conclusion: The left GIOVANA is normal. Normal digital pressures.     MRI Foot Right Without Contrast    Result Date: 2/13/2025  1. Osteomyelitis of the distal shaft and head of the 1st proximal phalanx and of the distal phalanx with overlying soft tissue wound/ulcer. Gas within the soft tissues and marrow of the great toe associated with infection. Tenosynovitis flexor hallucis longus tendon sheath. Forefoot cellulitis. 2. No evidence for septic arthritis at the 1st MTP joint. 3. Muscular edema and atrophy associated with myositis or chronic neuropathy.  This report was finalized on 2/13/2025 7:48 AM by Junior Villalobos M.D on Workstation: [id number]      XR Foot 3+ View Right    Result Date: 2/11/2025   There is subcutaneous emphysema and soft tissue loss over the great toe. No radiopaque foreign body. No definite bone erosion.    This report was finalized on 2/11/2025 5:20 PM by Dr. Jason Dan M.D on Workstation: [id number]     Microbiology Results (last 10 days)       Procedure Component Value - Date/Time    Fungus Culture - Surgical Site, Toe, Right [[id number]] Collected: 02/18/25 0754    Lab  Status: Preliminary result Specimen: Surgical Site from Toe, Right Updated: 02/23/25 0830     Fungus Culture No fungus isolated at less than 1 week    Tissue / Bone Culture - Surgical Site, Toe, Right [[id number]]  (Abnormal)  (Susceptibility) Collected: 02/18/25 0754    Lab Status: Final result Specimen: Surgical Site from Toe, Right Updated: 02/21/25 0759     Tissue Culture Scant growth (1+) Citrobacter freundii     Comment:   This organism may develop resistance during prolonged therapy with 3rd generation cephalosporins (e.g. ceftriaxone) as a result of de-repression of AmpC B-lactamase.  Ceftriaxone may be a reasonable treatment option for uncomplicated cystitis or other lower severity infections when susceptibility is demonstrated.         Scant growth (1+) Enterococcus faecalis     Gram Stain Few (2+) Gram positive cocci      Rare (1+) WBCs seen    Susceptibility        Citrobacter freundii      BHAVIN      Cefepime Susceptible      Ceftazidime Susceptible      Ceftriaxone Susceptible      Gentamicin Susceptible      Levofloxacin Susceptible      Trimethoprim + Sulfamethoxazole Susceptible                       Susceptibility        Enterococcus faecalis      BHAVIN      Ampicillin Susceptible      Vancomycin Susceptible                       Susceptibility Comments       Citrobacter freundii    With the exception of urinary-sourced infections, aminoglycosides should not be used as monotherapy.               Anaerobic Culture 10 Day Incubation - Surgical Site, Toe, Right [[id number]]  (Abnormal) Collected: 02/18/25 0754    Lab Status: Final result Specimen: Surgical Site from Toe, Right Updated: 02/23/25 1517     Anaerobic Culture Parvimonas micra    Narrative:      This organism is Beta-lactamase negative and is predictably susceptible to ampicillin or amoxicillin             ECG 12 Lead Chest Pain   Final Result   HEART RATE=95  bpm   RR Ysxkqvux=692  ms   OH Rpwclggd=617  ms   P Horizontal Axis=32  deg   P Front  Axis=80  deg   QRSD Csvygatc=673  ms   QT Kvpulbiv=482  ms   DQdA=398  ms   QRS Axis=14  deg   T Wave Axis=95  deg   - ABNORMAL ECG -   Sinus rhythm   Nonspecific  T abnormalities, lateral leads   Borderline  prolonged QT interval   NO PRIOR TRACING AVAILABLE FOR COMPARISON   Electronically Signed By: Julia Gallagher ([facility]) 2025-02-24 16:55:41   Date and Time of Study:2025-02-23 12:30:55      Telemetry Scan   Final Result      Telemetry Scan   Final Result      Telemetry Scan   Final Result      Telemetry Scan   Final Result      Telemetry Scan   Final Result      Telemetry Scan   Final Result      Telemetry Scan   Final Result      Telemetry Scan   Final Result      Telemetry Scan   Final Result      Telemetry Scan   Final Result      Telemetry Scan   Final Result      Telemetry Scan   Final Result      Telemetry Scan   Final Result      Telemetry Scan   Final Result      Telemetry Scan   Final Result      Telemetry Scan   Final Result      Telemetry Scan   Final Result      Telemetry Scan   Final Result      Telemetry Scan   Final Result      Telemetry Scan   Final Result      Telemetry Scan   Final Result      Telemetry Scan   Final Result      Telemetry Scan   Final Result      Telemetry Scan   Final Result      Telemetry Scan   Final Result      Telemetry Scan   Final Result      Telemetry Scan   Final Result      Telemetry Scan   Final Result      Telemetry Scan   Final Result      Telemetry Scan   Final Result      Telemetry Scan   Final Result              Assessment:    Diabetic toe ulcer    Osteomyelitis of great toe of right foot  39-year-old male with  1-right great toe diabetic/ischemic ulcer with dry gangrene with associated abscess and contiguous focus osteomyelitis of the proximal and distal phalanx with associated flexor hallucis tenosynovitis-status post amputation of right great toe, I&D of multiple compartments of the right foot and sesamoidectomy involving tibia-fibula and right foot on  2/18/2025-operative cultures showing gram-negative rods and Gram stain shows gram-positive cocci-identified as Citrobacter freundii and Enterococcus.  2-significant peripheral vascular disease with element of gangrene involving the great toe  3-type 2 diabetes  4-end-stage renal disease  5-legally blind  6-severe anemia multifactorial  7-hypertension  8-multiple antibiotic allergies/intolerances including allergy to penicillin though it could very well be not a true allergy given the description of his reaction.  9-anemia multifactorial status post transfusion  10-penicillin allergy.     Recommendations/Discussions:  See my discussion and recommendation on 2/23/2025  Upon review his culture it appears the patient would need prolonged antibiotic treatment and ideal treatment regimen would include either to Unasyn or Zosyn or Augmentin.  I reviewed with him his antibiotic allergy in relation to penicillin and he keeps on saying that he does not know what happened to him since he was a little bradycardia and he was told by his mother that he had penicillin allergy.  Will request our pharmacy colleagues to see if we can give him a trial of IV Unasyn or desensitizing with penicillin so that he can have appropriate regimen for his potentially limb threatening right foot infection.  Patient is agrees for this trial.  In the meantime continue with cefepime vancomycin and add Flagyl.      Abdiaziz Paz MD  2/24/2025  19:49 EST    Parts of this note may be an electronic transcription/translation of spoken language to printed text using the Dragon dictation system.

## 2025-02-26 LAB
ALBUMIN SERPL-MCNC: 3 G/DL (ref 3.5–5.2)
ANION GAP SERPL CALCULATED.3IONS-SCNC: 10.6 MMOL/L (ref 5–15)
BASOPHILS # BLD AUTO: 0.08 10*3/MM3 (ref 0–0.2)
BASOPHILS NFR BLD AUTO: 0.7 % (ref 0–1.5)
BUN SERPL-MCNC: 23 MG/DL (ref 6–20)
BUN/CREAT SERPL: 4.3 (ref 7–25)
CALCIUM SPEC-SCNC: 8.8 MG/DL (ref 8.6–10.5)
CHLORIDE SERPL-SCNC: 98 MMOL/L (ref 98–107)
CO2 SERPL-SCNC: 25.4 MMOL/L (ref 22–29)
CREAT SERPL-MCNC: 5.37 MG/DL (ref 0.76–1.27)
CYTO UR: NORMAL
DEPRECATED RDW RBC AUTO: 45.1 FL (ref 37–54)
EGFRCR SERPLBLD CKD-EPI 2021: 13 ML/MIN/1.73
EOSINOPHIL # BLD AUTO: 0.26 10*3/MM3 (ref 0–0.4)
EOSINOPHIL NFR BLD AUTO: 2.2 % (ref 0.3–6.2)
ERYTHROCYTE [DISTWIDTH] IN BLOOD BY AUTOMATED COUNT: 14.3 % (ref 12.3–15.4)
GLUCOSE BLDC GLUCOMTR-MCNC: 147 MG/DL (ref 70–130)
GLUCOSE BLDC GLUCOMTR-MCNC: 245 MG/DL (ref 70–130)
GLUCOSE BLDC GLUCOMTR-MCNC: 285 MG/DL (ref 70–130)
GLUCOSE BLDC GLUCOMTR-MCNC: 90 MG/DL (ref 70–130)
GLUCOSE SERPL-MCNC: 142 MG/DL (ref 65–99)
HCT VFR BLD AUTO: 25.5 % (ref 37.5–51)
HGB BLD-MCNC: 8.4 G/DL (ref 13–17.7)
IMM GRANULOCYTES # BLD AUTO: 0.18 10*3/MM3 (ref 0–0.05)
IMM GRANULOCYTES NFR BLD AUTO: 1.5 % (ref 0–0.5)
LAB AP CASE REPORT: NORMAL
LYMPHOCYTES # BLD AUTO: 1.06 10*3/MM3 (ref 0.7–3.1)
LYMPHOCYTES NFR BLD AUTO: 9 % (ref 19.6–45.3)
MCH RBC QN AUTO: 29.4 PG (ref 26.6–33)
MCHC RBC AUTO-ENTMCNC: 32.9 G/DL (ref 31.5–35.7)
MCV RBC AUTO: 89.2 FL (ref 79–97)
MONOCYTES # BLD AUTO: 0.89 10*3/MM3 (ref 0.1–0.9)
MONOCYTES NFR BLD AUTO: 7.6 % (ref 5–12)
NEUTROPHILS NFR BLD AUTO: 79 % (ref 42.7–76)
NEUTROPHILS NFR BLD AUTO: 9.27 10*3/MM3 (ref 1.7–7)
NRBC BLD AUTO-RTO: 0.3 /100 WBC (ref 0–0.2)
PATH REPORT.FINAL DX SPEC: NORMAL
PATH REPORT.GROSS SPEC: NORMAL
PHOSPHATE SERPL-MCNC: 3.8 MG/DL (ref 2.5–4.5)
PLATELET # BLD AUTO: 193 10*3/MM3 (ref 140–450)
PMV BLD AUTO: 8 FL (ref 6–12)
POTASSIUM SERPL-SCNC: 4.3 MMOL/L (ref 3.5–5.2)
RBC # BLD AUTO: 2.86 10*6/MM3 (ref 4.14–5.8)
SODIUM SERPL-SCNC: 134 MMOL/L (ref 136–145)
VANCOMYCIN SERPL-MCNC: 19.8 MCG/ML (ref 5–40)
WBC NRBC COR # BLD AUTO: 11.74 10*3/MM3 (ref 3.4–10.8)

## 2025-02-26 PROCEDURE — 63710000001 INSULIN GLARGINE PER 5 UNITS: Performed by: PODIATRIST

## 2025-02-26 PROCEDURE — 63710000001 INSULIN LISPRO (HUMAN) PER 5 UNITS: Performed by: PODIATRIST

## 2025-02-26 PROCEDURE — 82948 REAGENT STRIP/BLOOD GLUCOSE: CPT

## 2025-02-26 PROCEDURE — 85025 COMPLETE CBC W/AUTO DIFF WBC: CPT | Performed by: INTERNAL MEDICINE

## 2025-02-26 PROCEDURE — 25010000002 PROCHLORPERAZINE 10 MG/2ML SOLUTION: Performed by: PODIATRIST

## 2025-02-26 PROCEDURE — 99232 SBSQ HOSP IP/OBS MODERATE 35: CPT

## 2025-02-26 PROCEDURE — 25010000002 EPOETIN ALFA-EPBX 10000 UNIT/ML SOLUTION: Performed by: PODIATRIST

## 2025-02-26 PROCEDURE — 80202 ASSAY OF VANCOMYCIN: CPT | Performed by: PODIATRIST

## 2025-02-26 PROCEDURE — 25010000002 ONDANSETRON PER 1 MG: Performed by: PODIATRIST

## 2025-02-26 PROCEDURE — 25010000002 HYDROMORPHONE 1 MG/ML SOLUTION: Performed by: PODIATRIST

## 2025-02-26 PROCEDURE — 97162 PT EVAL MOD COMPLEX 30 MIN: CPT

## 2025-02-26 PROCEDURE — 25010000002 PIPERACILLIN SOD-TAZOBACTAM PER 1 G: Performed by: INTERNAL MEDICINE

## 2025-02-26 PROCEDURE — 80069 RENAL FUNCTION PANEL: CPT | Performed by: INTERNAL MEDICINE

## 2025-02-26 RX ORDER — CLOPIDOGREL BISULFATE 75 MG/1
75 TABLET ORAL DAILY
Status: DISCONTINUED | OUTPATIENT
Start: 2025-02-26 | End: 2025-03-03 | Stop reason: HOSPADM

## 2025-02-26 RX ADMIN — SUCRALFATE 1 G: 1 TABLET ORAL at 20:53

## 2025-02-26 RX ADMIN — CLOPIDOGREL BISULFATE 75 MG: 75 TABLET ORAL at 11:38

## 2025-02-26 RX ADMIN — HYDROMORPHONE HYDROCHLORIDE 1 MG: 1 INJECTION, SOLUTION INTRAMUSCULAR; INTRAVENOUS; SUBCUTANEOUS at 17:53

## 2025-02-26 RX ADMIN — PANTOPRAZOLE SODIUM 40 MG: 40 TABLET, DELAYED RELEASE ORAL at 06:55

## 2025-02-26 RX ADMIN — ONDANSETRON 4 MG: 2 INJECTION INTRAMUSCULAR; INTRAVENOUS at 06:55

## 2025-02-26 RX ADMIN — ONDANSETRON 4 MG: 2 INJECTION INTRAMUSCULAR; INTRAVENOUS at 13:14

## 2025-02-26 RX ADMIN — PROCHLORPERAZINE EDISYLATE 5 MG: 5 INJECTION, SOLUTION INTRAMUSCULAR; INTRAVENOUS at 15:47

## 2025-02-26 RX ADMIN — ASPIRIN 81 MG CHEWABLE TABLET 81 MG: 81 TABLET CHEWABLE at 08:17

## 2025-02-26 RX ADMIN — ONDANSETRON 4 MG: 2 INJECTION INTRAMUSCULAR; INTRAVENOUS at 20:55

## 2025-02-26 RX ADMIN — HYDROMORPHONE HYDROCHLORIDE 1 MG: 1 INJECTION, SOLUTION INTRAMUSCULAR; INTRAVENOUS; SUBCUTANEOUS at 13:14

## 2025-02-26 RX ADMIN — ATORVASTATIN CALCIUM 10 MG: 10 TABLET, FILM COATED ORAL at 22:49

## 2025-02-26 RX ADMIN — PIPERACILLIN AND TAZOBACTAM 3.38 G: 3; .375 INJECTION, POWDER, FOR SOLUTION INTRAVENOUS at 11:38

## 2025-02-26 RX ADMIN — SUCRALFATE 1 G: 1 TABLET ORAL at 06:55

## 2025-02-26 RX ADMIN — CARVEDILOL 25 MG: 25 TABLET, FILM COATED ORAL at 20:52

## 2025-02-26 RX ADMIN — EPOETIN ALFA-EPBX 10000 UNITS: 10000 INJECTION, SOLUTION INTRAVENOUS; SUBCUTANEOUS at 08:18

## 2025-02-26 RX ADMIN — SUCRALFATE 1 G: 1 TABLET ORAL at 11:38

## 2025-02-26 RX ADMIN — INSULIN LISPRO 14 UNITS: 100 INJECTION, SOLUTION INTRAVENOUS; SUBCUTANEOUS at 17:01

## 2025-02-26 RX ADMIN — PANTOPRAZOLE SODIUM 40 MG: 40 TABLET, DELAYED RELEASE ORAL at 17:01

## 2025-02-26 RX ADMIN — INSULIN GLARGINE 42 UNITS: 100 INJECTION, SOLUTION SUBCUTANEOUS at 20:53

## 2025-02-26 RX ADMIN — DOCUSATE SODIUM 100 MG: 100 CAPSULE, LIQUID FILLED ORAL at 20:53

## 2025-02-26 RX ADMIN — PROCHLORPERAZINE EDISYLATE 5 MG: 5 INJECTION, SOLUTION INTRAMUSCULAR; INTRAVENOUS at 22:45

## 2025-02-26 RX ADMIN — BUMETANIDE 2 MG: 2 TABLET ORAL at 08:17

## 2025-02-26 RX ADMIN — METRONIDAZOLE 500 MG: 500 TABLET ORAL at 06:55

## 2025-02-26 RX ADMIN — DOCUSATE SODIUM 100 MG: 100 CAPSULE, LIQUID FILLED ORAL at 08:18

## 2025-02-26 RX ADMIN — INSULIN LISPRO 3 UNITS: 100 INJECTION, SOLUTION INTRAVENOUS; SUBCUTANEOUS at 20:53

## 2025-02-26 RX ADMIN — ZOLPIDEM TARTRATE 5 MG: 5 TABLET, FILM COATED ORAL at 22:45

## 2025-02-26 RX ADMIN — BUMETANIDE 2 MG: 2 TABLET ORAL at 17:01

## 2025-02-26 RX ADMIN — HYDROCODONE BITARTRATE AND ACETAMINOPHEN 2 TABLET: 5; 325 TABLET ORAL at 15:47

## 2025-02-26 RX ADMIN — CARVEDILOL 25 MG: 25 TABLET, FILM COATED ORAL at 08:18

## 2025-02-26 RX ADMIN — SUCRALFATE 1 G: 1 TABLET ORAL at 17:01

## 2025-02-26 RX ADMIN — PIPERACILLIN AND TAZOBACTAM 3.38 G: 3; .375 INJECTION, POWDER, FOR SOLUTION INTRAVENOUS at 17:00

## 2025-02-26 RX ADMIN — PROCHLORPERAZINE EDISYLATE 5 MG: 5 INJECTION, SOLUTION INTRAMUSCULAR; INTRAVENOUS at 07:53

## 2025-02-26 RX ADMIN — INSULIN LISPRO 14 UNITS: 100 INJECTION, SOLUTION INTRAVENOUS; SUBCUTANEOUS at 08:17

## 2025-02-26 RX ADMIN — HYDROCODONE BITARTRATE AND ACETAMINOPHEN 2 TABLET: 5; 325 TABLET ORAL at 22:45

## 2025-02-26 RX ADMIN — HYDROMORPHONE HYDROCHLORIDE 1 MG: 1 INJECTION, SOLUTION INTRAMUSCULAR; INTRAVENOUS; SUBCUTANEOUS at 06:55

## 2025-02-26 RX ADMIN — INSULIN LISPRO 4 UNITS: 100 INJECTION, SOLUTION INTRAVENOUS; SUBCUTANEOUS at 17:00

## 2025-02-26 RX ADMIN — HYDROMORPHONE HYDROCHLORIDE 1 MG: 1 INJECTION, SOLUTION INTRAMUSCULAR; INTRAVENOUS; SUBCUTANEOUS at 20:58

## 2025-02-26 NOTE — PLAN OF CARE
Goal Outcome Evaluation:  Plan of Care Reviewed With: patient      Pt is 38 y/o M admitted to [facility] on 2/11/25 with R foot wound. Taken to OR 2/18 for R hallux amputation with sesamoidectomy, 2/21 for I/D and 2/24 for I/D. Per podiatry pt to be heel weight bearing with post op shoe to R LE. PMH: ESRD on HD, HTN, HLD, CAD, legally blind. At baseline pt is from home with spouse - typically indep but holds onto someone for visual guidance with mobility. Pt currently demos SBA for transfers and HHA to take 2 steps forward/back - limited by R foot pain a this time, but good heel weight bearing noted. Educated pt on need for AD at this time - Pt prefers SPC d/t needing to hold onto someone for guidance. Pt has been getting up with spouse assist in room - educated on WB status and need for AD - also discussed using pt's w/c for long distances at home d/t safety and pain. Pt demos mobility below baseline and therefore would benefit from acute skilled PT to address functional mobility deficits. Anticipate d/c home with assist and SPC for improved balance/safety.            Anticipated Discharge Disposition (PT): home with assist

## 2025-02-26 NOTE — THERAPY EVALUATION
Patient Name: Wilner Menjivar  : 1985    MRN: [id number]                              Today's Date: 2025       Admit Date: 2025    Visit Dx:     ICD-10-CM ICD-9-CM   1. Diabetic ulcer of right great toe  E11.621 250.80    L97.519 707.15   2. Hyperglycemia  R73.9 790.29   3. Diabetic ulcer of toe of right foot associated with type 2 diabetes mellitus, with necrosis of bone  E11.621 250.80    L97.514 707.15     730.17   4. Osteomyelitis of great toe of right foot  M86.9 730.27     Patient Active Problem List   Diagnosis    Morbid (severe) obesity due to excess calories (*specify comorbidity)    Diabetic toe ulcer    Osteomyelitis of great toe of right foot    Status post insertion of drug eluting coronary artery stent     Past Medical History:   Diagnosis Date    CKD stage 4 due to type 2 diabetes mellitus     GERD (gastroesophageal reflux disease)     Heart failure     Hypertension     Type 2 diabetes mellitus     Vision impairment      Past Surgical History:   Procedure Laterality Date    AMPUTATION DIGIT Right 2025    Procedure: Hallux amputation, right foot SESAMOIDECTOMY, INCISION AN DRAINAGE;  Surgeon: Trevon Garcia DPM;  Location: [facility];  Service: Podiatry;  Laterality: Right;    BONE EXCISION LEG Right 2025    Procedure: Partial first metatarsal excision, right foot;  Surgeon: Trevon Garcia DPM;  Location: [facility] OR;  Service: Podiatry;  Laterality: Right;    CORONARY STENT PLACEMENT      EYE SURGERY      WISDOM TOOTH EXTRACTION      WOUND DEBRIDEMENT Right 2025    Procedure: DEBRIDEMENT FOOT, RIGHT;  Surgeon: Trevon Garcia DPM;  Location: [facility];  Service: Podiatry;  Laterality: Right;      General Information       Row Name 25 1550          Physical Therapy Time and Intention    Document Type evaluation  -ST     Mode of Treatment physical therapy  -ST       Row Name 25 5388          General Information    Patient Profile Reviewed  yes  -ST     Prior Level of Function independent:;min assist:;all household mobility  has someone with him for mobility to guide d/t legally blind  -     Existing Precautions/Restrictions fall;weight bearing  HWB R LE with post op shoe. does have wound vac  -     Barriers to Rehab medically complex;visual deficit  -[facility] Name 02/26/25 1550          Living Environment    People in Home spouse  -ST       Row Name 02/26/25 1550          Home Main Entrance    Number of Stairs, Main Entrance three  -ST     Stair Railings, Main Entrance railings safe and in good condition  -[facility] Name 02/26/25 1550          Cognition    Orientation Status (Cognition) oriented x 3  -[facility] Name 02/26/25 1550          Safety Issues/Impairments Affecting Functional Mobility    Safety Issues Affecting Function (Mobility) safety precaution awareness;safety precautions follow-through/compliance  -     Impairments Affecting Function (Mobility) balance;endurance/activity tolerance;pain  -     Comment, Safety Issues/Impairments (Mobility) nonskid socks donned  -               User Key  (r) = Recorded By, (t) = Taken By, (c) = Cosigned By      Initials Name Provider Type    ST Lupe Edwards PT Physical Therapist                   Mobility       [facility] Name 02/26/25 1551          Bed Mobility    Comment, (Bed Mobility) sitting EOB upon arrival and at end of session  -ST       Row Name 02/26/25 1551          Sit-Stand Transfer    Sit-Stand [place] (Transfers) standby assist  -ST       Row Name 02/26/25 1551          Gait/Stairs (Locomotion)    [place] Level (Gait) contact guard;minimum assist (75% patient effort)  -     Assistive Device (Gait) --  HHA  -ST     Distance in Feet (Gait) 1  2 steps forward/back  -     Deviations/Abnormal Patterns (Gait) gait speed decreased;stride length decreased;tammy decreased;antalgic  -ST     Bilateral Gait Deviations forward flexed posture;heel strike decreased  -      Right Sided Gait Deviations weight shift ability decreased  -ST     Comment, (Gait/Stairs) good heel weight bearing noted - significant UE support for balance d/t pain  -ST               User Key  (r) = Recorded By, (t) = Taken By, (c) = Cosigned By      Initials Name Provider Type    ST Lupe Edwards, PT Physical Therapist                   Obj/Interventions       Row Name 02/26/25 1552          Range of Motion Comprehensive    Comment, General Range of Motion bilat LE WFL  -ST       Row Name 02/26/25 1552          Strength Comprehensive (MMT)    Comment, General Manual Muscle Testing (MMT) Assessment bilat LE WFL - R foot limited by incisions  -ST       Row Name 02/26/25 1552          Balance    Comment, Balance significant unsteadiness without HHA d/t R LE pain/sensitivity  -ST               User Key  (r) = Recorded By, (t) = Taken By, (c) = Cosigned By      Initials Name Provider Type    ST Lupe Edwards, PT Physical Therapist                   Goals/Plan       Row Name 02/26/25 1553          Bed Mobility Goal 1 (PT)    Activity/Assistive Device (Bed Mobility Goal 1, PT) bed mobility activities, all  -ST     [place] Level/Cues Needed (Bed Mobility Goal 1, PT) modified independence  -ST     Time Frame (Bed Mobility Goal 1, PT) 1 week  -ST     Progress/Outcomes (Bed Mobility Goal 1, PT) new goal  -       Row Name 02/26/25 1553          Transfer Goal 1 (PT)    Activity/Assistive Device (Transfer Goal 1, PT) sit-to-stand/stand-to-sit;bed-to-chair/chair-to-bed  -ST     [place] Level/Cues Needed (Transfer Goal 1, PT) supervision required  -ST     Time Frame (Transfer Goal 1, PT) 1 week  -ST     Progress/Outcome (Transfer Goal 1, PT) new goal  -       Row Name 02/26/25 1553          Gait Training Goal 1 (PT)    Activity/Assistive Device (Gait Training Goal 1, PT) gait (walking locomotion);assistive device use  -ST     [place] Level (Gait Training Goal 1, PT) standby assist  -ST      Distance (Gait Training Goal 1, PT) 50'  -ST     Time Frame (Gait Training Goal 1, PT) 1 week  -ST     Progress/Outcome (Gait Training Goal 1, PT) new goal  -ST               User Key  (r) = Recorded By, (t) = Taken By, (c) = Cosigned By      Initials Name Provider Type    ST Lupe Edwards, PT Physical Therapist                   Clinical Impression       Row Name 02/26/25 1552          Pain    Pain Location foot  -ST     Pain Side/Orientation right  -ST     Pain Management Interventions exercise or physical activity utilized  -ST     Response to Pain Interventions activity participation with increased pain  -ST     Pre/Posttreatment Pain Comment does not tolerate much ambulation d/t pain today  -ST       Row Name 02/26/25 7252          Plan of Care Review    Plan of Care Reviewed With patient  -ST     Outcome Evaluation Pt is 38 y/o M admitted to [facility] on 2/11/25 with R foot wound. Taken to OR 2/18 for R hallux amputation with sesamoidectomy, 2/21 for I/D and 2/24 for I/D. Per podiatry pt to be heel weight bearing with post op shoe to R LE. PMH: ESRD on HD, HTN, HLD, CAD, legally blind. At baseline pt is from home with spouse - typically indep but holds onto someone for visual guidance with mobility. Pt currently demos SBA for transfers and HHA to take 2 steps forward/back - limited by R foot pain a this time, but good heel weight bearing noted. Educated pt on need for AD at this time - Pt prefers SPC d/t needing to hold onto someone for guidance. Pt has been getting up with spouse assist in room - educated on WB status and need for AD - also discussed using pt's w/c for long distances at home d/t safety and pain. Pt demos mobility below baseline and therefore would benefit from acute skilled PT to address functional mobility deficits. Anticipate d/c home with assist and SPC for improved balance/safety.  -ST       Row Name 02/26/25 8412          Therapy Assessment/Plan (PT)    Rehab Potential (PT) good  -ST      Criteria for Skilled Interventions Met (PT) yes  -ST     Therapy Frequency (PT) 3 times/wk  -ST     Predicted Duration of Therapy Intervention (PT) 1 week  -ST       Row Name 02/26/25 1552          Positioning and Restraints    Pre-Treatment Position in bed  -ST     Post Treatment Position bed  -ST     In Bed notified nsg;sitting EOB;call light within reach;encouraged to call for assist;with family/caregiver  -ST               User Key  (r) = Recorded By, (t) = Taken By, (c) = Cosigned By      Initials Name Provider Type    Lupe Vargas PT Physical Therapist                   Outcome Measures       Row Name 02/26/25 1553 02/26/25 0824       How much help from another person do you currently need...    Turning from your back to your side while in flat bed without using bedrails? 4  -ST 4  -BC    Moving from lying on back to sitting on the side of a flat bed without bedrails? 4  -ST 4  -BC    Moving to and from a bed to a chair (including a wheelchair)? 3  -ST 3  -BC    Standing up from a chair using your arms (e.g., wheelchair, bedside chair)? 3  -ST 3  -BC    Climbing 3-5 steps with a railing? 3  -ST 3  -BC    To walk in hospital room? 3  -ST 3  -BC    AM-PAC 6 Clicks Score (PT) 20  -ST 20  -BC    Highest Level of Mobility Goal 6 --> Walk 10 steps or more  -ST 6 --> Walk 10 steps or more  -BC      Row Name 02/26/25 1553          Functional Assessment    Outcome Measure Options AM-PAC 6 Clicks Basic Mobility (PT)  -ST               User Key  (r) = Recorded By, (t) = Taken By, (c) = Cosigned By      Initials Name Provider Type    Nargis Feliciano RN Registered Nurse    Lupe Vargas PT Physical Therapist                                 Physical Therapy Education       Title: PT OT SLP Therapies (In Progress)       Topic: Physical Therapy (In Progress)       Point: Mobility training (Done)       Learning Progress Summary            Patient Acceptance, E,TB, VU,NR by  at 2/26/2025 7033                       Point: Home exercise program (Not Started)       Learner Progress:  Not documented in this visit.              Point: Body mechanics (Done)       Learning Progress Summary            Patient Acceptance, E,TB, VU,NR by  at 2/26/2025 2290                      Point: Precautions (Not Started)       Learner Progress:  Not documented in this visit.                              User Key       Initials Effective Dates Name Provider Type Discipline     09/22/22 -  Lupe Edwards, PT Physical Therapist PT                  PT Recommendation and Plan     Outcome Evaluation: Pt is 40 y/o M admitted to [facility] on 2/11/25 with R foot wound. Taken to OR 2/18 for R hallux amputation with sesamoidectomy, 2/21 for I/D and 2/24 for I/D. Per podiatry pt to be heel weight bearing with post op shoe to R LE. PMH: ESRD on HD, HTN, HLD, CAD, legally blind. At baseline pt is from home with spouse - typically indep but holds onto someone for visual guidance with mobility. Pt currently demos SBA for transfers and HHA to take 2 steps forward/back - limited by R foot pain a this time, but good heel weight bearing noted. Educated pt on need for AD at this time - Pt prefers SPC d/t needing to hold onto someone for guidance. Pt has been getting up with spouse assist in room - educated on WB status and need for AD - also discussed using pt's w/c for long distances at home d/t safety and pain. Pt demos mobility below baseline and therefore would benefit from acute skilled PT to address functional mobility deficits. Anticipate d/c home with assist and SPC for improved balance/safety.     Time Calculation:         PT Charges       Row Name 02/26/25 9317             Time Calculation    Start Time 1448  -ST      Stop Time 1508  -ST      Time Calculation (min) 20 min  -ST      PT Received On 02/26/25  -ST      PT - Next Appointment 02/28/25  -ST      PT Goal Re-Cert Due Date 03/05/25  -ST         Time Calculation- PT    Total Timed Code  Minutes- PT 0 minute(s)  -ST                User Key  (r) = Recorded By, (t) = Taken By, (c) = Cosigned By      Initials Name Provider Type    Lupe Vargas, PT Physical Therapist                  Therapy Charges for Today       Code Description Service Date Service Provider Modifiers Qty    82980365661 HC PT EVAL MOD COMPLEXITY 3 2/26/2025 Lupe Edwards, PT GP 1            PT G-Codes  Outcome Measure Options: AM-PAC 6 Clicks Basic Mobility (PT)  AM-PAC 6 Clicks Score (PT): 20  PT Discharge Summary  Anticipated Discharge Disposition (PT): home with assist    Lupe Edwards, PT  2/26/2025

## 2025-02-26 NOTE — PROGRESS NOTES
"[facility] Clinical Pharmacy Services: Zosyn Consult     Wilner Menjivar has a pharmacy consult to dose Zosyn per Dr. Paz's request.     Indication: Bone and joint infection     Relevant clinical data and objective history reviewed:  39 y.o. male 170.2 cm (67\") 107 kg (234 lb 12.6 oz)  Estimated Creatinine Clearance: 21.3 mL/min (A) (by C-G formula based on SCr of 5.37 mg/dL (H)).    Past Medical History:   Diagnosis Date    CKD stage 4 due to type 2 diabetes mellitus     GERD (gastroesophageal reflux disease)     Heart failure     Hypertension     Type 2 diabetes mellitus     Vision impairment      is allergic to azithromycin and penicillins.  Assessment/Plan    Will start patient on zosyn 3.375g IV q12h, which has been adjusted for iHD.      Request for administration with dialysis schedule noted.  Discussed with ID pharmacist - there are no published regimens that I am aware of that would allow for either continuous infusion or thrice weekly dosing with HD.  These options exist for other forms of renal replacement but to my knowledge, we are limited to a q12h frequency with HD based on published evidence.    Thank you for allowing us to participate in the care of this patient.  Please do not hesitate to reach out with any further questions or considerations.    Lina Huizar, PharmD  Clinical Pharmacist    "

## 2025-02-26 NOTE — PROGRESS NOTES
Podiatry Progress Note      Patient: Wilner Menjivar Admit Date: 02/11/2025    Age: 39 y.o.   PCP: Roosevelt Thao MD    MRN: [id number]  Room: 14/        Subjective     Chief Complaint     Chief Complaint   Patient presents with    Wound Check        HPI     39-year-old male is postop day #2 from partial bone excision and debridement of the right foot.  Patient had wound VAC placed yesterday was having some pain and I changed pain medication orders and he is doing much better this morning.    Past Medical History     Past Medical History:   Diagnosis Date    CKD stage 4 due to type 2 diabetes mellitus     GERD (gastroesophageal reflux disease)     Heart failure     Hypertension     Type 2 diabetes mellitus     Vision impairment         Past Surgical History:   Procedure Laterality Date    AMPUTATION DIGIT Right 2/18/2025    Procedure: Hallux amputation, right foot SESAMOIDECTOMY, INCISION AN DRAINAGE;  Surgeon: Trevon Garcia DPM;  Location: [facility];  Service: Podiatry;  Laterality: Right;    BONE EXCISION LEG Right 2/21/2025    Procedure: Partial first metatarsal excision, right foot;  Surgeon: Trevon Garcia DPM;  Location: [facility] OR;  Service: Podiatry;  Laterality: Right;    CORONARY STENT PLACEMENT      EYE SURGERY      WISDOM TOOTH EXTRACTION      WOUND DEBRIDEMENT Right 2/21/2025    Procedure: DEBRIDEMENT FOOT, RIGHT;  Surgeon: Trevon Garcia DPM;  Location: [facility];  Service: Podiatry;  Laterality: Right;        Allergies   Allergen Reactions    Azithromycin Nausea And Vomiting     Pt given IV azithro, reported nausea/vomiting and hot flashes within 3-5 minutes of admin. Pt also c/o new abd pain with admin.     Pt given IV azithro, reported nausea/vomiting and hot flashes within 3-5 minutes of admin. Pt also c/o new abd pain with admin.    Penicillins Unknown - Low Severity     reaction as a child. Does not recall reaction.     reaction as a child. Does not recall  reaction.  Beta lactam allergy details  Antibiotic reaction: unknown  Age at reaction: infant  Dose to reaction time: unknown  Reason for antibiotic: unknown  Epinephrine required for reaction?: unknown  Tolerated antibiotics: unknown           Social History     Tobacco Use   Smoking Status Never   Smokeless Tobacco Never        Objective   Physical Exam    Vitals:    02/26/25 0045   BP: 138/71   Pulse: 92   Resp: 18   Temp: 98.2 °F (36.8 °C)   SpO2: 98%        Wound VAC in place, continuous suction.  No leak noted.    Labs     Lab Results   Component Value Date    HGBA1C 11.10 (H) 02/13/2025    POCGLU 147 (H) 02/26/2025        CBC:      Lab 02/26/25  0606 02/25/25  0348 02/24/25  0517 02/23/25  0346 02/22/25  0337   WBC 11.74* 9.19 13.86* 16.36* 11.86*   HEMOGLOBIN 8.4* 8.1* 7.0* 5.7* 7.2*   HEMATOCRIT 25.5* 24.1* 21.4* 18.2* 23.4*   PLATELETS 193 215 262 281 315   NEUTROS ABS 9.27* 7.01* 11.35* 13.61* 9.55*   IMMATURE GRANS (ABS) 0.18* 0.20* 0.29* 0.38* 0.22*   LYMPHS ABS 1.06 1.01 1.02 1.19 1.00   MONOS ABS 0.89 0.71 0.89 0.87 0.80   EOS ABS 0.26 0.19 0.24 0.24 0.23   MCV 89.2 88.0 88.8 89.7 91.4          Results for orders placed or performed during the hospital encounter of 02/11/25   Blood Culture - Blood, Arm, Right    Specimen: Arm, Right; Blood   Result Value Ref Range    Blood Culture No growth at 5 days         XR Foot 3+ View Right  Narrative: XR FOOT 3+ VW RIGHT-     INDICATIONS: Postoperative evaluation     TECHNIQUE: 3 VIEWS OF THE RIGHT FOOT     COMPARISON: 2/18/2025     FINDINGS:     Partial amputation change of the first ray is noted at the level of the  proximal to mid first metatarsal shaft, with adjacent surgical soft  tissue gas. The bones otherwise appear intact. No dislocation.     Impression:    Postsurgical changes.           This report was finalized on 2/21/2025 3:43 PM by JACE BardalesD on Workstation: [id number]          Assessment/Plan     39-year-old male postop day #2 from  partial bone excision and serial debridement of the right foot    -Patient examined and evaluated by myself  - Antibiotics per infectious disease  - Bone biopsy from 2-24 is still pending  -Continue wound VAC therapy to the right lower extremity.  Patient will be discharged with a wound VAC  - Patient needs to be heel weightbearing only to the right lower extremity  - Discussed with patient that we will continue the attempt at limb salvage, however if anything worsens regarding infection or necrosis of soft tissue he likely will need a form of major amputation.  - No further intervention from my standpoint at this time      Trevon Garcia DPM  Office: [phone]

## 2025-02-26 NOTE — NURSING NOTE
HD TERMINATED EARLY. PATIENT INSISTED HE COULD NOT GET COMFORTABLE AND DEMANDED OFF. POSITIVE FLUID BALANCE 340 ML'S AS A RESULT OF TERMINATING TX EARLY. NO MEDS ADMINISTERED. AVF NEEDLES DE-ACCESSED. HEMOSTASIS ACHIEVED. STABLE POST COMPLETION OF HD.

## 2025-02-26 NOTE — PROGRESS NOTES
[facility] Progress Note       Encounter Date:25  Patient:Wilner Menjivar  :1985  MRN:[id number]      Chief Complaint: Chest tightness      Subjective:      Patient reports that he is doing well this morning.  Patient is resting comfortably in bed.  Denies any new cardiac complaints.      Review of Systems:  Review of Systems   Eyes:  Positive for vision loss in left eye and vision loss in right eye.   Cardiovascular:  Negative for chest pain, dyspnea on exertion, leg swelling and palpitations.   Respiratory:  Negative for cough and shortness of breath.        Medications:  Scheduled Meds:  aspirin, 81 mg, Oral, Daily  atorvastatin, 10 mg, Oral, Nightly  bumetanide, 2 mg, Oral, BID Diuretics  carvedilol, 25 mg, Oral, BID  docusate sodium, 100 mg, Oral, BID  epoetin jamil/jamil-epbx, 10,000 Units, Subcutaneous, Once per day on   insulin glargine, 42 Units, Subcutaneous, Nightly  insulin lispro, 14 Units, Subcutaneous, TID With Meals  insulin lispro, 2-7 Units, Subcutaneous, 4x Daily AC & at Bedtime  pantoprazole, 40 mg, Oral, BID AC  polyethylene glycol, 17 g, Oral, Daily  Scopolamine, 1 patch, Transdermal, Q72H  sucralfate, 1 g, Oral, 4x Daily AC & at Bedtime    Continuous Infusions:  Pharmacy Consult,   Pharmacy To Dose:,     PRN Meds:    aluminum-magnesium hydroxide-simethicone    calcium carbonate    dextrose    dextrose    diphenhydrAMINE **OR** diphenhydrAMINE    EPINEPHrine    glucagon (human recombinant)    HYDROcodone-acetaminophen    HYDROmorphone    ondansetron    Pharmacy Consult    Pharmacy To Dose:    prochlorperazine    zolpidem         Objective:       Vitals:    25 1236 25 2022 25 0045 25 0716   BP: 124/79 143/86 138/71    BP Location: Right arm Right arm Right arm    Patient Position: Lying Sitting Lying    Pulse: 81 91 92    Resp: 16 18 18 18   Temp: 97.8 °F (36.6 °C) 97.5 °F (36.4 °C) 98.2 °F (36.8 °C) 98.4 °F (36.9  "°C)   TempSrc: Temporal Oral Oral Oral   SpO2:  94% 98%    Weight:       Height:               Physical Exam:  Constitutional: Well appearing, well developed, no acute distress   HENT: Oropharynx clear and membrane moist  Eyes: Normal conjunctiva, no sclera icterus.  Neck: Supple, no carotid bruit bilaterally.  Cardiovascular: Regular rate and rhythm, No Murmur, No bilateral lower extremity edema.  Pulmonary: Normal respiratory effort, normal lung sounds, no wheezing.  Neurological: Alert and orient x 3.   Skin: Warm, dry, no ecchymosis, no rash.  Psych: Appropriate mood and affect. Normal judgment and insight.           Lab Review:   Results from last 7 days   Lab Units 02/26/25  0606 02/25/25  0348 02/24/25  0517 02/23/25  1105 02/23/25  0346 02/22/25  0337 02/21/25  0544 02/20/25  0352   SODIUM mmol/L 134* 134* 129*  --  133* 134* 132* 134*   POTASSIUM mmol/L 4.3 4.5 4.8 4.6 4.3 4.8 4.5 4.5   CHLORIDE mmol/L 98 100 98  --  100 100 97* 98   CO2 mmol/L 25.4 23.2 19.7*  --  22.0 23.8 21.0* 24.5   BUN mg/dL 23* 38* 40*  --  33* 25* 34* 25*   CREATININE mg/dL 5.37* 6.13* 7.99*  --  6.93* 5.30* 6.28* 5.48*   GLUCOSE mg/dL 142* 216* 312*  --  123* 259* 211* 190*   CALCIUM mg/dL 8.8 8.6 8.3*  --  8.6 8.6 9.2 9.2     Results from last 7 days   Lab Units 02/23/25  1216 02/23/25  1105   HSTROP T ng/L 99* 105*     Results from last 7 days   Lab Units 02/26/25  0606 02/25/25  0348 02/24/25  0517 02/23/25  0346 02/22/25  0337 02/21/25  0544 02/20/25  0352   WBC 10*3/mm3 11.74* 9.19 13.86* 16.36* 11.86* 13.27* 14.41*   HEMOGLOBIN g/dL 8.4* 8.1* 7.0* 5.7* 7.2* 8.7* 9.2*   HEMATOCRIT % 25.5* 24.1* 21.4* 18.2* 23.4* 26.3* 26.0*   PLATELETS 10*3/mm3 193 215 262 281 315 328 339         Results from last 7 days   Lab Units 02/23/25  1105   MAGNESIUM mg/dL 2.0           Invalid input(s): \"LDLCALC\"          Echocardiogram 2/4/2025 [facility]:  Technically difficult examination; Lumason echocontrast utilized to enhance " endocardial definition.   The estimated ejection fraction is 55-60% with no clear regional wall motion abnormalities.   There is mild, concentric, left ventricular hypertrophy.   There is grade I left ventricular diastolic dysfunction (impaired relaxation).   The right ventricular chamber size and systolic function are within normal limits.   No significant valvular abnormalities are visualized.   There is no evidence of a pericardial effusion.      Cardiac catheterization 9/20/2024 [facility]:  Successful percutaneous intervention to 80% mid marginal branch stenoses with 2.25 x 38 mm Synergy drug-eluting stent with a 2.5 x 48 mm Synergy from the mid marginal into the proximal circumflex postdilated the 2.75 mm noncompliant balloon  Patent stent within the mid LAD  Right coronary artery 100% mid with left-to-right collaterals filling the distal vascular bed     Stress MPI 9/17/2024 [facility]:  Gated SPECT analysis demonstrates a post stress ejection fraction of 21%. Global LV systolic function is severely reduced. Severely decreased wall thickening and severe hypokinesis is noted in the basal inferior, basal inferolateral, mid inferior, mid inferolateral, apical septal, apical inferior, apical lateral wall segments. Otherwise, there is moderately decreased wall thickening and mild hypokinesis in all the remaining left ventricular wall segments.      Echocardiogram 5/3/2024 [facility]:  The ejection fraction biplane was calculated at 43%. Left ventricle size is normal. Mild left ventricular hypertrophy. The mid to distal anteroseptal mid anterior and apical walls are severely hypokinetic   Structurally normal mitral valve. Mild mitral regurgitation is present.   Right ventricular systolic pressure of 36 mmHg.   Mild tricuspid regurgitation.   There is no evidence of pericardial effusion.      Cardiac Catheterization 9/6/2022 [facility]:  Mildly elevated right heart catheterization  pressures are noted with   cardiac index between 2.5 and 3.0 L/min/m^2.   Severe multivessel CAD is identified including high-grade 99% LAD stenosis   (clear culprit for ACS).   Successful PCI to the mid LAD was completed with 2 Synergy drug-eluting stents (2.5 x 38 mm and 3.0 x 24 mm).   Intravascular ultrasound imaging with diffuse lipidic plaquing with mild calcification.   70% mid marginal branch stenoses  100% mid RCA segment stenoses with left-to-right collaterals       Assessment:          Diagnosis Plan   1. Diabetic ulcer of right great toe  Ambulatory Referral to Vascular Surgery      2. Hyperglycemia        3. Diabetic ulcer of toe of right foot associated with type 2 diabetes mellitus, with necrosis of bone  Ambulatory Referral to Vascular Surgery    Case request    Case request    Tissue Pathology Exam    Tissue Pathology Exam      4. Osteomyelitis of great toe of right foot  Case request    Case request    Fungus Culture - Bone, Toe, Right    Fungus Culture - Surgical Site, Toe, Right    Tissue / Bone Culture - Bone, Toe, Right    Tissue / Bone Culture - Surgical Site, Toe, Right    Anaerobic Culture 10 Day Incubation - Bone, Toe, Right    Anaerobic Culture 10 Day Incubation - Surgical Site, Toe, Right    Tissue Pathology Exam    Tissue Pathology Exam    Case request    Case request    Tissue Pathology Exam    Tissue Pathology Exam    Case request    Case request    Tissue Pathology Exam    Tissue Pathology Exam             Plan:       Wilner Menjivar is a 39 y.o. gentleman follows with [place] [facility].  He has a past medical history notable for coronary artery disease status post PCI, chronic systolic congestive heart failure with recovered ejection fraction, type 2 diabetes, legally blind, end-stage renal disease on hemodialysis, severe anemia multifactorial, hypertension, and osteomyelitis of the feet.  He presented to the hospital on 211 for concerns over foot infection.  He has been here before  with multiple revisions of toe amputation and treatment of his osteomyelitis.  This has been complicated by bleeding.  He has known history of profound anemia at time requiring transfusions.  He was having some chest discomfort in the setting of having a hemoglobin of 5.  This has since resolved since transfusion.  Cardiology was consulted to see him due to chest pain and known coronary artery disease.  Patient underwent surgery on the 24th which he reports that he tolerated well and is doing well.  We have stopped Brilinta.  Would be reasonable to transition to aspirin and Plavix when safe from a surgical standpoint.  I did reach out to podiatry team who cleared that the patient is safe from a surgical standpoint to start Plavix.  Plavix has been ordered.  If patient has continued issues with bleeding.  Potentially transition to single antiplatelet therapy.      Chest pain/type II myocardial infarction:  Denies any anginal symptoms today.  His EKG is likely unchanged compared to reports from outside hospitals  Symptoms improved after treating his anemia  Echocardiogram demonstrates recovery of left ventricular function  Dr. Lake did not recommend any further ischemic testing unless patient has significant changes in clinical status.  History of RCA      Coronary artery disease without angina:  Did have revascularization August 2024  Discontinued Brilinta yesterday.    Will start Plavix today.  Did discussed with podiatry team and they cleared patient for starting dual antiplatelet therapy.  Dr. Lake noted if issues with anemia and bleeding on dual platelet therapy could potentially transition to single antiplatelet therapy.  Plan to follow with [place] [facility] following discharge.  Continue atorvastatin 10 mg  Continue carvedilol 12.5 mg twice daily     Chronic systolic and diastolic congestive heart failure:  Actually had recovery of his left ventricular function after revascularization of his  circumflex in August based upon echocardiogram 2/5/2025  Continue carvedilol  No ACE/ARB due to renal disease       Thank you for allowing me to participate in the care of Wilner Menjivar. Feel free to contact me directly with any further questions or concerns.         PAYTON Quintanilla  [facility]  02/26/25  09:13 EST

## 2025-02-26 NOTE — PLAN OF CARE
Problem: Adult Inpatient Plan of Care  Goal: Plan of Care Review  Outcome: Progressing  Goal: Absence of Hospital-Acquired Illness or Injury  Intervention: Identify and Manage Fall Risk  Recent Flowsheet Documentation  Taken 2/26/2025 1800 by Nargis Olson RN  Safety Promotion/Fall Prevention:   safety round/check completed   fall prevention program maintained  Taken 2/26/2025 1403 by Nargis Olson RN  Safety Promotion/Fall Prevention:   safety round/check completed   fall prevention program maintained  Taken 2/26/2025 1200 by Nargis Olson RN  Safety Promotion/Fall Prevention:   safety round/check completed   fall prevention program maintained  Taken 2/26/2025 1000 by Nargis Olson RN  Safety Promotion/Fall Prevention: patient off unit  Taken 2/26/2025 0824 by Nargis Olson RN  Safety Promotion/Fall Prevention:   safety round/check completed   fall prevention program maintained   Goal Outcome Evaluation:

## 2025-02-26 NOTE — PROGRESS NOTES
Infectious Diseases Progress Note    Abdiaziz Paz MD     [facility]  [place]: 15 days  Patient Identification:  Name: Wilner Menjivar  Age: 39 y.o.  Sex: male  :  1985  MRN: [id number]         Primary Care Physician: Roosevelt Thao MD        Subjective: Tolerated Zosyn without any problem.  Denies any fever and chills.  Right foot feels better.  Interval History: See consultation note.  2025: UnderwentHallux amputation, right foot SESAMOIDECTOMY, INCISION AN DRAINAGE  2025: Patient underwent repeat I&D of the right foot and partial first metatarsal excision of the right foot.  2025 underwent partial bone excision of the first metatarsal as well as right foot debridement at the level of bone.  Objective:    Scheduled Meds:aspirin, 81 mg, Oral, Daily  atorvastatin, 10 mg, Oral, Nightly  bumetanide, 2 mg, Oral, BID Diuretics  carvedilol, 25 mg, Oral, BID  cefepime, 2,000 mg, Intravenous, Q24H  docusate sodium, 100 mg, Oral, BID  epoetin jamil/jamil-epbx, 10,000 Units, Subcutaneous, Once per day on   insulin glargine, 42 Units, Subcutaneous, Nightly  insulin lispro, 14 Units, Subcutaneous, TID With Meals  insulin lispro, 2-7 Units, Subcutaneous, 4x Daily AC & at Bedtime  metroNIDAZOLE, 500 mg, Oral, Q8H  pantoprazole, 40 mg, Oral, BID AC  polyethylene glycol, 17 g, Oral, Daily  Scopolamine, 1 patch, Transdermal, Q72H  sucralfate, 1 g, Oral, 4x Daily AC & at Bedtime  Vancomycin Pharmacy Intermittent/Pulse Dosing, , Not Applicable, Daily      Continuous Infusions:Pharmacy Consult,   Pharmacy to dose vancomycin,         Vital signs in last 24 hours:  Temp:  [97.5 °F (36.4 °C)-98.2 °F (36.8 °C)] 98.2 °F (36.8 °C)  Heart Rate:  [81-92] 92  Resp:  [16-18] 18  BP: (124-143)/(57-86) 138/71    Intake/Output:    Intake/Output Summary (Last 24 hours) at 2025 0651  Last data filed at 2025 1237  Gross per 24 hour   Intake --   Output 2000 ml   Net -2000 ml  "            Exam:  /71 (BP Location: Right arm, Patient Position: Lying)   Pulse 92   Temp 98.2 °F (36.8 °C) (Oral)   Resp 18   Ht 170.2 cm (67\")   Wt 107 kg (234 lb 12.6 oz)   SpO2 98%   BMI 36.77 kg/m²   Patient is examined using the personal protective equipment as per guidelines from infection control for this particular patient as enacted.  Hand washing was performed before and after patient interaction.  General Appearance:  Alert cooperative sitting at the edge of the bed about to eat his dinner.                          Head:    Normocephalic, without obvious abnormality, atraumatic                           Eyes:  Legally blind                         Throat:   Lips, tongue, gums normal; oral mucosa pink and moist                           Neck:   Supple, symmetrical, trachea midline, no JVD                         Lungs:    Clear to auscultation bilaterally, respirations unlabored                 Chest Wall:    No tenderness or deformity                          Heart:  S1-S2 regular                  Abdomen:   Soft nontender                 Extremities: Right foot after debridement and amputation of the great toe is dressed.                  Neurologic: Legally blind       Data Review:    I reviewed the patient's new clinical results.  Results from last 7 days   Lab Units 02/26/25  0606 02/25/25  0348 02/24/25  0517 02/23/25  0346 02/22/25  0337 02/21/25  0544 02/20/25  0352   WBC 10*3/mm3 11.74* 9.19 13.86* 16.36* 11.86* 13.27* 14.41*   HEMOGLOBIN g/dL 8.4* 8.1* 7.0* 5.7* 7.2* 8.7* 9.2*   PLATELETS 10*3/mm3 193 215 262 281 315 328 339     Results from last 7 days   Lab Units 02/26/25  0606 02/25/25  0348 02/24/25  0517 02/23/25  1105 02/23/25  0346 02/22/25  0337 02/21/25  0544 02/20/25  0352   SODIUM mmol/L 134* 134* 129*  --  133* 134* 132* 134*   POTASSIUM mmol/L 4.3 4.5 4.8 4.6 4.3 4.8 4.5 4.5   CHLORIDE mmol/L 98 100 98  --  100 100 97* 98   CO2 mmol/L 25.4 23.2 19.7*  --  22.0 23.8 " 21.0* 24.5   BUN mg/dL 23* 38* 40*  --  33* 25* 34* 25*   CREATININE mg/dL 5.37* 6.13* 7.99*  --  6.93* 5.30* 6.28* 5.48*   CALCIUM mg/dL 8.8 8.6 8.3*  --  8.6 8.6 9.2 9.2   GLUCOSE mg/dL 142* 216* 312*  --  123* 259* 211* 190*   XR Foot 3+ View Right    Result Date: 2/21/2025   Postsurgical changes.    This report was finalized on 2/21/2025 3:43 PM by Dr. Chris Sheets M.D on Workstation: [id number]      XR Foot 3+ View Right    Result Date: 2/18/2025  Postoperative changes from amputation of the great toe at the level of the MTP joint.  This report was finalized on 2/18/2025 9:04 AM by Tom Cha MD on Workstation: [id number]      Doppler Arterial Multi Level Lower Extremity - Bilateral CAR    Addendum Date: 2/13/2025      Right Conclusion: The right GIOVANA is moderately reduced. Waveforms are consistent with femoral disease, popliteal disease and tib-peroneal disease. Normal digital pressures.   Left Conclusion: The left GIOVANA is normal. Normal digital pressures.     MRI Foot Right Without Contrast    Result Date: 2/13/2025  1. Osteomyelitis of the distal shaft and head of the 1st proximal phalanx and of the distal phalanx with overlying soft tissue wound/ulcer. Gas within the soft tissues and marrow of the great toe associated with infection. Tenosynovitis flexor hallucis longus tendon sheath. Forefoot cellulitis. 2. No evidence for septic arthritis at the 1st MTP joint. 3. Muscular edema and atrophy associated with myositis or chronic neuropathy.  This report was finalized on 2/13/2025 7:48 AM by Junior Villalobos M.D on Workstation: [id number]      XR Foot 3+ View Right    Result Date: 2/11/2025   There is subcutaneous emphysema and soft tissue loss over the great toe. No radiopaque foreign body. No definite bone erosion.    This report was finalized on 2/11/2025 5:20 PM by Dr. Jason Dan M.D on Workstation: [id number]     Microbiology Results (last 10 days)       Procedure Component Value -  Date/Time    Fungus Culture - Surgical Site, Toe, Right [[id number]] Collected: 02/18/25 0754    Lab Status: Preliminary result Specimen: Surgical Site from Toe, Right Updated: 02/25/25 0830     Fungus Culture No fungus isolated at 1 week    Tissue / Bone Culture - Surgical Site, Toe, Right [[id number]]  (Abnormal)  (Susceptibility) Collected: 02/18/25 0754    Lab Status: Edited Result - FINAL Specimen: Surgical Site from Toe, Right Updated: 02/25/25 0827     Tissue Culture Scant growth (1+) Citrobacter freundii     Comment:   This organism may develop resistance during prolonged therapy with 3rd generation cephalosporins (e.g. ceftriaxone) as a result of de-repression of AmpC B-lactamase.  Ceftriaxone may be a reasonable treatment option for uncomplicated cystitis or other lower severity infections when susceptibility is demonstrated.         Scant growth (1+) Enterococcus faecalis     Gram Stain Few (2+) Gram positive cocci      Rare (1+) WBCs seen    Susceptibility        Citrobacter freundii      BHAVIN      Amoxicillin + Clavulanate Resistant (C)  [1]       Cefepime Susceptible      Ceftazidime Susceptible      Ceftriaxone Susceptible      Gentamicin Susceptible      Levofloxacin Susceptible      Piperacillin + Tazobactam Susceptible (C)  [1]       Trimethoprim + Sulfamethoxazole Susceptible                   [1]  Appended report. These results have been appended to a previously final verified report.                Susceptibility        Enterococcus faecalis      BHAVIN      Ampicillin Susceptible      Vancomycin Susceptible                       Susceptibility Comments       Citrobacter freundii    With the exception of urinary-sourced infections, aminoglycosides should not be used as monotherapy.               Anaerobic Culture 10 Day Incubation - Surgical Site, Toe, Right [[id number]]  (Abnormal) Collected: 02/18/25 0754    Lab Status: Final result Specimen: Surgical Site from Toe, Right Updated: 02/23/25 3011      Anaerobic Culture Parvimonas micra    Narrative:      This organism is Beta-lactamase negative and is predictably susceptible to ampicillin or amoxicillin             ECG 12 Lead Chest Pain   Final Result   HEART RATE=95  bpm   RR Mksffqnq=656  ms   WA Jfoucwov=433  ms   P Horizontal Axis=32  deg   P Front Axis=80  deg   QRSD Cgiqsezj=354  ms   QT Onphmuwb=324  ms   PRtX=898  ms   QRS Axis=14  deg   T Wave Axis=95  deg   - ABNORMAL ECG -   Sinus rhythm   Nonspecific  T abnormalities, lateral leads   Borderline  prolonged QT interval   NO PRIOR TRACING AVAILABLE FOR COMPARISON   Electronically Signed By: Julia Gallagher ([facility]) 2025-02-24 16:55:41   Date and Time of Study:2025-02-23 12:30:55      Telemetry Scan   Final Result      Telemetry Scan   Final Result      Telemetry Scan   Final Result      Telemetry Scan   Final Result      Telemetry Scan   Final Result      Telemetry Scan   Final Result      Telemetry Scan   Final Result      Telemetry Scan   Final Result      Telemetry Scan   Final Result      Telemetry Scan   Final Result      Telemetry Scan   Final Result      Telemetry Scan   Final Result      Telemetry Scan   Final Result      Telemetry Scan   Final Result      Telemetry Scan   Final Result      Telemetry Scan   Final Result      Telemetry Scan   Final Result      Telemetry Scan   Final Result      Telemetry Scan   Final Result      Telemetry Scan   Final Result      Telemetry Scan   Final Result      Telemetry Scan   Final Result      Telemetry Scan   Final Result      Telemetry Scan   Final Result      Telemetry Scan   Final Result      Telemetry Scan   Final Result      Telemetry Scan   Final Result      Telemetry Scan   Final Result      Telemetry Scan   Final Result      Telemetry Scan   Final Result      Telemetry Scan   Final Result      Telemetry Scan   Final Result      Telemetry Scan   Final Result              Assessment:    Diabetic toe ulcer    Osteomyelitis of great toe of right  foot  39-year-old male with  1-right great toe diabetic/ischemic ulcer with dry gangrene with associated abscess and contiguous focus osteomyelitis of the proximal and distal phalanx with associated flexor hallucis tenosynovitis-status post amputation of right great toe, I&D of multiple compartments of the right foot and sesamoidectomy involving tibia-fibula and right foot on 2/18/2025-operative cultures showing gram-negative rods and Gram stain shows gram-positive cocci-identified as Citrobacter freundii and Enterococcus.  2-significant peripheral vascular disease with element of gangrene involving the great toe  3-type 2 diabetes  4-end-stage renal disease  5-legally blind  6-severe anemia multifactorial  7-hypertension  8-multiple antibiotic allergies/intolerances including allergy to penicillin though it could very well be not a true allergy given the description of his reaction.  9-anemia multifactorial status post transfusion  10-penicillin allergy.     Recommendations/Discussions:  See my discussion and recommendation on 2/23/2025  See my discussion and recommendation on 2/24/2025.  Discussed with pharmacy and patient tolerated trial of Zosyn without any problem.  His antibiotic regimen would be simplified to IV Zosyn adjusted to renal function and he would need 6 weeks of antibiotic treatment from final I&D for right foot infection with contiguous focus osteomyelitis.      Abdiaziz Paz MD  2/26/2025  06:51 EST    Parts of this note may be an electronic transcription/translation of spoken language to printed text using the Dragon dictation system.

## 2025-02-26 NOTE — PROGRESS NOTES
[facility] Progress Note      Patient Name: Wilner Menjivar  : 1985  MRN: [id number]  Primary Care Physician:  Roosevelt Thao MD  Date of admission: 2025    Subjective     Interval History:   F/u ESRD    Review of Systems:   Dialyzed yesterday  Nauseated this AM - thinks from pain meds  No dyspnea     Objective     Vitals:   Temp:  [97.5 °F (36.4 °C)-98.2 °F (36.8 °C)] 98.2 °F (36.8 °C)  Heart Rate:  [81-92] 92  Resp:  [16-18] 18  BP: (124-143)/(71-86) 138/71    Intake/Output Summary (Last 24 hours) at 2025 0721  Last data filed at 2025 1237  Gross per 24 hour   Intake --   Output 2000 ml   Net -2000 ml       Physical Exam:    General Appearance: blind nauseated AAM   Lungs: CTA bilat no rales  Heart: RRR, normal S1 and S2  Abdomen: soft, nontender, nondistended  : no palpable bladder  Extremities: no edema, cyanosis or clubbing      Scheduled Meds:     aspirin, 81 mg, Oral, Daily  atorvastatin, 10 mg, Oral, Nightly  bumetanide, 2 mg, Oral, BID Diuretics  carvedilol, 25 mg, Oral, BID  cefepime, 2,000 mg, Intravenous, Q24H  docusate sodium, 100 mg, Oral, BID  epoetin jamil/jamil-epbx, 10,000 Units, Subcutaneous, Once per day on   insulin glargine, 42 Units, Subcutaneous, Nightly  insulin lispro, 14 Units, Subcutaneous, TID With Meals  insulin lispro, 2-7 Units, Subcutaneous, 4x Daily AC & at Bedtime  metroNIDAZOLE, 500 mg, Oral, Q8H  pantoprazole, 40 mg, Oral, BID AC  polyethylene glycol, 17 g, Oral, Daily  Scopolamine, 1 patch, Transdermal, Q72H  sucralfate, 1 g, Oral, 4x Daily AC & at Bedtime  Vancomycin Pharmacy Intermittent/Pulse Dosing, , Not Applicable, Daily      IV Meds:   Pharmacy Consult,   Pharmacy to dose vancomycin,         Results Reviewed:   I have personally reviewed the results from the time of this admission to 2025 07:21 EST     Results from last 7 days   Lab Units 25  0606 25  0348 25  0517   SODIUM  mmol/L 134* 134* 129*   POTASSIUM mmol/L 4.3 4.5 4.8   CHLORIDE mmol/L 98 100 98   CO2 mmol/L 25.4 23.2 19.7*   BUN mg/dL 23* 38* 40*   CREATININE mg/dL 5.37* 6.13* 7.99*   CALCIUM mg/dL 8.8 8.6 8.3*   GLUCOSE mg/dL 142* 216* 312*     Estimated Creatinine Clearance: 21.3 mL/min (A) (by C-G formula based on SCr of 5.37 mg/dL (H)).  Results from last 7 days   Lab Units 02/26/25  0606 02/23/25  1105 02/22/25  0337 02/21/25  0544   MAGNESIUM mg/dL  --  2.0  --   --    PHOSPHORUS mg/dL 3.8  --  3.7 4.3         Results from last 7 days   Lab Units 02/26/25  0606 02/25/25  0348 02/24/25  0517 02/23/25  0346 02/22/25  0337   WBC 10*3/mm3 11.74* 9.19 13.86* 16.36* 11.86*   HEMOGLOBIN g/dL 8.4* 8.1* 7.0* 5.7* 7.2*   PLATELETS 10*3/mm3 193 215 262 281 315           Assessment / Plan     ASSESSMENT:  ESRD since 6/2024 followed by Dr. Wasserman on IHD MWF schedule via LUE AVF.  Only dialyzed 30 minutes MON before insisting to come off treatment.  Dialyzed yesterday (so off cycle).  Volume/lytes stable.  Has residual fcn and takes PO bumex  Anemia of CKD & ABL. Transfused.  Hgb up 8.4  Hypertension w/ CKD.  BP controlled  Coronary artery disease s/p PCI in Sept 2024  Diabetes Mellitus type 2 w/ complications (retinopathy , peripheral vascular disease  nephropathy ) .Mgmt per primary team   Right diabetic foot ulcer + osteomyelitis, POD1 I&D.  Abx per ID (vanc/zosyn currently)     PLAN:  HD again today to get back on MWF schedule, short treatment without UF on account of nausea      Roman Carmen MD  02/26/25  07:21 [facility]    [facility]  [phone]

## 2025-02-26 NOTE — PROGRESS NOTES
"Nutrition Services    Patient Name:  Wilner Menjivar  YOB: 1985  MRN: [id number]  Admit Date:  2/11/2025  Assessment Date:  02/26/25    NUTRITION SCREENING      Reason for Encounter Follow-up Protocol   Diagnosis/Problem Diabetic toe ulcer  POD#2 s/p partial bone excision and debridement of the R foot  S/p wound vac placement yesterday  HD today   PMH ESRD on HD, anemia, DM, HTN, HLD, CAD, GERD, legally blind    PO Diet Diet: Cardiac, Diabetic; Healthy Heart (2-3 Na+); Consistent Carbohydrate; Fluid Consistency: Thin (IDDSI 0)   Supplements n/a   PO Intake % 0% x 1 meal  Has Chick Efren A at bedside  Reports appetite doing fine and eating fine   Agreeable to ONS       Medications MAR reviewed by RD   Labs  Listed below, reviewed   Physical Findings Obese  Mild edema to R foot   GI BM 2/24   Skin Status Right great toe wound, incision to R foot, wound vac       Height  Weight  BMI  Weight Trend     Height: 170.2 cm (67\")  Weight: 107 kg (234 lb 12.6 oz) (02/24/25 0930)  Body mass index is 36.77 kg/m².  Stable       Nutrition Problem (PES) Increased nutrient needs related to wound healing requirements as evidence by increased protein needs.       Intervention/Plan Continue CCHO diet.  Adding Boost GC daily.  Adding Sudheer BID to help promote wound healing.    RD to follow up per protocol.     Results from last 7 days   Lab Units 02/26/25  0606 02/25/25  0348 02/24/25  0517   SODIUM mmol/L 134* 134* 129*   POTASSIUM mmol/L 4.3 4.5 4.8   CHLORIDE mmol/L 98 100 98   CO2 mmol/L 25.4 23.2 19.7*   BUN mg/dL 23* 38* 40*   CREATININE mg/dL 5.37* 6.13* 7.99*   CALCIUM mg/dL 8.8 8.6 8.3*   GLUCOSE mg/dL 142* 216* 312*     Results from last 7 days   Lab Units 02/26/25  0606 02/24/25  0517 02/23/25  1105   MAGNESIUM mg/dL  --   --  2.0   PHOSPHORUS mg/dL 3.8  --   --    HEMOGLOBIN g/dL 8.4*   < >  --    HEMATOCRIT % 25.5*   < >  --     < > = values in this interval not displayed.     Lab Results   Component Value " Date    HGBA1C 11.10 (H) 02/13/2025         Electronically signed by:  Yris Parker RD  02/26/25 16:10 EST

## 2025-02-26 NOTE — PROGRESS NOTES
"Daily progress note    Primary care physician  Dr. NERI    Subjective   Doing same with no new complaint but still with severe pain    History of present illness  39-year-old male with history of end-stage renal disease on hemodialysis chronic anemia diabetes hypertension hyperlipidemia coronary artery disease and gastroesophageal of disease who is also legally blind and has right foot wound which is getting worse mainly right great toe which looks infected and has recently seen by podiatrist and removed the callus from the right great toe.  Patient has intermittent bleeding pain drainage but no fever chills.  Patient also denies any chest pain shortness of breath palpitation abdominal pain nausea vomiting diarrhea.  Patient workup in ER revealed infected right great toe wound admitted for management.     REVIEW OF SYSTEMS  Unremarkable except pain     PHYSICAL EXAM   Blood pressure 138/71, pulse 92, temperature 98.4 °F (36.9 °C), temperature source Oral, resp. rate 18, height 170.2 cm (67\"), weight 107 kg (234 lb 12.6 oz), SpO2 98%.    Constitutional:       General: He is not in acute distress.     Appearance: Normal appearance. He is not ill-appearing, toxic-appearing or diaphoretic.   HENT:      Head: Normocephalic and atraumatic.      Nose: Nose normal.      Mouth/Throat:      Mouth: Mucous membranes are moist.      Pharynx: Oropharynx is clear.   Eyes:      Conjunctiva/sclera: Conjunctivae normal.      Pupils: Pupils are equal, round, and reactive to light.   Cardiovascular:      Rate and Rhythm: Normal rate and regular rhythm.      Pulses: Normal pulses.   Pulmonary:      Effort: Pulmonary effort is normal.   Abdominal:      General: Abdomen is flat.      Palpations: Abdomen is soft.   Musculoskeletal:      Comments: Right great toe is black in color with a deep ulceration and dried blood, no active purulent drainage, no palpable crepitus or fluctuance   Skin:     General: Skin is warm and dry.   Neurological: "      General: No focal deficit present.      Mental Status: He is alert and oriented to person, place, and time. Mental status is at baseline.   Psychiatric:         Mood and Affect: Mood normal.         Behavior: Behavior normal.         Thought Content: Thought content normal.         Judgment: Judgment normal.      LAB RESULTS  Lab Results (last 24 hours)       Procedure Component Value Units Date/Time    Vancomycin, Random [[id number]]  (Normal) Collected: 02/26/25 0606    Specimen: Blood Updated: 02/26/25 0649     Vancomycin Random 19.80 mcg/mL     Narrative:      Therapeutic Ranges for Vancomycin    Vancomycin Random   5.0-40.0 mcg/mL  Vancomycin Trough   5.0-20.0 mcg/mL  Vancomycin Peak     20.0-40.0 mcg/mL    Renal Function Panel [[id number]]  (Abnormal) Collected: 02/26/25 0606    Specimen: Blood Updated: 02/26/25 0649     Glucose 142 mg/dL      BUN 23 mg/dL      Creatinine 5.37 mg/dL      Sodium 134 mmol/L      Potassium 4.3 mmol/L      Chloride 98 mmol/L      CO2 25.4 mmol/L      Calcium 8.8 mg/dL      Albumin 3.0 g/dL      Phosphorus 3.8 mg/dL      Anion Gap 10.6 mmol/L      BUN/Creatinine Ratio 4.3     eGFR 13.0 mL/min/1.73     Narrative:      GFR Categories in Chronic Kidney Disease (CKD)      GFR Category          GFR (mL/min/1.73)    Interpretation  G1                     90 or greater         Normal or high (1)  G2                      60-89                Mild decrease (1)  G3a                   45-59                Mild to moderate decrease  G3b                   30-44                Moderate to severe decrease  G4                    15-29                Severe decrease  G5                    14 or less           Kidney failure          (1)In the absence of evidence of kidney disease, neither GFR category G1 or G2 fulfill the criteria for CKD.    eGFR calculation 2021 CKD-EPI creatinine equation, which does not include race as a factor    CBC & Differential [[id number]]  (Abnormal) Collected:  02/26/25 0606    Specimen: Blood Updated: 02/26/25 0629    Narrative:      The following orders were created for panel order CBC & Differential.  Procedure                               Abnormality         Status                     ---------                               -----------         ------                     CBC Auto Differential[[id number]]        Abnormal            Final result                 Please view results for these tests on the individual orders.    CBC Auto Differential [[id number]]  (Abnormal) Collected: 02/26/25 0606    Specimen: Blood Updated: 02/26/25 0629     WBC 11.74 10*3/mm3      RBC 2.86 10*6/mm3      Hemoglobin 8.4 g/dL      Hematocrit 25.5 %      MCV 89.2 fL      MCH 29.4 pg      MCHC 32.9 g/dL      RDW 14.3 %      RDW-SD 45.1 fl      MPV 8.0 fL      Platelets 193 10*3/mm3      Neutrophil % 79.0 %      Lymphocyte % 9.0 %      Monocyte % 7.6 %      Eosinophil % 2.2 %      Basophil % 0.7 %      Immature Grans % 1.5 %      Neutrophils, Absolute 9.27 10*3/mm3      Lymphocytes, Absolute 1.06 10*3/mm3      Monocytes, Absolute 0.89 10*3/mm3      Eosinophils, Absolute 0.26 10*3/mm3      Basophils, Absolute 0.08 10*3/mm3      Immature Grans, Absolute 0.18 10*3/mm3      nRBC 0.3 /100 WBC     POC Glucose Once [[id number]]  (Abnormal) Collected: 02/26/25 0609    Specimen: Blood Updated: 02/26/25 0611     Glucose 147 mg/dL     POC Glucose Once [[id number]]  (Normal) Collected: 02/25/25 2104    Specimen: Blood Updated: 02/25/25 2105     Glucose 121 mg/dL     POC Glucose Once [[id number]]  (Normal) Collected: 02/25/25 1632    Specimen: Blood Updated: 02/25/25 1633     Glucose 129 mg/dL     POC Glucose Once [[id number]]  (Normal) Collected: 02/25/25 1355    Specimen: Blood Updated: 02/25/25 1357     Glucose 109 mg/dL           Imaging Results (Last 24 Hours)       ** No results found for the last 24 hours. **            Current Facility-Administered Medications:     aluminum-magnesium  hydroxide-simethicone (MAALOX MAX) 400-400-40 MG/5ML suspension 15 mL, 15 mL, Oral, PRN, Trevon Garcia, DPM, 15 mL at 02/24/25 1007    aspirin chewable tablet 81 mg, 81 mg, Oral, Daily, Jose, Trevon, DPM, 81 mg at 02/26/25 0817    atorvastatin (LIPITOR) tablet 10 mg, 10 mg, Oral, Nightly, Jose, Trevon, DPM, 10 mg at 02/25/25 2258    bumetanide (BUMEX) tablet 2 mg, 2 mg, Oral, BID Diuretics, Jose, Trevon, DPM, 2 mg at 02/26/25 0817    calcium carbonate (TUMS) chewable tablet 500 mg (200 mg elemental), 2 tablet, Oral, TID PRN, Trevon Garcia, DPM, 2 tablet at 02/25/25 0904    carvedilol (COREG) tablet 25 mg, 25 mg, Oral, BID, Trevon Garcia, DPM, 25 mg at 02/26/25 0818    dextrose (D50W) (25 g/50 mL) IV injection 25 g, 25 g, Intravenous, Q15 Min PRN, Trevon Garcia DPM    dextrose (GLUTOSE) oral gel 15 g, 15 g, Oral, Q15 Min PRN, Trevon Garcia DPM    diphenhydrAMINE (BENADRYL) capsule 50 mg, 50 mg, Oral, Q4H PRN **OR** diphenhydrAMINE (BENADRYL) injection 50 mg, 50 mg, Intravenous, Q6H PRN, Abdiaziz Paz MD    docusate sodium (COLACE) capsule 100 mg, 100 mg, Oral, BID, Trevon Garcia, DPM, 100 mg at 02/26/25 0818    EPINEPHrine (ANAPHYLAXIS) 1 mg/ml injection kit, 0.3 mg, Intramuscular, PRN, Abdiaziz Paz MD    epoetin jamil-epbx (RETACRIT) injection 10,000 Units, 10,000 Units, Subcutaneous, Once per day on Monday Wednesday Friday, Trevon Garcia DPM, 10,000 Units at 02/26/25 0818    glucagon (GLUCAGEN) injection 1 mg, 1 mg, Intramuscular, Q15 Min PRN, Jose, Trevon, DPM    HYDROcodone-acetaminophen (NORCO) 5-325 MG per tablet 2 tablet, 2 tablet, Oral, Q4H PRN, Trevon Garcia, DPM, 2 tablet at 02/25/25 1607    HYDROmorphone (DILAUDID) injection 1 mg, 1 mg, Intravenous, Q2H PRN, Trevon Garcia, DPM, 1 mg at 02/26/25 0655    insulin glargine (LANTUS, SEMGLEE) injection 42 Units, 42 Units, Subcutaneous, Nightly, JoseTrevon goodwin, DPM, 42 Units at 02/25/25 2259    insulin lispro  (HUMALOG/ADMELOG) injection 14 Units, 14 Units, Subcutaneous, TID With Meals, Trevon Garcia DPM, 14 Units at 02/26/25 0817    insulin lispro (HUMALOG/ADMELOG) injection 2-7 Units, 2-7 Units, Subcutaneous, 4x Daily AC & at Bedtime, Trevon Garcia DPM, 2 Units at 02/25/25 0752    ondansetron (ZOFRAN) injection 4 mg, 4 mg, Intravenous, Q6H PRN, Trevon Garcia DPM, 4 mg at 02/26/25 0655    pantoprazole (PROTONIX) EC tablet 40 mg, 40 mg, Oral, BID AC, Trevon Garcia DPM, 40 mg at 02/26/25 0655    Pharmacy Consult, , Not Applicable, Continuous PRN, Abdiaziz Paz MD    Pharmacy To Dose: Piperacillin-tazobactam (Zosyn), , Not Applicable, Continuous PRN, Abdiaziz Paz MD    polyethylene glycol (MIRALAX) packet 17 g, 17 g, Oral, Daily, Trevon Garcai DPM    prochlorperazine (COMPAZINE) injection 5 mg, 5 mg, Intravenous, Q6H PRN, Trevon Garcia DPM, 5 mg at 02/26/25 0753    scopolamine patch 1 mg/72 hr, 1 patch, Transdermal, Q72H, Trevon Garcia DPM, 1 patch at 02/25/25 2258    sucralfate (CARAFATE) tablet 1 g, 1 g, Oral, 4x Daily AC & at Bedtime, Trevon Garcia DPM, 1 g at 02/26/25 0655    zolpidem (AMBIEN) tablet 5 mg, 5 mg, Oral, Nightly PRN, Trevon Garcia DPM, 5 mg at 02/25/25 2258     ASSESSMENT  Right great toe wound with infection and surrounding cellulitis and osteomyelitis status post I&D and hallux amputation followed by partial first right metatarsal excision  End-stage renal disease   Diabetes mellitus  Hypertension  Hyperlipidemia  Coronary artery disease  Legally blind  Chronic anemia with drop in H&H  Gastroesophageal reflux disease    PLAN  CPM   Postop care  Transfused as needed  Adjust blood pressure medications  Continue IV antibiotics   Pain management   Hemodialysis today  Infectious disease podiatry and nephrology to follow patient   Continue home medications  Stress ulcer DVT prophylaxis  Supportive care  Discussed with nursing staff family and podiatry  Follow closely  further recommendation current hospital course    JASKARAN LANGFORD MD    Copied text in this note has been reviewed and is accurate as of 02/26/25

## 2025-02-27 PROBLEM — L97.519 DIABETIC ULCER OF RIGHT GREAT TOE: Status: ACTIVE | Noted: 2025-02-11

## 2025-02-27 PROBLEM — E11.621 DIABETIC ULCER OF RIGHT GREAT TOE: Status: ACTIVE | Noted: 2025-02-11

## 2025-02-27 LAB
ANION GAP SERPL CALCULATED.3IONS-SCNC: 13 MMOL/L (ref 5–15)
BASOPHILS # BLD AUTO: 0.08 10*3/MM3 (ref 0–0.2)
BASOPHILS NFR BLD AUTO: 0.7 % (ref 0–1.5)
BUN SERPL-MCNC: 27 MG/DL (ref 6–20)
BUN/CREAT SERPL: 4.1 (ref 7–25)
CALCIUM SPEC-SCNC: 9 MG/DL (ref 8.6–10.5)
CHLORIDE SERPL-SCNC: 98 MMOL/L (ref 98–107)
CO2 SERPL-SCNC: 23 MMOL/L (ref 22–29)
CREAT SERPL-MCNC: 6.66 MG/DL (ref 0.76–1.27)
DEPRECATED RDW RBC AUTO: 44.5 FL (ref 37–54)
EGFRCR SERPLBLD CKD-EPI 2021: 10 ML/MIN/1.73
EOSINOPHIL # BLD AUTO: 0.24 10*3/MM3 (ref 0–0.4)
EOSINOPHIL NFR BLD AUTO: 2.1 % (ref 0.3–6.2)
ERYTHROCYTE [DISTWIDTH] IN BLOOD BY AUTOMATED COUNT: 13.7 % (ref 12.3–15.4)
GLUCOSE BLDC GLUCOMTR-MCNC: 188 MG/DL (ref 70–130)
GLUCOSE BLDC GLUCOMTR-MCNC: 194 MG/DL (ref 70–130)
GLUCOSE BLDC GLUCOMTR-MCNC: 201 MG/DL (ref 70–130)
GLUCOSE BLDC GLUCOMTR-MCNC: 261 MG/DL (ref 70–130)
GLUCOSE BLDC GLUCOMTR-MCNC: 67 MG/DL (ref 70–130)
GLUCOSE BLDC GLUCOMTR-MCNC: 78 MG/DL (ref 70–130)
GLUCOSE SERPL-MCNC: 187 MG/DL (ref 65–99)
HCT VFR BLD AUTO: 25.1 % (ref 37.5–51)
HGB BLD-MCNC: 8.6 G/DL (ref 13–17.7)
IMM GRANULOCYTES # BLD AUTO: 0.14 10*3/MM3 (ref 0–0.05)
IMM GRANULOCYTES NFR BLD AUTO: 1.2 % (ref 0–0.5)
LYMPHOCYTES # BLD AUTO: 0.8 10*3/MM3 (ref 0.7–3.1)
LYMPHOCYTES NFR BLD AUTO: 7 % (ref 19.6–45.3)
MCH RBC QN AUTO: 30.5 PG (ref 26.6–33)
MCHC RBC AUTO-ENTMCNC: 34.3 G/DL (ref 31.5–35.7)
MCV RBC AUTO: 89 FL (ref 79–97)
MONOCYTES # BLD AUTO: 0.66 10*3/MM3 (ref 0.1–0.9)
MONOCYTES NFR BLD AUTO: 5.8 % (ref 5–12)
NEUTROPHILS NFR BLD AUTO: 83.2 % (ref 42.7–76)
NEUTROPHILS NFR BLD AUTO: 9.49 10*3/MM3 (ref 1.7–7)
NRBC BLD AUTO-RTO: 0 /100 WBC (ref 0–0.2)
PLATELET # BLD AUTO: 199 10*3/MM3 (ref 140–450)
PMV BLD AUTO: 8.5 FL (ref 6–12)
POTASSIUM SERPL-SCNC: 4.5 MMOL/L (ref 3.5–5.2)
RBC # BLD AUTO: 2.82 10*6/MM3 (ref 4.14–5.8)
SODIUM SERPL-SCNC: 134 MMOL/L (ref 136–145)
WBC NRBC COR # BLD AUTO: 11.41 10*3/MM3 (ref 3.4–10.8)

## 2025-02-27 PROCEDURE — 63710000001 INSULIN GLARGINE PER 5 UNITS: Performed by: PODIATRIST

## 2025-02-27 PROCEDURE — 80048 BASIC METABOLIC PNL TOTAL CA: CPT | Performed by: HOSPITALIST

## 2025-02-27 PROCEDURE — 85025 COMPLETE CBC W/AUTO DIFF WBC: CPT | Performed by: HOSPITALIST

## 2025-02-27 PROCEDURE — 25010000002 PIPERACILLIN SOD-TAZOBACTAM PER 1 G: Performed by: INTERNAL MEDICINE

## 2025-02-27 PROCEDURE — 82948 REAGENT STRIP/BLOOD GLUCOSE: CPT

## 2025-02-27 PROCEDURE — 99232 SBSQ HOSP IP/OBS MODERATE 35: CPT | Performed by: SURGERY

## 2025-02-27 PROCEDURE — 63710000001 INSULIN LISPRO (HUMAN) PER 5 UNITS: Performed by: PODIATRIST

## 2025-02-27 PROCEDURE — 25010000002 HYDROMORPHONE 1 MG/ML SOLUTION: Performed by: PODIATRIST

## 2025-02-27 PROCEDURE — 99232 SBSQ HOSP IP/OBS MODERATE 35: CPT | Performed by: INTERNAL MEDICINE

## 2025-02-27 PROCEDURE — 25010000002 ONDANSETRON PER 1 MG: Performed by: PODIATRIST

## 2025-02-27 PROCEDURE — 25010000002 PROCHLORPERAZINE 10 MG/2ML SOLUTION: Performed by: PODIATRIST

## 2025-02-27 RX ADMIN — INSULIN LISPRO 14 UNITS: 100 INJECTION, SOLUTION INTRAVENOUS; SUBCUTANEOUS at 09:34

## 2025-02-27 RX ADMIN — CARVEDILOL 25 MG: 25 TABLET, FILM COATED ORAL at 21:21

## 2025-02-27 RX ADMIN — HYDROCODONE BITARTRATE AND ACETAMINOPHEN 2 TABLET: 5; 325 TABLET ORAL at 12:04

## 2025-02-27 RX ADMIN — INSULIN LISPRO 2 UNITS: 100 INJECTION, SOLUTION INTRAVENOUS; SUBCUTANEOUS at 12:04

## 2025-02-27 RX ADMIN — ANTACID TABLETS 2 TABLET: 500 TABLET, CHEWABLE ORAL at 09:30

## 2025-02-27 RX ADMIN — HYDROMORPHONE HYDROCHLORIDE 1 MG: 1 INJECTION, SOLUTION INTRAMUSCULAR; INTRAVENOUS; SUBCUTANEOUS at 06:08

## 2025-02-27 RX ADMIN — INSULIN LISPRO 14 UNITS: 100 INJECTION, SOLUTION INTRAVENOUS; SUBCUTANEOUS at 12:04

## 2025-02-27 RX ADMIN — PROCHLORPERAZINE EDISYLATE 5 MG: 5 INJECTION, SOLUTION INTRAMUSCULAR; INTRAVENOUS at 22:50

## 2025-02-27 RX ADMIN — BUMETANIDE 2 MG: 2 TABLET ORAL at 09:35

## 2025-02-27 RX ADMIN — SUCRALFATE 1 G: 1 TABLET ORAL at 21:21

## 2025-02-27 RX ADMIN — BUMETANIDE 2 MG: 2 TABLET ORAL at 18:16

## 2025-02-27 RX ADMIN — ASPIRIN 81 MG CHEWABLE TABLET 81 MG: 81 TABLET CHEWABLE at 09:35

## 2025-02-27 RX ADMIN — PANTOPRAZOLE SODIUM 40 MG: 40 TABLET, DELAYED RELEASE ORAL at 16:52

## 2025-02-27 RX ADMIN — INSULIN GLARGINE 42 UNITS: 100 INJECTION, SOLUTION SUBCUTANEOUS at 21:22

## 2025-02-27 RX ADMIN — PIPERACILLIN AND TAZOBACTAM 3.38 G: 3; .375 INJECTION, POWDER, FOR SOLUTION INTRAVENOUS at 06:08

## 2025-02-27 RX ADMIN — SUCRALFATE 1 G: 1 TABLET ORAL at 16:52

## 2025-02-27 RX ADMIN — PANTOPRAZOLE SODIUM 40 MG: 40 TABLET, DELAYED RELEASE ORAL at 06:08

## 2025-02-27 RX ADMIN — SUCRALFATE 1 G: 1 TABLET ORAL at 12:35

## 2025-02-27 RX ADMIN — HYDROMORPHONE HYDROCHLORIDE 1 MG: 1 INJECTION, SOLUTION INTRAMUSCULAR; INTRAVENOUS; SUBCUTANEOUS at 19:18

## 2025-02-27 RX ADMIN — HYDROMORPHONE HYDROCHLORIDE 1 MG: 1 INJECTION, SOLUTION INTRAMUSCULAR; INTRAVENOUS; SUBCUTANEOUS at 22:50

## 2025-02-27 RX ADMIN — CLOPIDOGREL BISULFATE 75 MG: 75 TABLET ORAL at 09:35

## 2025-02-27 RX ADMIN — INSULIN LISPRO 3 UNITS: 100 INJECTION, SOLUTION INTRAVENOUS; SUBCUTANEOUS at 21:21

## 2025-02-27 RX ADMIN — CARVEDILOL 25 MG: 25 TABLET, FILM COATED ORAL at 09:35

## 2025-02-27 RX ADMIN — ATORVASTATIN CALCIUM 10 MG: 10 TABLET, FILM COATED ORAL at 21:21

## 2025-02-27 RX ADMIN — HYDROMORPHONE HYDROCHLORIDE 1 MG: 1 INJECTION, SOLUTION INTRAMUSCULAR; INTRAVENOUS; SUBCUTANEOUS at 16:52

## 2025-02-27 RX ADMIN — PIPERACILLIN AND TAZOBACTAM 3.38 G: 3; .375 INJECTION, POWDER, FOR SOLUTION INTRAVENOUS at 18:16

## 2025-02-27 RX ADMIN — ZOLPIDEM TARTRATE 5 MG: 5 TABLET, FILM COATED ORAL at 22:50

## 2025-02-27 RX ADMIN — HYDROCODONE BITARTRATE AND ACETAMINOPHEN 2 TABLET: 5; 325 TABLET ORAL at 18:17

## 2025-02-27 RX ADMIN — ONDANSETRON 4 MG: 2 INJECTION INTRAMUSCULAR; INTRAVENOUS at 18:17

## 2025-02-27 RX ADMIN — PROCHLORPERAZINE EDISYLATE 5 MG: 5 INJECTION, SOLUTION INTRAMUSCULAR; INTRAVENOUS at 16:52

## 2025-02-27 RX ADMIN — SUCRALFATE 1 G: 1 TABLET ORAL at 06:08

## 2025-02-27 RX ADMIN — ONDANSETRON 4 MG: 2 INJECTION INTRAMUSCULAR; INTRAVENOUS at 12:03

## 2025-02-27 RX ADMIN — INSULIN LISPRO 4 UNITS: 100 INJECTION, SOLUTION INTRAVENOUS; SUBCUTANEOUS at 09:34

## 2025-02-27 RX ADMIN — PROCHLORPERAZINE EDISYLATE 5 MG: 5 INJECTION, SOLUTION INTRAMUSCULAR; INTRAVENOUS at 09:30

## 2025-02-27 RX ADMIN — HYDROMORPHONE HYDROCHLORIDE 1 MG: 1 INJECTION, SOLUTION INTRAMUSCULAR; INTRAVENOUS; SUBCUTANEOUS at 00:53

## 2025-02-27 RX ADMIN — ONDANSETRON 4 MG: 2 INJECTION INTRAMUSCULAR; INTRAVENOUS at 06:08

## 2025-02-27 RX ADMIN — HYDROMORPHONE HYDROCHLORIDE 1 MG: 1 INJECTION, SOLUTION INTRAMUSCULAR; INTRAVENOUS; SUBCUTANEOUS at 09:31

## 2025-02-27 NOTE — PROGRESS NOTES
Infectious Diseases Progress Note    Abdiaziz Paz MD     [facility]  Los: 16 days  Patient Identification:  Name: Wilner Menjivar  Age: 39 y.o.  Sex: male  :  1985  MRN: [id number]         Primary Care Physician: Roosevelt Thao MD        Subjective: Denies any new complaints.  Right foot feels better.  No issues with Zosyn.  Interval History: See consultation note.  2025: UnderwentHallux amputation, right foot SESAMOIDECTOMY, INCISION AN DRAINAGE  2025: Patient underwent repeat I&D of the right foot and partial first metatarsal excision of the right foot.  2025 underwent partial bone excision of the first metatarsal as well as right foot debridement at the level of bone.  Objective:    Scheduled Meds:aspirin, 81 mg, Oral, Daily  atorvastatin, 10 mg, Oral, Nightly  bumetanide, 2 mg, Oral, BID Diuretics  carvedilol, 25 mg, Oral, BID  clopidogrel, 75 mg, Oral, Daily  docusate sodium, 100 mg, Oral, BID  epoetin jamil/jamil-epbx, 10,000 Units, Subcutaneous, Once per day on   insulin glargine, 42 Units, Subcutaneous, Nightly  insulin lispro, 14 Units, Subcutaneous, TID With Meals  insulin lispro, 2-7 Units, Subcutaneous, 4x Daily AC & at Bedtime  pantoprazole, 40 mg, Oral, BID AC  piperacillin-tazobactam, 3.375 g, Intravenous, Q12H  polyethylene glycol, 17 g, Oral, Daily  Scopolamine, 1 patch, Transdermal, Q72H  sucralfate, 1 g, Oral, 4x Daily AC & at Bedtime      Continuous Infusions:Pharmacy To Dose:,         Vital signs in last 24 hours:  Temp:  [97.3 °F (36.3 °C)-98.6 °F (37 °C)] 98.4 °F (36.9 °C)  Heart Rate:  [] 90  Resp:  [16-20] 18  BP: (123-167)/(59-75) 141/60    Intake/Output:    Intake/Output Summary (Last 24 hours) at 2025 0666  Last data filed at 2025 0053  Gross per 24 hour   Intake 480 ml   Output 400 ml   Net 80 ml             Exam:  /60 (BP Location: Right arm, Patient Position: Lying)   Pulse 90   Temp 98.4 °F (36.9 °C)  "(Oral)   Resp 18   Ht 170.2 cm (67\")   Wt 107 kg (234 lb 12.6 oz)   SpO2 98%   BMI 36.77 kg/m²   Patient is examined using the personal protective equipment as per guidelines from infection control for this particular patient as enacted.  Hand washing was performed before and after patient interaction.  General Appearance:  Alert cooperative sitting at the edge of the bed about to eat his dinner.                          Head:    Normocephalic, without obvious abnormality, atraumatic                           Eyes:  Legally blind                         Throat:   Lips, tongue, gums normal; oral mucosa pink and moist                           Neck:   Supple, symmetrical, trachea midline, no JVD                         Lungs:    Clear to auscultation bilaterally, respirations unlabored                 Chest Wall:    No tenderness or deformity                          Heart:  S1-S2 regular                  Abdomen:   Soft nontender                 Extremities: Right foot after debridement and amputation of the great toe is dressed.                  Neurologic: Legally blind       Data Review:    I reviewed the patient's new clinical results.  Results from last 7 days   Lab Units 02/26/25  0606 02/25/25  0348 02/24/25  0517 02/23/25  0346 02/22/25  0337 02/21/25  0544   WBC 10*3/mm3 11.74* 9.19 13.86* 16.36* 11.86* 13.27*   HEMOGLOBIN g/dL 8.4* 8.1* 7.0* 5.7* 7.2* 8.7*   PLATELETS 10*3/mm3 193 215 262 281 315 328     Results from last 7 days   Lab Units 02/26/25  0606 02/25/25  0348 02/24/25  0517 02/23/25  1105 02/23/25  0346 02/22/25  0337 02/21/25  0544   SODIUM mmol/L 134* 134* 129*  --  133* 134* 132*   POTASSIUM mmol/L 4.3 4.5 4.8 4.6 4.3 4.8 4.5   CHLORIDE mmol/L 98 100 98  --  100 100 97*   CO2 mmol/L 25.4 23.2 19.7*  --  22.0 23.8 21.0*   BUN mg/dL 23* 38* 40*  --  33* 25* 34*   CREATININE mg/dL 5.37* 6.13* 7.99*  --  6.93* 5.30* 6.28*   CALCIUM mg/dL 8.8 8.6 8.3*  --  8.6 8.6 9.2   GLUCOSE mg/dL 142* 216* " 312*  --  123* 259* 211*   XR Foot 3+ View Right    Result Date: 2/21/2025   Postsurgical changes.    This report was finalized on 2/21/2025 3:43 PM by Dr. Chris Sheets M.D on Workstation: [id number]      XR Foot 3+ View Right    Result Date: 2/18/2025  Postoperative changes from amputation of the great toe at the level of the MTP joint.  This report was finalized on 2/18/2025 9:04 AM by Tom Cha MD on Workstation: [id number]      Doppler Arterial Multi Level Lower Extremity - Bilateral CAR    Addendum Date: 2/13/2025      Right Conclusion: The right GIOVANA is moderately reduced. Waveforms are consistent with femoral disease, popliteal disease and tib-peroneal disease. Normal digital pressures.   Left Conclusion: The left GIOVANA is normal. Normal digital pressures.     MRI Foot Right Without Contrast    Result Date: 2/13/2025  1. Osteomyelitis of the distal shaft and head of the 1st proximal phalanx and of the distal phalanx with overlying soft tissue wound/ulcer. Gas within the soft tissues and marrow of the great toe associated with infection. Tenosynovitis flexor hallucis longus tendon sheath. Forefoot cellulitis. 2. No evidence for septic arthritis at the 1st MTP joint. 3. Muscular edema and atrophy associated with myositis or chronic neuropathy.  This report was finalized on 2/13/2025 7:48 AM by Junior Villalobos M.D on Workstation: [id number]      XR Foot 3+ View Right    Result Date: 2/11/2025   There is subcutaneous emphysema and soft tissue loss over the great toe. No radiopaque foreign body. No definite bone erosion.    This report was finalized on 2/11/2025 5:20 PM by Dr. Jason Dan M.D on Workstation: [id number]     Microbiology Results (last 10 days)       Procedure Component Value - Date/Time    Fungus Culture - Surgical Site, Toe, Right [[id number]] Collected: 02/18/25 0754    Lab Status: Preliminary result Specimen: Surgical Site from Toe, Right Updated: 02/25/25 9959     Fungus Culture No  fungus isolated at 1 week    Tissue / Bone Culture - Surgical Site, Toe, Right [[id number]]  (Abnormal)  (Susceptibility) Collected: 02/18/25 0754    Lab Status: Edited Result - FINAL Specimen: Surgical Site from Toe, Right Updated: 02/25/25 0827     Tissue Culture Scant growth (1+) Citrobacter freundii     Comment:   This organism may develop resistance during prolonged therapy with 3rd generation cephalosporins (e.g. ceftriaxone) as a result of de-repression of AmpC B-lactamase.  Ceftriaxone may be a reasonable treatment option for uncomplicated cystitis or other lower severity infections when susceptibility is demonstrated.         Scant growth (1+) Enterococcus faecalis     Gram Stain Few (2+) Gram positive cocci      Rare (1+) WBCs seen    Susceptibility        Citrobacter freundii      BHAVIN      Amoxicillin + Clavulanate Resistant (C)  [1]       Cefepime Susceptible      Ceftazidime Susceptible      Ceftriaxone Susceptible      Gentamicin Susceptible      Levofloxacin Susceptible      Piperacillin + Tazobactam Susceptible (C)  [1]       Trimethoprim + Sulfamethoxazole Susceptible                   [1]  Appended report. These results have been appended to a previously final verified report.                Susceptibility        Enterococcus faecalis      BHAVIN      Ampicillin Susceptible      Vancomycin Susceptible                       Susceptibility Comments       Citrobacter freundii    With the exception of urinary-sourced infections, aminoglycosides should not be used as monotherapy.               Anaerobic Culture 10 Day Incubation - Surgical Site, Toe, Right [[id number]]  (Abnormal) Collected: 02/18/25 0754    Lab Status: Final result Specimen: Surgical Site from Toe, Right Updated: 02/23/25 1517     Anaerobic Culture Parvimonas micra    Narrative:      This organism is Beta-lactamase negative and is predictably susceptible to ampicillin or amoxicillin             ECG 12 Lead Chest Pain   Final Result   HEART  RATE=95  bpm   RR Bxqccoln=396  ms   NE Qlakuxzx=206  ms   P Horizontal Axis=32  deg   P Front Axis=80  deg   QRSD Qvswntqj=243  ms   QT Hrgrpaca=012  ms   RQuG=394  ms   QRS Axis=14  deg   T Wave Axis=95  deg   - ABNORMAL ECG -   Sinus rhythm   Nonspecific  T abnormalities, lateral leads   Borderline  prolonged QT interval   NO PRIOR TRACING AVAILABLE FOR COMPARISON   Electronically Signed By: Julia Gallagher ([facility]) 2025-02-24 16:55:41   Date and Time of Study:2025-02-23 12:30:55      Telemetry Scan   Final Result      Telemetry Scan   Final Result      Telemetry Scan   Final Result      Telemetry Scan   Final Result      Telemetry Scan   Final Result      Telemetry Scan   Final Result      Telemetry Scan   Final Result      Telemetry Scan   Final Result      Telemetry Scan   Final Result      Telemetry Scan   Final Result      Telemetry Scan   Final Result      Telemetry Scan   Final Result      Telemetry Scan   Final Result      Telemetry Scan   Final Result      Telemetry Scan   Final Result      Telemetry Scan   Final Result      Telemetry Scan   Final Result      Telemetry Scan   Final Result      Telemetry Scan   Final Result      Telemetry Scan   Final Result      Telemetry Scan   Final Result      Telemetry Scan   Final Result      Telemetry Scan   Final Result      Telemetry Scan   Final Result      Telemetry Scan   Final Result      Telemetry Scan   Final Result      Telemetry Scan   Final Result      Telemetry Scan   Final Result      Telemetry Scan   Final Result      Telemetry Scan   Final Result      Telemetry Scan   Final Result      Telemetry Scan   Final Result      Telemetry Scan   Final Result      Telemetry Scan   Final Result      Telemetry Scan   Final Result      Telemetry Scan   Final Result              Assessment:    Diabetic toe ulcer    Osteomyelitis of great toe of right foot  39-year-old male with  1-right great toe diabetic/ischemic ulcer with dry gangrene with associated abscess and  contiguous focus osteomyelitis of the proximal and distal phalanx with associated flexor hallucis tenosynovitis-status post amputation of right great toe, I&D of multiple compartments of the right foot and sesamoidectomy involving tibia-fibula and right foot on 2/18/2025-operative cultures showing gram-negative rods and Gram stain shows gram-positive cocci-identified as Citrobacter freundii and Enterococcus.  2-significant peripheral vascular disease with element of gangrene involving the great toe  3-type 2 diabetes  4-end-stage renal disease  5-legally blind  6-severe anemia multifactorial  7-hypertension  8-multiple antibiotic allergies/intolerances including allergy to penicillin though it could very well be not a true allergy given the description of his reaction.  9-anemia multifactorial status post transfusion  10-penicillin allergy.     Recommendations/Discussions:  See my discussion and recommendation on 2/23/2025  See my discussion and recommendation on 2/24/2025.  See my discussion and recommendation on 2/25 and 2/26/2025.  Dr. Garcia's opinion about his right foot and care plan noted and agree is a difficult situation.  Options of antibiotic treatment include   Ideal-continuation of Zosyn for 6 weeks with dose adjusted to renal function in terms of amount and frequency but would require IV access as there is no documented recommendation for adjustment to accommodate for the dialysis schedule as noted by our colleague pharmacist versus   change antibiotic regimen consisting of vancomycin and cefepime and Flagyl -which she was on prior to initiation of Zosyn-to accommodate for dialysis schedule.    Discussed with patient's caring nurse and recommendations from nephrology service it appears that placement of Estevez catheter is an option acceptable to our nephrology colleagues.  Discussed with the patient and he wants to proceed with the best regimen based on his culture results to provide him best chance  to salvage his right foot.  Once he gets his Estevez catheter arranged we will ask our case management to arrange following antibiotic regimen for out of hospital antibiotic treatment:  Please arrange for 42 days of IV Zosyn starting 2/26/2025 at 3.375 g every 12 hours with end of the treatment would be on 4/9/2025.  Check weekly CBC CMP and CRP and call abnormal results to Dr. Paz med [phone].  Please educate patient and place it in his AVS to call Dr. Paz at [phone] on a weekly basis to go over review of system to monitor antibiotic toxicity and effectiveness of treatment.    Follow-up with foot and ankle specialist per their recommendations.  Please make arrangements with vascular surgery service for removal of Estevez catheter after completion of antibiotic therapy on 4/9/2025.  Diagnosis: Polymicrobial right great toe and right foot diabetic ischemic ulcer with culture positive for Citrobacter freundii Enterococcus and Parvimona Micra  Abdiaziz Paz MD  2/27/2025  06:43 EST    Parts of this note may be an electronic transcription/translation of spoken language to printed text using the Dragon dictation system.

## 2025-02-27 NOTE — CASE MANAGEMENT/SOCIAL WORK
Continued Stay Note  [facility]     Patient Name: Wilner Menjivar  MRN: [id number]  Today's Date: 2/27/2025    Admit Date: 2/11/2025    Plan: Home with family, VNA .   Discharge Plan       Row Name 02/27/25 1705       Plan    Plan Comments CCP received call from [facility] who stated they are able to accept pt. [facility] asked that CCP send additional clinicals to [phone]; clinicals faxed. Saad SCHNEIDER/CCP      Row Name 02/27/25 1649       Plan    Plan Home with family, VNA .    Patient/Family in Agreement with Plan yes    Plan Comments [facility] notified [facility] they are out of network with pts insurance. [facility] notified and asked that Facesheet be faxed to [phone] to confirm they are in network with pts insurance. Facesheet faxed. Epic denial from [facility]. [facility]  notified of referral; they are able to accept and can accept pt with a Estevez. Saad SCHNEIDER/CCP                   Discharge Codes    No documentation.                 Expected Discharge Date and Time       Expected Discharge Date Expected Discharge Time    Feb 28, 2025               Una Garcia RN

## 2025-02-27 NOTE — PROGRESS NOTES
"Daily progress note    Primary care physician  Dr. NERI    Subjective   Doing same with no new complaint     History of present illness  39-year-old male with history of end-stage renal disease on hemodialysis chronic anemia diabetes hypertension hyperlipidemia coronary artery disease and gastroesophageal of disease who is also legally blind and has right foot wound which is getting worse mainly right great toe which looks infected and has recently seen by podiatrist and removed the callus from the right great toe.  Patient has intermittent bleeding pain drainage but no fever chills.  Patient also denies any chest pain shortness of breath palpitation abdominal pain nausea vomiting diarrhea.  Patient workup in ER revealed infected right great toe wound admitted for management.     REVIEW OF SYSTEMS  Unremarkable except pain     PHYSICAL EXAM   Blood pressure 148/67, pulse 91, temperature 98.6 °F (37 °C), temperature source Oral, resp. rate 20, height 170.2 cm (67\"), weight 107 kg (234 lb 12.6 oz), SpO2 98%.    Constitutional:       General: He is not in acute distress.     Appearance: Normal appearance. He is not ill-appearing, toxic-appearing or diaphoretic.   HENT:      Head: Normocephalic and atraumatic.      Nose: Nose normal.      Mouth/Throat:      Mouth: Mucous membranes are moist.      Pharynx: Oropharynx is clear.   Eyes:      Conjunctiva/sclera: Conjunctivae normal.      Pupils: Pupils are equal, round, and reactive to light.   Cardiovascular:      Rate and Rhythm: Normal rate and regular rhythm.      Pulses: Normal pulses.   Pulmonary:      Effort: Pulmonary effort is normal.   Abdominal:      General: Abdomen is flat.      Palpations: Abdomen is soft.   Musculoskeletal:      Comments: Right great toe is black in color with a deep ulceration and dried blood, no active purulent drainage, no palpable crepitus or fluctuance   Skin:     General: Skin is warm and dry.   Neurological:      General: No focal " "deficit present.      Mental Status: He is alert and oriented to person, place, and time. Mental status is at baseline.   Psychiatric:         Mood and Affect: Mood normal.         Behavior: Behavior normal.         Thought Content: Thought content normal.         Judgment: Judgment normal.      LAB RESULTS  Lab Results (last 24 hours)       Procedure Component Value Units Date/Time    POC Glucose Once [[id number]]  (Abnormal) Collected: 02/27/25 0612    Specimen: Blood Updated: 02/27/25 0614     Glucose 261 mg/dL     POC Glucose Once [[id number]]  (Abnormal) Collected: 02/26/25 2037    Specimen: Blood Updated: 02/26/25 2038     Glucose 245 mg/dL     POC Glucose Once [[id number]]  (Abnormal) Collected: 02/26/25 1617    Specimen: Blood Updated: 02/26/25 1618     Glucose 285 mg/dL     Tissue Pathology Exam [[id number]] Collected: 02/24/25 1704    Specimen: Tissue from Foot, Right Updated: 02/26/25 1417     Case Report --     Surgical Pathology Report                         Case: [id number]                                  Authorizing Provider:  Trevon Garcia DPM      Collected:           02/24/2025 05:04 PM          Ordering Location:     Psych[facility]  Received:            02/24/2025 10:21 PM                                 MAIN OR                                                                      Pathologist:           Rebeka Araya MD                                                    Specimen:    Foot, Right, Right foot first metatarsal clearance fragment                                 Final Diagnosis --     1.  Bone, right first metatarsal, clearance fragment:   -Benign viable osseous tissue, negative for acute osteomyelitis.    [facility]       Gross Description --     1. Foot, Right.  Received in formalin labeled \"right foot first metatarsal clearance fragment\" is a 1.7 x 0.3 cm cross-section of tan-red trabecular bone which is unoriented.  The specimen is submitted in toto following " "decalcification is 1A.    jap/uso/swm         Microscopic Description --     Unless \"gross only\" is specified, the final diagnosis for each specimen is based on microscopic examination of tissue.      POC Glucose Once [[id number]]  (Normal) Collected: 02/26/25 1146    Specimen: Blood Updated: 02/26/25 1148     Glucose 90 mg/dL           Imaging Results (Last 24 Hours)       ** No results found for the last 24 hours. **            Current Facility-Administered Medications:     aluminum-magnesium hydroxide-simethicone (MAALOX MAX) 400-400-40 MG/5ML suspension 15 mL, 15 mL, Oral, PRN, Jose, Trevon, DPM, 15 mL at 02/24/25 1007    aspirin chewable tablet 81 mg, 81 mg, Oral, Daily, Trevon Garcia, DPM, 81 mg at 02/26/25 0817    atorvastatin (LIPITOR) tablet 10 mg, 10 mg, Oral, Nightly, Trevon Garcia, DPM, 10 mg at 02/26/25 2249    bumetanide (BUMEX) tablet 2 mg, 2 mg, Oral, BID Diuretics, JoseTrevon carr, DPM, 2 mg at 02/26/25 1701    calcium carbonate (TUMS) chewable tablet 500 mg (200 mg elemental), 2 tablet, Oral, TID PRN, Trevon Garcia DPM, 2 tablet at 02/25/25 0904    carvedilol (COREG) tablet 25 mg, 25 mg, Oral, BID, Trevon Garcia, DPM, 25 mg at 02/26/25 2052    clopidogrel (PLAVIX) tablet 75 mg, 75 mg, Oral, Daily, Cooper Romo, APRN, 75 mg at 02/26/25 1138    dextrose (D50W) (25 g/50 mL) IV injection 25 g, 25 g, Intravenous, Q15 Min PRN, Trevon Garcia DPM    dextrose (GLUTOSE) oral gel 15 g, 15 g, Oral, Q15 Min PRN, Trevon Garcia, DPM    diphenhydrAMINE (BENADRYL) capsule 50 mg, 50 mg, Oral, Q4H PRN **OR** diphenhydrAMINE (BENADRYL) injection 50 mg, 50 mg, Intravenous, Q6H PRN, Abdiaziz Paz MD    docusate sodium (COLACE) capsule 100 mg, 100 mg, Oral, BID, Trevon Garcia DPM, 100 mg at 02/26/25 2053    EPINEPHrine (ANAPHYLAXIS) 1 mg/ml injection kit, 0.3 mg, Intramuscular, PRN, Abdiaziz Paz MD    epoetin jamil-epbx (RETACRIT) injection 10,000 Units, 10,000 Units, Subcutaneous, Once " per day on Monday Wednesday Friday, Trevon Garcia DPM, 10,000 Units at 02/26/25 0818    glucagon (GLUCAGEN) injection 1 mg, 1 mg, Intramuscular, Q15 Min PRN, Trevon Garcia DPM    HYDROcodone-acetaminophen (NORCO) 5-325 MG per tablet 2 tablet, 2 tablet, Oral, Q4H PRN, Trevon Garcia DPM, 2 tablet at 02/26/25 2245    HYDROmorphone (DILAUDID) injection 1 mg, 1 mg, Intravenous, Q2H PRN, Trevon Garcia DPM, 1 mg at 02/27/25 0608    insulin glargine (LANTUS, SEMGLEE) injection 42 Units, 42 Units, Subcutaneous, Nightly, Trevon Garcia DPM, 42 Units at 02/26/25 2053    insulin lispro (HUMALOG/ADMELOG) injection 14 Units, 14 Units, Subcutaneous, TID With Meals, Trevon Garcia DPM, 14 Units at 02/26/25 1701    insulin lispro (HUMALOG/ADMELOG) injection 2-7 Units, 2-7 Units, Subcutaneous, 4x Daily AC & at Bedtime, Trevon Garcia DPM, 3 Units at 02/26/25 2053    ondansetron (ZOFRAN) injection 4 mg, 4 mg, Intravenous, Q6H PRN, Trevon Garcia DPM, 4 mg at 02/27/25 0608    pantoprazole (PROTONIX) EC tablet 40 mg, 40 mg, Oral, BID AC, Trevon Garcia DPM, 40 mg at 02/27/25 0608    piperacillin-tazobactam (ZOSYN) 3.375 g IVPB in 100 mL NS MBP (CD), 3.375 g, Intravenous, Q12H, Abdiaziz Paz MD, 3.375 g at 02/27/25 0608    polyethylene glycol (MIRALAX) packet 17 g, 17 g, Oral, Daily, Trevon Garcia DPM    prochlorperazine (COMPAZINE) injection 5 mg, 5 mg, Intravenous, Q6H PRN, Trevon Garcia DPM, 5 mg at 02/26/25 2245    scopolamine patch 1 mg/72 hr, 1 patch, Transdermal, Q72H, Trevon Garcia DPM, 1 patch at 02/25/25 2258    sucralfate (CARAFATE) tablet 1 g, 1 g, Oral, 4x Daily AC & at Bedtime, Trevon Garcia DPM, 1 g at 02/27/25 0608    zolpidem (AMBIEN) tablet 5 mg, 5 mg, Oral, Nightly PRN, Trevon Garcia DPM, 5 mg at 02/26/25 2245     ASSESSMENT  Right great toe wound with infection and surrounding cellulitis and osteomyelitis status post I&D and hallux amputation followed by partial first  right metatarsal excision  End-stage renal disease   Diabetes mellitus  Hypertension  Hyperlipidemia  Coronary artery disease  Legally blind  Chronic anemia with drop in H&H  Gastroesophageal reflux disease    PLAN  CPM   Postop care  Transfused as needed  Adjust blood pressure medications  Continue IV antibiotics   Pain management   Hemodialysis today  Infectious disease podiatry and nephrology to follow patient   Continue home medications  Stress ulcer DVT prophylaxis  Supportive care  Discussed with nursing staff family and podiatry  Follow closely further recommendation current hospital course    JASKARAN LANGFORD MD    Copied text in this note has been reviewed and is accurate as of 02/27/25

## 2025-02-27 NOTE — PLAN OF CARE
Goal Outcome Evaluation:              Outcome Evaluation: Patient is alert and oriented x4. VSS. Reports constant pain to right lower extremity. PRN pain and nausea medications given. Patient reports improvement in symptoms. Plan for gentile catheter to be placed 2/28/25, family aware. Plan of care ongoing.

## 2025-02-27 NOTE — PROGRESS NOTES
[facility] Progress Note      Patient Name: Wilner Menjivar  : 1985  MRN: [id number]  Primary Care Physician:  Roosevelt Thao MD  Date of admission: 2025    Subjective     Interval History:   F/u ESRD     Review of Systems:   Asked off dialysis yesterday after only 1 hour   Denies dyspnea     Objective     Vitals:   Temp:  [98.4 °F (36.9 °C)-98.6 °F (37 °C)] 98.6 °F (37 °C)  Heart Rate:  [] 91  Resp:  [18-20] 20  BP: (125-148)/(59-75) 148/67    Intake/Output Summary (Last 24 hours) at 2025 1157  Last data filed at 2025 0053  Gross per 24 hour   Intake 480 ml   Output 400 ml   Net 80 ml       Physical Exam:    General Appearance: blind AAM no distress  Neck: supple, no JVD  Lungs: CTA bilat no rales  Heart: RRR, normal S1 and S2  Abdomen: soft, nontender, nondistended  Extremities: no edema, cyanosis or clubbing  Neuro: normal speech and mental status     Scheduled Meds:     aspirin, 81 mg, Oral, Daily  atorvastatin, 10 mg, Oral, Nightly  bumetanide, 2 mg, Oral, BID Diuretics  carvedilol, 25 mg, Oral, BID  clopidogrel, 75 mg, Oral, Daily  docusate sodium, 100 mg, Oral, BID  epoetin jamil/jamil-epbx, 10,000 Units, Subcutaneous, Once per day on   insulin glargine, 42 Units, Subcutaneous, Nightly  insulin lispro, 14 Units, Subcutaneous, TID With Meals  insulin lispro, 2-7 Units, Subcutaneous, 4x Daily AC & at Bedtime  pantoprazole, 40 mg, Oral, BID AC  piperacillin-tazobactam, 3.375 g, Intravenous, Q12H  polyethylene glycol, 17 g, Oral, Daily  Scopolamine, 1 patch, Transdermal, Q72H  sucralfate, 1 g, Oral, 4x Daily AC & at Bedtime      IV Meds:        Results Reviewed:   I have personally reviewed the results from the time of this admission to 2025 11:57 EST     Results from last 7 days   Lab Units 25  1037 25  0606 25  0348   SODIUM mmol/L 134* 134* 134*   POTASSIUM mmol/L 4.5 4.3 4.5   CHLORIDE mmol/L 98 98 100   CO2  mmol/L 23.0 25.4 23.2   BUN mg/dL 27* 23* 38*   CREATININE mg/dL 6.66* 5.37* 6.13*   CALCIUM mg/dL 9.0 8.8 8.6   GLUCOSE mg/dL 187* 142* 216*     Estimated Creatinine Clearance: 17.2 mL/min (A) (by C-G formula based on SCr of 6.66 mg/dL (H)).  Results from last 7 days   Lab Units 02/26/25  0606 02/23/25  1105 02/22/25  0337 02/21/25  0544   MAGNESIUM mg/dL  --  2.0  --   --    PHOSPHORUS mg/dL 3.8  --  3.7 4.3         Results from last 7 days   Lab Units 02/27/25  1037 02/26/25  0606 02/25/25  0348 02/24/25  0517 02/23/25  0346   WBC 10*3/mm3 11.41* 11.74* 9.19 13.86* 16.36*   HEMOGLOBIN g/dL 8.6* 8.4* 8.1* 7.0* 5.7*   PLATELETS 10*3/mm3 199 193 215 262 281           Assessment / Plan     ASSESSMENT:  ESRD since 6/2024 followed by Dr. Wasserman on IHD MWF schedule via LUE AVF. Been very non compliant with dialysis in hospital, asking off treatments early (only ran 1h yesterday).  Volume/lytes stable  Anemia of CKD & ABL. Transfused.  Hgb up 8.6  Hypertension w/ CKD.  BP controlled  Coronary artery disease s/p PCI in Sept 2024  Diabetes Mellitus type 2 w/ complications (retinopathy , peripheral vascular disease  nephropathy ) .Mgmt per primary team   Right diabetic foot ulcer + osteomyelitis, POD2 I&D.  Abx per ID (zosyn through 4/9/25)    PLAN:  HD tomorrow  Needs Estevez for IV abx through 4/9/25 per ID - consult vascular surg  D/W WYATT Carmen MD  02/27/25  11:57 EST    [facility]  [phone]

## 2025-02-27 NOTE — CASE MANAGEMENT/SOCIAL WORK
Continued Stay Note  [facility]     Patient Name: Wilner Menjivar  MRN: [id number]  Today's Date: 2/27/2025    Admit Date: 2/11/2025    Plan: Home with spouse, [facility] and [facility] referrals pending.   Discharge Plan       Row Name 02/27/25 1154       Plan    Plan Home with spouse, [facility] and [facility] referrals pending.    Patient/Family in Agreement with Plan yes    Plan Comments CCP met with pt at the bedside to discuss Wound vac, IV abx at [facility]. Pt asked that CCP s/w his wife Mitali. CCP s/w Mitali via phone, introduced self and role of CCP. CCP explained pt needs IV Abx and wound vac at [facility]. CCP explained referral for [facility] for wound vac, HH agency and infusion company. Mitail verbalized understanding and denies preference of agency for HH or Infusion company. [facility] notified and order signed by Dr. Garcia faxed to [phone]. [facility] for [facility] notifying of referral. Yan[facility] notified of referral. Saad SCHNEIDER/CCP                   Discharge Codes    No documentation.                 Expected Discharge Date and Time       Expected Discharge Date Expected Discharge Time    Feb 28, 2025               Una Garcia RN

## 2025-02-27 NOTE — CASE MANAGEMENT/SOCIAL WORK
Continued Stay Note  [facility]     Patient Name: Wilner Menjivar  MRN: [id number]  Today's Date: 2/27/2025    Admit Date: 2/11/2025    Plan: Home with family, [facility] .   Discharge Plan       Row Name 02/27/25 1649       Plan    Plan Home with family, [facility] HH.    Patient/Family in Agreement with Plan yes    Plan Comments [facility] notified CCP they are out of network with pts insurance. [facility] notified and asked that Facesheet be faxed to [phone] to confirm they are in network with pts insurance. Facesheet faxed. Epic denial from [facility]. [facility]  notified of referral; they are able to accept and can accept pt with a Estevez. Saad SCHNEIDER/CCP                   Discharge Codes    No documentation.                 Expected Discharge Date and Time       Expected Discharge Date Expected Discharge Time    Feb 28, 2025               Una Garcia RN

## 2025-02-27 NOTE — PLAN OF CARE
Goal Outcome Evaluation:  Plan of Care Reviewed With: patient        Progress: improving  Outcome Evaluation: Pt is alert and oriented x 4. Vital signs stable, room air. Still reported constant pain to right foot, PRN pain meds given. Intermittent nausea, PRN anti-emetics given. On IV antibiotics. Wound vac dressing dry and intact. Continue to monitor.

## 2025-02-27 NOTE — PROGRESS NOTES
[facility] Progress Note       Encounter Date:25  Patient:Wilner Menjivar  :1985  MRN:[id number]      Chief Complaint: Follow-up chest pain      Subjective:      Further chest pain episodes since his hemoglobin was corrected overall doing fairly well was restarted on dual antiplatelet therapy we transitioned over to clopidogrel seems to have stable blood counts      Review of Systems:  Review of Systems   Constitutional: Negative for malaise/fatigue.   Cardiovascular:  Negative for chest pain.   Respiratory:  Negative for shortness of breath.        Medications:  Scheduled Meds:  aspirin, 81 mg, Oral, Daily  atorvastatin, 10 mg, Oral, Nightly  bumetanide, 2 mg, Oral, BID Diuretics  carvedilol, 25 mg, Oral, BID  clopidogrel, 75 mg, Oral, Daily  docusate sodium, 100 mg, Oral, BID  epoetin jamil/jamil-epbx, 10,000 Units, Subcutaneous, Once per day on   insulin glargine, 42 Units, Subcutaneous, Nightly  insulin lispro, 14 Units, Subcutaneous, TID With Meals  insulin lispro, 2-7 Units, Subcutaneous, 4x Daily AC & at Bedtime  pantoprazole, 40 mg, Oral, BID AC  piperacillin-tazobactam, 3.375 g, Intravenous, Q12H  polyethylene glycol, 17 g, Oral, Daily  Scopolamine, 1 patch, Transdermal, Q72H  sucralfate, 1 g, Oral, 4x Daily AC & at Bedtime    Continuous Infusions:   PRN Meds:    aluminum-magnesium hydroxide-simethicone    calcium carbonate    dextrose    dextrose    diphenhydrAMINE **OR** diphenhydrAMINE    EPINEPHrine    glucagon (human recombinant)    HYDROcodone-acetaminophen    HYDROmorphone    ondansetron    prochlorperazine    zolpidem         Objective:       Vitals:    25 2000 252 258 25 0721   BP: 145/59 142/68 141/60 148/67   BP Location: Right arm  Right arm Right arm   Patient Position: Lying  Lying Lying   Pulse: 96  90 91   Resp: 20  18 20   Temp: 98.6 °F (37 °C)  98.4 °F (36.9 °C) 98.6 °F (37 °C)   TempSrc: Oral  Oral  "Oral   SpO2: 98%  98%    Weight:       Height:               Physical Exam:  Constitutional: Well appearing, well developed, no acute distress   HENT: Oropharynx clear and membrane moist  Eyes: Normal conjunctiva, no sclera icterus.  Neck: Supple, no carotid bruit bilaterally.  Cardiovascular: Regular rate and rhythm, No Murmur, No bilateral lower extremity edema.  Pulmonary: Normal respiratory effort, normal lung sounds, no wheezing.  Neurological: Alert and orient x 3.   Skin: Warm, dry, no ecchymosis, no rash. R foot wrapped  Psych: Appropriate mood and affect. Normal judgment and insight.           Lab Review:   Results from last 7 days   Lab Units 02/26/25  0606 02/25/25  0348 02/24/25  0517 02/23/25  1105 02/23/25  0346 02/22/25  0337 02/21/25  0544   SODIUM mmol/L 134* 134* 129*  --  133* 134* 132*   POTASSIUM mmol/L 4.3 4.5 4.8 4.6 4.3 4.8 4.5   CHLORIDE mmol/L 98 100 98  --  100 100 97*   CO2 mmol/L 25.4 23.2 19.7*  --  22.0 23.8 21.0*   BUN mg/dL 23* 38* 40*  --  33* 25* 34*   CREATININE mg/dL 5.37* 6.13* 7.99*  --  6.93* 5.30* 6.28*   GLUCOSE mg/dL 142* 216* 312*  --  123* 259* 211*   CALCIUM mg/dL 8.8 8.6 8.3*  --  8.6 8.6 9.2     Results from last 7 days   Lab Units 02/23/25  1216 02/23/25  1105   HSTROP T ng/L 99* 105*     Results from last 7 days   Lab Units 02/26/25  0606 02/25/25  0348 02/24/25  0517 02/23/25  0346 02/22/25  0337 02/21/25  0544   WBC 10*3/mm3 11.74* 9.19 13.86* 16.36* 11.86* 13.27*   HEMOGLOBIN g/dL 8.4* 8.1* 7.0* 5.7* 7.2* 8.7*   HEMATOCRIT % 25.5* 24.1* 21.4* 18.2* 23.4* 26.3*   PLATELETS 10*3/mm3 193 215 262 281 315 328         Results from last 7 days   Lab Units 02/23/25  1105   MAGNESIUM mg/dL 2.0           Invalid input(s): \"LDLCALC\"                 Echocardiogram 2/4/2025 [facility]:  Technically difficult examination; Lumason echocontrast utilized to enhance endocardial definition.   The estimated ejection fraction is 55-60% with no clear regional wall motion " abnormalities.   There is mild, concentric, left ventricular hypertrophy.   There is grade I left ventricular diastolic dysfunction (impaired relaxation).   The right ventricular chamber size and systolic function are within normal limits.   No significant valvular abnormalities are visualized.   There is no evidence of a pericardial effusion.      Cardiac catheterization 9/20/2024 [facility]:  Successful percutaneous intervention to 80% mid marginal branch stenoses with 2.25 x 38 mm Synergy drug-eluting stent with a 2.5 x 48 mm Synergy from the mid marginal into the proximal circumflex postdilated the 2.75 mm noncompliant balloon  Patent stent within the mid LAD  Right coronary artery 100% mid with left-to-right collaterals filling the distal vascular bed     Stress MPI 9/17/2024 [facility]:  Gated SPECT analysis demonstrates a post stress ejection fraction of 21%. Global LV systolic function is severely reduced. Severely decreased wall thickening and severe hypokinesis is noted in the basal inferior, basal inferolateral, mid inferior, mid inferolateral, apical septal, apical inferior, apical lateral wall segments. Otherwise, there is moderately decreased wall thickening and mild hypokinesis in all the remaining left ventricular wall segments.      Echocardiogram 5/3/2024 [facility]:  The ejection fraction biplane was calculated at 43%. Left ventricle size is normal. Mild left ventricular hypertrophy. The mid to distal anteroseptal mid anterior and apical walls are severely hypokinetic   Structurally normal mitral valve. Mild mitral regurgitation is present.   Right ventricular systolic pressure of 36 mmHg.   Mild tricuspid regurgitation.   There is no evidence of pericardial effusion.      Cardiac Catheterization 9/6/2022 [facility]:  Mildly elevated right heart catheterization pressures are noted with   cardiac index between 2.5 and 3.0 L/min/m^2.   Severe multivessel CAD is  identified including high-grade 99% LAD stenosis   (clear culprit for ACS).   Successful PCI to the mid LAD was completed with 2 Synergy drug-eluting stents (2.5 x 38 mm and 3.0 x 24 mm).   Intravascular ultrasound imaging with diffuse lipidic plaquing with mild calcification.   70% mid marginal branch stenoses  100% mid RCA segment stenoses with left-to-right collaterals             Assessment:          Diagnosis Plan   1. Diabetic ulcer of right great toe  Ambulatory Referral to Vascular Surgery      2. Hyperglycemia        3. Diabetic ulcer of toe of right foot associated with type 2 diabetes mellitus, with necrosis of bone  Ambulatory Referral to Vascular Surgery    Case request    Case request    Tissue Pathology Exam    Tissue Pathology Exam      4. Osteomyelitis of great toe of right foot  Case request    Case request    Fungus Culture - Bone, Toe, Right    Fungus Culture - Surgical Site, Toe, Right    Tissue / Bone Culture - Bone, Toe, Right    Tissue / Bone Culture - Surgical Site, Toe, Right    Anaerobic Culture 10 Day Incubation - Bone, Toe, Right    Anaerobic Culture 10 Day Incubation - Surgical Site, Toe, Right    Tissue Pathology Exam    Tissue Pathology Exam    Case request    Case request    Tissue Pathology Exam    Tissue Pathology Exam    Case request    Case request    Tissue Pathology Exam    Tissue Pathology Exam             Plan:       Mr. Menjivar is a 39 y.o. gentleman the past medical history notable for coronary artery disease status post percutaneous intervention, chronic systolic congestive heart failure with recovered ejection fraction, type 2 diabetes, legally blind, end-stage renal disease on hemodialysis, severe anemia multifactorial, hypertension, and osteomyelitis of the feet who presented to the hospital on 2/11 for concerns over foot infection has been here since with multiple revisions of toe amputation and treatment of his osteomyelitis this has been complicated by bleeding he has  had profound anemia at times requiring transfusions and did have some chest discomfort on 2/23 in the setting of a hemoglobin of 5.  This is since resolved since transfusion.  I was asked to see him due to his chest pain and known coronary disease.  From a cardiac standpoint I think the etiology of his discomfort was likely his severe anemia.  His troponins are elevated but in the setting of end-stage renal disease and anemia I think this is more of a demand ischemia in the setting of anemia.  His symptoms have resolved since transfusion and I do not think a repeat ischemic evaluation is needed at this time.  He did have an echocardiogram earlier this month showing improvement of his ejection fraction after recent revascularization just a few months ago.  His EKG in the setting of chest pain shows some nonspecific T wave abnormalities in the lateral leads which unfortunately do not have anything to compare with but according to reports he did have LVH with repolarization abnormalities on his EKG at [facility] when last checked during his cath in August which would be similar findings to what we see here.  We did decide to change his Brilinta over to clopidogrel given that he gets frequent transfusions and has chronically anemic in the hopes of trying to optimize his anemia and transfusion requirements.  He seems to be tolerating this thus far.  Again would defer duration of antiplatelet therapy to his primary cardiologist and interventional cardiologist sounds like they wanted a year of antiplatelet therapy if possible.    From our standpoint we will sign off please call us with any questions.        Chest pain/type II myocardial infarction:  Patient had chest discomfort and elevated troponins in the setting of anemia which is consistent with a type II myocardial infarction  His EKG is likely unchanged compared to reports from outside hospitals  Symptoms improved with treating his anemia  Echocardiogram  earlier this month demonstrates recovery of left ventricular function  Would hold off on any further ischemic testing at this time unless significant changes in clinical status  Does have known RCA  which could be contributing as well     Coronary artery disease without angina:  Prior to this patient was doing well clinically  Did have revascularization August 2024  Transitioned from Brilinta to clopidogrel given that he is 6 months out from his stent and this would be helpful with his transfusion requirements and chronic anemia  Continue statin and beta-blocker     Chronic systolic and diastolic congestive heart failure:  Actually had recovery of his left ventricular function after revascularization of his circumflex in August based upon echocardiogram 2/5/2025  Continue carvedilol  No ACE/ARB given instead renal disease                  Everett Lake MD  [facility]  02/27/25  08:57 EST

## 2025-02-27 NOTE — DISCHARGE PLACEMENT REQUEST
"Wilner carr (39 y.o. Male)       Date of Birth   1985    Social Security Number       Address   [address] [place] [address]    Home Phone   [phone]    MRN   [id number]       Lutheran   Spiritism    Marital Status                               Admission Date   2/11/25    Admission Type   Emergency    Admitting Provider   Remy Thornton MD    Attending Provider   Remy Thornton MD    Department, Room/Bed   [address], S414/1       Discharge Date       Discharge Disposition       Discharge Destination                                 Attending Provider: Remy Thornton MD    Allergies: Azithromycin, Penicillins    Isolation: None   Infection: None   Code Status: CPR    Ht: 170.2 cm (67\")   Wt: 107 kg (234 lb 12.6 oz)    Admission Cmt: None   Principal Problem: Diabetic toe ulcer [E11.621,L97.509]                   Active Insurance as of 2/11/2025       Primary Coverage       Payor Plan Insurance Group Employer/Plan Group    HUMANA MEDICARE REPLACEMENT HUMANA MEDICARE ADVANTAGE HMO [id number]       Payor Plan Address Payor Plan Phone Number Payor Plan Fax Number Effective Dates    [address] [address] [phone]  1/1/2023 - None Entered    [facility] [address]         Subscriber Name Subscriber Birth Date Member ID       WILNER MESSER 1985 [id number]                     Emergency Contacts        (Rel.) Home Phone Work Phone Mobile Phone    LIT MESSER (Spouse) -- -- [phone]    RANJEET MESSER (Mother) -- -- [phone]                "

## 2025-02-28 ENCOUNTER — ANESTHESIA (OUTPATIENT)
Dept: PERIOP | Facility: HOSPITAL | Age: 40
End: 2025-02-28
Payer: MEDICARE

## 2025-02-28 ENCOUNTER — ANESTHESIA EVENT (OUTPATIENT)
Dept: PERIOP | Facility: HOSPITAL | Age: 40
End: 2025-02-28
Payer: MEDICARE

## 2025-02-28 ENCOUNTER — APPOINTMENT (OUTPATIENT)
Dept: GENERAL RADIOLOGY | Facility: HOSPITAL | Age: 40
DRG: 628 | End: 2025-02-28
Payer: MEDICARE

## 2025-02-28 LAB
ALBUMIN SERPL-MCNC: 2.6 G/DL (ref 3.5–5.2)
ANION GAP SERPL CALCULATED.3IONS-SCNC: 10.1 MMOL/L (ref 5–15)
BASOPHILS # BLD AUTO: 0.08 10*3/MM3 (ref 0–0.2)
BASOPHILS NFR BLD AUTO: 0.7 % (ref 0–1.5)
BUN SERPL-MCNC: 33 MG/DL (ref 6–20)
BUN/CREAT SERPL: 4.2 (ref 7–25)
CALCIUM SPEC-SCNC: 9.2 MG/DL (ref 8.6–10.5)
CHLORIDE SERPL-SCNC: 97 MMOL/L (ref 98–107)
CO2 SERPL-SCNC: 25.9 MMOL/L (ref 22–29)
CREAT SERPL-MCNC: 7.86 MG/DL (ref 0.76–1.27)
DEPRECATED RDW RBC AUTO: 47.6 FL (ref 37–54)
EGFRCR SERPLBLD CKD-EPI 2021: 8.2 ML/MIN/1.73
EOSINOPHIL # BLD AUTO: 0.28 10*3/MM3 (ref 0–0.4)
EOSINOPHIL NFR BLD AUTO: 2.3 % (ref 0.3–6.2)
ERYTHROCYTE [DISTWIDTH] IN BLOOD BY AUTOMATED COUNT: 14.3 % (ref 12.3–15.4)
GLUCOSE BLDC GLUCOMTR-MCNC: 104 MG/DL (ref 70–130)
GLUCOSE BLDC GLUCOMTR-MCNC: 111 MG/DL (ref 70–130)
GLUCOSE BLDC GLUCOMTR-MCNC: 113 MG/DL (ref 70–130)
GLUCOSE BLDC GLUCOMTR-MCNC: 153 MG/DL (ref 70–130)
GLUCOSE SERPL-MCNC: 108 MG/DL (ref 65–99)
HCT VFR BLD AUTO: 26.9 % (ref 37.5–51)
HGB BLD-MCNC: 8.8 G/DL (ref 13–17.7)
IMM GRANULOCYTES # BLD AUTO: 0.13 10*3/MM3 (ref 0–0.05)
IMM GRANULOCYTES NFR BLD AUTO: 1.1 % (ref 0–0.5)
LYMPHOCYTES # BLD AUTO: 0.93 10*3/MM3 (ref 0.7–3.1)
LYMPHOCYTES NFR BLD AUTO: 7.6 % (ref 19.6–45.3)
MCH RBC QN AUTO: 30 PG (ref 26.6–33)
MCHC RBC AUTO-ENTMCNC: 32.7 G/DL (ref 31.5–35.7)
MCV RBC AUTO: 91.8 FL (ref 79–97)
MONOCYTES # BLD AUTO: 0.87 10*3/MM3 (ref 0.1–0.9)
MONOCYTES NFR BLD AUTO: 7.1 % (ref 5–12)
NEUTROPHILS NFR BLD AUTO: 81.2 % (ref 42.7–76)
NEUTROPHILS NFR BLD AUTO: 9.88 10*3/MM3 (ref 1.7–7)
NRBC BLD AUTO-RTO: 0 /100 WBC (ref 0–0.2)
PHOSPHATE SERPL-MCNC: 3.6 MG/DL (ref 2.5–4.5)
PLATELET # BLD AUTO: 183 10*3/MM3 (ref 140–450)
PMV BLD AUTO: 8.2 FL (ref 6–12)
POTASSIUM SERPL-SCNC: 4.4 MMOL/L (ref 3.5–5.2)
RBC # BLD AUTO: 2.93 10*6/MM3 (ref 4.14–5.8)
SODIUM SERPL-SCNC: 133 MMOL/L (ref 136–145)
WBC NRBC COR # BLD AUTO: 12.17 10*3/MM3 (ref 3.4–10.8)

## 2025-02-28 PROCEDURE — 25010000002 ONDANSETRON PER 1 MG: Performed by: SURGERY

## 2025-02-28 PROCEDURE — 0JH63XZ INSERTION OF TUNNELED VASCULAR ACCESS DEVICE INTO CHEST SUBCUTANEOUS TISSUE AND FASCIA, PERCUTANEOUS APPROACH: ICD-10-PCS | Performed by: SURGERY

## 2025-02-28 PROCEDURE — 25010000002 HYDROMORPHONE 1 MG/ML SOLUTION: Performed by: PODIATRIST

## 2025-02-28 PROCEDURE — 25010000002 EPOETIN ALFA-EPBX 10000 UNIT/ML SOLUTION: Performed by: PODIATRIST

## 2025-02-28 PROCEDURE — 76937 US GUIDE VASCULAR ACCESS: CPT | Performed by: SURGERY

## 2025-02-28 PROCEDURE — 25010000002 HYDROMORPHONE 1 MG/ML SOLUTION: Performed by: SURGERY

## 2025-02-28 PROCEDURE — 25010000002 PIPERACILLIN SOD-TAZOBACTAM PER 1 G: Performed by: SURGERY

## 2025-02-28 PROCEDURE — 25010000002 GLYCOPYRROLATE 0.2 MG/ML SOLUTION

## 2025-02-28 PROCEDURE — 82948 REAGENT STRIP/BLOOD GLUCOSE: CPT

## 2025-02-28 PROCEDURE — 25010000002 ONDANSETRON PER 1 MG: Performed by: PODIATRIST

## 2025-02-28 PROCEDURE — 80069 RENAL FUNCTION PANEL: CPT | Performed by: INTERNAL MEDICINE

## 2025-02-28 PROCEDURE — 76000 FLUOROSCOPY <1 HR PHYS/QHP: CPT

## 2025-02-28 PROCEDURE — 36558 INSERT TUNNELED CV CATH: CPT | Performed by: SURGERY

## 2025-02-28 PROCEDURE — 85025 COMPLETE CBC W/AUTO DIFF WBC: CPT | Performed by: INTERNAL MEDICINE

## 2025-02-28 PROCEDURE — 25010000002 PROCHLORPERAZINE 10 MG/2ML SOLUTION: Performed by: PODIATRIST

## 2025-02-28 PROCEDURE — 25010000002 BUPIVACAINE (PF) 0.25 % SOLUTION 30 ML VIAL: Performed by: SURGERY

## 2025-02-28 PROCEDURE — 25010000002 PROCHLORPERAZINE 10 MG/2ML SOLUTION: Performed by: SURGERY

## 2025-02-28 PROCEDURE — 63710000001 INSULIN GLARGINE PER 5 UNITS: Performed by: SURGERY

## 2025-02-28 PROCEDURE — 25010000002 LIDOCAINE 1 % SOLUTION 20 ML VIAL: Performed by: SURGERY

## 2025-02-28 PROCEDURE — 25010000002 PIPERACILLIN SOD-TAZOBACTAM PER 1 G: Performed by: INTERNAL MEDICINE

## 2025-02-28 PROCEDURE — C1751 CATH, INF, PER/CENT/MIDLINE: HCPCS | Performed by: SURGERY

## 2025-02-28 PROCEDURE — 25010000002 PROPOFOL 10 MG/ML EMULSION

## 2025-02-28 PROCEDURE — 77001 FLUOROGUIDE FOR VEIN DEVICE: CPT | Performed by: SURGERY

## 2025-02-28 PROCEDURE — 25010000002 HEPARIN (PORCINE) PER 1000 UNITS: Performed by: SURGERY

## 2025-02-28 PROCEDURE — 25010000002 FENTANYL CITRATE (PF) 50 MCG/ML SOLUTION

## 2025-02-28 PROCEDURE — 02HV33Z INSERTION OF INFUSION DEVICE INTO SUPERIOR VENA CAVA, PERCUTANEOUS APPROACH: ICD-10-PCS | Performed by: SURGERY

## 2025-02-28 PROCEDURE — 25010000002 FENTANYL CITRATE (PF) 50 MCG/ML SOLUTION: Performed by: STUDENT IN AN ORGANIZED HEALTH CARE EDUCATION/TRAINING PROGRAM

## 2025-02-28 PROCEDURE — 25010000002 LIDOCAINE 2% SOLUTION

## 2025-02-28 DEVICE — IMPLANTABLE DEVICE: Type: IMPLANTABLE DEVICE | Site: INTERNAL JUGULAR | Status: FUNCTIONAL

## 2025-02-28 RX ORDER — MIDAZOLAM HYDROCHLORIDE 1 MG/ML
1 INJECTION, SOLUTION INTRAMUSCULAR; INTRAVENOUS
Status: DISCONTINUED | OUTPATIENT
Start: 2025-02-28 | End: 2025-02-28 | Stop reason: HOSPADM

## 2025-02-28 RX ORDER — SODIUM CHLORIDE 9 MG/ML
9 INJECTION, SOLUTION INTRAVENOUS CONTINUOUS
Status: DISCONTINUED | OUTPATIENT
Start: 2025-02-28 | End: 2025-02-28

## 2025-02-28 RX ORDER — HYDROCODONE BITARTRATE AND ACETAMINOPHEN 5; 325 MG/1; MG/1
1 TABLET ORAL ONCE AS NEEDED
Status: DISCONTINUED | OUTPATIENT
Start: 2025-02-28 | End: 2025-02-28 | Stop reason: HOSPADM

## 2025-02-28 RX ORDER — PROPOFOL 10 MG/ML
VIAL (ML) INTRAVENOUS AS NEEDED
Status: DISCONTINUED | OUTPATIENT
Start: 2025-02-28 | End: 2025-02-28 | Stop reason: SURG

## 2025-02-28 RX ORDER — DIPHENHYDRAMINE HYDROCHLORIDE 50 MG/ML
12.5 INJECTION INTRAMUSCULAR; INTRAVENOUS
Status: DISCONTINUED | OUTPATIENT
Start: 2025-02-28 | End: 2025-02-28 | Stop reason: HOSPADM

## 2025-02-28 RX ORDER — ONDANSETRON 2 MG/ML
4 INJECTION INTRAMUSCULAR; INTRAVENOUS ONCE AS NEEDED
Status: DISCONTINUED | OUTPATIENT
Start: 2025-02-28 | End: 2025-02-28 | Stop reason: HOSPADM

## 2025-02-28 RX ORDER — FLUMAZENIL 0.1 MG/ML
0.2 INJECTION INTRAVENOUS AS NEEDED
Status: DISCONTINUED | OUTPATIENT
Start: 2025-02-28 | End: 2025-02-28 | Stop reason: HOSPADM

## 2025-02-28 RX ORDER — HYDRALAZINE HYDROCHLORIDE 20 MG/ML
5 INJECTION INTRAMUSCULAR; INTRAVENOUS
Status: DISCONTINUED | OUTPATIENT
Start: 2025-02-28 | End: 2025-02-28 | Stop reason: HOSPADM

## 2025-02-28 RX ORDER — IPRATROPIUM BROMIDE AND ALBUTEROL SULFATE 2.5; .5 MG/3ML; MG/3ML
3 SOLUTION RESPIRATORY (INHALATION) ONCE AS NEEDED
Status: DISCONTINUED | OUTPATIENT
Start: 2025-02-28 | End: 2025-02-28 | Stop reason: HOSPADM

## 2025-02-28 RX ORDER — FENTANYL CITRATE 50 UG/ML
INJECTION, SOLUTION INTRAMUSCULAR; INTRAVENOUS AS NEEDED
Status: DISCONTINUED | OUTPATIENT
Start: 2025-02-28 | End: 2025-02-28 | Stop reason: SURG

## 2025-02-28 RX ORDER — EPHEDRINE SULFATE 50 MG/ML
5 INJECTION, SOLUTION INTRAVENOUS ONCE AS NEEDED
Status: DISCONTINUED | OUTPATIENT
Start: 2025-02-28 | End: 2025-02-28 | Stop reason: HOSPADM

## 2025-02-28 RX ORDER — NALOXONE HCL 0.4 MG/ML
0.2 VIAL (ML) INJECTION AS NEEDED
Status: DISCONTINUED | OUTPATIENT
Start: 2025-02-28 | End: 2025-02-28 | Stop reason: HOSPADM

## 2025-02-28 RX ORDER — ATROPINE SULFATE 0.4 MG/ML
0.4 INJECTION, SOLUTION INTRAMUSCULAR; INTRAVENOUS; SUBCUTANEOUS ONCE AS NEEDED
Status: DISCONTINUED | OUTPATIENT
Start: 2025-02-28 | End: 2025-02-28 | Stop reason: HOSPADM

## 2025-02-28 RX ORDER — FENTANYL CITRATE 50 UG/ML
50 INJECTION, SOLUTION INTRAMUSCULAR; INTRAVENOUS ONCE AS NEEDED
Status: COMPLETED | OUTPATIENT
Start: 2025-02-28 | End: 2025-02-28

## 2025-02-28 RX ORDER — PROMETHAZINE HYDROCHLORIDE 25 MG/1
25 TABLET ORAL ONCE AS NEEDED
Status: DISCONTINUED | OUTPATIENT
Start: 2025-02-28 | End: 2025-02-28 | Stop reason: HOSPADM

## 2025-02-28 RX ORDER — PROMETHAZINE HYDROCHLORIDE 25 MG/1
25 SUPPOSITORY RECTAL ONCE AS NEEDED
Status: DISCONTINUED | OUTPATIENT
Start: 2025-02-28 | End: 2025-02-28 | Stop reason: HOSPADM

## 2025-02-28 RX ORDER — LABETALOL HYDROCHLORIDE 5 MG/ML
5 INJECTION, SOLUTION INTRAVENOUS
Status: DISCONTINUED | OUTPATIENT
Start: 2025-02-28 | End: 2025-02-28 | Stop reason: HOSPADM

## 2025-02-28 RX ORDER — LIDOCAINE HYDROCHLORIDE 20 MG/ML
INJECTION, SOLUTION INFILTRATION; PERINEURAL AS NEEDED
Status: DISCONTINUED | OUTPATIENT
Start: 2025-02-28 | End: 2025-02-28 | Stop reason: SURG

## 2025-02-28 RX ORDER — SODIUM CHLORIDE, SODIUM LACTATE, POTASSIUM CHLORIDE, CALCIUM CHLORIDE 600; 310; 30; 20 MG/100ML; MG/100ML; MG/100ML; MG/100ML
9 INJECTION, SOLUTION INTRAVENOUS CONTINUOUS
Status: DISCONTINUED | OUTPATIENT
Start: 2025-02-28 | End: 2025-02-28

## 2025-02-28 RX ORDER — LIDOCAINE HYDROCHLORIDE 10 MG/ML
0.5 INJECTION, SOLUTION INFILTRATION; PERINEURAL ONCE AS NEEDED
Status: DISCONTINUED | OUTPATIENT
Start: 2025-02-28 | End: 2025-02-28 | Stop reason: HOSPADM

## 2025-02-28 RX ORDER — OXYCODONE AND ACETAMINOPHEN 7.5; 325 MG/1; MG/1
1 TABLET ORAL EVERY 4 HOURS PRN
Status: DISCONTINUED | OUTPATIENT
Start: 2025-02-28 | End: 2025-02-28 | Stop reason: HOSPADM

## 2025-02-28 RX ORDER — FAMOTIDINE 10 MG/ML
20 INJECTION, SOLUTION INTRAVENOUS ONCE
Status: DISCONTINUED | OUTPATIENT
Start: 2025-02-28 | End: 2025-02-28

## 2025-02-28 RX ORDER — SODIUM CHLORIDE 0.9 % (FLUSH) 0.9 %
3 SYRINGE (ML) INJECTION EVERY 12 HOURS SCHEDULED
Status: DISCONTINUED | OUTPATIENT
Start: 2025-02-28 | End: 2025-02-28 | Stop reason: HOSPADM

## 2025-02-28 RX ORDER — GLYCOPYRROLATE 0.2 MG/ML
INJECTION INTRAMUSCULAR; INTRAVENOUS AS NEEDED
Status: DISCONTINUED | OUTPATIENT
Start: 2025-02-28 | End: 2025-02-28 | Stop reason: SURG

## 2025-02-28 RX ORDER — SODIUM CHLORIDE 0.9 % (FLUSH) 0.9 %
3-10 SYRINGE (ML) INJECTION AS NEEDED
Status: DISCONTINUED | OUTPATIENT
Start: 2025-02-28 | End: 2025-02-28 | Stop reason: HOSPADM

## 2025-02-28 RX ADMIN — HYDROMORPHONE HYDROCHLORIDE 1 MG: 1 INJECTION, SOLUTION INTRAMUSCULAR; INTRAVENOUS; SUBCUTANEOUS at 10:02

## 2025-02-28 RX ADMIN — ONDANSETRON 4 MG: 2 INJECTION INTRAMUSCULAR; INTRAVENOUS at 03:29

## 2025-02-28 RX ADMIN — HYDROMORPHONE HYDROCHLORIDE 1 MG: 1 INJECTION, SOLUTION INTRAMUSCULAR; INTRAVENOUS; SUBCUTANEOUS at 06:23

## 2025-02-28 RX ADMIN — HYDROCODONE BITARTRATE AND ACETAMINOPHEN 2 TABLET: 5; 325 TABLET ORAL at 14:16

## 2025-02-28 RX ADMIN — HYDROMORPHONE HYDROCHLORIDE 1 MG: 1 INJECTION, SOLUTION INTRAMUSCULAR; INTRAVENOUS; SUBCUTANEOUS at 20:34

## 2025-02-28 RX ADMIN — HYDROCODONE BITARTRATE AND ACETAMINOPHEN 2 TABLET: 5; 325 TABLET ORAL at 19:42

## 2025-02-28 RX ADMIN — PROPOFOL 80 MG: 10 INJECTION, EMULSION INTRAVENOUS at 11:25

## 2025-02-28 RX ADMIN — GLYCOPYRROLATE 0.2 MG: 0.2 INJECTION INTRAMUSCULAR; INTRAVENOUS at 11:27

## 2025-02-28 RX ADMIN — EPOETIN ALFA-EPBX 10000 UNITS: 10000 INJECTION, SOLUTION INTRAVENOUS; SUBCUTANEOUS at 10:02

## 2025-02-28 RX ADMIN — SODIUM CHLORIDE 9 ML: 9 INJECTION, SOLUTION INTRAVENOUS at 11:12

## 2025-02-28 RX ADMIN — PIPERACILLIN AND TAZOBACTAM 3.38 G: 3; .375 INJECTION, POWDER, FOR SOLUTION INTRAVENOUS at 18:37

## 2025-02-28 RX ADMIN — FENTANYL CITRATE 50 MCG: 50 INJECTION, SOLUTION INTRAMUSCULAR; INTRAVENOUS at 11:22

## 2025-02-28 RX ADMIN — SCOPOLAMINE 1 PATCH: 1.5 PATCH, EXTENDED RELEASE TRANSDERMAL at 20:34

## 2025-02-28 RX ADMIN — Medication 3 ML: at 15:46

## 2025-02-28 RX ADMIN — FENTANYL CITRATE 50 MCG: 50 INJECTION, SOLUTION INTRAMUSCULAR; INTRAVENOUS at 11:12

## 2025-02-28 RX ADMIN — HYDROMORPHONE HYDROCHLORIDE 1 MG: 1 INJECTION, SOLUTION INTRAMUSCULAR; INTRAVENOUS; SUBCUTANEOUS at 15:19

## 2025-02-28 RX ADMIN — ONDANSETRON 4 MG: 2 INJECTION INTRAMUSCULAR; INTRAVENOUS at 10:02

## 2025-02-28 RX ADMIN — PANTOPRAZOLE SODIUM 40 MG: 40 TABLET, DELAYED RELEASE ORAL at 18:37

## 2025-02-28 RX ADMIN — ZOLPIDEM TARTRATE 5 MG: 5 TABLET, FILM COATED ORAL at 23:06

## 2025-02-28 RX ADMIN — DOCUSATE SODIUM 100 MG: 100 CAPSULE, LIQUID FILLED ORAL at 20:34

## 2025-02-28 RX ADMIN — BUMETANIDE 2 MG: 2 TABLET ORAL at 18:37

## 2025-02-28 RX ADMIN — PROCHLORPERAZINE EDISYLATE 5 MG: 5 INJECTION, SOLUTION INTRAMUSCULAR; INTRAVENOUS at 14:16

## 2025-02-28 RX ADMIN — HYDROMORPHONE HYDROCHLORIDE 1 MG: 1 INJECTION, SOLUTION INTRAMUSCULAR; INTRAVENOUS; SUBCUTANEOUS at 23:06

## 2025-02-28 RX ADMIN — PROCHLORPERAZINE EDISYLATE 5 MG: 5 INJECTION, SOLUTION INTRAMUSCULAR; INTRAVENOUS at 06:23

## 2025-02-28 RX ADMIN — INSULIN GLARGINE 20 UNITS: 100 INJECTION, SOLUTION SUBCUTANEOUS at 20:34

## 2025-02-28 RX ADMIN — HYDROMORPHONE HYDROCHLORIDE 1 MG: 1 INJECTION, SOLUTION INTRAMUSCULAR; INTRAVENOUS; SUBCUTANEOUS at 03:29

## 2025-02-28 RX ADMIN — ONDANSETRON 4 MG: 2 INJECTION INTRAMUSCULAR; INTRAVENOUS at 18:37

## 2025-02-28 RX ADMIN — PANTOPRAZOLE SODIUM 40 MG: 40 TABLET, DELAYED RELEASE ORAL at 06:24

## 2025-02-28 RX ADMIN — SUCRALFATE 1 G: 1 TABLET ORAL at 18:37

## 2025-02-28 RX ADMIN — LIDOCAINE HYDROCHLORIDE 100 MG: 20 INJECTION, SOLUTION INFILTRATION; PERINEURAL at 11:25

## 2025-02-28 RX ADMIN — ATORVASTATIN CALCIUM 10 MG: 10 TABLET, FILM COATED ORAL at 20:34

## 2025-02-28 RX ADMIN — PROCHLORPERAZINE EDISYLATE 5 MG: 5 INJECTION, SOLUTION INTRAMUSCULAR; INTRAVENOUS at 20:34

## 2025-02-28 RX ADMIN — SUCRALFATE 1 G: 1 TABLET ORAL at 20:34

## 2025-02-28 RX ADMIN — PROPOFOL 100 MCG/KG/MIN: 10 INJECTION, EMULSION INTRAVENOUS at 11:28

## 2025-02-28 RX ADMIN — CARVEDILOL 25 MG: 25 TABLET, FILM COATED ORAL at 20:34

## 2025-02-28 RX ADMIN — PIPERACILLIN AND TAZOBACTAM 3.38 G: 3; .375 INJECTION, POWDER, FOR SOLUTION INTRAVENOUS at 06:23

## 2025-02-28 RX ADMIN — HYDROMORPHONE HYDROCHLORIDE 1 MG: 1 INJECTION, SOLUTION INTRAMUSCULAR; INTRAVENOUS; SUBCUTANEOUS at 18:37

## 2025-02-28 NOTE — DISCHARGE INSTR - OTHER ORDERS
Per Infectious Disease Recs:     · Please educate patient and place it in his AVS to call Dr. Paz at [phone] on a weekly basis to go over review of system to monitor antibiotic toxicity and effectiveness of treatment.      · Please make arrangements with vascular surgery service for removal of Estevez catheter after completion of antibiotic therapy on 4/9/2025.

## 2025-02-28 NOTE — NURSING NOTE
hd without incident. tolerated well. removed 2050 ml's. no meds administered. pain med via awilda Chinchilla, rn, rn for c/o pain during treatment. avf needles dc'd, hemostasis achieved. stable, post completion of hd.

## 2025-02-28 NOTE — NURSING NOTE
02/28/25 1535   Wound 02/25/25 1610 Right medial foot   Placement Date/Time: 02/25/25 1610   Side: Right  Orientation: medial  Location: foot  Primary Wound Type: Incision   Dressing Appearance dry;intact   Closure None   Base clean;epithelialization;granulating;moist;red;exposed structure   Periwound swelling;redness   Periwound Temperature warm   Periwound Skin Turgor soft   Edges open   Drainage Characteristics/Odor serosanguineous   Drainage Amount scant   Care, Wound cleansed with;sterile water;negative pressure wound therapy   Dressing Care dressing applied;foam;transparent film   Periwound Care barrier film applied   NPWT (Negative Pressure Wound Therapy) 02/25/25 1610 Rt medial Foot   Placement Date/Time: 02/25/25 1610   Location: Rt medial Foot   Therapy Setting continuous therapy   Dressing foam, black;transparent dressing   Pressure Setting 125 mmHg   Sponges Inserted 1   Sponges Removed 1     CWCB: We see patient for follow up NPWT. This is the second time we have tried to change dressing, in the morning he was under surgey for insertion of Estevez catheter, now on Dialysis, but the Dialysis nurse allowed us to do it here. Existing dressing removed, area cleaned, black foam applied, strip paste placed for handle the seal, draped, track pad attached and connected to continuous suction from wound vac machine, good seal noted. Patient tolerated well, pain medication was administered during procedure by floor nurse.  Plan to change m/w/f. The next to change will be by Monday.

## 2025-02-28 NOTE — ANESTHESIA POSTPROCEDURE EVALUATION
Patient: Wilner Menjivar    Procedure Summary       Date: 02/28/25 Room / Location: [facility] OR [address] [place] [address] MAIN OR    Anesthesia Start: 1121 Anesthesia Stop: 1224    Procedure: INSERTION OF PETER CATHETER Diagnosis:       Diabetic ulcer of right great toe      (Diabetic ulcer of right great toe [E11.621, L97.519])    Surgeons: Lam Gomez MD Provider: Shane Lerner MD    Anesthesia Type: MAC ASA Status: 3            Anesthesia Type: MAC    Vitals  Vitals Value Taken Time   /86 02/28/25 1315   Temp 36.7 °C (98 °F) 02/28/25 1307   Pulse 93 02/28/25 1315   Resp 16 02/28/25 1300   SpO2 100 % 02/28/25 1315   Vitals shown include unfiled device data.        Post Anesthesia Care and Evaluation    Pain management: adequate    Airway patency: patent  Anesthetic complications: No anesthetic complications  PONV Status: controlled  Cardiovascular status: blood pressure returned to baseline and acceptable  Respiratory status: acceptable  Hydration status: acceptable

## 2025-02-28 NOTE — CASE MANAGEMENT/SOCIAL WORK
Continued Stay Note  [facility]     Patient Name: Wilner Menjivar  MRN: [id number]  Today's Date: 2/28/2025    Admit Date: 2/11/2025    Plan: [facility] with [facility], [facility] for IV Abx and [facility] for wound vac.   Discharge Plan       Row Name 02/28/25 1253       Plan    Plan Home with [facility] HH, [facility] for IV Abx and [facility] for wound vac.    Patient/Family in Agreement with Plan yes    Plan Comments [facility] notified CCP that they received documentation but needs order form signed by MD, surgical note, and med list faxed to [phone]. Podiatry notified and will cosign order tomorrow; paperwork in chart for MD to sign. Onelia/DAJUAN notified CCP able to provide IV abx at [facility] and cost is $150/week. CCP s/w Mitali, pts spouse, via phone to update. CCP explained [facility], [facility] and [facility] have all accepted and will follow at [facility]. CCP also explained need new order for wound vac signed by MD and most likely DC anticipated for Monday. Mitali verbalized understanding and is agreeable. Saad SCHNEIDER/CCP                   Discharge Codes    No documentation.                 Expected Discharge Date and Time       Expected Discharge Date Expected Discharge Time    Feb 28, 2025               Una Garcia RN

## 2025-02-28 NOTE — PLAN OF CARE
Goal Outcome Evaluation:  Plan of Care Reviewed With: spouse, patient           Outcome Evaluation: Patient off floor most of day due to central line placement and dialysis. PRN pain medication administered while on floor. Wound vac changed in dialysis PRN medication administered while in dialysis for pain. no c/o nausea but requests nausea meds with pain medications.

## 2025-02-28 NOTE — PLAN OF CARE
Goal Outcome Evaluation:  Plan of Care Reviewed With: patient        Progress: improving  Outcome Evaluation: No changes overnight. AO x 4. NPO post midnight, scheduled for Estevez catheter placement 2/28/25. PRN pain and nausea meds given. Wound vac dressing intact. Continue to monitor.

## 2025-02-28 NOTE — PROGRESS NOTES
[facility] Progress Note      Patient Name: Wilner Menjivar  : 1985  MRN: [id number]  Primary Care Physician:  Roosevelt Thao MD  Date of admission: 2025    Subjective     Interval History:   F/u ESRD     Review of Systems:   Underwent RIJ Estevez w/o incident  To dialyze soon and already grumbling about not wanting to run full treatment  Denies dyspnea     Objective     Vitals:   Temp:  [97.7 °F (36.5 °C)-98.6 °F (37 °C)] 98.2 °F (36.8 °C)  Heart Rate:  [84-95] 88  Resp:  [16-18] 16  BP: (104-169)/(56-87) 169/83    Intake/Output Summary (Last 24 hours) at 2025 1119  Last data filed at 2025 0623  Gross per 24 hour   Intake 640 ml   Output 1075 ml   Net -435 ml       Physical Exam:    General Appearance: irritable blind AAM no distress  Neck: RIJ Estevez, no JVD  Lungs: CTA bilat no rales  Heart: RRR, normal S1 and S2  Abdomen: soft, nontender, nondistended  Extremities: no edema, cyanosis or clubbing      Scheduled Meds:     [Transfer Hold] aspirin, 81 mg, Oral, Daily  [Transfer Hold] atorvastatin, 10 mg, Oral, Nightly  [Transfer Hold] bumetanide, 2 mg, Oral, BID Diuretics  carvedilol, 25 mg, Oral, BID  [Transfer Hold] clopidogrel, 75 mg, Oral, Daily  [Transfer Hold] docusate sodium, 100 mg, Oral, BID  [Transfer Hold] epoetin jamil/jamil-epbx, 10,000 Units, Subcutaneous, Once per day on   [Transfer Hold] insulin glargine, 42 Units, Subcutaneous, Nightly  [Transfer Hold] insulin lispro, 14 Units, Subcutaneous, TID With Meals  [Transfer Hold] insulin lispro, 2-7 Units, Subcutaneous, 4x Daily AC & at Bedtime  [Transfer Hold] pantoprazole, 40 mg, Oral, BID AC  piperacillin-tazobactam, 3.375 g, Intravenous, Q12H  [Transfer Hold] polyethylene glycol, 17 g, Oral, Daily  [Transfer Hold] Scopolamine, 1 patch, Transdermal, Q72H  sodium chloride, 3 mL, Intravenous, Q12H  [Transfer Hold] sucralfate, 1 g, Oral, 4x Daily AC & at Bedtime      IV Meds:    lactated ringers, 9 mL/hr  sodium chloride, 9 mL        Results Reviewed:   I have personally reviewed the results from the time of this admission to 2/28/2025 11:19 EST     Results from last 7 days   Lab Units 02/28/25  0353 02/27/25  1037 02/26/25  0606   SODIUM mmol/L 133* 134* 134*   POTASSIUM mmol/L 4.4 4.5 4.3   CHLORIDE mmol/L 97* 98 98   CO2 mmol/L 25.9 23.0 25.4   BUN mg/dL 33* 27* 23*   CREATININE mg/dL 7.86* 6.66* 5.37*   CALCIUM mg/dL 9.2 9.0 8.8   GLUCOSE mg/dL 108* 187* 142*     Estimated Creatinine Clearance: 14.6 mL/min (A) (by C-G formula based on SCr of 7.86 mg/dL (H)).  Results from last 7 days   Lab Units 02/28/25  0353 02/26/25  0606 02/23/25  1105 02/22/25  0337   MAGNESIUM mg/dL  --   --  2.0  --    PHOSPHORUS mg/dL 3.6 3.8  --  3.7         Results from last 7 days   Lab Units 02/28/25  0353 02/27/25  1037 02/26/25  0606 02/25/25  0348 02/24/25  0517   WBC 10*3/mm3 12.17* 11.41* 11.74* 9.19 13.86*   HEMOGLOBIN g/dL 8.8* 8.6* 8.4* 8.1* 7.0*   PLATELETS 10*3/mm3 183 199 193 215 262           Assessment / Plan     ASSESSMENT:  ESRD since 6/2024 followed by Dr. Wasserman on IHD MWF schedule via E AVF. Been very non compliant with dialysis in hospital, asking off treatments early (only ran 1h WED).  Volume/lytes stable  Anemia of CKD & ABL. Transfused.  Hgb up 8.8  Hypertension w/ CKD.  BP controlled  Coronary artery disease s/p PCI in Sept 2024  Diabetes Mellitus type 2 w/ complications (retinopathy , peripheral vascular disease  nephropathy ) .Mgmt per primary team   Right diabetic foot ulcer + osteomyelitis, s/p I&D.  Abx per ID (zosyn through 4/9/25) via Estevez    PLAN:  Estevez placed  HD to follow; asked patient to allow full treatment, remains reluctant  Zosyn per ID through 4/9/25      Roman Carmen MD  02/28/25  11:19 [facility]    [facility]  [phone]

## 2025-02-28 NOTE — OP NOTE
Operative Note  Location: [facility]  Date of Admission:  2/11/2025  OR Date: 2/28/2025    Pre-op Diagnosis:   Diabetic ulcer of right great toe [E11.621, L97.519]    Post-op Diagnosis:     Same    Procedure:  1) Tunneled central venous catheter insertion   2) Ultrasound guided vascular access  3) Radiologic supervision of catheter tip placement    Assistant: None    Anesthesia: Monitored Anesthesia Care    Estimated Blood Loss: minimal    Staff:   Circulator: Sarahi Sloan RN; Sarahi Hutchinson RN  Scrub Person: Justin Dawkins    Complications: None    Specimen: None    Findings:  Tip in SVC/Atrial     Implants:   Implant Name Type Inv. Item Serial No.  Lot No. LRB No. Used Action   KT CATH CV PROLINE CT 2LUM W/CUFF BASIC 6F 60CM - [id number] Implant KT CATH CV PROLINE CT 2LUM W/CUFF BASIC 6F 60CM  MEDCOMP [id number] Implanted       Indications:    The patient is an 39 y.o. male referred for tunneled dialysis catheter placement.  The risks, benefits, and alternatives have been discussed with the patient and/or family and include but are not limited to injury to artery, vein, lung, bleeding, and infection.    Procedure:  The patient was taken to the operating room and placed supine on the operating room table. After the induction of IV sedation, the neck and chest were prepped and draped with ChloraPrep. The patient was placed in Trendelenburg position. Timeout was observed. The right  Internal Jugular  was evaluated on ultrasound and found to be easily compressible. The vessel was entered under direct ultrasound guidance and photographic documentation was recorded in the chart for the record. An angled wire was then advanced down into the superior vena cava under fluoroscopy without any difficulty. Exit site was chosen on the chest. The tract was anesthetized with 1% lidocaine mixed with 0.25% Marcaine. A 6 Fijian dual-lumen catheter catheter was then flushed and brought up onto the field.  It was tunneled in an antegrade fashion up to the IJ insertion site after being cut at 23 cm. Dilator and peel-away sheath were then advanced over the wire under fluoroscopy without any significant difficulty. Dilator and wire were removed leaving the peel-away sheath in place. The distal tip of the catheter was then placed into the peel-away sheath and the peel-away sheath was removed. Under fluoroscopy, the distal tip of the catheter was positioned into the right atrium. There was no kinking at the catheter insertion site. The catheter flushed and aspirated easily. The lung fields were examined. There was no evidence of hemothorax or pneumothorax. The catheter flushed and aspirated quite easily. It was locked with concentrated heparin. The catheter was then anchored to the chest with nylon sutures. A stitch and dermal glue were used to close the neck site as well. Sterile dressings were applied. The patient tolerated the procedure well. There were no immediately apparent complications.           Active Hospital Problems    Diagnosis  POA    **Diabetic toe ulcer [E11.621, L97.509]  Yes    Osteomyelitis of great toe of right foot [M86.9]  Unknown    Diabetic ulcer of right great toe [E11.621, L97.519]  Unknown      Resolved Hospital Problems   No resolved problems to display.      Lam Gomez MD     Date: 2/28/2025  Time: 12:30 EST

## 2025-02-28 NOTE — ANESTHESIA PREPROCEDURE EVALUATION
" Anesthesia Evaluation     Patient summary reviewed and Nursing notes reviewed   no history of anesthetic complications:   NPO Solid Status: > 8 hours  NPO Liquid Status: > 2 hours           Airway   Mallampati: III  TM distance: >3 FB  Neck ROM: full  Comment: Full beard  Dental - normal exam     Pulmonary - normal exam   (-) COPD, asthma  Cardiovascular - normal exam  Exercise tolerance: good (4-7 METS)    ECG reviewed  Patient on routine beta blocker and Beta blocker not taken-may be given intraoperatively    (+) hypertension, CAD, cardiac stents (2 stents in 2024)   (-) past MI, angina      Neuro/Psych  (-) seizures, CVA  GI/Hepatic/Renal/Endo    (+) obesity, GERD, renal disease (MWF dialysis - pt to get post-op HD)- ESRD and dialysis, diabetes mellitus poorly controlled  (-) liver disease    Musculoskeletal     Abdominal    Substance History   (+) drug use (THC)     OB/GYN          Other - negative ROS                     Anesthesia Plan    ASA 3     MAC     (I have reviewed the patient's history and chart with the patient, including all pertinent laboratory results and imaging. I have explained the risks of monitored anesthesia care including but not limited to respiratory depression, possible need for airway intervention, or possible intra-op recall. I explained that airway intervention involves risk of possible dental injury.    VITALS:  /83 (BP Location: Right arm, Patient Position: Lying)   Pulse 90   Temp 36.8 °C (98.2 °F) (Oral)   Resp 16   Ht 170.2 cm (67\")   Wt 107 kg (234 lb 12.6 oz)   SpO2 100%   BMI 36.77 kg/m² )  intravenous induction     Anesthetic plan, risks, benefits, and alternatives have been provided, discussed and informed consent has been obtained with: patient.  Pre-procedure education provided    CODE STATUS:    Level Of Support Discussed With: Patient  Code Status (Patient has no pulse and is not breathing): CPR (Attempt to Resuscitate)  Medical Interventions (Patient has " pulse or is breathing): Full Support

## 2025-02-28 NOTE — PROGRESS NOTES
"  Infectious Diseases Progress Note    Abdiaziz Paz MD     [facility]  Los: 17 days  Patient Identification:  Name: Wilner Menjivar  Age: 39 y.o.  Sex: male  :  1985  MRN: [id number]         Primary Care Physician: Roosevelt Thao MD        Subjective: Going to have Estevez catheter placed today.  Interval History: See consultation note.  2025: UnderwentHallux amputation, right foot SESAMOIDECTOMY, INCISION AN DRAINAGE  2025: Patient underwent repeat I&D of the right foot and partial first metatarsal excision of the right foot.  2025 underwent partial bone excision of the first metatarsal as well as right foot debridement at the level of bone.  Objective:    Scheduled Meds:aspirin, 81 mg, Oral, Daily  atorvastatin, 10 mg, Oral, Nightly  bumetanide, 2 mg, Oral, BID Diuretics  carvedilol, 25 mg, Oral, BID  clopidogrel, 75 mg, Oral, Daily  docusate sodium, 100 mg, Oral, BID  epoetin jamil/jamil-epbx, 10,000 Units, Subcutaneous, Once per day on   insulin glargine, 42 Units, Subcutaneous, Nightly  insulin lispro, 14 Units, Subcutaneous, TID With Meals  insulin lispro, 2-7 Units, Subcutaneous, 4x Daily AC & at Bedtime  pantoprazole, 40 mg, Oral, BID AC  piperacillin-tazobactam, 3.375 g, Intravenous, Q12H  polyethylene glycol, 17 g, Oral, Daily  Scopolamine, 1 patch, Transdermal, Q72H  sucralfate, 1 g, Oral, 4x Daily AC & at Bedtime      Continuous Infusions:       Vital signs in last 24 hours:  Temp:  [97.7 °F (36.5 °C)-98.6 °F (37 °C)] 98.1 °F (36.7 °C)  Heart Rate:  [84-95] 94  Resp:  [18] 18  BP: (104-160)/(56-87) 115/80    Intake/Output:    Intake/Output Summary (Last 24 hours) at 2025 0917  Last data filed at 2025 0623  Gross per 24 hour   Intake 640 ml   Output 1075 ml   Net -435 ml             Exam:  /80 (BP Location: Right arm, Patient Position: Lying)   Pulse 94   Temp 98.1 °F (36.7 °C) (Oral)   Resp 18   Ht 170.2 cm (67\")   Wt 107 kg " (234 lb 12.6 oz)   SpO2 100%   BMI 36.77 kg/m²   Patient is examined using the personal protective equipment as per guidelines from infection control for this particular patient as enacted.  Hand washing was performed before and after patient interaction.  General Appearance:  Alert cooperative sitting at the edge of the bed about to eat his dinner.                          Head:    Normocephalic, without obvious abnormality, atraumatic                           Eyes:  Legally blind                         Throat:   Lips, tongue, gums normal; oral mucosa pink and moist                           Neck:   Supple, symmetrical, trachea midline, no JVD                         Lungs:    Clear to auscultation bilaterally, respirations unlabored                 Chest Wall:    No tenderness or deformity                          Heart:  S1-S2 regular                  Abdomen:   Soft nontender                 Extremities: Right foot after debridement and amputation of the great toe is dressed.                  Neurologic: Legally blind       Data Review:    I reviewed the patient's new clinical results.  Results from last 7 days   Lab Units 02/28/25  0353 02/27/25  1037 02/26/25  0606 02/25/25  0348 02/24/25  0517 02/23/25  0346 02/22/25  0337   WBC 10*3/mm3 12.17* 11.41* 11.74* 9.19 13.86* 16.36* 11.86*   HEMOGLOBIN g/dL 8.8* 8.6* 8.4* 8.1* 7.0* 5.7* 7.2*   PLATELETS 10*3/mm3 183 199 193 215 262 281 315     Results from last 7 days   Lab Units 02/28/25  0353 02/27/25  1037 02/26/25  0606 02/25/25  0348 02/24/25  0517 02/23/25  1105 02/23/25  0346 02/22/25  0337   SODIUM mmol/L 133* 134* 134* 134* 129*  --  133* 134*   POTASSIUM mmol/L 4.4 4.5 4.3 4.5 4.8 4.6 4.3 4.8   CHLORIDE mmol/L 97* 98 98 100 98  --  100 100   CO2 mmol/L 25.9 23.0 25.4 23.2 19.7*  --  22.0 23.8   BUN mg/dL 33* 27* 23* 38* 40*  --  33* 25*   CREATININE mg/dL 7.86* 6.66* 5.37* 6.13* 7.99*  --  6.93* 5.30*   CALCIUM mg/dL 9.2 9.0 8.8 8.6 8.3*  --  8.6 8.6    GLUCOSE mg/dL 108* 187* 142* 216* 312*  --  123* 259*   XR Foot 3+ View Right    Result Date: 2/21/2025   Postsurgical changes.    This report was finalized on 2/21/2025 3:43 PM by Dr. Chris Sheets M.D on Workstation: [id number]      XR Foot 3+ View Right    Result Date: 2/18/2025  Postoperative changes from amputation of the great toe at the level of the MTP joint.  This report was finalized on 2/18/2025 9:04 AM by Tom Cha MD on Workstation: [id number]      Doppler Arterial Multi Level Lower Extremity - Bilateral CAR    Addendum Date: 2/13/2025      Right Conclusion: The right GIOVANA is moderately reduced. Waveforms are consistent with femoral disease, popliteal disease and tib-peroneal disease. Normal digital pressures.   Left Conclusion: The left GIOVANA is normal. Normal digital pressures.     MRI Foot Right Without Contrast    Result Date: 2/13/2025  1. Osteomyelitis of the distal shaft and head of the 1st proximal phalanx and of the distal phalanx with overlying soft tissue wound/ulcer. Gas within the soft tissues and marrow of the great toe associated with infection. Tenosynovitis flexor hallucis longus tendon sheath. Forefoot cellulitis. 2. No evidence for septic arthritis at the 1st MTP joint. 3. Muscular edema and atrophy associated with myositis or chronic neuropathy.  This report was finalized on 2/13/2025 7:48 AM by Junior Villalobos M.D on Workstation: [id number]      XR Foot 3+ View Right    Result Date: 2/11/2025   There is subcutaneous emphysema and soft tissue loss over the great toe. No radiopaque foreign body. No definite bone erosion.    This report was finalized on 2/11/2025 5:20 PM by Dr. Jason Dan M.D on Workstation: [id number]     Microbiology Results (last 10 days)       ** No results found for the last 240 hours. **          ECG 12 Lead Chest Pain   Final Result   HEART RATE=95  bpm   RR Enkmgcbv=323  ms   CT Ozcmjtev=234  ms   P Horizontal Axis=32  deg   P Front Axis=80   deg   QRSD Rniqzvqa=867  ms   QT Cliciimp=171  ms   PBkD=028  ms   QRS Axis=14  deg   T Wave Axis=95  deg   - ABNORMAL ECG -   Sinus rhythm   Nonspecific  T abnormalities, lateral leads   Borderline  prolonged QT interval   NO PRIOR TRACING AVAILABLE FOR COMPARISON   Electronically Signed By: Julia Gallagher ([facility]) 2025-02-24 16:55:41   Date and Time of Study:2025-02-23 12:30:55      Telemetry Scan   Final Result      Telemetry Scan   Final Result      Telemetry Scan   Final Result      Telemetry Scan   Final Result      Telemetry Scan   Final Result      Telemetry Scan   Final Result      Telemetry Scan   Final Result      Telemetry Scan   Final Result      Telemetry Scan   Final Result      Telemetry Scan   Final Result      Telemetry Scan   Final Result      Telemetry Scan   Final Result      Telemetry Scan   Final Result      Telemetry Scan   Final Result      Telemetry Scan   Final Result      Telemetry Scan   Final Result      Telemetry Scan   Final Result      Telemetry Scan   Final Result      Telemetry Scan   Final Result      Telemetry Scan   Final Result      Telemetry Scan   Final Result      Telemetry Scan   Final Result      Telemetry Scan   Final Result      Telemetry Scan   Final Result      Telemetry Scan   Final Result      Telemetry Scan   Final Result      Telemetry Scan   Final Result      Telemetry Scan   Final Result      Telemetry Scan   Final Result      Telemetry Scan   Final Result      Telemetry Scan   Final Result      Telemetry Scan   Final Result      Telemetry Scan   Final Result      Telemetry Scan   Final Result      Telemetry Scan   Final Result      Telemetry Scan   Final Result      Telemetry Scan   Final Result              Assessment:    Diabetic toe ulcer    Osteomyelitis of great toe of right foot    Diabetic ulcer of right great toe  39-year-old male with  1-right great toe diabetic/ischemic ulcer with dry gangrene with associated abscess and contiguous focus osteomyelitis of  the proximal and distal phalanx with associated flexor hallucis tenosynovitis-status post amputation of right great toe, I&D of multiple compartments of the right foot and sesamoidectomy involving tibia-fibula and right foot on 2/18/2025-operative cultures showing gram-negative rods and Gram stain shows gram-positive cocci-identified as Citrobacter freundii and Enterococcus.  2-significant peripheral vascular disease with element of gangrene involving the great toe  3-type 2 diabetes  4-end-stage renal disease  5-legally blind  6-severe anemia multifactorial  7-hypertension  8-multiple antibiotic allergies/intolerances including allergy to penicillin though it could very well be not a true allergy given the description of his reaction.  9-anemia multifactorial status post transfusion  10-penicillin allergy.     Recommendations/Discussions:  See my discussion and recommendation on 2/23/2025  See my discussion and recommendation on 2/24/2025.  See my discussion and recommendation on 2/25 and 2/26/2025.  Dr. Garcia's opinion about his right foot and care plan noted and agree is a difficult situation.  Options of antibiotic treatment include   Ideal-continuation of Zosyn for 6 weeks with dose adjusted to renal function in terms of amount and frequency but would require IV access as there is no documented recommendation for adjustment to accommodate for the dialysis schedule as noted by our colleague pharmacist versus   change antibiotic regimen consisting of vancomycin and cefepime and Flagyl -which she was on prior to initiation of Zosyn-to accommodate for dialysis schedule.    Discussed with patient's caring nurse and recommendations from nephrology service it appears that placement of Estevez catheter is an option acceptable to our nephrology colleagues.  Discussed with the patient and he wants to proceed with the best regimen based on his culture results to provide him best chance to salvage his right foot.  Once  he gets his Estevez catheter arranged we will ask our case management to arrange following antibiotic regimen for out of hospital antibiotic treatment:  Please arrange for 42 days of IV Zosyn starting 2/26/2025 at 3.375 g every 12 hours with end of the treatment would be on 4/9/2025.  Check weekly CBC CMP and CRP and call abnormal results to Dr. Paz med [phone].  Please educate patient and place it in his AVS to call Dr. Paz at [phone] on a weekly basis to go over review of system to monitor antibiotic toxicity and effectiveness of treatment.    Follow-up with foot and ankle specialist per their recommendations.  Please make arrangements with vascular surgery service for removal of Estevez catheter after completion of antibiotic therapy on 4/9/2025.  Diagnosis: Polymicrobial right great toe and right foot diabetic ischemic ulcer with culture positive for Citrobacter freundii Enterococcus and Parvimona Micra  Abdiaziz Paz MD  2/28/2025  09:49 EST    Parts of this note may be an electronic transcription/translation of spoken language to printed text using the Dragon dictation system.

## 2025-02-28 NOTE — PROGRESS NOTES
"Daily progress note    Primary care physician  Dr. NERI    Subjective   Doing same with no new complaint and going for port placement    History of present illness  39-year-old male with history of end-stage renal disease on hemodialysis chronic anemia diabetes hypertension hyperlipidemia coronary artery disease and gastroesophageal of disease who is also legally blind and has right foot wound which is getting worse mainly right great toe which looks infected and has recently seen by podiatrist and removed the callus from the right great toe.  Patient has intermittent bleeding pain drainage but no fever chills.  Patient also denies any chest pain shortness of breath palpitation abdominal pain nausea vomiting diarrhea.  Patient workup in ER revealed infected right great toe wound admitted for management.     REVIEW OF SYSTEMS  Unremarkable except pain     PHYSICAL EXAM   Blood pressure 169/83, pulse 88, temperature 98.2 °F (36.8 °C), temperature source Oral, resp. rate 16, height 170.2 cm (67\"), weight 107 kg (234 lb 12.6 oz), SpO2 100%.    Constitutional:       General: He is not in acute distress.     Appearance: Normal appearance. He is not ill-appearing, toxic-appearing or diaphoretic.   HENT:      Head: Normocephalic and atraumatic.      Nose: Nose normal.      Mouth/Throat:      Mouth: Mucous membranes are moist.      Pharynx: Oropharynx is clear.   Eyes:      Conjunctiva/sclera: Conjunctivae normal.      Pupils: Pupils are equal, round, and reactive to light.   Cardiovascular:      Rate and Rhythm: Normal rate and regular rhythm.      Pulses: Normal pulses.   Pulmonary:      Effort: Pulmonary effort is normal.   Abdominal:      General: Abdomen is flat.      Palpations: Abdomen is soft.   Musculoskeletal:      Comments: Right great toe is black in color with a deep ulceration and dried blood, no active purulent drainage, no palpable crepitus or fluctuance   Skin:     General: Skin is warm and dry. "   Neurological:      General: No focal deficit present.      Mental Status: He is alert and oriented to person, place, and time. Mental status is at baseline.   Psychiatric:         Mood and Affect: Mood normal.         Behavior: Behavior normal.         Thought Content: Thought content normal.         Judgment: Judgment normal.      LAB RESULTS  Lab Results (last 24 hours)       Procedure Component Value Units Date/Time    POC Glucose Once [[id number]]  (Normal) Collected: 02/28/25 0630    Specimen: Blood Updated: 02/28/25 0631     Glucose 111 mg/dL     Renal Function Panel [[id number]]  (Abnormal) Collected: 02/28/25 0353    Specimen: Blood Updated: 02/28/25 0441     Glucose 108 mg/dL      BUN 33 mg/dL      Creatinine 7.86 mg/dL      Sodium 133 mmol/L      Potassium 4.4 mmol/L      Chloride 97 mmol/L      CO2 25.9 mmol/L      Calcium 9.2 mg/dL      Albumin 2.6 g/dL      Phosphorus 3.6 mg/dL      Anion Gap 10.1 mmol/L      BUN/Creatinine Ratio 4.2     eGFR 8.2 mL/min/1.73     Narrative:      GFR Categories in Chronic Kidney Disease (CKD)      GFR Category          GFR (mL/min/1.73)    Interpretation  G1                     90 or greater         Normal or high (1)  G2                      60-89                Mild decrease (1)  G3a                   45-59                Mild to moderate decrease  G3b                   30-44                Moderate to severe decrease  G4                    15-29                Severe decrease  G5                    14 or less           Kidney failure          (1)In the absence of evidence of kidney disease, neither GFR category G1 or G2 fulfill the criteria for CKD.    eGFR calculation 2021 CKD-EPI creatinine equation, which does not include race as a factor    CBC & Differential [[id number]]  (Abnormal) Collected: 02/28/25 0353    Specimen: Blood Updated: 02/28/25 0418    Narrative:      The following orders were created for panel order CBC & Differential.  Procedure                                Abnormality         Status                     ---------                               -----------         ------                     CBC Auto Differential[[id number]]        Abnormal            Final result                 Please view results for these tests on the individual orders.    CBC Auto Differential [[id number]]  (Abnormal) Collected: 02/28/25 0353    Specimen: Blood Updated: 02/28/25 0418     WBC 12.17 10*3/mm3      RBC 2.93 10*6/mm3      Hemoglobin 8.8 g/dL      Hematocrit 26.9 %      MCV 91.8 fL      MCH 30.0 pg      MCHC 32.7 g/dL      RDW 14.3 %      RDW-SD 47.6 fl      MPV 8.2 fL      Platelets 183 10*3/mm3      Neutrophil % 81.2 %      Lymphocyte % 7.6 %      Monocyte % 7.1 %      Eosinophil % 2.3 %      Basophil % 0.7 %      Immature Grans % 1.1 %      Neutrophils, Absolute 9.88 10*3/mm3      Lymphocytes, Absolute 0.93 10*3/mm3      Monocytes, Absolute 0.87 10*3/mm3      Eosinophils, Absolute 0.28 10*3/mm3      Basophils, Absolute 0.08 10*3/mm3      Immature Grans, Absolute 0.13 10*3/mm3      nRBC 0.0 /100 WBC     POC Glucose Once [[id number]]  (Abnormal) Collected: 02/27/25 2112    Specimen: Blood Updated: 02/27/25 2113     Glucose 201 mg/dL     POC Glucose Once [[id number]]  (Abnormal) Collected: 02/27/25 1826    Specimen: Blood Updated: 02/27/25 1827     Glucose 194 mg/dL     POC Glucose Once [[id number]]  (Normal) Collected: 02/27/25 1645    Specimen: Blood Updated: 02/27/25 1646     Glucose 78 mg/dL     POC Glucose Once [[id number]]  (Abnormal) Collected: 02/27/25 1554    Specimen: Blood Updated: 02/27/25 1556     Glucose 67 mg/dL           Imaging Results (Last 24 Hours)       Procedure Component Value Units Date/Time    FL C Arm During Surgery [[id number]] Resulted: 02/28/25 1221     Updated: 02/28/25 1221    Narrative:      This procedure was auto-finalized with no dictation required.            Current Facility-Administered Medications:     [Transfer Hold] aluminum-magnesium  hydroxide-simethicone (MAALOX MAX) 400-400-40 MG/5ML suspension 15 mL, 15 mL, Oral, PRN, Trevon Garcia DPM, 15 mL at 02/24/25 1007    [Transfer Hold] aspirin chewable tablet 81 mg, 81 mg, Oral, Daily, Trevon Garcia DPM, 81 mg at 02/27/25 0935    [Transfer Hold] atorvastatin (LIPITOR) tablet 10 mg, 10 mg, Oral, Nightly, Trevon Garcia DPM, 10 mg at 02/27/25 2121    Atropine Sulfate (PF) injection 0.4 mg, 0.4 mg, Intravenous, Once PRN, Amy Ramon, CRNA    [Transfer Hold] bumetanide (BUMEX) tablet 2 mg, 2 mg, Oral, BID Diuretics, Trevon Garcia DPM, 2 mg at 02/27/25 1816    [Transfer Hold] calcium carbonate (TUMS) chewable tablet 500 mg (200 mg elemental), 2 tablet, Oral, TID PRN, Trevon Garcia DPM, 2 tablet at 02/27/25 0930    carvedilol (COREG) tablet 25 mg, 25 mg, Oral, BID, Trevon Garcia DPM, 25 mg at 02/27/25 2121    [Transfer Hold] clopidogrel (PLAVIX) tablet 75 mg, 75 mg, Oral, Daily, Cooper Romo APRN, 75 mg at 02/27/25 0935    [Transfer Hold] dextrose (D50W) (25 g/50 mL) IV injection 25 g, 25 g, Intravenous, Q15 Min PRN, Trevon Garcia DPM    [Transfer Hold] dextrose (GLUTOSE) oral gel 15 g, 15 g, Oral, Q15 Min PRN, Trevon Garcia DPM    [Transfer Hold] diphenhydrAMINE (BENADRYL) capsule 50 mg, 50 mg, Oral, Q4H PRN **OR** [Transfer Hold] diphenhydrAMINE (BENADRYL) injection 50 mg, 50 mg, Intravenous, Q6H PRN, Abdiaziz Paz MD    diphenhydrAMINE (BENADRYL) injection 12.5 mg, 12.5 mg, Intravenous, Q15 Min PRN, Amy Ramon CRNA    [Transfer Hold] docusate sodium (COLACE) capsule 100 mg, 100 mg, Oral, BID, Trevon Garcia DPM, 100 mg at 02/26/25 2053    ePHEDrine injection 5 mg, 5 mg, Intravenous, Once PRN, Amy Ramon CRNA    [Transfer Hold] EPINEPHrine (ANAPHYLAXIS) 1 mg/ml injection kit, 0.3 mg, Intramuscular, PRN, Abdiaziz Paz MD    [Transfer Hold] epoetin jamil-epbx (RETACRIT) injection 10,000 Units, 10,000 Units, Subcutaneous, Once per day on Monday  Wednesday Friday, Trevon Garcia DPM, 10,000 Units at 02/28/25 1002    flumazenil (ROMAZICON) injection 0.2 mg, 0.2 mg, Intravenous, PRN, Amy Ramon CRNA    [Transfer Hold] glucagon (GLUCAGEN) injection 1 mg, 1 mg, Intramuscular, Q15 Min PRN, Trevon Garcia DPM    hydrALAZINE (APRESOLINE) injection 5 mg, 5 mg, Intravenous, Q10 Min PRN, Amy Ramon CRNA    HYDROcodone-acetaminophen (NORCO) 5-325 MG per tablet 1 tablet, 1 tablet, Oral, Once PRN, Amy Ramon CRNA    [Transfer Hold] HYDROcodone-acetaminophen (NORCO) 5-325 MG per tablet 2 tablet, 2 tablet, Oral, Q4H PRN, Trevon Garcia DPM, 2 tablet at 02/27/25 1817    [Transfer Hold] HYDROmorphone (DILAUDID) injection 1 mg, 1 mg, Intravenous, Q2H PRN, Trevon Garcia DPM, 1 mg at 02/28/25 1002    [Transfer Hold] insulin glargine (LANTUS, SEMGLEE) injection 42 Units, 42 Units, Subcutaneous, Nightly, Trevon Garcia DPM, 42 Units at 02/27/25 2122    [Transfer Hold] insulin lispro (HUMALOG/ADMELOG) injection 14 Units, 14 Units, Subcutaneous, TID With Meals, Trevon Garcia DPM, 14 Units at 02/27/25 1204    [Transfer Hold] insulin lispro (HUMALOG/ADMELOG) injection 2-7 Units, 2-7 Units, Subcutaneous, 4x Daily AC & at Bedtime, Trevon Garcia DPM, 3 Units at 02/27/25 2121    ipratropium-albuterol (DUO-NEB) nebulizer solution 3 mL, 3 mL, Nebulization, Once PRN, Amy Ramon CRNA    labetalol (NORMODYNE,TRANDATE) injection 5 mg, 5 mg, Intravenous, Q5 Min PRN, Amy Ramon CRNA    lactated ringers infusion, 9 mL/hr, Intravenous, Continuous, Una Ellis MD    lidocaine (XYLOCAINE) 1 % injection 0.5 mL, 0.5 mL, Intradermal, Once PRN, Una Ellis MD    midazolam (VERSED) injection 1 mg, 1 mg, Intravenous, Q5 Min PRN, Una Ellis MD    naloxone (NARCAN) injection 0.2 mg, 0.2 mg, Intravenous, PRN, Amy Ramon CRNA    [Transfer Hold] ondansetron (ZOFRAN) injection 4 mg, 4 mg, Intravenous, Q6H  PRN, Trevon Garcia DPM, 4 mg at 02/28/25 1002    ondansetron (ZOFRAN) injection 4 mg, 4 mg, Intravenous, Once PRN, Amy Ramon CRNA    oxyCODONE-acetaminophen (PERCOCET) 7.5-325 MG per tablet 1 tablet, 1 tablet, Oral, Q4H PRN, Amy Ramon CRNA    [Transfer Hold] pantoprazole (PROTONIX) EC tablet 40 mg, 40 mg, Oral, BID AC, Trevon Garcia DPM, 40 mg at 02/28/25 0624    piperacillin-tazobactam (ZOSYN) 3.375 g IVPB in 100 mL NS MBP (CD), 3.375 g, Intravenous, Q12H, Abdiaziz Paz MD, 3.375 g at 02/28/25 0623    [Transfer Hold] polyethylene glycol (MIRALAX) packet 17 g, 17 g, Oral, Daily, Trevon Garcia DPM    [Transfer Hold] prochlorperazine (COMPAZINE) injection 5 mg, 5 mg, Intravenous, Q6H PRN, Trevon Garcia DPM, 5 mg at 02/28/25 0623    promethazine (PHENERGAN) suppository 25 mg, 25 mg, Rectal, Once PRN **OR** promethazine (PHENERGAN) tablet 25 mg, 25 mg, Oral, Once PRN, Amy Ramon, CRNA    [Transfer Hold] scopolamine patch 1 mg/72 hr, 1 patch, Transdermal, Q72H, Trevon Garcia DPM, 1 patch at 02/25/25 2818    sodium chloride 0.9 % flush 3 mL, 3 mL, Intravenous, Q12H, Una Ellis MD    sodium chloride 0.9 % flush 3-10 mL, 3-10 mL, Intravenous, PRN, Una Ellis MD    sodium chloride 0.9 % infusion 9 mL, 9 mL, Intravenous, Continuous, Una Ellis MD, 9 mL at 02/28/25 1112    [Transfer Hold] sucralfate (CARAFATE) tablet 1 g, 1 g, Oral, 4x Daily AC & at Bedtime, Trevon Garcia DPM, 1 g at 02/27/25 2121    [Transfer Hold] zolpidem (AMBIEN) tablet 5 mg, 5 mg, Oral, Nightly PRN, Trevon Garcia DPM, 5 mg at 02/27/25 2250     ASSESSMENT  Right great toe wound with infection and surrounding cellulitis and osteomyelitis status post I&D and hallux amputation followed by partial first right metatarsal excision  End-stage renal disease   Diabetes mellitus  Hypertension  Hyperlipidemia  Coronary artery disease  Legally blind  Chronic anemia with drop in  H&H  Gastroesophageal reflux disease    PLAN  CPM   Postop care  Port placement today  Continue IV antibiotics per infectious disease  Pain management   Hemodialysis today after port placement  Infectious disease podiatry and nephrology to follow patient   Continue home medications  Stress ulcer DVT prophylaxis  Supportive care  Discussed with nursing staff family and podiatry  Discharge planning    JASKARAN LANGFORD MD    Copied text in this note has been reviewed and is accurate as of 02/28/25

## 2025-03-01 LAB
ANION GAP SERPL CALCULATED.3IONS-SCNC: 10 MMOL/L (ref 5–15)
BASOPHILS # BLD AUTO: 0.07 10*3/MM3 (ref 0–0.2)
BASOPHILS NFR BLD AUTO: 0.8 % (ref 0–1.5)
BUN SERPL-MCNC: 21 MG/DL (ref 6–20)
BUN/CREAT SERPL: 3.5 (ref 7–25)
CALCIUM SPEC-SCNC: 8.9 MG/DL (ref 8.6–10.5)
CHLORIDE SERPL-SCNC: 96 MMOL/L (ref 98–107)
CO2 SERPL-SCNC: 27 MMOL/L (ref 22–29)
CREAT SERPL-MCNC: 5.92 MG/DL (ref 0.76–1.27)
DEPRECATED RDW RBC AUTO: 44.4 FL (ref 37–54)
EGFRCR SERPLBLD CKD-EPI 2021: 11.5 ML/MIN/1.73
EOSINOPHIL # BLD AUTO: 0.25 10*3/MM3 (ref 0–0.4)
EOSINOPHIL NFR BLD AUTO: 2.7 % (ref 0.3–6.2)
ERYTHROCYTE [DISTWIDTH] IN BLOOD BY AUTOMATED COUNT: 14.1 % (ref 12.3–15.4)
GLUCOSE BLDC GLUCOMTR-MCNC: 117 MG/DL (ref 70–130)
GLUCOSE BLDC GLUCOMTR-MCNC: 142 MG/DL (ref 70–130)
GLUCOSE BLDC GLUCOMTR-MCNC: 143 MG/DL (ref 70–130)
GLUCOSE BLDC GLUCOMTR-MCNC: 80 MG/DL (ref 70–130)
GLUCOSE SERPL-MCNC: 179 MG/DL (ref 65–99)
HCT VFR BLD AUTO: 24.1 % (ref 37.5–51)
HGB BLD-MCNC: 7.9 G/DL (ref 13–17.7)
IMM GRANULOCYTES # BLD AUTO: 0.09 10*3/MM3 (ref 0–0.05)
IMM GRANULOCYTES NFR BLD AUTO: 1 % (ref 0–0.5)
LYMPHOCYTES # BLD AUTO: 1.09 10*3/MM3 (ref 0.7–3.1)
LYMPHOCYTES NFR BLD AUTO: 11.7 % (ref 19.6–45.3)
MCH RBC QN AUTO: 29.2 PG (ref 26.6–33)
MCHC RBC AUTO-ENTMCNC: 32.8 G/DL (ref 31.5–35.7)
MCV RBC AUTO: 88.9 FL (ref 79–97)
MONOCYTES # BLD AUTO: 0.77 10*3/MM3 (ref 0.1–0.9)
MONOCYTES NFR BLD AUTO: 8.3 % (ref 5–12)
NEUTROPHILS NFR BLD AUTO: 7.02 10*3/MM3 (ref 1.7–7)
NEUTROPHILS NFR BLD AUTO: 75.5 % (ref 42.7–76)
NRBC BLD AUTO-RTO: 0 /100 WBC (ref 0–0.2)
PLATELET # BLD AUTO: 157 10*3/MM3 (ref 140–450)
PMV BLD AUTO: 8.3 FL (ref 6–12)
POTASSIUM SERPL-SCNC: 4 MMOL/L (ref 3.5–5.2)
RBC # BLD AUTO: 2.71 10*6/MM3 (ref 4.14–5.8)
SODIUM SERPL-SCNC: 133 MMOL/L (ref 136–145)
WBC NRBC COR # BLD AUTO: 9.29 10*3/MM3 (ref 3.4–10.8)

## 2025-03-01 PROCEDURE — 63710000001 INSULIN LISPRO (HUMAN) PER 5 UNITS: Performed by: SURGERY

## 2025-03-01 PROCEDURE — 25010000002 PIPERACILLIN SOD-TAZOBACTAM PER 1 G: Performed by: SURGERY

## 2025-03-01 PROCEDURE — 63710000001 INSULIN GLARGINE PER 5 UNITS: Performed by: SURGERY

## 2025-03-01 PROCEDURE — 82948 REAGENT STRIP/BLOOD GLUCOSE: CPT

## 2025-03-01 PROCEDURE — 25010000002 ONDANSETRON PER 1 MG: Performed by: SURGERY

## 2025-03-01 PROCEDURE — 25010000002 PROCHLORPERAZINE 10 MG/2ML SOLUTION: Performed by: SURGERY

## 2025-03-01 PROCEDURE — 80048 BASIC METABOLIC PNL TOTAL CA: CPT | Performed by: SURGERY

## 2025-03-01 PROCEDURE — 85025 COMPLETE CBC W/AUTO DIFF WBC: CPT | Performed by: SURGERY

## 2025-03-01 PROCEDURE — 25010000002 HYDROMORPHONE 1 MG/ML SOLUTION: Performed by: SURGERY

## 2025-03-01 RX ADMIN — ONDANSETRON 4 MG: 2 INJECTION INTRAMUSCULAR; INTRAVENOUS at 17:54

## 2025-03-01 RX ADMIN — HYDROMORPHONE HYDROCHLORIDE 1 MG: 1 INJECTION, SOLUTION INTRAMUSCULAR; INTRAVENOUS; SUBCUTANEOUS at 23:21

## 2025-03-01 RX ADMIN — PANTOPRAZOLE SODIUM 40 MG: 40 TABLET, DELAYED RELEASE ORAL at 17:59

## 2025-03-01 RX ADMIN — HYDROMORPHONE HYDROCHLORIDE 1 MG: 1 INJECTION, SOLUTION INTRAMUSCULAR; INTRAVENOUS; SUBCUTANEOUS at 09:18

## 2025-03-01 RX ADMIN — CARVEDILOL 25 MG: 25 TABLET, FILM COATED ORAL at 20:27

## 2025-03-01 RX ADMIN — INSULIN GLARGINE 20 UNITS: 100 INJECTION, SOLUTION SUBCUTANEOUS at 21:23

## 2025-03-01 RX ADMIN — CLOPIDOGREL BISULFATE 75 MG: 75 TABLET ORAL at 09:17

## 2025-03-01 RX ADMIN — HYDROMORPHONE HYDROCHLORIDE 1 MG: 1 INJECTION, SOLUTION INTRAMUSCULAR; INTRAVENOUS; SUBCUTANEOUS at 17:57

## 2025-03-01 RX ADMIN — HYDROCODONE BITARTRATE AND ACETAMINOPHEN 2 TABLET: 5; 325 TABLET ORAL at 21:23

## 2025-03-01 RX ADMIN — HYDROMORPHONE HYDROCHLORIDE 1 MG: 1 INJECTION, SOLUTION INTRAMUSCULAR; INTRAVENOUS; SUBCUTANEOUS at 03:03

## 2025-03-01 RX ADMIN — SUCRALFATE 1 G: 1 TABLET ORAL at 11:26

## 2025-03-01 RX ADMIN — PIPERACILLIN AND TAZOBACTAM 3.38 G: 3; .375 INJECTION, POWDER, FOR SOLUTION INTRAVENOUS at 18:23

## 2025-03-01 RX ADMIN — HYDROMORPHONE HYDROCHLORIDE 1 MG: 1 INJECTION, SOLUTION INTRAMUSCULAR; INTRAVENOUS; SUBCUTANEOUS at 11:27

## 2025-03-01 RX ADMIN — SUCRALFATE 1 G: 1 TABLET ORAL at 17:59

## 2025-03-01 RX ADMIN — HYDROMORPHONE HYDROCHLORIDE 1 MG: 1 INJECTION, SOLUTION INTRAMUSCULAR; INTRAVENOUS; SUBCUTANEOUS at 14:04

## 2025-03-01 RX ADMIN — SUCRALFATE 1 G: 1 TABLET ORAL at 09:17

## 2025-03-01 RX ADMIN — ZOLPIDEM TARTRATE 5 MG: 5 TABLET, FILM COATED ORAL at 23:21

## 2025-03-01 RX ADMIN — HYDROMORPHONE HYDROCHLORIDE 1 MG: 1 INJECTION, SOLUTION INTRAMUSCULAR; INTRAVENOUS; SUBCUTANEOUS at 20:28

## 2025-03-01 RX ADMIN — HYDROMORPHONE HYDROCHLORIDE 1 MG: 1 INJECTION, SOLUTION INTRAMUSCULAR; INTRAVENOUS; SUBCUTANEOUS at 06:27

## 2025-03-01 RX ADMIN — BUMETANIDE 2 MG: 2 TABLET ORAL at 17:59

## 2025-03-01 RX ADMIN — PANTOPRAZOLE SODIUM 40 MG: 40 TABLET, DELAYED RELEASE ORAL at 09:17

## 2025-03-01 RX ADMIN — PROCHLORPERAZINE EDISYLATE 5 MG: 5 INJECTION, SOLUTION INTRAMUSCULAR; INTRAVENOUS at 14:04

## 2025-03-01 RX ADMIN — ATORVASTATIN CALCIUM 10 MG: 10 TABLET, FILM COATED ORAL at 20:27

## 2025-03-01 RX ADMIN — ONDANSETRON 4 MG: 2 INJECTION INTRAMUSCULAR; INTRAVENOUS at 03:03

## 2025-03-01 RX ADMIN — BUMETANIDE 2 MG: 2 TABLET ORAL at 09:17

## 2025-03-01 RX ADMIN — ASPIRIN 81 MG CHEWABLE TABLET 81 MG: 81 TABLET CHEWABLE at 09:17

## 2025-03-01 RX ADMIN — ONDANSETRON 4 MG: 2 INJECTION INTRAMUSCULAR; INTRAVENOUS at 23:21

## 2025-03-01 RX ADMIN — DOCUSATE SODIUM 100 MG: 100 CAPSULE, LIQUID FILLED ORAL at 20:27

## 2025-03-01 RX ADMIN — PROCHLORPERAZINE EDISYLATE 5 MG: 5 INJECTION, SOLUTION INTRAMUSCULAR; INTRAVENOUS at 06:26

## 2025-03-01 RX ADMIN — ONDANSETRON 4 MG: 2 INJECTION INTRAMUSCULAR; INTRAVENOUS at 09:18

## 2025-03-01 RX ADMIN — INSULIN LISPRO 14 UNITS: 100 INJECTION, SOLUTION INTRAVENOUS; SUBCUTANEOUS at 09:18

## 2025-03-01 RX ADMIN — SUCRALFATE 1 G: 1 TABLET ORAL at 20:27

## 2025-03-01 RX ADMIN — CARVEDILOL 25 MG: 25 TABLET, FILM COATED ORAL at 09:17

## 2025-03-01 RX ADMIN — PIPERACILLIN AND TAZOBACTAM 3.38 G: 3; .375 INJECTION, POWDER, FOR SOLUTION INTRAVENOUS at 06:17

## 2025-03-01 NOTE — PROGRESS NOTES
Podiatry Progress Note      Patient: Wilner Menjivar Admit Date: 02/11/2025    Age: 39 y.o.   PCP: Roosevelt Thao MD    MRN: [id number]  Room: [facility]        Subjective     Chief Complaint     Chief Complaint   Patient presents with    Wound Check        HPI     39-year-old male who underwent serial debridements and partial first ray amputation of this right foot.  Wound VAC is in place.  Patient states he is doing well and does not complain today alignment injury.    Past Medical History     Past Medical History:   Diagnosis Date    CKD stage 4 due to type 2 diabetes mellitus     GERD (gastroesophageal reflux disease)     Heart failure     Hypertension     Type 2 diabetes mellitus     Vision impairment         Past Surgical History:   Procedure Laterality Date    AMPUTATION DIGIT Right 2/18/2025    Procedure: Hallux amputation, right foot SESAMOIDECTOMY, INCISION AN DRAINAGE;  Surgeon: Trevon Garcia DPM;  Location: [facility];  Service: Podiatry;  Laterality: Right;    BONE EXCISION LEG Right 2/21/2025    Procedure: Partial first metatarsal excision, right foot;  Surgeon: Trevon Garcia DPM;  Location: [facility] OR;  Service: Podiatry;  Laterality: Right;    CORONARY STENT PLACEMENT      EYE SURGERY      WISDOM TOOTH EXTRACTION      WOUND DEBRIDEMENT Right 2/21/2025    Procedure: DEBRIDEMENT FOOT, RIGHT;  Surgeon: Trevon Garcia DPM;  Location: [facility];  Service: Podiatry;  Laterality: Right;        Allergies   Allergen Reactions    Azithromycin Nausea And Vomiting     Pt given IV azithro, reported nausea/vomiting and hot flashes within 3-5 minutes of admin. Pt also c/o new abd pain with admin.     Pt given IV azithro, reported nausea/vomiting and hot flashes within 3-5 minutes of admin. Pt also c/o new abd pain with admin.    Penicillins Unknown - Low Severity     reaction as a child. Does not recall reaction.     reaction as a child. Does not recall reaction.  Beta lactam allergy  details  Antibiotic reaction: unknown  Age at reaction: infant  Dose to reaction time: unknown  Reason for antibiotic: unknown  Epinephrine required for reaction?: unknown  Tolerated antibiotics: unknown           Social History     Tobacco Use   Smoking Status Never   Smokeless Tobacco Never        Objective   Physical Exam    Vitals:    03/01/25 0725   BP: 142/76   Pulse: 91   Resp: 16   Temp: 98.2 °F (36.8 °C)   SpO2:         Wound VAC in place, dressing intact.    Labs     Lab Results   Component Value Date    HGBA1C 11.10 (H) 02/13/2025    POCGLU 117 03/01/2025        CBC:      Lab 03/01/25  0312 02/28/25  0353 02/27/25  1037 02/26/25  0606 02/25/25  0348   WBC 9.29 12.17* 11.41* 11.74* 9.19   HEMOGLOBIN 7.9* 8.8* 8.6* 8.4* 8.1*   HEMATOCRIT 24.1* 26.9* 25.1* 25.5* 24.1*   PLATELETS 157 183 199 193 215   NEUTROS ABS 7.02* 9.88* 9.49* 9.27* 7.01*   IMMATURE GRANS (ABS) 0.09* 0.13* 0.14* 0.18* 0.20*   LYMPHS ABS 1.09 0.93 0.80 1.06 1.01   MONOS ABS 0.77 0.87 0.66 0.89 0.71   EOS ABS 0.25 0.28 0.24 0.26 0.19   MCV 88.9 91.8 89.0 89.2 88.0          Results for orders placed or performed during the hospital encounter of 02/11/25   Blood Culture - Blood, Arm, Right    Specimen: Arm, Right; Blood   Result Value Ref Range    Blood Culture No growth at 5 days         FL C Arm During Surgery  This procedure was auto-finalized with no dictation required.       Assessment/Plan     39-year-old male is postop from serial debridements of his right foot with partial first ray amputation    -Patient examined evaluated by myself  - Continue wound VAC therapy, signed order to have patient discharged  - Antibiotics per infectious disease  - No further intervention needed from my standpoint patient will follow-up in the outpatient      Trevon Garcia DPM  Office: [phone]

## 2025-03-01 NOTE — PROGRESS NOTES
"Daily progress note    Primary care physician  Dr. NERI    Subjective   Status post Estevez catheter and doing better with no new complaint and wants to go home    History of present illness  39-year-old male with history of end-stage renal disease on hemodialysis chronic anemia diabetes hypertension hyperlipidemia coronary artery disease and gastroesophageal of disease who is also legally blind and has right foot wound which is getting worse mainly right great toe which looks infected and has recently seen by podiatrist and removed the callus from the right great toe.  Patient has intermittent bleeding pain drainage but no fever chills.  Patient also denies any chest pain shortness of breath palpitation abdominal pain nausea vomiting diarrhea.  Patient workup in ER revealed infected right great toe wound admitted for management.     REVIEW OF SYSTEMS  Unremarkable except pain     PHYSICAL EXAM   Blood pressure 148/70, pulse 90, temperature 97.5 °F (36.4 °C), temperature source Oral, resp. rate 16, height 170.2 cm (67\"), weight 107 kg (234 lb 12.6 oz), SpO2 99%.    Constitutional:       General: He is not in acute distress.     Appearance: Normal appearance. He is not ill-appearing, toxic-appearing or diaphoretic.   HENT:      Head: Normocephalic and atraumatic.      Nose: Nose normal.      Mouth/Throat:      Mouth: Mucous membranes are moist.      Pharynx: Oropharynx is clear.   Eyes:      Conjunctiva/sclera: Conjunctivae normal.      Pupils: Pupils are equal, round, and reactive to light.   Cardiovascular:      Rate and Rhythm: Normal rate and regular rhythm.      Pulses: Normal pulses.   Pulmonary:      Effort: Pulmonary effort is normal.   Abdominal:      General: Abdomen is flat.      Palpations: Abdomen is soft.   Musculoskeletal:      Comments: Right great toe is black in color with a deep ulceration and dried blood, no active purulent drainage, no palpable crepitus or fluctuance   Skin:     General: Skin is " warm and dry.   Neurological:      General: No focal deficit present.      Mental Status: He is alert and oriented to person, place, and time. Mental status is at baseline.   Psychiatric:         Mood and Affect: Mood normal.         Behavior: Behavior normal.         Thought Content: Thought content normal.         Judgment: Judgment normal.      LAB RESULTS  Lab Results (last 24 hours)       Procedure Component Value Units Date/Time    POC Glucose Once [[id number]]  (Normal) Collected: 03/01/25 1051    Specimen: Blood Updated: 03/01/25 1052     Glucose 117 mg/dL     POC Glucose Once [[id number]]  (Abnormal) Collected: 03/01/25 0639    Specimen: Blood Updated: 03/01/25 0641     Glucose 143 mg/dL     Basic Metabolic Panel [[id number]]  (Abnormal) Collected: 03/01/25 0312    Specimen: Blood Updated: 03/01/25 0349     Glucose 179 mg/dL      BUN 21 mg/dL      Creatinine 5.92 mg/dL      Sodium 133 mmol/L      Potassium 4.0 mmol/L      Chloride 96 mmol/L      CO2 27.0 mmol/L      Calcium 8.9 mg/dL      BUN/Creatinine Ratio 3.5     Anion Gap 10.0 mmol/L      eGFR 11.5 mL/min/1.73     Narrative:      GFR Categories in Chronic Kidney Disease (CKD)      GFR Category          GFR (mL/min/1.73)    Interpretation  G1                     90 or greater         Normal or high (1)  G2                      60-89                Mild decrease (1)  G3a                   45-59                Mild to moderate decrease  G3b                   30-44                Moderate to severe decrease  G4                    15-29                Severe decrease  G5                    14 or less           Kidney failure          (1)In the absence of evidence of kidney disease, neither GFR category G1 or G2 fulfill the criteria for CKD.    eGFR calculation 2021 CKD-EPI creatinine equation, which does not include race as a factor    CBC & Differential [[id number]]  (Abnormal) Collected: 03/01/25 0312    Specimen: Blood Updated: 03/01/25 0328    Narrative:       The following orders were created for panel order CBC & Differential.  Procedure                               Abnormality         Status                     ---------                               -----------         ------                     CBC Auto Differential[[id number]]        Abnormal            Final result                 Please view results for these tests on the individual orders.    CBC Auto Differential [[id number]]  (Abnormal) Collected: 03/01/25 0312    Specimen: Blood Updated: 03/01/25 0328     WBC 9.29 10*3/mm3      RBC 2.71 10*6/mm3      Hemoglobin 7.9 g/dL      Hematocrit 24.1 %      MCV 88.9 fL      MCH 29.2 pg      MCHC 32.8 g/dL      RDW 14.1 %      RDW-SD 44.4 fl      MPV 8.3 fL      Platelets 157 10*3/mm3      Neutrophil % 75.5 %      Lymphocyte % 11.7 %      Monocyte % 8.3 %      Eosinophil % 2.7 %      Basophil % 0.8 %      Immature Grans % 1.0 %      Neutrophils, Absolute 7.02 10*3/mm3      Lymphocytes, Absolute 1.09 10*3/mm3      Monocytes, Absolute 0.77 10*3/mm3      Eosinophils, Absolute 0.25 10*3/mm3      Basophils, Absolute 0.07 10*3/mm3      Immature Grans, Absolute 0.09 10*3/mm3      nRBC 0.0 /100 WBC     POC Glucose Once [[id number]]  (Abnormal) Collected: 02/28/25 2032    Specimen: Blood Updated: 02/28/25 2033     Glucose 153 mg/dL     POC Glucose Once [[id number]]  (Normal) Collected: 02/28/25 1410    Specimen: Blood Updated: 02/28/25 1411     Glucose 104 mg/dL           Imaging Results (Last 24 Hours)       ** No results found for the last 24 hours. **            Current Facility-Administered Medications:     aluminum-magnesium hydroxide-simethicone (MAALOX MAX) 400-400-40 MG/5ML suspension 15 mL, 15 mL, Oral, PRN, Lam Gomez MD, 15 mL at 02/24/25 1007    aspirin chewable tablet 81 mg, 81 mg, Oral, Daily, Lam Gomez MD, 81 mg at 03/01/25 0917    atorvastatin (LIPITOR) tablet 10 mg, 10 mg, Oral, Nightly, Lam Gomez MD, 10 mg at 02/28/25 2034     bumetanide (BUMEX) tablet 2 mg, 2 mg, Oral, BID Diuretics, Lam Gomez MD, 2 mg at 03/01/25 0917    calcium carbonate (TUMS) chewable tablet 500 mg (200 mg elemental), 2 tablet, Oral, TID PRN, Lam Gomez MD, 2 tablet at 02/27/25 0930    carvedilol (COREG) tablet 25 mg, 25 mg, Oral, BID, Lam Gomez MD, 25 mg at 03/01/25 0917    clopidogrel (PLAVIX) tablet 75 mg, 75 mg, Oral, Daily, Lam Gomez MD, 75 mg at 03/01/25 0917    dextrose (D50W) (25 g/50 mL) IV injection 25 g, 25 g, Intravenous, Q15 Min PRN, Lam Gomez MD    dextrose (GLUTOSE) oral gel 15 g, 15 g, Oral, Q15 Min PRN, Lam Gomez MD    diphenhydrAMINE (BENADRYL) capsule 50 mg, 50 mg, Oral, Q4H PRN **OR** diphenhydrAMINE (BENADRYL) injection 50 mg, 50 mg, Intravenous, Q6H PRN, Lam Gomez MD    docusate sodium (COLACE) capsule 100 mg, 100 mg, Oral, BID, Lam Gomez MD, 100 mg at 02/28/25 2034    EPINEPHrine (ANAPHYLAXIS) 1 mg/ml injection kit, 0.3 mg, Intramuscular, PRN, Lam Gomez MD    epoetin jamil-epbx (RETACRIT) injection 10,000 Units, 10,000 Units, Subcutaneous, Once per day on Monday Wednesday Friday, Lam Gomez MD, 10,000 Units at 02/28/25 1002    glucagon (GLUCAGEN) injection 1 mg, 1 mg, Intramuscular, Q15 Min PRN, Lam Gomez MD    HYDROcodone-acetaminophen (NORCO) 5-325 MG per tablet 2 tablet, 2 tablet, Oral, Q4H PRN, Lam Gomez MD, 2 tablet at 02/28/25 1942    HYDROmorphone (DILAUDID) injection 1 mg, 1 mg, Intravenous, Q2H PRN, Lam Gomez MD, 1 mg at 03/01/25 1127    insulin glargine (LANTUS, SEMGLEE) injection 42 Units, 42 Units, Subcutaneous, Nightly, Lam Gomez MD, 20 Units at 02/28/25 2034    insulin lispro (HUMALOG/ADMELOG) injection 14 Units, 14 Units, Subcutaneous, TID With Meals, Lam Gomez MD, 14 Units at 03/01/25 0918    insulin lispro (HUMALOG/ADMELOG) injection 2-7  Units, 2-7 Units, Subcutaneous, 4x Daily AC & at Bedtime, Lam Gomez MD, 3 Units at 02/27/25 2121    ondansetron (ZOFRAN) injection 4 mg, 4 mg, Intravenous, Q6H PRN, Lam Gomez MD, 4 mg at 03/01/25 0918    pantoprazole (PROTONIX) EC tablet 40 mg, 40 mg, Oral, BID AC, Lam Gomez MD, 40 mg at 03/01/25 0917    piperacillin-tazobactam (ZOSYN) 3.375 g IVPB in 100 mL NS MBP (CD), 3.375 g, Intravenous, Q12H, Lam Gomez MD, 3.375 g at 03/01/25 0617    polyethylene glycol (MIRALAX) packet 17 g, 17 g, Oral, Daily, Lam Gomez MD    prochlorperazine (COMPAZINE) injection 5 mg, 5 mg, Intravenous, Q6H PRN, Lam Gomez MD, 5 mg at 03/01/25 0626    scopolamine patch 1 mg/72 hr, 1 patch, Transdermal, Q72H, Lam Gomez MD, 1 patch at 02/28/25 2034    sucralfate (CARAFATE) tablet 1 g, 1 g, Oral, 4x Daily AC & at Bedtime, Lam Gomez MD, 1 g at 03/01/25 1126    zolpidem (AMBIEN) tablet 5 mg, 5 mg, Oral, Nightly PRN, Lam Gomez MD, 5 mg at 02/28/25 2306     ASSESSMENT  Right great toe wound with infection and surrounding cellulitis and osteomyelitis status post I&D and hallux amputation followed by partial first right metatarsal excision  End-stage renal disease on hemodialysis  Status post Estevez catheter placement  Diabetes mellitus  Hypertension  Hyperlipidemia  Coronary artery disease  Legally blind  Chronic anemia with drop in H&H  Gastroesophageal reflux disease    PLAN  CPM   Postop care  Continue IV antibiotics per infectious disease  Wound VAC  Pain management   Hemodialysis today after port placement  Infectious disease podiatry and nephrology to follow patient   Continue home medications  Stress ulcer DVT prophylaxis  Supportive care  Discussed with nursing staff family and podiatry  Awaiting wound VAC and outpatient IV antibiotics arrangement    JASKARAN LANGFORD MD    Copied text in this note has been reviewed and is accurate  as of 03/01/25

## 2025-03-01 NOTE — PLAN OF CARE
Goal Outcome Evaluation:   Pain continues to be poorly managed with opiates. Pt reports that he has gotten relief from neuropathic pain using gabapentin in the past.  Wound vac in place with working well.

## 2025-03-02 LAB
ALBUMIN SERPL-MCNC: 3 G/DL (ref 3.5–5.2)
ANION GAP SERPL CALCULATED.3IONS-SCNC: 14 MMOL/L (ref 5–15)
BASOPHILS # BLD MANUAL: 0.11 10*3/MM3 (ref 0–0.2)
BASOPHILS NFR BLD MANUAL: 1.1 % (ref 0–1.5)
BUN SERPL-MCNC: 30 MG/DL (ref 6–20)
BUN/CREAT SERPL: 3.5 (ref 7–25)
CALCIUM SPEC-SCNC: 9.6 MG/DL (ref 8.6–10.5)
CHLORIDE SERPL-SCNC: 93 MMOL/L (ref 98–107)
CO2 SERPL-SCNC: 24 MMOL/L (ref 22–29)
CREAT SERPL-MCNC: 8.66 MG/DL (ref 0.76–1.27)
DEPRECATED RDW RBC AUTO: 46.6 FL (ref 37–54)
EGFRCR SERPLBLD CKD-EPI 2021: 7.3 ML/MIN/1.73
EOSINOPHIL # BLD MANUAL: 0 10*3/MM3 (ref 0–0.4)
EOSINOPHIL NFR BLD MANUAL: 0 % (ref 0.3–6.2)
ERYTHROCYTE [DISTWIDTH] IN BLOOD BY AUTOMATED COUNT: 13.8 % (ref 12.3–15.4)
GLUCOSE BLDC GLUCOMTR-MCNC: 117 MG/DL (ref 70–130)
GLUCOSE BLDC GLUCOMTR-MCNC: 120 MG/DL (ref 70–130)
GLUCOSE BLDC GLUCOMTR-MCNC: 168 MG/DL (ref 70–130)
GLUCOSE BLDC GLUCOMTR-MCNC: 253 MG/DL (ref 70–130)
GLUCOSE BLDC GLUCOMTR-MCNC: 93 MG/DL (ref 70–130)
GLUCOSE SERPL-MCNC: 171 MG/DL (ref 65–99)
HCT VFR BLD AUTO: 28.9 % (ref 37.5–51)
HGB BLD-MCNC: 9.1 G/DL (ref 13–17.7)
LYMPHOCYTES # BLD MANUAL: 0.89 10*3/MM3 (ref 0.7–3.1)
LYMPHOCYTES NFR BLD MANUAL: 8.5 % (ref 5–12)
MAGNESIUM SERPL-MCNC: 2.1 MG/DL (ref 1.6–2.6)
MCH RBC QN AUTO: 29.4 PG (ref 26.6–33)
MCHC RBC AUTO-ENTMCNC: 31.5 G/DL (ref 31.5–35.7)
MCV RBC AUTO: 93.5 FL (ref 79–97)
MONOCYTES # BLD: 0.89 10*3/MM3 (ref 0.1–0.9)
NEUTROPHILS # BLD AUTO: 8.54 10*3/MM3 (ref 1.7–7)
NEUTROPHILS NFR BLD MANUAL: 81.9 % (ref 42.7–76)
PHOSPHATE SERPL-MCNC: 3.8 MG/DL (ref 2.5–4.5)
PLAT MORPH BLD: NORMAL
PLATELET # BLD AUTO: 217 10*3/MM3 (ref 140–450)
PMV BLD AUTO: 8.7 FL (ref 6–12)
POTASSIUM SERPL-SCNC: 4.3 MMOL/L (ref 3.5–5.2)
RBC # BLD AUTO: 3.09 10*6/MM3 (ref 4.14–5.8)
RBC MORPH BLD: NORMAL
SODIUM SERPL-SCNC: 131 MMOL/L (ref 136–145)
VARIANT LYMPHS NFR BLD MANUAL: 8.5 % (ref 19.6–45.3)
WBC MORPH BLD: NORMAL
WBC NRBC COR # BLD AUTO: 10.43 10*3/MM3 (ref 3.4–10.8)

## 2025-03-02 PROCEDURE — 63710000001 INSULIN GLARGINE PER 5 UNITS: Performed by: SURGERY

## 2025-03-02 PROCEDURE — 25010000002 HYDROMORPHONE 1 MG/ML SOLUTION: Performed by: SURGERY

## 2025-03-02 PROCEDURE — 85025 COMPLETE CBC W/AUTO DIFF WBC: CPT | Performed by: HOSPITALIST

## 2025-03-02 PROCEDURE — 25010000002 ONDANSETRON PER 1 MG: Performed by: SURGERY

## 2025-03-02 PROCEDURE — 82948 REAGENT STRIP/BLOOD GLUCOSE: CPT

## 2025-03-02 PROCEDURE — 63710000001 INSULIN LISPRO (HUMAN) PER 5 UNITS: Performed by: SURGERY

## 2025-03-02 PROCEDURE — 85007 BL SMEAR W/DIFF WBC COUNT: CPT | Performed by: HOSPITALIST

## 2025-03-02 PROCEDURE — 25010000002 PIPERACILLIN SOD-TAZOBACTAM PER 1 G: Performed by: SURGERY

## 2025-03-02 PROCEDURE — 80069 RENAL FUNCTION PANEL: CPT | Performed by: INTERNAL MEDICINE

## 2025-03-02 PROCEDURE — 83735 ASSAY OF MAGNESIUM: CPT | Performed by: INTERNAL MEDICINE

## 2025-03-02 PROCEDURE — 25010000002 PROCHLORPERAZINE 10 MG/2ML SOLUTION: Performed by: SURGERY

## 2025-03-02 RX ORDER — MANNITOL 250 MG/ML
12.5 INJECTION, SOLUTION INTRAVENOUS AS NEEDED
Status: CANCELLED | OUTPATIENT
Start: 2025-03-03 | End: 2025-03-03

## 2025-03-02 RX ADMIN — ONDANSETRON 4 MG: 2 INJECTION INTRAMUSCULAR; INTRAVENOUS at 09:36

## 2025-03-02 RX ADMIN — PROCHLORPERAZINE EDISYLATE 5 MG: 5 INJECTION, SOLUTION INTRAMUSCULAR; INTRAVENOUS at 21:19

## 2025-03-02 RX ADMIN — DOCUSATE SODIUM 100 MG: 100 CAPSULE, LIQUID FILLED ORAL at 09:38

## 2025-03-02 RX ADMIN — SUCRALFATE 1 G: 1 TABLET ORAL at 16:32

## 2025-03-02 RX ADMIN — PIPERACILLIN AND TAZOBACTAM 3.38 G: 3; .375 INJECTION, POWDER, FOR SOLUTION INTRAVENOUS at 05:06

## 2025-03-02 RX ADMIN — HYDROMORPHONE HYDROCHLORIDE 1 MG: 1 INJECTION, SOLUTION INTRAMUSCULAR; INTRAVENOUS; SUBCUTANEOUS at 23:33

## 2025-03-02 RX ADMIN — CARVEDILOL 25 MG: 25 TABLET, FILM COATED ORAL at 09:38

## 2025-03-02 RX ADMIN — BUMETANIDE 2 MG: 2 TABLET ORAL at 09:38

## 2025-03-02 RX ADMIN — ZOLPIDEM TARTRATE 5 MG: 5 TABLET, FILM COATED ORAL at 22:20

## 2025-03-02 RX ADMIN — PANTOPRAZOLE SODIUM 40 MG: 40 TABLET, DELAYED RELEASE ORAL at 16:32

## 2025-03-02 RX ADMIN — SUCRALFATE 1 G: 1 TABLET ORAL at 11:14

## 2025-03-02 RX ADMIN — PANTOPRAZOLE SODIUM 40 MG: 40 TABLET, DELAYED RELEASE ORAL at 07:49

## 2025-03-02 RX ADMIN — HYDROMORPHONE HYDROCHLORIDE 1 MG: 1 INJECTION, SOLUTION INTRAMUSCULAR; INTRAVENOUS; SUBCUTANEOUS at 05:06

## 2025-03-02 RX ADMIN — HYDROCODONE BITARTRATE AND ACETAMINOPHEN 2 TABLET: 5; 325 TABLET ORAL at 09:38

## 2025-03-02 RX ADMIN — INSULIN LISPRO 4 UNITS: 100 INJECTION, SOLUTION INTRAVENOUS; SUBCUTANEOUS at 21:19

## 2025-03-02 RX ADMIN — HYDROMORPHONE HYDROCHLORIDE 1 MG: 1 INJECTION, SOLUTION INTRAMUSCULAR; INTRAVENOUS; SUBCUTANEOUS at 21:18

## 2025-03-02 RX ADMIN — CLOPIDOGREL BISULFATE 75 MG: 75 TABLET ORAL at 09:38

## 2025-03-02 RX ADMIN — INSULIN LISPRO 2 UNITS: 100 INJECTION, SOLUTION INTRAVENOUS; SUBCUTANEOUS at 07:50

## 2025-03-02 RX ADMIN — HYDROMORPHONE HYDROCHLORIDE 1 MG: 1 INJECTION, SOLUTION INTRAMUSCULAR; INTRAVENOUS; SUBCUTANEOUS at 18:24

## 2025-03-02 RX ADMIN — DOCUSATE SODIUM 100 MG: 100 CAPSULE, LIQUID FILLED ORAL at 21:18

## 2025-03-02 RX ADMIN — ONDANSETRON 4 MG: 2 INJECTION INTRAMUSCULAR; INTRAVENOUS at 16:33

## 2025-03-02 RX ADMIN — HYDROMORPHONE HYDROCHLORIDE 1 MG: 1 INJECTION, SOLUTION INTRAMUSCULAR; INTRAVENOUS; SUBCUTANEOUS at 12:49

## 2025-03-02 RX ADMIN — ASPIRIN 81 MG CHEWABLE TABLET 81 MG: 81 TABLET CHEWABLE at 09:38

## 2025-03-02 RX ADMIN — PIPERACILLIN AND TAZOBACTAM 3.38 G: 3; .375 INJECTION, POWDER, FOR SOLUTION INTRAVENOUS at 18:23

## 2025-03-02 RX ADMIN — INSULIN LISPRO 14 UNITS: 100 INJECTION, SOLUTION INTRAVENOUS; SUBCUTANEOUS at 07:50

## 2025-03-02 RX ADMIN — PROCHLORPERAZINE EDISYLATE 5 MG: 5 INJECTION, SOLUTION INTRAMUSCULAR; INTRAVENOUS at 04:19

## 2025-03-02 RX ADMIN — BUMETANIDE 2 MG: 2 TABLET ORAL at 18:24

## 2025-03-02 RX ADMIN — ATORVASTATIN CALCIUM 10 MG: 10 TABLET, FILM COATED ORAL at 21:18

## 2025-03-02 RX ADMIN — SUCRALFATE 1 G: 1 TABLET ORAL at 07:49

## 2025-03-02 RX ADMIN — CARVEDILOL 25 MG: 25 TABLET, FILM COATED ORAL at 21:18

## 2025-03-02 RX ADMIN — SUCRALFATE 1 G: 1 TABLET ORAL at 21:18

## 2025-03-02 RX ADMIN — ONDANSETRON 4 MG: 2 INJECTION INTRAMUSCULAR; INTRAVENOUS at 23:33

## 2025-03-02 RX ADMIN — PROCHLORPERAZINE EDISYLATE 5 MG: 5 INJECTION, SOLUTION INTRAMUSCULAR; INTRAVENOUS at 11:09

## 2025-03-02 RX ADMIN — INSULIN GLARGINE 42 UNITS: 100 INJECTION, SOLUTION SUBCUTANEOUS at 21:19

## 2025-03-02 NOTE — PROGRESS NOTES
"  Infectious Diseases Progress Note    Abdiaziz Paz MD     [facility]  [place]: 19 days  Patient Identification:  Name: Wilner Menjivar  Age: 39 y.o.  Sex: male  :  1985  MRN: [id number]         Primary Care Physician: Roosevelt Thao MD        Subjective: Had some nausea earlier but otherwise denies any complaints.  Interval History: See consultation note.  2025: UnderwentHallux amputation, right foot SESAMOIDECTOMY, INCISION AN DRAINAGE  2025: Patient underwent repeat I&D of the right foot and partial first metatarsal excision of the right foot.  2025 underwent partial bone excision of the first metatarsal as well as right foot debridement at the level of bone.  Objective:    Scheduled Meds:aspirin, 81 mg, Oral, Daily  atorvastatin, 10 mg, Oral, Nightly  bumetanide, 2 mg, Oral, BID Diuretics  carvedilol, 25 mg, Oral, BID  clopidogrel, 75 mg, Oral, Daily  docusate sodium, 100 mg, Oral, BID  epoetin jamil/jamil-epbx, 10,000 Units, Subcutaneous, Once per day on   insulin glargine, 42 Units, Subcutaneous, Nightly  insulin lispro, 14 Units, Subcutaneous, TID With Meals  insulin lispro, 2-7 Units, Subcutaneous, 4x Daily AC & at Bedtime  pantoprazole, 40 mg, Oral, BID AC  piperacillin-tazobactam, 3.375 g, Intravenous, Q12H  polyethylene glycol, 17 g, Oral, Daily  Scopolamine, 1 patch, Transdermal, Q72H  sucralfate, 1 g, Oral, 4x Daily AC & at Bedtime      Continuous Infusions:       Vital signs in last 24 hours:  Temp:  [97.5 °F (36.4 °C)-98.6 °F (37 °C)] 97.5 °F (36.4 °C)  Heart Rate:  [81-98] 86  Resp:  [16] 16  BP: (148-172)/(66-91) 172/82    Intake/Output:    Intake/Output Summary (Last 24 hours) at 3/2/2025 1525  Last data filed at 3/1/2025 1802  Gross per 24 hour   Intake --   Output 375 ml   Net -375 ml             Exam:  /82 (BP Location: Right arm, Patient Position: Sitting)   Pulse 86   Temp 97.5 °F (36.4 °C) (Oral)   Resp 16   Ht 170.2 cm (67\")   " Wt 107 kg (234 lb 12.6 oz)   SpO2 100%   BMI 36.77 kg/m²   Patient is examined using the personal protective equipment as per guidelines from infection control for this particular patient as enacted.  Hand washing was performed before and after patient interaction.  General Appearance:  Alert cooperative sitting at the edge of the bed about to eat his dinner.                          Head:    Normocephalic, without obvious abnormality, atraumatic                           Eyes:  Legally blind                         Throat:   Lips, tongue, gums normal; oral mucosa pink and moist                           Neck:   Supple, symmetrical, trachea midline, no JVD                         Lungs:    Clear to auscultation bilaterally, respirations unlabored                 Chest Wall:    No tenderness or deformity                          Heart:  S1-S2 regular                  Abdomen:   Soft nontender                 Extremities: Right foot after debridement and amputation of the great toe is dressed.                  Neurologic: Legally blind       Data Review:    I reviewed the patient's new clinical results.  Results from last 7 days   Lab Units 03/02/25  0429 03/01/25  0312 02/28/25  0353 02/27/25  1037 02/26/25  0606 02/25/25  0348 02/24/25  0517   WBC 10*3/mm3 10.43 9.29 12.17* 11.41* 11.74* 9.19 13.86*   HEMOGLOBIN g/dL 9.1* 7.9* 8.8* 8.6* 8.4* 8.1* 7.0*   PLATELETS 10*3/mm3 217 157 183 199 193 215 262     Results from last 7 days   Lab Units 03/02/25  0429 03/01/25  0312 02/28/25  0353 02/27/25  1037 02/26/25  0606 02/25/25  0348 02/24/25  0517   SODIUM mmol/L 131* 133* 133* 134* 134* 134* 129*   POTASSIUM mmol/L 4.3 4.0 4.4 4.5 4.3 4.5 4.8   CHLORIDE mmol/L 93* 96* 97* 98 98 100 98   CO2 mmol/L 24.0 27.0 25.9 23.0 25.4 23.2 19.7*   BUN mg/dL 30* 21* 33* 27* 23* 38* 40*   CREATININE mg/dL 8.66* 5.92* 7.86* 6.66* 5.37* 6.13* 7.99*   CALCIUM mg/dL 9.6 8.9 9.2 9.0 8.8 8.6 8.3*   GLUCOSE mg/dL 171* 179* 108* 187* 142*  216* 312*   XR Foot 3+ View Right    Result Date: 2/21/2025   Postsurgical changes.    This report was finalized on 2/21/2025 3:43 PM by Dr. Chris Sheets M.D on Workstation: [id number]      XR Foot 3+ View Right    Result Date: 2/18/2025  Postoperative changes from amputation of the great toe at the level of the MTP joint.  This report was finalized on 2/18/2025 9:04 AM by Tom Cha MD on Workstation: [id number]      Doppler Arterial Multi Level Lower Extremity - Bilateral CAR    Addendum Date: 2/13/2025      Right Conclusion: The right GIOVANA is moderately reduced. Waveforms are consistent with femoral disease, popliteal disease and tib-peroneal disease. Normal digital pressures.   Left Conclusion: The left GIOVANA is normal. Normal digital pressures.     MRI Foot Right Without Contrast    Result Date: 2/13/2025  1. Osteomyelitis of the distal shaft and head of the 1st proximal phalanx and of the distal phalanx with overlying soft tissue wound/ulcer. Gas within the soft tissues and marrow of the great toe associated with infection. Tenosynovitis flexor hallucis longus tendon sheath. Forefoot cellulitis. 2. No evidence for septic arthritis at the 1st MTP joint. 3. Muscular edema and atrophy associated with myositis or chronic neuropathy.  This report was finalized on 2/13/2025 7:48 AM by Junior Villalobos M.D on Workstation: [id number]      XR Foot 3+ View Right    Result Date: 2/11/2025   There is subcutaneous emphysema and soft tissue loss over the great toe. No radiopaque foreign body. No definite bone erosion.    This report was finalized on 2/11/2025 5:20 PM by Dr. Jason Dan M.D on Workstation: [id number]     Microbiology Results (last 10 days)       ** No results found for the last 240 hours. **          ECG 12 Lead Chest Pain   Final Result   HEART RATE=95  bpm   RR Owsonnaa=390  ms   NH Orcqxyst=635  ms   P Horizontal Axis=32  deg   P Front Axis=80  deg   QRSD Htxwigsm=346  ms   QT Aadyhxxg=066   ms   ECkS=167  ms   QRS Axis=14  deg   T Wave Axis=95  deg   - ABNORMAL ECG -   Sinus rhythm   Nonspecific  T abnormalities, lateral leads   Borderline  prolonged QT interval   NO PRIOR TRACING AVAILABLE FOR COMPARISON   Electronically Signed By: Julia Gallagher ([facility]) 2025-02-24 16:55:41   Date and Time of Study:2025-02-23 12:30:55      Telemetry Scan   Final Result      Telemetry Scan   Final Result      Telemetry Scan   Final Result      Telemetry Scan   Final Result      Telemetry Scan   Final Result      Telemetry Scan   Final Result      Telemetry Scan   Final Result      Telemetry Scan   Final Result      Telemetry Scan   Final Result      Telemetry Scan   Final Result      Telemetry Scan   Final Result      Telemetry Scan   Final Result      Telemetry Scan   Final Result      Telemetry Scan   Final Result      Telemetry Scan   Final Result      Telemetry Scan   Final Result      Telemetry Scan   Final Result      Telemetry Scan   Final Result      Telemetry Scan   Final Result      Telemetry Scan   Final Result      Telemetry Scan   Final Result      Telemetry Scan   Final Result      Telemetry Scan   Final Result      Telemetry Scan   Final Result      Telemetry Scan   Final Result      Telemetry Scan   Final Result      Telemetry Scan   Final Result      Telemetry Scan   Final Result      Telemetry Scan   Final Result      Telemetry Scan   Final Result      Telemetry Scan   Final Result      Telemetry Scan   Final Result      Telemetry Scan   Final Result      Telemetry Scan   Final Result      Telemetry Scan   Final Result      Telemetry Scan   Final Result      Telemetry Scan   Final Result      Telemetry Scan   Final Result      Telemetry Scan   Final Result      Telemetry Scan   Final Result      Telemetry Scan   Final Result      Telemetry Scan   Final Result      Telemetry Scan   Final Result      Telemetry Scan   Final Result              Assessment:    Diabetic toe ulcer    Osteomyelitis of great  toe of right foot    Diabetic ulcer of right great toe  39-year-old male with  1-right great toe diabetic/ischemic ulcer with dry gangrene with associated abscess and contiguous focus osteomyelitis of the proximal and distal phalanx with associated flexor hallucis tenosynovitis-status post amputation of right great toe, I&D of multiple compartments of the right foot and sesamoidectomy involving tibia-fibula and right foot on 2/18/2025-operative cultures showing gram-negative rods and Gram stain shows gram-positive cocci-identified as Citrobacter freundii and Enterococcus.  2-significant peripheral vascular disease with element of gangrene involving the great toe  3-type 2 diabetes  4-end-stage renal disease  5-legally blind  6-severe anemia multifactorial  7-hypertension  8-multiple antibiotic allergies/intolerances including allergy to penicillin though it could very well be not a true allergy given the description of his reaction.  9-anemia multifactorial status post transfusion  10-penicillin allergy.     Recommendations/Discussions:  See my discussion and recommendation on 2/23/2025  See my discussion and recommendation on 2/24/2025.  See my discussion and recommendation on 2/25 and 2/26/2025.  Dr. Garcia's opinion about his right foot and care plan noted and agree is a difficult situation.  Options of antibiotic treatment include   Ideal-continuation of Zosyn for 6 weeks with dose adjusted to renal function in terms of amount and frequency but would require IV access as there is no documented recommendation for adjustment to accommodate for the dialysis schedule as noted by our colleague pharmacist versus   change antibiotic regimen consisting of vancomycin and cefepime and Flagyl -which she was on prior to initiation of Zosyn-to accommodate for dialysis schedule.    Discussed with patient's caring nurse and recommendations from nephrology service it appears that placement of Estevez catheter is an option  acceptable to our nephrology colleagues.  Discussed with the patient and he wants to proceed with the best regimen based on his culture results to provide him best chance to salvage his right foot.  Once he gets his Estevez catheter arranged we will ask our case management to arrange following antibiotic regimen for out of hospital antibiotic treatment:  Please arrange for 42 days of IV Zosyn starting 2/26/2025 at 3.375 g every 12 hours with end of the treatment would be on 4/9/2025.  Check weekly CBC CMP and CRP and call abnormal results to Dr. Paz med [phone].  Please educate patient and place it in his AVS to call Dr. Paz at [phone] on a weekly basis to go over review of system to monitor antibiotic toxicity and effectiveness of treatment.    Follow-up with foot and ankle specialist per their recommendations.  Please make arrangements with vascular surgery service for removal of Estevez catheter after completion of antibiotic therapy on 4/9/2025.  Diagnosis: Polymicrobial right great toe and right foot diabetic ischemic ulcer with culture positive for Citrobacter freundii Enterococcus and Parvimona Micra  Abdiaziz Paz MD  3/2/2025  15:25 EST    Parts of this note may be an electronic transcription/translation of spoken language to printed text using the Dragon dictation system.

## 2025-03-02 NOTE — PROGRESS NOTES
[facility] Progress Note      Patient Name: Wilner Menjivar  : 1985  MRN: [id number]  Primary Care Physician:  Roosevelt Thao MD  Date of admission: 2025    Subjective     Interval History:   F/u ESRD     Review of Systems:   Underwent RIJ Estevez ; pain minimal  Had HD yesterday via left arm access without problem; 2 L removed  No shortness of breath on room air  Appetite is good; no N/V    Objective     Vitals:   Temp:  [97.5 °F (36.4 °C)-98.6 °F (37 °C)] 97.5 °F (36.4 °C)  Heart Rate:  [] 90  Resp:  [16-18] 16  BP: (132-148)/(50-76) 148/70    Intake/Output Summary (Last 24 hours) at 3/1/2025 1903  Last data filed at 3/1/2025 1802  Gross per 24 hour   Intake --   Output 375 ml   Net -375 ml       Physical Exam:    General Appearance: Pleasant, blind, AAM, NAD, overweight  Neck: RIJ Estevez, no JVD  Lungs: CTA bilat no rales; not labored on RA  Heart: RRR, normal S1 and S2  Abdomen: soft, nontender, nondistended, BS +  Extremities: Trace edema, no cyanosis or clubbing  Vascular: Left arm AV fistula patent      Scheduled Meds:     aspirin, 81 mg, Oral, Daily  atorvastatin, 10 mg, Oral, Nightly  bumetanide, 2 mg, Oral, BID Diuretics  carvedilol, 25 mg, Oral, BID  clopidogrel, 75 mg, Oral, Daily  docusate sodium, 100 mg, Oral, BID  epoetin jamil/jamil-epbx, 10,000 Units, Subcutaneous, Once per day on   insulin glargine, 42 Units, Subcutaneous, Nightly  insulin lispro, 14 Units, Subcutaneous, TID With Meals  insulin lispro, 2-7 Units, Subcutaneous, 4x Daily AC & at Bedtime  pantoprazole, 40 mg, Oral, BID AC  piperacillin-tazobactam, 3.375 g, Intravenous, Q12H  polyethylene glycol, 17 g, Oral, Daily  Scopolamine, 1 patch, Transdermal, Q72H  sucralfate, 1 g, Oral, 4x Daily AC & at Bedtime      IV Meds:          Results Reviewed:   I have personally reviewed the results from the time of this admission to 3/1/2025 19:03 EST     Results from last 7  days   Lab Units 03/01/25  0312 02/28/25  0353 02/27/25  1037   SODIUM mmol/L 133* 133* 134*   POTASSIUM mmol/L 4.0 4.4 4.5   CHLORIDE mmol/L 96* 97* 98   CO2 mmol/L 27.0 25.9 23.0   BUN mg/dL 21* 33* 27*   CREATININE mg/dL 5.92* 7.86* 6.66*   CALCIUM mg/dL 8.9 9.2 9.0   GLUCOSE mg/dL 179* 108* 187*     Estimated Creatinine Clearance: 19.4 mL/min (A) (by C-G formula based on SCr of 5.92 mg/dL (H)).  Results from last 7 days   Lab Units 02/28/25  0353 02/26/25  0606 02/23/25  1105   MAGNESIUM mg/dL  --   --  2.0   PHOSPHORUS mg/dL 3.6 3.8  --          Results from last 7 days   Lab Units 03/01/25  0312 02/28/25  0353 02/27/25  1037 02/26/25  0606 02/25/25  0348   WBC 10*3/mm3 9.29 12.17* 11.41* 11.74* 9.19   HEMOGLOBIN g/dL 7.9* 8.8* 8.6* 8.4* 8.1*   PLATELETS 10*3/mm3 157 183 199 193 215           Assessment / Plan     ASSESSMENT:  ESRD since 6/2024 followed by Dr. Wasserman on IHD MWF schedule via LUE AVF. Been very non compliant with dialysis in hospital, although did run full treatment yesterday.  Volume/lytes stable  Anemia of CKD & ABL. Transfused this hospitalization  Hypertension w/ CKD.  Blood pressure acceptable  Coronary artery disease s/p PCI in Sept 2024  Diabetes Mellitus type 2 w/ complications (retinopathy, peripheral vascular disease  nephropathy). Mgmt per primary team   Right diabetic foot ulcer + osteomyelitis, s/p I&D.  Abx per ID (zosyn through 4/9/25) via [facility]    PLAN:  1.  HD MWF  2.  Zosyn per ID through 4/9/25  3.  Discussed with family members at bedside; hopefully home soon      Sascha Randolph MD  03/01/25  19:03 [facility]    [facility]  [phone]

## 2025-03-02 NOTE — PROGRESS NOTES
[facility] Progress Note      Patient Name: Wilner Menjivar  : 1985  MRN: [id number]  Primary Care Physician:  Roosevelt Thao MD  Date of admission: 2025    Subjective     Interval History:   F/u ESRD   Patient is feeling the same, denies any chest pain or shortness of air, no orthopnea or PND, no nausea or vomiting  Review of Systems:   As noted above    Objective     Vitals:   Temp:  [97.5 °F (36.4 °C)-98.6 °F (37 °C)] 98.1 °F (36.7 °C)  Heart Rate:  [81-98] 81  Resp:  [16] 16  BP: (148-172)/(66-91) 148/66    Intake/Output Summary (Last 24 hours) at 3/2/2025 1111  Last data filed at 3/1/2025 1802  Gross per 24 hour   Intake --   Output 375 ml   Net -375 ml       Physical Exam:    General Appearance: Awake, alert, chronically ill, no acute disc  Neck: RIJ Estevez, no JVD  Lungs: Clear to auscultation, unlabored breathing  Heart: RRR, no rub  Abdomen: soft, nontender, nondistended, BS +  Extremities: Right foot is bandaged with the presence of a wound VAC  Vascular: Left arm AV fistula patent  Neuro: Awake, alert, normal speech and mental status and he is legally blind      Scheduled Meds:     aspirin, 81 mg, Oral, Daily  atorvastatin, 10 mg, Oral, Nightly  bumetanide, 2 mg, Oral, BID Diuretics  carvedilol, 25 mg, Oral, BID  clopidogrel, 75 mg, Oral, Daily  docusate sodium, 100 mg, Oral, BID  epoetin jamil/jamil-epbx, 10,000 Units, Subcutaneous, Once per day on   insulin glargine, 42 Units, Subcutaneous, Nightly  insulin lispro, 14 Units, Subcutaneous, TID With Meals  insulin lispro, 2-7 Units, Subcutaneous, 4x Daily AC & at Bedtime  pantoprazole, 40 mg, Oral, BID AC  piperacillin-tazobactam, 3.375 g, Intravenous, Q12H  polyethylene glycol, 17 g, Oral, Daily  Scopolamine, 1 patch, Transdermal, Q72H  sucralfate, 1 g, Oral, 4x Daily AC & at Bedtime      IV Meds:          Results Reviewed:   I have personally reviewed the results from the time of this  admission to 3/2/2025 11:11 EST     Results from last 7 days   Lab Units 03/02/25 0429 03/01/25 0312 02/28/25  0353   SODIUM mmol/L 131* 133* 133*   POTASSIUM mmol/L 4.3 4.0 4.4   CHLORIDE mmol/L 93* 96* 97*   CO2 mmol/L 24.0 27.0 25.9   BUN mg/dL 30* 21* 33*   CREATININE mg/dL 8.66* 5.92* 7.86*   CALCIUM mg/dL 9.6 8.9 9.2   GLUCOSE mg/dL 171* 179* 108*     Estimated Creatinine Clearance: 13.2 mL/min (A) (by C-G formula based on SCr of 8.66 mg/dL (H)).  Results from last 7 days   Lab Units 03/02/25 0429 02/28/25  0353 02/26/25  0606   MAGNESIUM mg/dL 2.1  --   --    PHOSPHORUS mg/dL 3.8 3.6 3.8         Results from last 7 days   Lab Units 03/02/25 0429 03/01/25 0312 02/28/25  0353 02/27/25  1037 02/26/25  0606   WBC 10*3/mm3 10.43 9.29 12.17* 11.41* 11.74*   HEMOGLOBIN g/dL 9.1* 7.9* 8.8* 8.6* 8.4*   PLATELETS 10*3/mm3 217 157 183 199 193           Assessment / Plan     ASSESSMENT:  ESRD since 6/2024 followed by Dr. Wasserman on IHD MWF schedule via E AVF.  He has not been compliant with his dialysis time as prescribed multiple occasion during hospitalization  Anemia of CKD & ABL.  Hemoglobin today 9.1, improved with transfusion  Hypertension w/ CKD.  Blood pressure acceptable  Coronary artery disease s/p PCI in Sept 2024  Diabetes Mellitus type 2 w/ complications (retinopathy, peripheral vascular disease  nephropathy). Mgmt per primary team   Right diabetic foot ulcer + osteomyelitis, s/p I&D.  Abx per ID (zosyn through 4/9/25) via Estevez    PLAN:  1.  Hemodialysis tomorrow  2.  Zosyn per ID through 4/9/25  3.  Surveillance labs    I reviewed the chart and other providers notes, reviewed labs.  I discussed the case with the patient and he voiced good understanding.  Copied text in this note has been reviewed and is accurate as of 03/02/25.         Lee Mike MD  03/02/25  11:11 [facility]    [facility]  [phone]

## 2025-03-02 NOTE — PROGRESS NOTES
Infectious Diseases Progress Note    Abdiaziz Paz MD     [facility]  [place]: 18 days  Patient Identification:  Name: Wilner Menjivar  Age: 39 y.o.  Sex: male  :  1985  MRN: [id number]         Primary Care Physician: Roosevelt Thao MD        Subjective: Denies any physical complaints.  Discharge is held because of logistics and arrangements for wound VAC which will be delivered only on Monday.  Tolerating Zosyn without any problem.  Right foot feels better.  Denies any fever and chills.  Interval History: See consultation note.  2025: UnderwentHallux amputation, right foot SESAMOIDECTOMY, INCISION AN DRAINAGE  2025: Patient underwent repeat I&D of the right foot and partial first metatarsal excision of the right foot.  2025 underwent partial bone excision of the first metatarsal as well as right foot debridement at the level of bone.  Objective:    Scheduled Meds:aspirin, 81 mg, Oral, Daily  atorvastatin, 10 mg, Oral, Nightly  bumetanide, 2 mg, Oral, BID Diuretics  carvedilol, 25 mg, Oral, BID  clopidogrel, 75 mg, Oral, Daily  docusate sodium, 100 mg, Oral, BID  epoetin jamil/jamil-epbx, 10,000 Units, Subcutaneous, Once per day on   insulin glargine, 42 Units, Subcutaneous, Nightly  insulin lispro, 14 Units, Subcutaneous, TID With Meals  insulin lispro, 2-7 Units, Subcutaneous, 4x Daily AC & at Bedtime  pantoprazole, 40 mg, Oral, BID AC  piperacillin-tazobactam, 3.375 g, Intravenous, Q12H  polyethylene glycol, 17 g, Oral, Daily  Scopolamine, 1 patch, Transdermal, Q72H  sucralfate, 1 g, Oral, 4x Daily AC & at Bedtime      Continuous Infusions:       Vital signs in last 24 hours:  Temp:  [97.5 °F (36.4 °C)-98.4 °F (36.9 °C)] 98.4 °F (36.9 °C)  Heart Rate:  [84-91] 91  Resp:  [16-18] 16  BP: (132-167)/(50-91) 167/91    Intake/Output:    Intake/Output Summary (Last 24 hours) at 3/1/2025 2016  Last data filed at 3/1/2025 1802  Gross per 24 hour   Intake --   Output  "375 ml   Net -375 ml             Exam:  /91 (BP Location: Right arm, Patient Position: Lying)   Pulse 91   Temp 98.4 °F (36.9 °C) (Oral)   Resp 16   Ht 170.2 cm (67\")   Wt 107 kg (234 lb 12.6 oz)   SpO2 99%   BMI 36.77 kg/m²   Patient is examined using the personal protective equipment as per guidelines from infection control for this particular patient as enacted.  Hand washing was performed before and after patient interaction.  General Appearance:  Alert cooperative sitting at the edge of the bed about to eat his dinner.                          Head:    Normocephalic, without obvious abnormality, atraumatic                           Eyes:  Legally blind                         Throat:   Lips, tongue, gums normal; oral mucosa pink and moist                           Neck:   Supple, symmetrical, trachea midline, no JVD                         Lungs:    Clear to auscultation bilaterally, respirations unlabored                 Chest Wall:    No tenderness or deformity                          Heart:  S1-S2 regular                  Abdomen:   Soft nontender                 Extremities: Right foot after debridement and amputation of the great toe is dressed.                  Neurologic: Legally blind       Data Review:    I reviewed the patient's new clinical results.  Results from last 7 days   Lab Units 03/01/25  0312 02/28/25  0353 02/27/25  1037 02/26/25  0606 02/25/25  0348 02/24/25  0517 02/23/25  0346   WBC 10*3/mm3 9.29 12.17* 11.41* 11.74* 9.19 13.86* 16.36*   HEMOGLOBIN g/dL 7.9* 8.8* 8.6* 8.4* 8.1* 7.0* 5.7*   PLATELETS 10*3/mm3 157 183 199 193 215 262 281     Results from last 7 days   Lab Units 03/01/25  0312 02/28/25  0353 02/27/25  1037 02/26/25  0606 02/25/25  0348 02/24/25  0517 02/23/25  1105 02/23/25  0346   SODIUM mmol/L 133* 133* 134* 134* 134* 129*  --  133*   POTASSIUM mmol/L 4.0 4.4 4.5 4.3 4.5 4.8 4.6 4.3   CHLORIDE mmol/L 96* 97* 98 98 100 98  --  100   CO2 mmol/L 27.0 25.9 23.0 " 25.4 23.2 19.7*  --  22.0   BUN mg/dL 21* 33* 27* 23* 38* 40*  --  33*   CREATININE mg/dL 5.92* 7.86* 6.66* 5.37* 6.13* 7.99*  --  6.93*   CALCIUM mg/dL 8.9 9.2 9.0 8.8 8.6 8.3*  --  8.6   GLUCOSE mg/dL 179* 108* 187* 142* 216* 312*  --  123*   XR Foot 3+ View Right    Result Date: 2/21/2025   Postsurgical changes.    This report was finalized on 2/21/2025 3:43 PM by Dr. Chris Sheets M.D on Workstation: [id number]      XR Foot 3+ View Right    Result Date: 2/18/2025  Postoperative changes from amputation of the great toe at the level of the MTP joint.  This report was finalized on 2/18/2025 9:04 AM by Tom Cha MD on Workstation: [id number]      Doppler Arterial Multi Level Lower Extremity - Bilateral CAR    Addendum Date: 2/13/2025      Right Conclusion: The right GIOVANA is moderately reduced. Waveforms are consistent with femoral disease, popliteal disease and tib-peroneal disease. Normal digital pressures.   Left Conclusion: The left GIOVANA is normal. Normal digital pressures.     MRI Foot Right Without Contrast    Result Date: 2/13/2025  1. Osteomyelitis of the distal shaft and head of the 1st proximal phalanx and of the distal phalanx with overlying soft tissue wound/ulcer. Gas within the soft tissues and marrow of the great toe associated with infection. Tenosynovitis flexor hallucis longus tendon sheath. Forefoot cellulitis. 2. No evidence for septic arthritis at the 1st MTP joint. 3. Muscular edema and atrophy associated with myositis or chronic neuropathy.  This report was finalized on 2/13/2025 7:48 AM by Junior Villlaobos M.D on Workstation: [id number]      XR Foot 3+ View Right    Result Date: 2/11/2025   There is subcutaneous emphysema and soft tissue loss over the great toe. No radiopaque foreign body. No definite bone erosion.    This report was finalized on 2/11/2025 5:20 PM by Dr. Jason Dan M.D on Workstation: [id number]     Microbiology Results (last 10 days)       ** No results found  for the last 240 hours. **          ECG 12 Lead Chest Pain   Final Result   HEART RATE=95  bpm   RR Bwuelqgk=290  ms   LA Lrlggbss=761  ms   P Horizontal Axis=32  deg   P Front Axis=80  deg   QRSD Xvhtmvnt=528  ms   QT Peuqqtnb=684  ms   BHaH=571  ms   QRS Axis=14  deg   T Wave Axis=95  deg   - ABNORMAL ECG -   Sinus rhythm   Nonspecific  T abnormalities, lateral leads   Borderline  prolonged QT interval   NO PRIOR TRACING AVAILABLE FOR COMPARISON   Electronically Signed By: Julia Gallagher ([facility]) 2025-02-24 16:55:41   Date and Time of Study:2025-02-23 12:30:55      Telemetry Scan   Final Result      Telemetry Scan   Final Result      Telemetry Scan   Final Result      Telemetry Scan   Final Result      Telemetry Scan   Final Result      Telemetry Scan   Final Result      Telemetry Scan   Final Result      Telemetry Scan   Final Result      Telemetry Scan   Final Result      Telemetry Scan   Final Result      Telemetry Scan   Final Result      Telemetry Scan   Final Result      Telemetry Scan   Final Result      Telemetry Scan   Final Result      Telemetry Scan   Final Result      Telemetry Scan   Final Result      Telemetry Scan   Final Result      Telemetry Scan   Final Result      Telemetry Scan   Final Result      Telemetry Scan   Final Result      Telemetry Scan   Final Result      Telemetry Scan   Final Result      Telemetry Scan   Final Result      Telemetry Scan   Final Result      Telemetry Scan   Final Result      Telemetry Scan   Final Result      Telemetry Scan   Final Result      Telemetry Scan   Final Result      Telemetry Scan   Final Result      Telemetry Scan   Final Result      Telemetry Scan   Final Result      Telemetry Scan   Final Result      Telemetry Scan   Final Result      Telemetry Scan   Final Result      Telemetry Scan   Final Result      Telemetry Scan   Final Result      Telemetry Scan   Final Result      Telemetry Scan   Final Result      Telemetry Scan   Final Result      Telemetry  Scan   Final Result      Telemetry Scan   Final Result      Telemetry Scan   Final Result      Telemetry Scan   Final Result      Telemetry Scan   Final Result              Assessment:    Diabetic toe ulcer    Osteomyelitis of great toe of right foot    Diabetic ulcer of right great toe  39-year-old male with  1-right great toe diabetic/ischemic ulcer with dry gangrene with associated abscess and contiguous focus osteomyelitis of the proximal and distal phalanx with associated flexor hallucis tenosynovitis-status post amputation of right great toe, I&D of multiple compartments of the right foot and sesamoidectomy involving tibia-fibula and right foot on 2/18/2025-operative cultures showing gram-negative rods and Gram stain shows gram-positive cocci-identified as Citrobacter freundii and Enterococcus.  2-significant peripheral vascular disease with element of gangrene involving the great toe  3-type 2 diabetes  4-end-stage renal disease  5-legally blind  6-severe anemia multifactorial  7-hypertension  8-multiple antibiotic allergies/intolerances including allergy to penicillin though it could very well be not a true allergy given the description of his reaction.  9-anemia multifactorial status post transfusion  10-penicillin allergy.     Recommendations/Discussions:  See my discussion and recommendation on 2/23/2025  See my discussion and recommendation on 2/24/2025.  See my discussion and recommendation on 2/25 and 2/26/2025.  Dr. Garcia's opinion about his right foot and care plan noted and agree is a difficult situation.  Options of antibiotic treatment include   Ideal-continuation of Zosyn for 6 weeks with dose adjusted to renal function in terms of amount and frequency but would require IV access as there is no documented recommendation for adjustment to accommodate for the dialysis schedule as noted by our colleague pharmacist versus   change antibiotic regimen consisting of vancomycin and cefepime and Flagyl  -which she was on prior to initiation of Zosyn-to accommodate for dialysis schedule.    Discussed with patient's caring nurse and recommendations from nephrology service it appears that placement of Estevez catheter is an option acceptable to our nephrology colleagues.  Discussed with the patient and he wants to proceed with the best regimen based on his culture results to provide him best chance to salvage his right foot.  Once he gets his Estevez catheter arranged we will ask our case management to arrange following antibiotic regimen for out of hospital antibiotic treatment:  Please arrange for 42 days of IV Zosyn starting 2/26/2025 at 3.375 g every 12 hours with end of the treatment would be on 4/9/2025.  Check weekly CBC CMP and CRP and call abnormal results to Dr. Paz med [phone].  Please educate patient and place it in his AVS to call Dr. Paz at [phone] on a weekly basis to go over review of system to monitor antibiotic toxicity and effectiveness of treatment.    Follow-up with foot and ankle specialist per their recommendations.  Please make arrangements with vascular surgery service for removal of Estevez catheter after completion of antibiotic therapy on 4/9/2025.  Diagnosis: Polymicrobial right great toe and right foot diabetic ischemic ulcer with culture positive for Citrobacter freundii Enterococcus and Parvimona Micra  Abdiaziz Paz MD  3/1/2025  20:16 EST    Parts of this note may be an electronic transcription/translation of spoken language to printed text using the Dragon dictation system.

## 2025-03-02 NOTE — PROGRESS NOTES
"Daily progress note    Primary care physician  Dr. NERI    Subjective   Doing same with no new complaints and awaiting wound VAC arrangement    History of present illness  39-year-old male with history of end-stage renal disease on hemodialysis chronic anemia diabetes hypertension hyperlipidemia coronary artery disease and gastroesophageal of disease who is also legally blind and has right foot wound which is getting worse mainly right great toe which looks infected and has recently seen by podiatrist and removed the callus from the right great toe.  Patient has intermittent bleeding pain drainage but no fever chills.  Patient also denies any chest pain shortness of breath palpitation abdominal pain nausea vomiting diarrhea.  Patient workup in ER revealed infected right great toe wound admitted for management.     REVIEW OF SYSTEMS  Unremarkable except pain     PHYSICAL EXAM   Blood pressure 172/82, pulse 86, temperature 97.5 °F (36.4 °C), temperature source Oral, resp. rate 16, height 170.2 cm (67\"), weight 107 kg (234 lb 12.6 oz), SpO2 100%.    Constitutional:       General: He is not in acute distress.     Appearance: Normal appearance. He is not ill-appearing, toxic-appearing or diaphoretic.   HENT:      Head: Normocephalic and atraumatic.      Nose: Nose normal.      Mouth/Throat:      Mouth: Mucous membranes are moist.      Pharynx: Oropharynx is clear.   Eyes:      Conjunctiva/sclera: Conjunctivae normal.      Pupils: Pupils are equal, round, and reactive to light.   Cardiovascular:      Rate and Rhythm: Normal rate and regular rhythm.      Pulses: Normal pulses.   Pulmonary:      Effort: Pulmonary effort is normal.   Abdominal:      General: Abdomen is flat.      Palpations: Abdomen is soft.   Musculoskeletal:      Comments: Right great toe is black in color with a deep ulceration and dried blood, no active purulent drainage, no palpable crepitus or fluctuance   Skin:     General: Skin is warm and dry. "   Neurological:      General: No focal deficit present.      Mental Status: He is alert and oriented to person, place, and time. Mental status is at baseline.   Psychiatric:         Mood and Affect: Mood normal.         Behavior: Behavior normal.         Thought Content: Thought content normal.         Judgment: Judgment normal.      LAB RESULTS  Lab Results (last 24 hours)       Procedure Component Value Units Date/Time    POC Glucose Once [[id number]]  (Normal) Collected: 03/02/25 1256    Specimen: Blood Updated: 03/02/25 1257     Glucose 93 mg/dL     POC Glucose Once [[id number]]  (Normal) Collected: 03/02/25 1101    Specimen: Blood Updated: 03/02/25 1103     Glucose 117 mg/dL     Manual Differential [[id number]]  (Abnormal) Collected: 03/02/25 0429    Specimen: Blood Updated: 03/02/25 0734     Neutrophil % 81.9 %      Lymphocyte % 8.5 %      Monocyte % 8.5 %      Eosinophil % 0.0 %      Basophil % 1.1 %      Neutrophils Absolute 8.54 10*3/mm3      Lymphocytes Absolute 0.89 10*3/mm3      Monocytes Absolute 0.89 10*3/mm3      Eosinophils Absolute 0.00 10*3/mm3      Basophils Absolute 0.11 10*3/mm3      RBC Morphology Normal     WBC Morphology Normal     Platelet Morphology Normal    CBC & Differential [[id number]]  (Abnormal) Collected: 03/02/25 0429    Specimen: Blood Updated: 03/02/25 0640    Narrative:      The following orders were created for panel order CBC & Differential.  Procedure                               Abnormality         Status                     ---------                               -----------         ------                     CBC Auto Differential[[id number]]        Abnormal            Final result                 Please view results for these tests on the individual orders.    CBC Auto Differential [[id number]]  (Abnormal) Collected: 03/02/25 0429    Specimen: Blood Updated: 03/02/25 0640     WBC 10.43 10*3/mm3      RBC 3.09 10*6/mm3      Hemoglobin 9.1 g/dL      Hematocrit 28.9 %      MCV 93.5  fL      MCH 29.4 pg      MCHC 31.5 g/dL      RDW 13.8 %      RDW-SD 46.6 fl      MPV 8.7 fL      Platelets 217 10*3/mm3     POC Glucose Once [[id number]]  (Abnormal) Collected: 03/02/25 0619    Specimen: Blood Updated: 03/02/25 0620     Glucose 168 mg/dL     Renal Function Panel [[id number]]  (Abnormal) Collected: 03/02/25 0429    Specimen: Blood Updated: 03/02/25 0600     Glucose 171 mg/dL      BUN 30 mg/dL      Creatinine 8.66 mg/dL      Sodium 131 mmol/L      Potassium 4.3 mmol/L      Comment: Slight hemolysis detected by analyzer. Result may be falsely elevated.        Chloride 93 mmol/L      CO2 24.0 mmol/L      Calcium 9.6 mg/dL      Albumin 3.0 g/dL      Phosphorus 3.8 mg/dL      Anion Gap 14.0 mmol/L      BUN/Creatinine Ratio 3.5     eGFR 7.3 mL/min/1.73     Narrative:      GFR Categories in Chronic Kidney Disease (CKD)      GFR Category          GFR (mL/min/1.73)    Interpretation  G1                     90 or greater         Normal or high (1)  G2                      60-89                Mild decrease (1)  G3a                   45-59                Mild to moderate decrease  G3b                   30-44                Moderate to severe decrease  G4                    15-29                Severe decrease  G5                    14 or less           Kidney failure          (1)In the absence of evidence of kidney disease, neither GFR category G1 or G2 fulfill the criteria for CKD.    eGFR calculation 2021 CKD-EPI creatinine equation, which does not include race as a factor    Magnesium [[id number]]  (Normal) Collected: 03/02/25 0429    Specimen: Blood Updated: 03/02/25 0600     Magnesium 2.1 mg/dL     POC Glucose Once [[id number]]  (Abnormal) Collected: 03/01/25 2026    Specimen: Blood Updated: 03/01/25 2029     Glucose 142 mg/dL     POC Glucose Once [[id number]]  (Normal) Collected: 03/01/25 1552    Specimen: Blood Updated: 03/01/25 1554     Glucose 80 mg/dL           Imaging Results (Last 24 Hours)       ** No  results found for the last 24 hours. **            Current Facility-Administered Medications:     aluminum-magnesium hydroxide-simethicone (MAALOX MAX) 400-400-40 MG/5ML suspension 15 mL, 15 mL, Oral, PRN, Lam Gomez MD, 15 mL at 02/24/25 1007    aspirin chewable tablet 81 mg, 81 mg, Oral, Daily, Lam Gomez MD, 81 mg at 03/02/25 0938    atorvastatin (LIPITOR) tablet 10 mg, 10 mg, Oral, Nightly, Lam Gomez MD, 10 mg at 03/01/25 2027    bumetanide (BUMEX) tablet 2 mg, 2 mg, Oral, BID Diuretics, Lam Gomez MD, 2 mg at 03/02/25 0938    calcium carbonate (TUMS) chewable tablet 500 mg (200 mg elemental), 2 tablet, Oral, TID PRN, Lam Gomez MD, 2 tablet at 02/27/25 0930    carvedilol (COREG) tablet 25 mg, 25 mg, Oral, BID, Lam Gomez MD, 25 mg at 03/02/25 0938    clopidogrel (PLAVIX) tablet 75 mg, 75 mg, Oral, Daily, Lam Gomez MD, 75 mg at 03/02/25 0938    dextrose (D50W) (25 g/50 mL) IV injection 25 g, 25 g, Intravenous, Q15 Min PRN, Lam Gomez MD    dextrose (GLUTOSE) oral gel 15 g, 15 g, Oral, Q15 Min PRN, Lam Gomez MD    docusate sodium (COLACE) capsule 100 mg, 100 mg, Oral, BID, Lam Gomez MD, 100 mg at 03/02/25 0938    epoetin jamil-epbx (RETACRIT) injection 10,000 Units, 10,000 Units, Subcutaneous, Once per day on Monday Wednesday Friday, Lam Gomez MD, 10,000 Units at 02/28/25 1002    glucagon (GLUCAGEN) injection 1 mg, 1 mg, Intramuscular, Q15 Min PRN, Lam Gomez MD    HYDROcodone-acetaminophen (NORCO) 5-325 MG per tablet 2 tablet, 2 tablet, Oral, Q4H PRN, Lam Gomez MD, 2 tablet at 03/02/25 0938    HYDROmorphone (DILAUDID) injection 1 mg, 1 mg, Intravenous, Q2H PRN, Lam Gomez MD, 1 mg at 03/02/25 1249    insulin glargine (LANTUS, SEMGLEE) injection 42 Units, 42 Units, Subcutaneous, Nightly, Lam Gomez MD, 20 Units at 03/01/25 6332     insulin lispro (HUMALOG/ADMELOG) injection 14 Units, 14 Units, Subcutaneous, TID With Meals, Lam Gomez MD, 14 Units at 03/02/25 0750    insulin lispro (HUMALOG/ADMELOG) injection 2-7 Units, 2-7 Units, Subcutaneous, 4x Daily AC & at Bedtime, Lam Gomez MD, 2 Units at 03/02/25 0750    ondansetron (ZOFRAN) injection 4 mg, 4 mg, Intravenous, Q6H PRN, Lam Gomez MD, 4 mg at 03/02/25 0936    pantoprazole (PROTONIX) EC tablet 40 mg, 40 mg, Oral, BID AC, Lam Gomez MD, 40 mg at 03/02/25 0749    piperacillin-tazobactam (ZOSYN) 3.375 g IVPB in 100 mL NS MBP (CD), 3.375 g, Intravenous, Q12H, Lam Gomez MD, 3.375 g at 03/02/25 0506    polyethylene glycol (MIRALAX) packet 17 g, 17 g, Oral, Daily, Lam Gomez MD    prochlorperazine (COMPAZINE) injection 5 mg, 5 mg, Intravenous, Q6H PRN, Lam Gomez MD, 5 mg at 03/02/25 1109    scopolamine patch 1 mg/72 hr, 1 patch, Transdermal, Q72H, Lam Gomez MD, 1 patch at 02/28/25 2034    sucralfate (CARAFATE) tablet 1 g, 1 g, Oral, 4x Daily AC & at Bedtime, Lam Gomez MD, 1 g at 03/02/25 1114    zolpidem (AMBIEN) tablet 5 mg, 5 mg, Oral, Nightly PRN, Lam Gomez MD, 5 mg at 03/01/25 2321     ASSESSMENT  Right great toe wound with infection and surrounding cellulitis and osteomyelitis status post I&D and hallux amputation followed by partial first right metatarsal excision  End-stage renal disease on hemodialysis  Status post Estevez catheter placement  Diabetes mellitus  Hypertension  Hyperlipidemia  Coronary artery disease  Legally blind  Chronic anemia with drop in H&H  Gastroesophageal reflux disease    PLAN  CPM   Postop care  Continue IV antibiotics per infectious disease  Wound VAC  Pain management   Hemodialysis today after port placement  Infectious disease podiatry and nephrology to follow patient   Continue home medications  Stress ulcer DVT prophylaxis  Supportive  care  Discussed with nursing staff family and podiatry  Awaiting wound VAC and outpatient IV antibiotics arrangement    JASKARAN LANGFORD MD    Copied text in this note has been reviewed and is accurate as of 03/02/25

## 2025-03-02 NOTE — PLAN OF CARE
Goal Outcome Evaluation:         Patient Alert, oriented x4, RA, Sinus rhythm, VSS, afebrile. C/o Pain to right lower extremity, PRN Dilaudid given multiple times this shift. Patient threw up x2 this shift PRN given and effective. Wound vac dressing in place and sealed, no drainage noted. HD scheduled tomorrow. Plan of care ongoing.

## 2025-03-03 ENCOUNTER — READMISSION MANAGEMENT (OUTPATIENT)
Dept: CALL CENTER | Facility: HOSPITAL | Age: 40
End: 2025-03-03
Payer: MEDICARE

## 2025-03-03 VITALS
DIASTOLIC BLOOD PRESSURE: 98 MMHG | SYSTOLIC BLOOD PRESSURE: 176 MMHG | HEIGHT: 67 IN | WEIGHT: 234.79 LBS | OXYGEN SATURATION: 100 % | RESPIRATION RATE: 16 BRPM | HEART RATE: 93 BPM | TEMPERATURE: 98 F | BODY MASS INDEX: 36.85 KG/M2

## 2025-03-03 LAB
ALBUMIN SERPL-MCNC: 3 G/DL (ref 3.5–5.2)
ANION GAP SERPL CALCULATED.3IONS-SCNC: 11.1 MMOL/L (ref 5–15)
BASOPHILS # BLD AUTO: 0.06 10*3/MM3 (ref 0–0.2)
BASOPHILS NFR BLD AUTO: 0.7 % (ref 0–1.5)
BUN SERPL-MCNC: 36 MG/DL (ref 6–20)
BUN/CREAT SERPL: 3.9 (ref 7–25)
CALCIUM SPEC-SCNC: 9 MG/DL (ref 8.6–10.5)
CHLORIDE SERPL-SCNC: 95 MMOL/L (ref 98–107)
CO2 SERPL-SCNC: 26.9 MMOL/L (ref 22–29)
CREAT SERPL-MCNC: 9.31 MG/DL (ref 0.76–1.27)
DEPRECATED RDW RBC AUTO: 44.5 FL (ref 37–54)
EGFRCR SERPLBLD CKD-EPI 2021: 6.7 ML/MIN/1.73
EOSINOPHIL # BLD AUTO: 0.28 10*3/MM3 (ref 0–0.4)
EOSINOPHIL NFR BLD AUTO: 3.3 % (ref 0.3–6.2)
ERYTHROCYTE [DISTWIDTH] IN BLOOD BY AUTOMATED COUNT: 14.1 % (ref 12.3–15.4)
GLUCOSE BLDC GLUCOMTR-MCNC: 128 MG/DL (ref 70–130)
GLUCOSE BLDC GLUCOMTR-MCNC: 130 MG/DL (ref 70–130)
GLUCOSE SERPL-MCNC: 112 MG/DL (ref 65–99)
HCT VFR BLD AUTO: 24.1 % (ref 37.5–51)
HGB BLD-MCNC: 8 G/DL (ref 13–17.7)
IMM GRANULOCYTES # BLD AUTO: 0.07 10*3/MM3 (ref 0–0.05)
IMM GRANULOCYTES NFR BLD AUTO: 0.8 % (ref 0–0.5)
LYMPHOCYTES # BLD AUTO: 0.91 10*3/MM3 (ref 0.7–3.1)
LYMPHOCYTES NFR BLD AUTO: 10.6 % (ref 19.6–45.3)
MCH RBC QN AUTO: 29.4 PG (ref 26.6–33)
MCHC RBC AUTO-ENTMCNC: 33.2 G/DL (ref 31.5–35.7)
MCV RBC AUTO: 88.6 FL (ref 79–97)
MONOCYTES # BLD AUTO: 0.69 10*3/MM3 (ref 0.1–0.9)
MONOCYTES NFR BLD AUTO: 8 % (ref 5–12)
NEUTROPHILS NFR BLD AUTO: 6.59 10*3/MM3 (ref 1.7–7)
NEUTROPHILS NFR BLD AUTO: 76.6 % (ref 42.7–76)
NRBC BLD AUTO-RTO: 0 /100 WBC (ref 0–0.2)
PHOSPHATE SERPL-MCNC: 4.5 MG/DL (ref 2.5–4.5)
PLATELET # BLD AUTO: 143 10*3/MM3 (ref 140–450)
PMV BLD AUTO: 8.1 FL (ref 6–12)
POTASSIUM SERPL-SCNC: 3.8 MMOL/L (ref 3.5–5.2)
RBC # BLD AUTO: 2.72 10*6/MM3 (ref 4.14–5.8)
SODIUM SERPL-SCNC: 133 MMOL/L (ref 136–145)
WBC NRBC COR # BLD AUTO: 8.6 10*3/MM3 (ref 3.4–10.8)

## 2025-03-03 PROCEDURE — 25010000002 PIPERACILLIN SOD-TAZOBACTAM PER 1 G: Performed by: SURGERY

## 2025-03-03 PROCEDURE — 80069 RENAL FUNCTION PANEL: CPT | Performed by: INTERNAL MEDICINE

## 2025-03-03 PROCEDURE — 63710000001 INSULIN LISPRO (HUMAN) PER 5 UNITS: Performed by: SURGERY

## 2025-03-03 PROCEDURE — 25010000002 EPOETIN ALFA-EPBX 10000 UNIT/ML SOLUTION: Performed by: SURGERY

## 2025-03-03 PROCEDURE — 25010000002 PROCHLORPERAZINE 10 MG/2ML SOLUTION: Performed by: SURGERY

## 2025-03-03 PROCEDURE — 85025 COMPLETE CBC W/AUTO DIFF WBC: CPT | Performed by: INTERNAL MEDICINE

## 2025-03-03 PROCEDURE — 25010000002 HYDROMORPHONE 1 MG/ML SOLUTION: Performed by: SURGERY

## 2025-03-03 PROCEDURE — 25010000002 HYDROMORPHONE 1 MG/ML SOLUTION: Performed by: HOSPITALIST

## 2025-03-03 PROCEDURE — 82948 REAGENT STRIP/BLOOD GLUCOSE: CPT

## 2025-03-03 PROCEDURE — 25010000002 ONDANSETRON PER 1 MG: Performed by: SURGERY

## 2025-03-03 RX ORDER — BUMETANIDE 2 MG/1
2 TABLET ORAL 2 TIMES DAILY
Qty: 60 TABLET | Refills: 0 | Status: SHIPPED | OUTPATIENT
Start: 2025-03-03 | End: 2025-04-02

## 2025-03-03 RX ORDER — ATORVASTATIN CALCIUM 10 MG/1
10 TABLET, FILM COATED ORAL NIGHTLY
Qty: 30 TABLET | Refills: 0 | Status: SHIPPED | OUTPATIENT
Start: 2025-03-03 | End: 2025-04-02

## 2025-03-03 RX ORDER — HEPARIN SODIUM (PORCINE) LOCK FLUSH IV SOLN 100 UNIT/ML 100 UNIT/ML
3 SOLUTION INTRAVENOUS DAILY PRN
Status: DISCONTINUED | OUTPATIENT
Start: 2025-03-03 | End: 2025-03-03

## 2025-03-03 RX ORDER — SODIUM CHLORIDE 0.9 % (FLUSH) 0.9 %
10 SYRINGE (ML) INJECTION EVERY 12 HOURS SCHEDULED
Status: DISCONTINUED | OUTPATIENT
Start: 2025-03-03 | End: 2025-03-03 | Stop reason: HOSPADM

## 2025-03-03 RX ORDER — SODIUM CHLORIDE 0.9 % (FLUSH) 0.9 %
10 SYRINGE (ML) INJECTION AS NEEDED
Status: DISCONTINUED | OUTPATIENT
Start: 2025-03-03 | End: 2025-03-03

## 2025-03-03 RX ORDER — SODIUM CHLORIDE 9 MG/ML
40 INJECTION, SOLUTION INTRAVENOUS AS NEEDED
Status: DISCONTINUED | OUTPATIENT
Start: 2025-03-03 | End: 2025-03-03

## 2025-03-03 RX ORDER — PANTOPRAZOLE SODIUM 40 MG/1
40 TABLET, DELAYED RELEASE ORAL
Qty: 60 TABLET | Refills: 0 | Status: SHIPPED | OUTPATIENT
Start: 2025-03-03 | End: 2025-04-02

## 2025-03-03 RX ORDER — CLOPIDOGREL BISULFATE 75 MG/1
75 TABLET ORAL DAILY
Qty: 30 TABLET | Refills: 0 | Status: SHIPPED | OUTPATIENT
Start: 2025-03-04 | End: 2025-04-03

## 2025-03-03 RX ORDER — SODIUM CHLORIDE 0.9 % (FLUSH) 0.9 %
20 SYRINGE (ML) INJECTION AS NEEDED
Status: DISCONTINUED | OUTPATIENT
Start: 2025-03-03 | End: 2025-03-03

## 2025-03-03 RX ORDER — PSEUDOEPHEDRINE HCL 30 MG
100 TABLET ORAL 2 TIMES DAILY
Qty: 60 CAPSULE | Refills: 0 | Status: SHIPPED | OUTPATIENT
Start: 2025-03-03 | End: 2025-04-02

## 2025-03-03 RX ORDER — SUCRALFATE 1 G/1
1 TABLET ORAL
Qty: 120 TABLET | Refills: 0 | Status: SHIPPED | OUTPATIENT
Start: 2025-03-03 | End: 2025-04-02

## 2025-03-03 RX ORDER — HYDROCODONE BITARTRATE AND ACETAMINOPHEN 5; 325 MG/1; MG/1
2 TABLET ORAL EVERY 4 HOURS PRN
Qty: 20 TABLET | Refills: 0 | Status: SHIPPED | OUTPATIENT
Start: 2025-03-03 | End: 2025-03-10

## 2025-03-03 RX ADMIN — DOCUSATE SODIUM 100 MG: 100 CAPSULE, LIQUID FILLED ORAL at 11:49

## 2025-03-03 RX ADMIN — PANTOPRAZOLE SODIUM 40 MG: 40 TABLET, DELAYED RELEASE ORAL at 05:39

## 2025-03-03 RX ADMIN — HYDROMORPHONE HYDROCHLORIDE 1 MG: 1 INJECTION, SOLUTION INTRAMUSCULAR; INTRAVENOUS; SUBCUTANEOUS at 14:00

## 2025-03-03 RX ADMIN — PROCHLORPERAZINE EDISYLATE 5 MG: 5 INJECTION, SOLUTION INTRAMUSCULAR; INTRAVENOUS at 03:30

## 2025-03-03 RX ADMIN — HYDROCODONE BITARTRATE AND ACETAMINOPHEN 2 TABLET: 5; 325 TABLET ORAL at 11:49

## 2025-03-03 RX ADMIN — ASPIRIN 81 MG CHEWABLE TABLET 81 MG: 81 TABLET CHEWABLE at 11:49

## 2025-03-03 RX ADMIN — ONDANSETRON 4 MG: 2 INJECTION INTRAMUSCULAR; INTRAVENOUS at 11:50

## 2025-03-03 RX ADMIN — INSULIN LISPRO 14 UNITS: 100 INJECTION, SOLUTION INTRAVENOUS; SUBCUTANEOUS at 11:50

## 2025-03-03 RX ADMIN — EPOETIN ALFA-EPBX 10000 UNITS: 10000 INJECTION, SOLUTION INTRAVENOUS; SUBCUTANEOUS at 11:50

## 2025-03-03 RX ADMIN — SUCRALFATE 1 G: 1 TABLET ORAL at 05:39

## 2025-03-03 RX ADMIN — BUMETANIDE 2 MG: 2 TABLET ORAL at 11:49

## 2025-03-03 RX ADMIN — POLYETHYLENE GLYCOL 3350 17 G: 17 POWDER, FOR SOLUTION ORAL at 11:49

## 2025-03-03 RX ADMIN — PROCHLORPERAZINE EDISYLATE 5 MG: 5 INJECTION, SOLUTION INTRAMUSCULAR; INTRAVENOUS at 08:21

## 2025-03-03 RX ADMIN — SUCRALFATE 1 G: 1 TABLET ORAL at 11:49

## 2025-03-03 RX ADMIN — Medication 10 ML: at 11:51

## 2025-03-03 RX ADMIN — CARVEDILOL 25 MG: 25 TABLET, FILM COATED ORAL at 11:49

## 2025-03-03 RX ADMIN — CLOPIDOGREL BISULFATE 75 MG: 75 TABLET ORAL at 11:50

## 2025-03-03 RX ADMIN — PIPERACILLIN AND TAZOBACTAM 3.38 G: 3; .375 INJECTION, POWDER, FOR SOLUTION INTRAVENOUS at 05:39

## 2025-03-03 RX ADMIN — HYDROMORPHONE HYDROCHLORIDE 1 MG: 1 INJECTION, SOLUTION INTRAMUSCULAR; INTRAVENOUS; SUBCUTANEOUS at 09:03

## 2025-03-03 RX ADMIN — HYDROMORPHONE HYDROCHLORIDE 1 MG: 1 INJECTION, SOLUTION INTRAMUSCULAR; INTRAVENOUS; SUBCUTANEOUS at 11:49

## 2025-03-03 RX ADMIN — HYDROMORPHONE HYDROCHLORIDE 1 MG: 1 INJECTION, SOLUTION INTRAMUSCULAR; INTRAVENOUS; SUBCUTANEOUS at 03:30

## 2025-03-03 NOTE — DISCHARGE SUMMARY
Discharge summary    Date of admission 2/11/2025  Date of discharge 3/3/2025    Final diagnosis  Right great toe wound with infection and surrounding cellulitis and osteomyelitis status post I&D and hallux amputation followed by partial first right metatarsal excision  End-stage renal disease on hemodialysis  Status post Estevez catheter placement  Diabetes mellitus  Hypertension  Hyperlipidemia  Coronary artery disease  Legally blind  Chronic anemia   Gastroesophageal reflux disease    Discharge medications    Current Facility-Administered Medications:     aspirin chewable tablet 81 mg, 81 mg, Oral, Daily, Lam Gomez MD, 81 mg at 03/03/25 1149    atorvastatin (LIPITOR) tablet 10 mg, 10 mg, Oral, Nightly, Lam Gomez MD, 10 mg at 03/02/25 2118    bumetanide (BUMEX) tablet 2 mg, 2 mg, Oral, BID Diuretics, Lam Gomez MD, 2 mg at 03/03/25 1149    carvedilol (COREG) tablet 25 mg, 25 mg, Oral, BID, Lam Goemz MD, 25 mg at 03/03/25 1149    clopidogrel (PLAVIX) tablet 75 mg, 75 mg, Oral, Daily, Lam Gomez MD, 75 mg at 03/03/25 1150    docusate sodium (COLACE) capsule 100 mg, 100 mg, Oral, BID, Lam Gomez MD, 100 mg at 03/03/25 1149    epoetin jamil-epbx (RETACRIT) injection 10,000 Units, 10,000 Units, Subcutaneous, Once per day on Monday Wednesday Friday, Lam Gomez MD, 10,000 Units at 03/03/25 1150    HYDROcodone-acetaminophen (NORCO) 5-325 MG per tablet 2 tablet, 2 tablet, Oral, Q4H PRN, Lam Gomez MD, 2 tablet at 03/03/25 1149    insulin glargine (LANTUS, SEMGLEE) injection 42 Units, 42 Units, Subcutaneous, Nightly, Lam Gomez MD, 42 Units at 03/02/25 2119    insulin lispro (HUMALOG/ADMELOG) injection 14 Units, 14 Units, Subcutaneous, TID With Meals, Lam Gomez MD, 14 Units at 03/03/25 1150    insulin lispro (HUMALOG/ADMELOG) injection 2-7 Units, 2-7 Units, Subcutaneous, 4x Daily AC & at Bedtime, Jason  Lam Smith MD, 4 Units at 03/02/25 2119    pantoprazole (PROTONIX) EC tablet 40 mg, 40 mg, Oral, BID AC, Lam Gomez MD, 40 mg at 03/03/25 0539    piperacillin-tazobactam (ZOSYN) 3.375 g IVPB in 100 mL NS MBP (CD), 3.375 g, Intravenous, Q12H, Lam Gomez MD, 3.375 g at 03/03/25 0539    polyethylene glycol (MIRALAX) packet 17 g, 17 g, Oral, Daily, Lam Gomez MD, 17 g at 03/03/25 1149    scopolamine patch 1 mg/72 hr, 1 patch, Transdermal, Q72H, Lam Gomez MD, 1 patch at 02/28/25 2034    sodium chloride 0.9 % flush 10 mL, 10 mL, Intravenous, Q12H, Remy Thornton MD, 10 mL at 03/03/25 1151    sodium chloride 0.9 % flush 10 mL, 10 mL, Intravenous, Q12H, Remy Thornton MD, 10 mL at 03/03/25 1151    sucralfate (CARAFATE) tablet 1 g, 1 g, Oral, 4x Daily AC & at Bedtime, Lam Gomez MD, 1 g at 03/03/25 1149     Consults obtained  Nephrology  Podiatry  Infectious disease  Cardiology  Vascular surgery    Procedures  Hallux amputation right foot incision and drainage multiple compartments right foot sesamoidectomy tibial and fibular right foot on 2/18/2025  Partial first metatarsal excision right foot incision and drainage manage on 2/21/2025  Partial bone excision first metatarsal right foot debridement 2/24/2025  Tunneled central venous catheter incision 2/28/2025    Hospital course  39-year-old -American male with history of end-stage renal disease on hemodialysis chronic anemia diabetes hypertension hyperlipidemia coronary artery disease who is legally blind admitted to emergency room for right foot wound which is worsening mainly in the great toe with drainage.  Patient evaluated in ER and found to have infected right foot wound with surrounding cellulitis and possible osteomyelitis admitted for management.  Patient admitted treated with IV antibiotics local wound care and further workup revealed that he has osteomyelitis and infectious disease consult  podiatry consult obtained.  Patient underwent multiple procedures and remained on IV antibiotics.  Patient wound culture shows gram-negative rods gram-positive cocci identified as Citrobacter and Enterococcus and antibiotic adjusted by infectious disease.  Patient has poor IV access and Estevez catheter placed to complete IV antibiotics as an outpatient.  Patient also having wound VAC which is also arranged.  Patient decided to go home with family support and home health which is arranged.  Patient also followed by nephrology for hemodialysis.  Patient also required blood transfusion during hospitalization.  Patient hemoglobin 8.0 at the time of discharge.  Patient pain well-controlled with current medications.    Discharge diet regular    Activity as tolerated    Medication as above    Follow-up with prime doctor in 1 week and follow-up with cardiology nephrology vascular surgery infectious disease and podiatry per the instructions and take medication as directed and keep the appointment for hemodialysis 3 times a week.    JASKARAN LANGFORD MD

## 2025-03-03 NOTE — CASE MANAGEMENT/SOCIAL WORK
Case Management Discharge Note      Final Note: Home with spouse, [facility], [facility] for wound vac and [facility] for IV Anti. No additional CCP needs.    Provided Post Acute Provider List?: Yes  Delivered To: Support Person  Support Person: Mitali Menjivar-spouse  Method of Delivery: In person    Selected Continued Care - Admitted Since 2/11/2025       Destination    No services have been selected for the patient.                Durable Medical Equipment       Service Provider Services Address Phone Fax Patient Preferred    [facility] [id number] [facility] , [address] [address] [phone] --              Dialysis/Infusion       Service Provider Services Address Phone Fax Patient Preferred    [facility] Therapy [address], [place] [id number] [phone] --              Home Medical Care       Service Provider Services Address Phone Fax Patient Preferred    [facility], Home Rehabilitation, Home Medical , Home Nursing [address] [place] [address], [address], [facility] [address] [phone] --              Therapy    No services have been selected for the patient.                Community Resources    No services have been selected for the patient.                Community & DME    No services have been selected for the patient.                         Final Discharge Disposition Code: 06 - home with home health care

## 2025-03-03 NOTE — SIGNIFICANT NOTE
03/03/25 1132   OTHER   Discipline physical therapist   Rehab Time/Intention   Session Not Performed   (Gone to dialysis then discharging home.  Pt has been up ad salas in room w/ spouse per chart.  DC PT.)

## 2025-03-03 NOTE — CASE MANAGEMENT/SOCIAL WORK
Continued Stay Note  [facility]     Patient Name: Wilner Menjivar  MRN: [id number]  Today's Date: 3/3/2025    Admit Date: 2/11/2025    Plan: Home with [facility] for IV Abx and [facility] for wound vac.   Discharge Plan       Row Name 03/03/25 0746       Plan    Plan Comments Signed wound vac order, surgical notes, dietician notes and MAR faxed to [facility]. Saad SCHNEIDER/CCP                   Discharge Codes    No documentation.                 Expected Discharge Date and Time       Expected Discharge Date Expected Discharge Time    Mar 3, 2025               Una Garcia RN

## 2025-03-03 NOTE — NURSING NOTE
03/03/25 1330   Wound 02/25/25 1610 Right medial foot   Placement Date/Time: 02/25/25 1610   Side: Right  Orientation: medial  Location: foot  Primary Wound Type: Incision   Dressing Appearance dry;intact   Closure None   Base epithelialization;granulating;exposed structure;moist;red   Periwound moist;macerated;swelling   Periwound Temperature warm   Periwound Skin Turgor soft   Edges open   Drainage Characteristics/Odor serosanguineous   Drainage Amount scant   Care, Wound cleansed with;sterile water;negative pressure wound therapy   Dressing Care dressing changed;foam;transparent film   Periwound Care barrier film applied   NPWT (Negative Pressure Wound Therapy) 02/25/25 1610 Rt medial Foot   Placement Date/Time: 02/25/25 1610   Location: Rt medial Foot   Therapy Setting continuous therapy   Dressing foam, black   Contact Layer   (Versatel dressing)   Pressure Setting 125 mmHg   Sponges Inserted 1   Sponges Removed 1   Finger sweep complete Yes     CWCN: : We see patient who is going home today. Hospital's wound vac machine is discontinued, existing dressing removed, area is cleaned, Versatel dressing placed to protect structures, followed by black foam, cover with drape. Track pad attached and connected to the Home health wound vac without difficulty, good seal noted.  Please re-consult for any additional needs.

## 2025-03-03 NOTE — PROGRESS NOTES
"  Infectious Diseases Progress Note    Abdiaziz Paz MD     [facility]  [place]: 20 days  Patient Identification:  Name: Wilner Menjivar  Age: 39 y.o.  Sex: male  :  1985  MRN: [id number]         Primary Care Physician: Roosevelt Thao MD        Subjective: Nausea is improved.  Getting dialysis and feeling overall better.  Hoping to be discharged later today.  Interval History: See consultation note.  2025: UnderwentHallux amputation, right foot SESAMOIDECTOMY, INCISION AN DRAINAGE  2025: Patient underwent repeat I&D of the right foot and partial first metatarsal excision of the right foot.  2025 underwent partial bone excision of the first metatarsal as well as right foot debridement at the level of bone.  Objective:    Scheduled Meds:aspirin, 81 mg, Oral, Daily  atorvastatin, 10 mg, Oral, Nightly  bumetanide, 2 mg, Oral, BID Diuretics  carvedilol, 25 mg, Oral, BID  clopidogrel, 75 mg, Oral, Daily  docusate sodium, 100 mg, Oral, BID  epoetin jamil/jamil-epbx, 10,000 Units, Subcutaneous, Once per day on   insulin glargine, 42 Units, Subcutaneous, Nightly  insulin lispro, 14 Units, Subcutaneous, TID With Meals  insulin lispro, 2-7 Units, Subcutaneous, 4x Daily AC & at Bedtime  pantoprazole, 40 mg, Oral, BID AC  piperacillin-tazobactam, 3.375 g, Intravenous, Q12H  polyethylene glycol, 17 g, Oral, Daily  Scopolamine, 1 patch, Transdermal, Q72H  sucralfate, 1 g, Oral, 4x Daily AC & at Bedtime      Continuous Infusions:       Vital signs in last 24 hours:  Temp:  [97.5 °F (36.4 °C)-98.4 °F (36.9 °C)] 98.1 °F (36.7 °C)  Heart Rate:  [86-94] 91  Resp:  [16-18] 18  BP: (151-193)/() 193/109    Intake/Output:  No intake or output data in the 24 hours ending 25 0908            Exam:  BP (!) 193/109 (BP Location: Right arm, Patient Position: Sitting)   Pulse 91   Temp 98.1 °F (36.7 °C) (Oral)   Resp 18   Ht 170.2 cm (67\")   Wt 107 kg (234 lb 12.6 oz)   SpO2 " 100%   BMI 36.77 kg/m²   Patient is examined using the personal protective equipment as per guidelines from infection control for this particular patient as enacted.  Hand washing was performed before and after patient interaction.  General Appearance:  Alert cooperative sitting at the edge of the bed about to eat his dinner.                          Head:    Normocephalic, without obvious abnormality, atraumatic                           Eyes:  Legally blind                         Throat:   Lips, tongue, gums normal; oral mucosa pink and moist                           Neck:   Supple, symmetrical, trachea midline, no JVD                         Lungs:    Clear to auscultation bilaterally, respirations unlabored                 Chest Wall:    No tenderness or deformity                          Heart:  S1-S2 regular                  Abdomen:   Soft nontender                 Extremities: Right foot after debridement and amputation of the great toe is dressed.                  Neurologic: Legally blind       Data Review:    I reviewed the patient's new clinical results.  Results from last 7 days   Lab Units 03/03/25  0540 03/02/25  0429 03/01/25  0312 02/28/25  0353 02/27/25  1037 02/26/25  0606 02/25/25  0348   WBC 10*3/mm3 8.60 10.43 9.29 12.17* 11.41* 11.74* 9.19   HEMOGLOBIN g/dL 8.0* 9.1* 7.9* 8.8* 8.6* 8.4* 8.1*   PLATELETS 10*3/mm3 143 217 157 183 199 193 215     Results from last 7 days   Lab Units 03/03/25  0540 03/02/25  0429 03/01/25  0312 02/28/25  0353 02/27/25  1037 02/26/25  0606 02/25/25  0348   SODIUM mmol/L 133* 131* 133* 133* 134* 134* 134*   POTASSIUM mmol/L 3.8 4.3 4.0 4.4 4.5 4.3 4.5   CHLORIDE mmol/L 95* 93* 96* 97* 98 98 100   CO2 mmol/L 26.9 24.0 27.0 25.9 23.0 25.4 23.2   BUN mg/dL 36* 30* 21* 33* 27* 23* 38*   CREATININE mg/dL 9.31* 8.66* 5.92* 7.86* 6.66* 5.37* 6.13*   CALCIUM mg/dL 9.0 9.6 8.9 9.2 9.0 8.8 8.6   GLUCOSE mg/dL 112* 171* 179* 108* 187* 142* 216*   XR Foot 3+ View  Right    Result Date: 2/21/2025   Postsurgical changes.    This report was finalized on 2/21/2025 3:43 PM by Dr. Chris Sheets M.D on Workstation: [id number]      XR Foot 3+ View Right    Result Date: 2/18/2025  Postoperative changes from amputation of the great toe at the level of the MTP joint.  This report was finalized on 2/18/2025 9:04 AM by Tom Cha MD on Workstation: [id number]      Doppler Arterial Multi Level Lower Extremity - Bilateral CAR    Addendum Date: 2/13/2025      Right Conclusion: The right GIOVANA is moderately reduced. Waveforms are consistent with femoral disease, popliteal disease and tib-peroneal disease. Normal digital pressures.   Left Conclusion: The left GIOVANA is normal. Normal digital pressures.     MRI Foot Right Without Contrast    Result Date: 2/13/2025  1. Osteomyelitis of the distal shaft and head of the 1st proximal phalanx and of the distal phalanx with overlying soft tissue wound/ulcer. Gas within the soft tissues and marrow of the great toe associated with infection. Tenosynovitis flexor hallucis longus tendon sheath. Forefoot cellulitis. 2. No evidence for septic arthritis at the 1st MTP joint. 3. Muscular edema and atrophy associated with myositis or chronic neuropathy.  This report was finalized on 2/13/2025 7:48 AM by Junior Villalobos M.D on Workstation: [id number]      XR Foot 3+ View Right    Result Date: 2/11/2025   There is subcutaneous emphysema and soft tissue loss over the great toe. No radiopaque foreign body. No definite bone erosion.    This report was finalized on 2/11/2025 5:20 PM by Dr. Jason Dan M.D on Workstation: [id number]     Microbiology Results (last 10 days)       ** No results found for the last 240 hours. **          ECG 12 Lead Chest Pain   Final Result   HEART RATE=95  bpm   RR Sbqyojbz=554  ms   GA Hbrjtlln=407  ms   P Horizontal Axis=32  deg   P Front Axis=80  deg   QRSD Sygnajps=773  ms   QT Vlesfwom=651  ms   WOcK=049  ms   QRS  Axis=14  deg   T Wave Axis=95  deg   - ABNORMAL ECG -   Sinus rhythm   Nonspecific  T abnormalities, lateral leads   Borderline  prolonged QT interval   NO PRIOR TRACING AVAILABLE FOR COMPARISON   Electronically Signed By: Julia Gallagher ([facility]) 2025-02-24 16:55:41   Date and Time of Study:2025-02-23 12:30:55      Telemetry Scan   Final Result      Telemetry Scan   Final Result      Telemetry Scan   Final Result      Telemetry Scan   Final Result      Telemetry Scan   Final Result      Telemetry Scan   Final Result      Telemetry Scan   Final Result      Telemetry Scan   Final Result      Telemetry Scan   Final Result      Telemetry Scan   Final Result      Telemetry Scan   Final Result      Telemetry Scan   Final Result      Telemetry Scan   Final Result      Telemetry Scan   Final Result      Telemetry Scan   Final Result      Telemetry Scan   Final Result      Telemetry Scan   Final Result      Telemetry Scan   Final Result      Telemetry Scan   Final Result      Telemetry Scan   Final Result      Telemetry Scan   Final Result      Telemetry Scan   Final Result      Telemetry Scan   Final Result      Telemetry Scan   Final Result      Telemetry Scan   Final Result      Telemetry Scan   Final Result      Telemetry Scan   Final Result      Telemetry Scan   Final Result      Telemetry Scan   Final Result      Telemetry Scan   Final Result      Telemetry Scan   Final Result      Telemetry Scan   Final Result      Telemetry Scan   Final Result      Telemetry Scan   Final Result      Telemetry Scan   Final Result      Telemetry Scan   Final Result      Telemetry Scan   Final Result      Telemetry Scan   Final Result      Telemetry Scan   Final Result      Telemetry Scan   Final Result      Telemetry Scan   Final Result      Telemetry Scan   Final Result      Telemetry Scan   Final Result      Telemetry Scan   Final Result              Assessment:    Diabetic toe ulcer    Osteomyelitis of great toe of right foot     Diabetic ulcer of right great toe  39-year-old male with  1-right great toe diabetic/ischemic ulcer with dry gangrene with associated abscess and contiguous focus osteomyelitis of the proximal and distal phalanx with associated flexor hallucis tenosynovitis-status post amputation of right great toe, I&D of multiple compartments of the right foot and sesamoidectomy involving tibia-fibula and right foot on 2/18/2025-operative cultures showing gram-negative rods and Gram stain shows gram-positive cocci-identified as Citrobacter freundii and Enterococcus.  2-significant peripheral vascular disease with element of gangrene involving the great toe  3-type 2 diabetes  4-end-stage renal disease  5-legally blind  6-severe anemia multifactorial  7-hypertension  8-multiple antibiotic allergies/intolerances including allergy to penicillin though it could very well be not a true allergy given the description of his reaction.  9-anemia multifactorial status post transfusion  10-penicillin allergy.     Recommendations/Discussions:  See my discussion and recommendation on 2/23/2025  See my discussion and recommendation on 2/24/2025.  See my discussion and recommendation on 2/25 and 2/26/2025.  Dr. Garcia's opinion about his right foot and care plan noted and agree is a difficult situation.  Options of antibiotic treatment include   Ideal-continuation of Zosyn for 6 weeks with dose adjusted to renal function in terms of amount and frequency but would require IV access as there is no documented recommendation for adjustment to accommodate for the dialysis schedule as noted by our colleague pharmacist versus   change antibiotic regimen consisting of vancomycin and cefepime and Flagyl -which she was on prior to initiation of Zosyn-to accommodate for dialysis schedule.    Discussed with patient's caring nurse and recommendations from nephrology service it appears that placement of Estevez catheter is an option acceptable to our  nephrology colleagues.  Discussed with the patient and he wants to proceed with the best regimen based on his culture results to provide him best chance to salvage his right foot.  Once he gets his Estevez catheter arranged we will ask our case management to arrange following antibiotic regimen for out of hospital antibiotic treatment:  Please arrange for 42 days of IV Zosyn starting 2/26/2025 at 3.375 g every 12 hours with end of the treatment would be on 4/9/2025.  Check weekly CBC CMP and CRP and call abnormal results to Dr. Paz med [phone].  Please educate patient and place it in his AVS to call Dr. Paz at [phone] on a weekly basis to go over review of system to monitor antibiotic toxicity and effectiveness of treatment.    Follow-up with foot and ankle specialist per their recommendations.  Please make arrangements with vascular surgery service for removal of Estevez catheter after completion of antibiotic therapy on 4/9/2025.  Diagnosis: Polymicrobial right great toe and right foot diabetic ischemic ulcer with culture positive for Citrobacter freundii Enterococcus and Parvimona Micra  Abdiaziz Paz MD  3/3/2025  09:08 EST    Parts of this note may be an electronic transcription/translation of spoken language to printed text using the Dragon dictation system.

## 2025-03-03 NOTE — CASE MANAGEMENT/SOCIAL WORK
Continued Stay Note  [facility]     Patient Name: Wilner Menjivar  MRN: [id number]  Today's Date: 3/3/2025    Admit Date: 2/11/2025    Plan: Home with spouse, [facility], [facility] for wound vac and [facility] for IV Anti   Discharge Plan       Row Name 03/03/25 1403       Plan    Plan Home with spouse, [facility], [facility] for wound vac and [facility] for IV Anti    Patient/Family in Agreement with Plan yes    Plan Comments [facility] notified CCP pt has been approved for wound vac and can wound vac delivered to bedside today. CCP requested wound vac be delivered around 1pm today. WOCN and RN notified and they will change pts wound vac to [facility] wound vac for DC home. Ngozi/DAJUAN notified of DC today and will have RN reach out to Mitali, pts spouse, to coordinate teaching at the bedside. Lupe/[facility] notified of DC; per Lupe/[facility] pt is on schedule to be seen tomorrow. Saad SCHNEIDER/CCP    Final Discharge Disposition Code 06 - home with home health care    Final Note Home with spouse, [facility], [facility] for wound vac and [facility] for IV Anti. No additional CCP needs.                   Discharge Codes    No documentation.                 Expected Discharge Date and Time       Expected Discharge Date Expected Discharge Time    Mar 3, 2025               Una Garcia RN

## 2025-03-03 NOTE — NURSING NOTE
Dialysis stopped early per pt request, Dr. Mike is aware.  Removed 2.3 liters with this treatment and no complications noted.  Pre and post vitals are in epic.

## 2025-03-03 NOTE — PROGRESS NOTES
[facility] Progress Note      Patient Name: Wilner Menjivar  : 1985  MRN: [id number]  Primary Care Physician:  Roosevelt Thao MD  Date of admission: 2025    Subjective     Interval History:   F/u ESRD   Patient is feeling the same, denies any chest pain or shortness of air, no orthopnea or PND, no nausea or vomiting patient does not want to have the 4-hour dialysis treatment.  Review of Systems:   As noted above    Objective     Vitals:   Temp:  [97.5 °F (36.4 °C)-98.4 °F (36.9 °C)] 98.1 °F (36.7 °C)  Heart Rate:  [86-94] 91  Resp:  [16-18] 18  BP: (151-193)/() 193/109  No intake or output data in the 24 hours ending 25 0941      Physical Exam:    General Appearance: Awake, alert, chronically ill, no acute disc  Neck: RIJ Estevez, no JVD  Lungs: Clear to auscultation, unlabored breathing  Heart: RRR, no rub  Abdomen: soft, nontender, nondistended, BS +  Extremities: Right foot is bandaged with the presence of a wound VAC  Vascular: Left arm AV fistula patent  Neuro: Awake, alert, normal speech and mental status and he is legally blind      Scheduled Meds:     aspirin, 81 mg, Oral, Daily  atorvastatin, 10 mg, Oral, Nightly  bumetanide, 2 mg, Oral, BID Diuretics  carvedilol, 25 mg, Oral, BID  clopidogrel, 75 mg, Oral, Daily  docusate sodium, 100 mg, Oral, BID  epoetin jamil/jamil-epbx, 10,000 Units, Subcutaneous, Once per day on   insulin glargine, 42 Units, Subcutaneous, Nightly  insulin lispro, 14 Units, Subcutaneous, TID With Meals  insulin lispro, 2-7 Units, Subcutaneous, 4x Daily AC & at Bedtime  pantoprazole, 40 mg, Oral, BID AC  piperacillin-tazobactam, 3.375 g, Intravenous, Q12H  polyethylene glycol, 17 g, Oral, Daily  Scopolamine, 1 patch, Transdermal, Q72H  sucralfate, 1 g, Oral, 4x Daily AC & at Bedtime      IV Meds:          Results Reviewed:   I have personally reviewed the results from the time of this admission to 3/3/2025 09:41  EST     Results from last 7 days   Lab Units 03/03/25  0540 03/02/25 0429 03/01/25  0312   SODIUM mmol/L 133* 131* 133*   POTASSIUM mmol/L 3.8 4.3 4.0   CHLORIDE mmol/L 95* 93* 96*   CO2 mmol/L 26.9 24.0 27.0   BUN mg/dL 36* 30* 21*   CREATININE mg/dL 9.31* 8.66* 5.92*   CALCIUM mg/dL 9.0 9.6 8.9   GLUCOSE mg/dL 112* 171* 179*     Estimated Creatinine Clearance: 12.3 mL/min (A) (by C-G formula based on SCr of 9.31 mg/dL (H)).  Results from last 7 days   Lab Units 03/03/25 0540 03/02/25 0429 02/28/25  0353   MAGNESIUM mg/dL  --  2.1  --    PHOSPHORUS mg/dL 4.5 3.8 3.6         Results from last 7 days   Lab Units 03/03/25 0540 03/02/25 0429 03/01/25 0312 02/28/25  0353 02/27/25  1037   WBC 10*3/mm3 8.60 10.43 9.29 12.17* 11.41*   HEMOGLOBIN g/dL 8.0* 9.1* 7.9* 8.8* 8.6*   PLATELETS 10*3/mm3 143 217 157 183 199           Assessment / Plan     ASSESSMENT:  ESRD since 6/2024 followed by Dr. Wasserman on IHD MWF schedule via LUE AVF.  He has not been compliant with his dialysis time as prescribed multiple occasion during hospitalization, potassium today 3.8, sodium 133.  Anemia of CKD & ABL.  Hemoglobin today 8, improved with transfusion  Hypertension w/ CKD.  Blood pressure not well-controlled associate with fluid excess  Coronary artery disease s/p PCI in Sept 2024  Diabetes Mellitus type 2 w/ complications (retinopathy, peripheral vascular disease  nephropathy). Mgmt per primary team   Right diabetic foot ulcer + osteomyelitis, s/p I&D.  Abx per ID (zosyn through 4/9/25) via Estevez    PLAN:  1.  Hemodialysis today and removal of excessive volume, I discussed with him the need to have more compliance with his dialysis.  I changed his dialysate to 4K bath  2.  Zosyn per ID through 4/9/25  3.  He could be discharged from the renal standpoint and will be followed at the dialysis clinic.    I reviewed the chart and other providers notes, reviewed labs.  I discussed the case with the patient and he voiced good  understanding.  I discussed the case with the dialysis nurse.  Copied text in this note has been reviewed and is accurate as of 03/03/25.         Lee Mike MD  03/03/25  09:41 RUST    [facility]  [phone]

## 2025-03-03 NOTE — PLAN OF CARE
Problem: Adult Inpatient Plan of Care  Goal: Plan of Care Review  Outcome: Met  Flowsheets (Taken 3/3/2025 1423)  Progress: improving  Goal: Patient-Specific Goal (Individualized)  Outcome: Met  Goal: Absence of Hospital-Acquired Illness or Injury  Outcome: Met  Intervention: Prevent Skin Injury  Recent Flowsheet Documentation  Taken 3/3/2025 1202 by Natalia Macias RN  Body Position: position changed independently  Taken 3/3/2025 1025 by Natalia Macias RN  Body Position: dangle, side of bed  Taken 3/3/2025 0820 by Natalia Macias RN  Body Position: dangle, side of bed  Skin Protection: incontinence pads utilized  Intervention: Prevent Infection  Recent Flowsheet Documentation  Taken 3/3/2025 1025 by Natalia Macias RN  Infection Prevention: single patient room provided  Goal: Optimal Comfort and Wellbeing  Outcome: Met  Intervention: Provide Person-Centered Care  Recent Flowsheet Documentation  Taken 3/3/2025 0820 by Natalia Macias RN  Trust Relationship/Rapport:   care explained   choices provided   emotional support provided   empathic listening provided   questions answered   questions encouraged   reassurance provided   thoughts/feelings acknowledged  Goal: Readiness for Transition of Care  Outcome: Met     Problem: Comorbidity Management  Goal: Maintenance of Asthma Control  Outcome: Met  Intervention: Maintain Asthma Symptom Control  Recent Flowsheet Documentation  Taken 3/3/2025 1025 by Natalia Macias RN  Medication Review/Management: medications reviewed  Goal: Maintenance of Behavioral Health Symptom Control  Outcome: Met  Intervention: Maintain Behavioral Health Symptom Control  Recent Flowsheet Documentation  Taken 3/3/2025 1025 by Natalia Macias RN  Medication Review/Management: medications reviewed  Goal: Maintenance of COPD Symptom Control  Outcome: Met  Intervention: Maintain COPD (Chronic Obstructive Pulmonary Disease) Symptom Control  Recent Flowsheet Documentation  Taken 3/3/2025 1025 by  Natalia Macias RN  Medication Review/Management: medications reviewed  Goal: Blood Glucose Level Within Target Range  Outcome: Met  Intervention: Monitor and Manage Glycemia  Recent Flowsheet Documentation  Taken 3/3/2025 1025 by Natalia Macias RN  Medication Review/Management: medications reviewed  Goal: Maintenance of Heart Failure Symptom Control  Outcome: Met  Intervention: Maintain Heart Failure Management  Recent Flowsheet Documentation  Taken 3/3/2025 1025 by Natalia Macias RN  Medication Review/Management: medications reviewed  Goal: Blood Pressure in Desired Range  Outcome: Met  Intervention: Maintain Blood Pressure Management  Recent Flowsheet Documentation  Taken 3/3/2025 1025 by Natalia Macias RN  Medication Review/Management: medications reviewed  Goal: Maintenance of Osteoarthritis Symptom Control  Outcome: Met  Intervention: Maintain Osteoarthritis Symptom Control  Recent Flowsheet Documentation  Taken 3/3/2025 1202 by Natalia Macias RN  Activity Management: activity encouraged  Taken 3/3/2025 1025 by Natalia Macias RN  Activity Management: activity encouraged  Medication Review/Management: medications reviewed  Taken 3/3/2025 0820 by Natalia Macias RN  Activity Management: activity encouraged  Goal: Bariatric Home Regimen Maintained  Outcome: Met  Intervention: Maintain and Manage Postbariatric Surgery Care  Recent Flowsheet Documentation  Taken 3/3/2025 1025 by Natalia Macias RN  Medication Review/Management: medications reviewed  Goal: Maintenance of Seizure Control  Outcome: Met  Intervention: Maintain Seizure Symptom Control  Recent Flowsheet Documentation  Taken 3/3/2025 1025 by Natalia Macias RN  Medication Review/Management: medications reviewed  Taken 3/3/2025 0820 by Natalia Macias RN  Sensory Stimulation Regulation: television on  Goal: Maintenance of Asthma Control  Outcome: Met  Intervention: Maintain Asthma Symptom Control  Recent Flowsheet Documentation  Taken 3/3/2025 1025 by  Natalia Macias RN  Medication Review/Management: medications reviewed  Goal: Maintenance of Behavioral Health Symptom Control  Outcome: Met  Intervention: Maintain Behavioral Health Symptom Control  Recent Flowsheet Documentation  Taken 3/3/2025 1025 by Natalia Macias RN  Medication Review/Management: medications reviewed  Goal: Maintenance of COPD Symptom Control  Outcome: Met  Intervention: Maintain COPD (Chronic Obstructive Pulmonary Disease) Symptom Control  Recent Flowsheet Documentation  Taken 3/3/2025 1025 by Natalia Macias RN  Medication Review/Management: medications reviewed  Goal: Blood Glucose Level Within Target Range  Outcome: Met  Intervention: Monitor and Manage Glycemia  Recent Flowsheet Documentation  Taken 3/3/2025 1025 by Natalia Macias RN  Medication Review/Management: medications reviewed  Goal: Maintenance of Heart Failure Symptom Control  Outcome: Met  Intervention: Maintain Heart Failure Management  Recent Flowsheet Documentation  Taken 3/3/2025 1025 by Natalia Macias RN  Medication Review/Management: medications reviewed  Goal: Blood Pressure in Desired Range  Outcome: Met  Intervention: Maintain Blood Pressure Management  Recent Flowsheet Documentation  Taken 3/3/2025 1025 by Natalia Macias RN  Medication Review/Management: medications reviewed  Goal: Maintenance of Osteoarthritis Symptom Control  Outcome: Met  Intervention: Maintain Osteoarthritis Symptom Control  Recent Flowsheet Documentation  Taken 3/3/2025 1202 by Natalia Macias RN  Activity Management: activity encouraged  Taken 3/3/2025 1025 by Natalia Macias RN  Activity Management: activity encouraged  Medication Review/Management: medications reviewed  Taken 3/3/2025 0820 by Natalia Macias RN  Activity Management: activity encouraged  Goal: Bariatric Home Regimen Maintained  Outcome: Met  Intervention: Maintain and Manage Postbariatric Surgery Care  Recent Flowsheet Documentation  Taken 3/3/2025 1025 by Natalia Macias  RN  Medication Review/Management: medications reviewed  Goal: Maintenance of Seizure Control  Outcome: Met  Intervention: Maintain Seizure Symptom Control  Recent Flowsheet Documentation  Taken 3/3/2025 1025 by Natalia Macias RN  Medication Review/Management: medications reviewed  Taken 3/3/2025 0820 by Natalia Macias RN  Sensory Stimulation Regulation: television on     Problem: Skin Injury Risk Increased  Goal: Skin Health and Integrity  Outcome: Met  Intervention: Optimize Skin Protection  Recent Flowsheet Documentation  Taken 3/3/2025 1202 by Natalia Macias RN  Activity Management: activity encouraged  Head of Bed (HOB) Positioning: HOB at 30-45 degrees  Taken 3/3/2025 1025 by Natalia Macias RN  Activity Management: activity encouraged  Head of Bed (HOB) Positioning: HOB at 30-45 degrees  Taken 3/3/2025 0820 by Natalia Macias RN  Activity Management: activity encouraged  Pressure Reduction Techniques: frequent weight shift encouraged  Head of Bed (HOB) Positioning: HOB at 30-45 degrees  Pressure Reduction Devices: alternating pressure pump (MICHEAL)  Skin Protection: incontinence pads utilized     Problem: Pain Acute  Goal: Optimal Pain Control and Function  Outcome: Met  Intervention: Optimize Psychosocial Wellbeing  Recent Flowsheet Documentation  Taken 3/3/2025 0820 by Natalia Macias RN  Supportive Measures: active listening utilized  Diversional Activities:   smartphone   television  Intervention: Prevent or Manage Pain  Recent Flowsheet Documentation  Taken 3/3/2025 1025 by Natalia Macias RN  Medication Review/Management: medications reviewed  Taken 3/3/2025 0820 by Natalia Macias RN  Sensory Stimulation Regulation: television on     Problem: Fall Injury Risk  Goal: Absence of Fall and Fall-Related Injury  Outcome: Met  Intervention: Identify and Manage Contributors  Recent Flowsheet Documentation  Taken 3/3/2025 1025 by Natalia Macias RN  Medication Review/Management: medications reviewed     Problem:  Sepsis/Septic Shock  Goal: Optimal Coping  Outcome: Met  Intervention: Support Patient and Family Response  Recent Flowsheet Documentation  Taken 3/3/2025 0820 by Natalia Macias RN  Supportive Measures: active listening utilized  Family/Support System Care:   self-care encouraged   support provided  Goal: Absence of Bleeding  Outcome: Met  Goal: Blood Glucose Level Within Target Range  Outcome: Met  Goal: Absence of Infection Signs and Symptoms  Outcome: Met  Intervention: Initiate Sepsis Management  Recent Flowsheet Documentation  Taken 3/3/2025 1025 by Natalia Macias RN  Infection Prevention: single patient room provided  Intervention: Promote Stabilization  Recent Flowsheet Documentation  Taken 3/3/2025 0820 by Natalia Macias RN  Fluid/Electrolyte Management: fluids provided  Intervention: Promote Recovery  Recent Flowsheet Documentation  Taken 3/3/2025 1202 by Natalia Macias, RN  Activity Management: activity encouraged  Taken 3/3/2025 1025 by Natalia Macias, RN  Activity Management: activity encouraged  Taken 3/3/2025 0820 by Natalia Macias RN  Activity Management: activity encouraged  Goal: Optimal Nutrition Delivery  Outcome: Met   Goal Outcome Evaluation:           Progress: improving

## 2025-03-03 NOTE — PROGRESS NOTES
"Daily progress note    Primary care physician  Dr. NERI    Subjective   Doing same with no new complaints and just finished hemodialysis    History of present illness  39-year-old male with history of end-stage renal disease on hemodialysis chronic anemia diabetes hypertension hyperlipidemia coronary artery disease and gastroesophageal of disease who is also legally blind and has right foot wound which is getting worse mainly right great toe which looks infected and has recently seen by podiatrist and removed the callus from the right great toe.  Patient has intermittent bleeding pain drainage but no fever chills.  Patient also denies any chest pain shortness of breath palpitation abdominal pain nausea vomiting diarrhea.  Patient workup in ER revealed infected right great toe wound admitted for management.     REVIEW OF SYSTEMS  Unremarkable      PHYSICAL EXAM   Blood pressure 176/98, pulse 93, temperature 98 °F (36.7 °C), temperature source Temporal, resp. rate 16, height 170.2 cm (67\"), weight 107 kg (234 lb 12.6 oz), SpO2 100%.    Constitutional:       General: He is not in acute distress.     Appearance: Normal appearance. He is not ill-appearing, toxic-appearing or diaphoretic.   HENT:      Head: Normocephalic and atraumatic.      Nose: Nose normal.      Mouth/Throat:      Mouth: Mucous membranes are moist.      Pharynx: Oropharynx is clear.   Eyes:      Conjunctiva/sclera: Conjunctivae normal.      Pupils: Pupils are equal, round, and reactive to light.   Cardiovascular:      Rate and Rhythm: Normal rate and regular rhythm.      Pulses: Normal pulses.   Pulmonary:      Effort: Pulmonary effort is normal.   Abdominal:      General: Abdomen is flat.      Palpations: Abdomen is soft.   Musculoskeletal:      Comments: Right great toe is black in color with a deep ulceration and dried blood, no active purulent drainage, no palpable crepitus or fluctuance   Skin:     General: Skin is warm and dry.   Neurological:    "   General: No focal deficit present.      Mental Status: He is alert and oriented to person, place, and time. Mental status is at baseline.   Psychiatric:         Mood and Affect: Mood normal.         Behavior: Behavior normal.         Thought Content: Thought content normal.         Judgment: Judgment normal.      LAB RESULTS  Lab Results (last 24 hours)       Procedure Component Value Units Date/Time    POC Glucose Once [[id number]]  (Normal) Collected: 03/03/25 1129    Specimen: Blood Updated: 03/03/25 1131     Glucose 128 mg/dL     Renal Function Panel [[id number]]  (Abnormal) Collected: 03/03/25 0540    Specimen: Blood Updated: 03/03/25 0719     Glucose 112 mg/dL      BUN 36 mg/dL      Creatinine 9.31 mg/dL      Sodium 133 mmol/L      Potassium 3.8 mmol/L      Chloride 95 mmol/L      CO2 26.9 mmol/L      Calcium 9.0 mg/dL      Albumin 3.0 g/dL      Phosphorus 4.5 mg/dL      Anion Gap 11.1 mmol/L      BUN/Creatinine Ratio 3.9     eGFR 6.7 mL/min/1.73     Narrative:      GFR Categories in Chronic Kidney Disease (CKD)      GFR Category          GFR (mL/min/1.73)    Interpretation  G1                     90 or greater         Normal or high (1)  G2                      60-89                Mild decrease (1)  G3a                   45-59                Mild to moderate decrease  G3b                   30-44                Moderate to severe decrease  G4                    15-29                Severe decrease  G5                    14 or less           Kidney failure          (1)In the absence of evidence of kidney disease, neither GFR category G1 or G2 fulfill the criteria for CKD.    eGFR calculation 2021 CKD-EPI creatinine equation, which does not include race as a factor    CBC & Differential [[id number]]  (Abnormal) Collected: 03/03/25 0540    Specimen: Blood Updated: 03/03/25 0636    Narrative:      The following orders were created for panel order CBC & Differential.  Procedure                                Abnormality         Status                     ---------                               -----------         ------                     CBC Auto Differential[[id number]]        Abnormal            Final result                 Please view results for these tests on the individual orders.    CBC Auto Differential [[id number]]  (Abnormal) Collected: 03/03/25 0540    Specimen: Blood Updated: 03/03/25 0636     WBC 8.60 10*3/mm3      RBC 2.72 10*6/mm3      Hemoglobin 8.0 g/dL      Hematocrit 24.1 %      MCV 88.6 fL      MCH 29.4 pg      MCHC 33.2 g/dL      RDW 14.1 %      RDW-SD 44.5 fl      MPV 8.1 fL      Platelets 143 10*3/mm3      Neutrophil % 76.6 %      Lymphocyte % 10.6 %      Monocyte % 8.0 %      Eosinophil % 3.3 %      Basophil % 0.7 %      Immature Grans % 0.8 %      Neutrophils, Absolute 6.59 10*3/mm3      Lymphocytes, Absolute 0.91 10*3/mm3      Monocytes, Absolute 0.69 10*3/mm3      Eosinophils, Absolute 0.28 10*3/mm3      Basophils, Absolute 0.06 10*3/mm3      Immature Grans, Absolute 0.07 10*3/mm3      nRBC 0.0 /100 WBC     POC Glucose Once [[id number]]  (Normal) Collected: 03/03/25 0600    Specimen: Blood Updated: 03/03/25 0601     Glucose 130 mg/dL     POC Glucose Once [[id number]]  (Abnormal) Collected: 03/02/25 2027    Specimen: Blood Updated: 03/02/25 2029     Glucose 253 mg/dL     POC Glucose Once [[id number]]  (Normal) Collected: 03/02/25 1607    Specimen: Blood Updated: 03/02/25 1608     Glucose 120 mg/dL     POC Glucose Once [[id number]]  (Normal) Collected: 03/02/25 1256    Specimen: Blood Updated: 03/02/25 1257     Glucose 93 mg/dL           Imaging Results (Last 24 Hours)       ** No results found for the last 24 hours. **            Current Facility-Administered Medications:     aspirin chewable tablet 81 mg, 81 mg, Oral, Daily, Lam Gomez MD, 81 mg at 03/03/25 1149    atorvastatin (LIPITOR) tablet 10 mg, 10 mg, Oral, Nightly, Lam Gomez MD, 10 mg at 03/02/25 2118    bumetanide  (BUMEX) tablet 2 mg, 2 mg, Oral, BID Diuretics, Lam Gomez MD, 2 mg at 03/03/25 1149    carvedilol (COREG) tablet 25 mg, 25 mg, Oral, BID, Lam Gomez MD, 25 mg at 03/03/25 1149    clopidogrel (PLAVIX) tablet 75 mg, 75 mg, Oral, Daily, Lam Gomez MD, 75 mg at 03/03/25 1150    docusate sodium (COLACE) capsule 100 mg, 100 mg, Oral, BID, Lam Gomez MD, 100 mg at 03/03/25 1149    epoetin jamil-epbx (RETACRIT) injection 10,000 Units, 10,000 Units, Subcutaneous, Once per day on Monday Wednesday Friday, Lam Gomez MD, 10,000 Units at 03/03/25 1150    HYDROcodone-acetaminophen (NORCO) 5-325 MG per tablet 2 tablet, 2 tablet, Oral, Q4H PRN, Lam Gomez MD, 2 tablet at 03/03/25 1149    insulin glargine (LANTUS, SEMGLEE) injection 42 Units, 42 Units, Subcutaneous, Nightly, Lam Gomez MD, 42 Units at 03/02/25 2119    insulin lispro (HUMALOG/ADMELOG) injection 14 Units, 14 Units, Subcutaneous, TID With Meals, Lam Gomez MD, 14 Units at 03/03/25 1150    insulin lispro (HUMALOG/ADMELOG) injection 2-7 Units, 2-7 Units, Subcutaneous, 4x Daily AC & at Bedtime, Lam Gomez MD, 4 Units at 03/02/25 2119    pantoprazole (PROTONIX) EC tablet 40 mg, 40 mg, Oral, BID AC, Lam Gomez MD, 40 mg at 03/03/25 0539    piperacillin-tazobactam (ZOSYN) 3.375 g IVPB in 100 mL NS MBP (CD), 3.375 g, Intravenous, Q12H, Lam Gomez MD, 3.375 g at 03/03/25 0539    polyethylene glycol (MIRALAX) packet 17 g, 17 g, Oral, Daily, Lam Gomez MD, 17 g at 03/03/25 1149    scopolamine patch 1 mg/72 hr, 1 patch, Transdermal, Q72H, Lam Gomez MD, 1 patch at 02/28/25 2034    sodium chloride 0.9 % flush 10 mL, 10 mL, Intravenous, Q12H, Remy Thornton MD, 10 mL at 03/03/25 1151    sodium chloride 0.9 % flush 10 mL, 10 mL, Intravenous, Q12H, Remy Thornton MD, 10 mL at 03/03/25 1151    sucralfate (CARAFATE) tablet 1 g, 1 g,  Oral, 4x Daily AC & at Bedtime, Lam Gomez MD, 1 g at 03/03/25 1149      ASSESSMENT  Right great toe wound with infection and surrounding cellulitis and osteomyelitis status post I&D and hallux amputation followed by partial first right metatarsal excision  End-stage renal disease on hemodialysis  Status post Estevez catheter placement  Diabetes mellitus  Hypertension  Hyperlipidemia  Coronary artery disease  Legally blind  Chronic anemia   Gastroesophageal reflux disease    PLAN  Discharge home on IV antibiotics and wound VAC  Discharge summary dictated    JASKARAN LANGFORD MD    Copied text in this note has been reviewed and is accurate as of 03/03/25

## 2025-03-04 LAB — FUNGUS WND CULT: NORMAL

## 2025-03-04 NOTE — OUTREACH NOTE
Prep Survey      Flowsheet Row Responses   [facility] facility patient discharged from? [place]   Is LACE score < 7 ? No   Eligibility Readm Mgmt   Discharge diagnosis Right great toe wound with infection and surrounding cellulitis and osteomyelitis status post I&D and hallux amputation followed by partial first right metatarsal excision   Does the patient have one of the following disease processes/diagnoses(primary or secondary)? General Surgery   Does the patient have Home health ordered? Yes   [facility]   Is there a DME ordered? Yes   What DME was ordered? [facility] -n wound vac   Prep survey completed? Yes            N[facility] L[facility] - Registered Nurse

## 2025-03-05 ENCOUNTER — TELEPHONE (OUTPATIENT)
Age: 40
End: 2025-03-05
Payer: MEDICARE

## 2025-03-05 NOTE — TELEPHONE ENCOUNTER
Aggie with [facility] called regarding patient's gentile cath. Stated that she had done a dressing change and notice the patient has two sutures that are no longer attached or holding anything. She stated that she is not sure what to do about them, but wanted to see if these sutures would be removed at patient's appointment on Friday.     Aggie's call back is [phone].

## 2025-03-06 NOTE — TELEPHONE ENCOUNTER
Spoke to Aggie at [facility] and she was informed ok for pt to wait for appt on 3/7 to look at sutures, she reports catheter is secure.

## 2025-03-07 ENCOUNTER — OFFICE VISIT (OUTPATIENT)
Age: 40
End: 2025-03-07
Payer: MEDICARE

## 2025-03-07 VITALS
WEIGHT: 234 LBS | DIASTOLIC BLOOD PRESSURE: 78 MMHG | HEART RATE: 92 BPM | HEIGHT: 67 IN | BODY MASS INDEX: 36.73 KG/M2 | SYSTOLIC BLOOD PRESSURE: 137 MMHG

## 2025-03-07 DIAGNOSIS — I73.9 PERIPHERAL ARTERIAL DISEASE: Primary | ICD-10-CM

## 2025-03-07 NOTE — PROGRESS NOTES
"Chief Complaint  Post-op Follow-up    Follow-up for peripheral arterial disease     Subjective        Wilner Menjivar presents to [facility] VASCULAR SURGERY  History of Present Illness    Patient is a pleasant 39-year-old man with diabetes, end-stage renal disease on dialysis via left arm arteriovenous fistula created by Dr. Buckley at [place] last year, peripheral arterial disease with gangrene of his right first toe status post first toe amputation by Dr. Garcia last month.    Patient required a tunneled central line for long term antibiotics and that was placed by Dr. Gomez.    He is here today for follow-up for peripheral arterial disease.  He has no acute complaints today.    Objective   Vital Signs:  /78   Pulse 92   Ht 170.2 cm (67\")   Wt 106 kg (234 lb)   BMI 36.65 kg/m²   Estimated body mass index is 36.65 kg/m² as calculated from the following:    Height as of this encounter: 170.2 cm (67\").    Weight as of this encounter: 106 kg (234 lb).                   Physical Exam   Left arm fistula with excellent thrill  Right foot with dressing in place.  Patient requested I not remove it.  He reports Dr. Garcia was happy with the appearance of the wound yesterday.  Result Review :                         Assessment and Plan     39-year-old man with end-stage renal disease on dialysis, diabetes, peripheral arterial disease status post right first toe amputation.  He was initially seen as an inpatient consult last month but no intervention recommended due to toe pressures suggesting adequate perfusion for wound healing.  May be falsely elevated due to medial calcinosis.  Wound reportedly healing well so far.  Patient requested I not look at it today.  Will plan to see him again in 6 months with repeat ABIs with toe pressures.  Regarding patient's dialysis access, this is created by Dr. Buckley.  He has been followed with Dr. Muñiz for nephrology.  Will be happy to help with " dialysis access in the future if needed.     I encouraged patient to call our office to schedule tunneled catheter removal when he is done with his IV antibiotics.    Continue aspirin, Plavix, statin.    Diagnoses and all orders for this visit:    1. Peripheral arterial disease (Primary)  -     Doppler Arterial Multi Level Lower Extremity - Bilateral CAR; Future              Patient BMI noted. Educational material for patient for health risks of being overweight added to patient's after visit summary.           Follow Up     Return in about 6 months (around 9/7/2025).  Patient was given instructions and counseling regarding his condition or for health maintenance advice. Please see specific information pulled into the AVS if appropriate.

## 2025-03-10 ENCOUNTER — TELEPHONE (OUTPATIENT)
Age: 40
End: 2025-03-10
Payer: MEDICARE

## 2025-03-10 NOTE — TELEPHONE ENCOUNTER
Spoke with Aggie. Pt has a Gentile catheter for IV abx. The sutures anchoring the gentile to the skin are no longer attached to the skin. These sutures are ok to be removed, verbal order given.

## 2025-03-12 ENCOUNTER — READMISSION MANAGEMENT (OUTPATIENT)
Dept: CALL CENTER | Facility: HOSPITAL | Age: 40
End: 2025-03-12
Payer: MEDICARE

## 2025-03-12 NOTE — OUTREACH NOTE
General Surgery Week 1 Survey      Flowsheet Row Responses   [facility] patient discharged from? [place]   Does the patient have one of the following disease processes/diagnoses(primary or secondary)? General Surgery   Week 1 attempt successful? Yes   Call start time 1523   Call end time 1528   Discharge diagnosis Right great toe wound with infection and surrounding cellulitis and osteomyelitis status post I&D and hallux amputation followed by partial first right metatarsal excision   Meds reviewed with patient/caregiver? Yes   Is the patient having any side effects they believe may be caused by any medication additions or changes? No   Does the patient have all medications related to this admission filled (includes all antibiotics, pain medications, etc.) Yes   Is the patient taking all medications as directed (includes completed medication regime)? Yes   Does the patient have a follow up appointment scheduled with their surgeon? Yes   What is the Home health agency?  [facility] ,   Has home health visited the patient within 72 hours of discharge? Yes   Home health comments HH is coming three times a week   What DME was ordered? [facility] [employer] -n wound vac   Did the patient receive a copy of their discharge instructions? Yes   Nursing interventions Reviewed instructions with patient   What is the patient's perception of their health status since discharge? Improving   Is the patient /caregiver able to teach back basic post-op care? Practice 'cough and deep breath', Drive as instructed by MD in discharge instructions, Take showers only when approved by MD-sponge bathe until then, No tub bath, swimming, or hot tub until instructed by MD, Keep incision areas clean,dry and protected, Do not remove steri-strips   Is the patient/caregiver able to teach back signs and symptoms of incisional infection? Increased redness, swelling or pain at the incisonal site, Incisional warmth, Increased drainage or bleeding, Pus or  odor from incision, Fever   Is the patient/caregiver able to teach back the hierarchy of who to call/visit for symptoms/problems? PCP, Specialist, Home health nurse, Urgent Care, ED, 911 Yes   Week 1 call completed? Yes   Graduated Yes   Is the patient interested in additional calls from an ambulatory ? No   Would this patient benefit from a Referral to [facility] Social Work? No   Wrap up additional comments Patient reports doing well. No questions or concerns at this time. Patient states he follows up with surgeon tomorrow and HH is coming three times a week.   Call end time 1528            NEYDA KENNEY - Registered Nurse

## 2025-03-15 LAB — FUNGUS WND CULT: NORMAL

## 2025-03-18 ENCOUNTER — APPOINTMENT (OUTPATIENT)
Dept: GENERAL RADIOLOGY | Facility: HOSPITAL | Age: 40
End: 2025-03-18
Payer: MEDICARE

## 2025-03-18 ENCOUNTER — HOSPITAL ENCOUNTER (OUTPATIENT)
Facility: HOSPITAL | Age: 40
Setting detail: OBSERVATION
Discharge: HOME | End: 2025-03-21
Attending: EMERGENCY MEDICINE | Admitting: HOSPITALIST
Payer: MEDICARE

## 2025-03-18 DIAGNOSIS — R06.02 SHORTNESS OF BREATH: Primary | ICD-10-CM

## 2025-03-18 DIAGNOSIS — I16.0 HYPERTENSIVE URGENCY: ICD-10-CM

## 2025-03-18 DIAGNOSIS — R07.9 CHEST PAIN, UNSPECIFIED TYPE: ICD-10-CM

## 2025-03-18 DIAGNOSIS — R79.89 ELEVATED TROPONIN: ICD-10-CM

## 2025-03-18 DIAGNOSIS — Z99.2 ESRD ON HEMODIALYSIS: ICD-10-CM

## 2025-03-18 DIAGNOSIS — I50.23 ACUTE ON CHRONIC HFREF (HEART FAILURE WITH REDUCED EJECTION FRACTION): ICD-10-CM

## 2025-03-18 DIAGNOSIS — N18.6 ESRD ON HEMODIALYSIS: ICD-10-CM

## 2025-03-18 LAB
ALBUMIN SERPL-MCNC: 3.6 G/DL (ref 3.5–5.2)
ALBUMIN/GLOB SERPL: 1 G/DL
ALP SERPL-CCNC: 126 U/L (ref 39–117)
ALT SERPL W P-5'-P-CCNC: 14 U/L (ref 1–41)
ANION GAP SERPL CALCULATED.3IONS-SCNC: 11 MMOL/L (ref 5–15)
AST SERPL-CCNC: 19 U/L (ref 1–40)
B PARAPERT DNA SPEC QL NAA+PROBE: NOT DETECTED
B PERT DNA SPEC QL NAA+PROBE: NOT DETECTED
BASOPHILS # BLD AUTO: 0.07 10*3/MM3 (ref 0–0.2)
BASOPHILS NFR BLD AUTO: 0.6 % (ref 0–1.5)
BILIRUB SERPL-MCNC: 0.6 MG/DL (ref 0–1.2)
BUN SERPL-MCNC: 28 MG/DL (ref 6–20)
BUN/CREAT SERPL: 5.3 (ref 7–25)
C PNEUM DNA NPH QL NAA+NON-PROBE: NOT DETECTED
CALCIUM SPEC-SCNC: 9.2 MG/DL (ref 8.6–10.5)
CHLORIDE SERPL-SCNC: 99 MMOL/L (ref 98–107)
CO2 SERPL-SCNC: 24 MMOL/L (ref 22–29)
CREAT SERPL-MCNC: 5.26 MG/DL (ref 0.76–1.27)
D-LACTATE SERPL-SCNC: 1.5 MMOL/L (ref 0.5–2)
DEPRECATED RDW RBC AUTO: 49.6 FL (ref 37–54)
EGFRCR SERPLBLD CKD-EPI 2021: 13.2 ML/MIN/1.73
EOSINOPHIL # BLD AUTO: 0.08 10*3/MM3 (ref 0–0.4)
EOSINOPHIL NFR BLD AUTO: 0.7 % (ref 0.3–6.2)
ERYTHROCYTE [DISTWIDTH] IN BLOOD BY AUTOMATED COUNT: 14.9 % (ref 12.3–15.4)
FLUAV SUBTYP SPEC NAA+PROBE: NOT DETECTED
FLUBV RNA ISLT QL NAA+PROBE: NOT DETECTED
GEN 5 1HR TROPONIN T REFLEX: 260 NG/L
GLOBULIN UR ELPH-MCNC: 3.6 GM/DL
GLUCOSE SERPL-MCNC: 173 MG/DL (ref 65–99)
HADV DNA SPEC NAA+PROBE: NOT DETECTED
HCOV 229E RNA SPEC QL NAA+PROBE: NOT DETECTED
HCOV HKU1 RNA SPEC QL NAA+PROBE: NOT DETECTED
HCOV NL63 RNA SPEC QL NAA+PROBE: NOT DETECTED
HCOV OC43 RNA SPEC QL NAA+PROBE: NOT DETECTED
HCT VFR BLD AUTO: 28.6 % (ref 37.5–51)
HGB BLD-MCNC: 9.1 G/DL (ref 13–17.7)
HMPV RNA NPH QL NAA+NON-PROBE: NOT DETECTED
HOLD SPECIMEN: NORMAL
HOLD SPECIMEN: NORMAL
HPIV1 RNA ISLT QL NAA+PROBE: NOT DETECTED
HPIV2 RNA SPEC QL NAA+PROBE: NOT DETECTED
HPIV3 RNA NPH QL NAA+PROBE: NOT DETECTED
HPIV4 P GENE NPH QL NAA+PROBE: NOT DETECTED
IMM GRANULOCYTES # BLD AUTO: 0.07 10*3/MM3 (ref 0–0.05)
IMM GRANULOCYTES NFR BLD AUTO: 0.6 % (ref 0–0.5)
LYMPHOCYTES # BLD AUTO: 0.37 10*3/MM3 (ref 0.7–3.1)
LYMPHOCYTES NFR BLD AUTO: 3.4 % (ref 19.6–45.3)
M PNEUMO IGG SER IA-ACNC: NOT DETECTED
MCH RBC QN AUTO: 29.1 PG (ref 26.6–33)
MCHC RBC AUTO-ENTMCNC: 31.8 G/DL (ref 31.5–35.7)
MCV RBC AUTO: 91.4 FL (ref 79–97)
MONOCYTES # BLD AUTO: 0.83 10*3/MM3 (ref 0.1–0.9)
MONOCYTES NFR BLD AUTO: 7.6 % (ref 5–12)
NEUTROPHILS NFR BLD AUTO: 87.1 % (ref 42.7–76)
NEUTROPHILS NFR BLD AUTO: 9.53 10*3/MM3 (ref 1.7–7)
NRBC BLD AUTO-RTO: 0 /100 WBC (ref 0–0.2)
NT-PROBNP SERPL-MCNC: ABNORMAL PG/ML (ref 0–450)
PLATELET # BLD AUTO: 120 10*3/MM3 (ref 140–450)
PMV BLD AUTO: 8.2 FL (ref 6–12)
POTASSIUM SERPL-SCNC: 3.8 MMOL/L (ref 3.5–5.2)
PROCALCITONIN SERPL-MCNC: 0.25 NG/ML (ref 0–0.25)
PROT SERPL-MCNC: 7.2 G/DL (ref 6–8.5)
RBC # BLD AUTO: 3.13 10*6/MM3 (ref 4.14–5.8)
RHINOVIRUS RNA SPEC NAA+PROBE: NOT DETECTED
RSV RNA NPH QL NAA+NON-PROBE: NOT DETECTED
SARS-COV-2 RNA NPH QL NAA+NON-PROBE: NOT DETECTED
SODIUM SERPL-SCNC: 134 MMOL/L (ref 136–145)
TROPONIN T % DELTA: -2
TROPONIN T NUMERIC DELTA: -5 NG/L
TROPONIN T SERPL HS-MCNC: 265 NG/L
WBC NRBC COR # BLD AUTO: 10.95 10*3/MM3 (ref 3.4–10.8)
WHOLE BLOOD HOLD COAG: NORMAL
WHOLE BLOOD HOLD SPECIMEN: NORMAL

## 2025-03-18 PROCEDURE — 83880 ASSAY OF NATRIURETIC PEPTIDE: CPT

## 2025-03-18 PROCEDURE — G0378 HOSPITAL OBSERVATION PER HR: HCPCS

## 2025-03-18 PROCEDURE — 83605 ASSAY OF LACTIC ACID: CPT | Performed by: PHYSICIAN ASSISTANT

## 2025-03-18 PROCEDURE — 71045 X-RAY EXAM CHEST 1 VIEW: CPT

## 2025-03-18 PROCEDURE — 84145 PROCALCITONIN (PCT): CPT | Performed by: PHYSICIAN ASSISTANT

## 2025-03-18 PROCEDURE — 85025 COMPLETE CBC W/AUTO DIFF WBC: CPT

## 2025-03-18 PROCEDURE — 96376 TX/PRO/DX INJ SAME DRUG ADON: CPT

## 2025-03-18 PROCEDURE — 93005 ELECTROCARDIOGRAM TRACING: CPT | Performed by: EMERGENCY MEDICINE

## 2025-03-18 PROCEDURE — 0202U NFCT DS 22 TRGT SARS-COV-2: CPT | Performed by: PHYSICIAN ASSISTANT

## 2025-03-18 PROCEDURE — 25010000002 ONDANSETRON PER 1 MG: Performed by: HOSPITALIST

## 2025-03-18 PROCEDURE — 96365 THER/PROPH/DIAG IV INF INIT: CPT

## 2025-03-18 PROCEDURE — 36415 COLL VENOUS BLD VENIPUNCTURE: CPT

## 2025-03-18 PROCEDURE — 80053 COMPREHEN METABOLIC PANEL: CPT

## 2025-03-18 PROCEDURE — 96375 TX/PRO/DX INJ NEW DRUG ADDON: CPT

## 2025-03-18 PROCEDURE — 84484 ASSAY OF TROPONIN QUANT: CPT | Performed by: EMERGENCY MEDICINE

## 2025-03-18 PROCEDURE — 93005 ELECTROCARDIOGRAM TRACING: CPT | Performed by: HOSPITALIST

## 2025-03-18 PROCEDURE — 99285 EMERGENCY DEPT VISIT HI MDM: CPT

## 2025-03-18 PROCEDURE — 93010 ELECTROCARDIOGRAM REPORT: CPT | Performed by: STUDENT IN AN ORGANIZED HEALTH CARE EDUCATION/TRAINING PROGRAM

## 2025-03-18 PROCEDURE — 25010000002 PIPERACILLIN SOD-TAZOBACTAM PER 1 G: Performed by: EMERGENCY MEDICINE

## 2025-03-18 PROCEDURE — 25010000002 ONDANSETRON PER 1 MG: Performed by: PHYSICIAN ASSISTANT

## 2025-03-18 PROCEDURE — 84484 ASSAY OF TROPONIN QUANT: CPT

## 2025-03-18 PROCEDURE — 93005 ELECTROCARDIOGRAM TRACING: CPT

## 2025-03-18 RX ORDER — OXYCODONE AND ACETAMINOPHEN 5; 325 MG/1; MG/1
1 TABLET ORAL EVERY 4 HOURS PRN
Status: DISCONTINUED | OUTPATIENT
Start: 2025-03-18 | End: 2025-03-21 | Stop reason: HOSPADM

## 2025-03-18 RX ORDER — ASPIRIN 81 MG/1
81 TABLET, CHEWABLE ORAL DAILY
Status: DISCONTINUED | OUTPATIENT
Start: 2025-03-18 | End: 2025-03-21 | Stop reason: HOSPADM

## 2025-03-18 RX ORDER — ONDANSETRON 2 MG/ML
4 INJECTION INTRAMUSCULAR; INTRAVENOUS EVERY 6 HOURS PRN
Status: DISCONTINUED | OUTPATIENT
Start: 2025-03-18 | End: 2025-03-21 | Stop reason: HOSPADM

## 2025-03-18 RX ORDER — ATORVASTATIN CALCIUM 10 MG/1
10 TABLET, FILM COATED ORAL NIGHTLY
Status: DISCONTINUED | OUTPATIENT
Start: 2025-03-18 | End: 2025-03-21 | Stop reason: HOSPADM

## 2025-03-18 RX ORDER — ACETAMINOPHEN 500 MG
1000 TABLET ORAL ONCE
Status: COMPLETED | OUTPATIENT
Start: 2025-03-18 | End: 2025-03-18

## 2025-03-18 RX ORDER — ONDANSETRON 2 MG/ML
4 INJECTION INTRAMUSCULAR; INTRAVENOUS ONCE
Status: COMPLETED | OUTPATIENT
Start: 2025-03-18 | End: 2025-03-18

## 2025-03-18 RX ORDER — CARVEDILOL 25 MG/1
25 TABLET ORAL 2 TIMES DAILY WITH MEALS
Status: DISCONTINUED | OUTPATIENT
Start: 2025-03-18 | End: 2025-03-21 | Stop reason: HOSPADM

## 2025-03-18 RX ORDER — SODIUM CHLORIDE 0.9 % (FLUSH) 0.9 %
10 SYRINGE (ML) INJECTION AS NEEDED
Status: DISCONTINUED | OUTPATIENT
Start: 2025-03-18 | End: 2025-03-21 | Stop reason: HOSPADM

## 2025-03-18 RX ORDER — OXYCODONE AND ACETAMINOPHEN 5; 325 MG/1; MG/1
1 TABLET ORAL ONCE
Refills: 0 | Status: COMPLETED | OUTPATIENT
Start: 2025-03-18 | End: 2025-03-18

## 2025-03-18 RX ORDER — PANTOPRAZOLE SODIUM 40 MG/1
40 TABLET, DELAYED RELEASE ORAL
Status: DISCONTINUED | OUTPATIENT
Start: 2025-03-18 | End: 2025-03-21 | Stop reason: HOSPADM

## 2025-03-18 RX ORDER — GUAIFENESIN 600 MG/1
600 TABLET, EXTENDED RELEASE ORAL EVERY 12 HOURS SCHEDULED
Status: DISCONTINUED | OUTPATIENT
Start: 2025-03-18 | End: 2025-03-21 | Stop reason: HOSPADM

## 2025-03-18 RX ORDER — CETIRIZINE HYDROCHLORIDE 10 MG/1
10 TABLET ORAL DAILY
Status: DISCONTINUED | OUTPATIENT
Start: 2025-03-18 | End: 2025-03-21 | Stop reason: HOSPADM

## 2025-03-18 RX ADMIN — OXYCODONE AND ACETAMINOPHEN 1 TABLET: 5; 325 TABLET ORAL at 13:14

## 2025-03-18 RX ADMIN — GUAIFENESIN 600 MG: 600 TABLET, MULTILAYER, EXTENDED RELEASE ORAL at 20:07

## 2025-03-18 RX ADMIN — PANTOPRAZOLE SODIUM 40 MG: 40 TABLET, DELAYED RELEASE ORAL at 19:33

## 2025-03-18 RX ADMIN — PIPERACILLIN AND TAZOBACTAM 3.38 G: 3; .375 INJECTION, POWDER, FOR SOLUTION INTRAVENOUS at 15:30

## 2025-03-18 RX ADMIN — OXYCODONE AND ACETAMINOPHEN 1 TABLET: 5; 325 TABLET ORAL at 23:35

## 2025-03-18 RX ADMIN — METOPROLOL TARTRATE 5 MG: 5 INJECTION INTRAVENOUS at 12:14

## 2025-03-18 RX ADMIN — ASPIRIN 81 MG CHEWABLE TABLET 81 MG: 81 TABLET CHEWABLE at 19:32

## 2025-03-18 RX ADMIN — CETIRIZINE HYDROCHLORIDE 10 MG: 10 TABLET ORAL at 20:07

## 2025-03-18 RX ADMIN — ATORVASTATIN CALCIUM 10 MG: 10 TABLET ORAL at 20:07

## 2025-03-18 RX ADMIN — CARVEDILOL 25 MG: 25 TABLET, FILM COATED ORAL at 19:33

## 2025-03-18 RX ADMIN — ACETAMINOPHEN 1000 MG: 500 TABLET, FILM COATED ORAL at 12:00

## 2025-03-18 RX ADMIN — OXYCODONE AND ACETAMINOPHEN 1 TABLET: 5; 325 TABLET ORAL at 19:33

## 2025-03-18 RX ADMIN — ONDANSETRON 4 MG: 2 INJECTION, SOLUTION INTRAMUSCULAR; INTRAVENOUS at 19:33

## 2025-03-18 RX ADMIN — ONDANSETRON 4 MG: 2 INJECTION, SOLUTION INTRAMUSCULAR; INTRAVENOUS at 11:55

## 2025-03-18 NOTE — ED PROVIDER NOTES
EMERGENCY DEPARTMENT ENCOUNTER  Room Number:  21/21  PCP: Roosevelt Thao MD  Independent Historians: Patient and Family      HPI:  Chief Complaint: had concerns including Chest Pain and Shortness of Breath.     A complete HPI/ROS/PMH/PSH/SH/FH are unobtainable due to: None    Chronic or social conditions impacting patient care (Social Determinants of Health): None      Context: The patient is a 40 y.o. male with a medical history of ESRD on dialysis, CAD, GERD, hypertension, type 2 diabetes who presents to the ED c/o acute chest pain, palpitations, cold chills, body aches, headache.  Symptoms started in the night last night, states he did not sleep through the night because of his symptoms.  Felt like his heart was racing and he had some chest pressure.  That has come and gone through the night.  Headache started gradually this morning, chills and fatigue subsequently developed.  Denies any shortness of breath, cough, congestion, vomiting or diarrhea.  Last dialysis was yesterday and it was noted that the patient had some extra fluid on him and might need an extra treatment this week.  Wife notes that they were more lenient about his diet and intake the last several days due to his birthday.  He does make a small amount of urine each day, denies any urinary symptoms.    Patient currently has a wound VAC on his right foot, was recently admitted for osteomyelitis and amputation, is currently receiving IV antibiotics at home.  Has not received his dose yet today.    Review of prior external notes (non-ED) -and- Review of prior external test results outside of this encounter:  With cellulitis and osteomyelitis, underwent incision and drainage and hallux amputation followed by a partial first right metatarsal excision.  Estevez catheter was placed.  Patient admitted to 11/25 to 3/3/2025 due to right great toe wound.  He was discharged on 42 days of IV Zosyn.        PAST MEDICAL HISTORY  Active Ambulatory Problems      Diagnosis Date Noted    Morbid (severe) obesity due to excess calories (*specify comorbidity) 04/02/2024    Diabetic toe ulcer 02/11/2025    Osteomyelitis of great toe of right foot 02/11/2025    Status post insertion of drug eluting coronary artery stent 09/24/2024    Diabetic ulcer of right great toe 02/11/2025     Resolved Ambulatory Problems     Diagnosis Date Noted    No Resolved Ambulatory Problems     Past Medical History:   Diagnosis Date    CKD stage 4 due to type 2 diabetes mellitus     GERD (gastroesophageal reflux disease)     Heart failure     Hypertension     Type 2 diabetes mellitus     Vision impairment          PAST SURGICAL HISTORY  Past Surgical History:   Procedure Laterality Date    AMPUTATION DIGIT Right 2/18/2025    Procedure: Hallux amputation, right foot SESAMOIDECTOMY, INCISION AN DRAINAGE;  Surgeon: Trevon Garcia DPM;  Location: [facility] OR;  Service: Podiatry;  Laterality: Right;    BONE EXCISION LEG Right 2/21/2025    Procedure: Partial first metatarsal excision, right foot;  Surgeon: Trevon Garcia DPM;  Location: [facility] OR;  Service: Podiatry;  Laterality: Right;    CORONARY STENT PLACEMENT      EYE SURGERY      INCISION AND DRAINAGE LEG Right 2/24/2025    Procedure: INCISION AND DRAINAGE LOWER EXTREMITY, PARTIAL BONE EXCISION;  Surgeon: Trevon Garcia DPM;  Location: [facility] OR;  Service: Podiatry;  Laterality: Right;    TUNNELED VENOUS CATHETER PLACEMENT N/A 2/28/2025    Procedure: INSERTION OF PETER CATHETER;  Surgeon: Lam Gomez MD;  Location: [facility] OR;  Service: Vascular;  Laterality: N/A;    WISDOM TOOTH EXTRACTION      WOUND DEBRIDEMENT Right 2/21/2025    Procedure: DEBRIDEMENT FOOT, RIGHT;  Surgeon: Trevon Garcia DPM;  Location: [facility] OR;  Service: Podiatry;  Laterality: Right;         FAMILY HISTORY  Family History   Problem Relation Age of Onset    Malig Hyperthermia Neg Hx          SOCIAL HISTORY  Social History      Socioeconomic History    Marital status:    Tobacco Use    Smoking status: Never     Passive exposure: Never    Smokeless tobacco: Never   Vaping Use    Vaping status: Never Used   Substance and Sexual Activity    Alcohol use: Yes     Comment: occ    Drug use: Yes     Types: Marijuana    Sexual activity: Defer         ALLERGIES  Azithromycin and Penicillins      REVIEW OF SYSTEMS  Review of Systems  Included in HPI  All systems reviewed and negative except for those discussed in HPI.      PHYSICAL EXAM    I have reviewed the triage vital signs and nursing notes.    ED Triage Vitals   Temp Heart Rate Resp BP SpO2   03/18/25 1006 03/18/25 1006 03/18/25 1006 03/18/25 1023 03/18/25 1006   98.9 °F (37.2 °C) 114 18 166/100 97 %      Temp src Heart Rate Source Patient Position BP Location FiO2 (%)   -- -- -- -- --              Physical Exam  GENERAL: alert, no acute distress  SKIN: Warm, dry  HENT: Normocephalic, atraumatic  EYES: no scleral icterus  CV: regular rhythm, mild tachycardia, left upper extremity fistula with a palpable thrill, Estevez catheter noted in the right upper chest wall and surrounding skin appears clean and dry, no drainage  RESPIRATORY: normal effort, lungs clear  ABDOMEN: nondistended soft nontender normal bowel sounds no guarding or rigidity  MUSCULOSKELETAL: no deformity.  Right foot is bandaged with wound VAC in place.  NEURO: alert, moves all extremities, follows commands            LAB RESULTS  Recent Results (from the past 24 hours)   ECG 12 Lead ED Triage Standing Order; SOA    Collection Time: 03/18/25 10:13 AM   Result Value Ref Range    QT Interval 376 ms    QTC Interval 517 ms   Comprehensive Metabolic Panel    Collection Time: 03/18/25 10:16 AM    Specimen: Arm, Right; Blood   Result Value Ref Range    Glucose 173 (H) 65 - 99 mg/dL    BUN 28 (H) 6 - 20 mg/dL    Creatinine 5.26 (H) 0.76 - 1.27 mg/dL    Sodium 134 (L) 136 - 145 mmol/L    Potassium 3.8 3.5 - 5.2 mmol/L     Chloride 99 98 - 107 mmol/L    CO2 24.0 22.0 - 29.0 mmol/L    Calcium 9.2 8.6 - 10.5 mg/dL    Total Protein 7.2 6.0 - 8.5 g/dL    Albumin 3.6 3.5 - 5.2 g/dL    ALT (SGPT) 14 1 - 41 U/L    AST (SGOT) 19 1 - 40 U/L    Alkaline Phosphatase 126 (H) 39 - 117 U/L    Total Bilirubin 0.6 0.0 - 1.2 mg/dL    Globulin 3.6 gm/dL    A/G Ratio 1.0 g/dL    BUN/Creatinine Ratio 5.3 (L) 7.0 - 25.0    Anion Gap 11.0 5.0 - 15.0 mmol/L    eGFR 13.2 (L) >60.0 mL/min/1.73   BNP    Collection Time: 03/18/25 10:16 AM    Specimen: Arm, Right; Blood   Result Value Ref Range    proBNP 20,250.0 (H) 0.0 - 450.0 pg/mL   High Sensitivity Troponin T    Collection Time: 03/18/25 10:16 AM    Specimen: Arm, Right; Blood   Result Value Ref Range    HS Troponin T 265 (C) <22 ng/L   Green Top (Gel)    Collection Time: 03/18/25 10:16 AM   Result Value Ref Range    Extra Tube Hold for add-ons.    Lavender Top    Collection Time: 03/18/25 10:16 AM   Result Value Ref Range    Extra Tube hold for add-on    Gold Top - SST    Collection Time: 03/18/25 10:16 AM   Result Value Ref Range    Extra Tube Hold for add-ons.    Light Blue Top    Collection Time: 03/18/25 10:16 AM   Result Value Ref Range    Extra Tube Hold for add-ons.    CBC Auto Differential    Collection Time: 03/18/25 10:16 AM    Specimen: Arm, Right; Blood   Result Value Ref Range    WBC 10.95 (H) 3.40 - 10.80 10*3/mm3    RBC 3.13 (L) 4.14 - 5.80 10*6/mm3    Hemoglobin 9.1 (L) 13.0 - 17.7 g/dL    Hematocrit 28.6 (L) 37.5 - 51.0 %    MCV 91.4 79.0 - 97.0 fL    MCH 29.1 26.6 - 33.0 pg    MCHC 31.8 31.5 - 35.7 g/dL    RDW 14.9 12.3 - 15.4 %    RDW-SD 49.6 37.0 - 54.0 fl    MPV 8.2 6.0 - 12.0 fL    Platelets 120 (L) 140 - 450 10*3/mm3    Neutrophil % 87.1 (H) 42.7 - 76.0 %    Lymphocyte % 3.4 (L) 19.6 - 45.3 %    Monocyte % 7.6 5.0 - 12.0 %    Eosinophil % 0.7 0.3 - 6.2 %    Basophil % 0.6 0.0 - 1.5 %    Immature Grans % 0.6 (H) 0.0 - 0.5 %    Neutrophils, Absolute 9.53 (H) 1.70 - 7.00 10*3/mm3     Lymphocytes, Absolute 0.37 (L) 0.70 - 3.10 10*3/mm3    Monocytes, Absolute 0.83 0.10 - 0.90 10*3/mm3    Eosinophils, Absolute 0.08 0.00 - 0.40 10*3/mm3    Basophils, Absolute 0.07 0.00 - 0.20 10*3/mm3    Immature Grans, Absolute 0.07 (H) 0.00 - 0.05 10*3/mm3    nRBC 0.0 0.0 - 0.2 /100 WBC   Procalcitonin    Collection Time: 03/18/25 10:16 AM    Specimen: Arm, Right; Blood   Result Value Ref Range    Procalcitonin 0.25 0.00 - 0.25 ng/mL   High Sensitivity Troponin T 1Hr    Collection Time: 03/18/25 11:58 AM    Specimen: Blood   Result Value Ref Range    HS Troponin T 260 (C) <22 ng/L    Troponin T Numeric Delta -5 ng/L    Troponin T % Delta -2 Abnormal if >/= 20%   Lactic Acid, Plasma    Collection Time: 03/18/25 11:58 AM    Specimen: Blood   Result Value Ref Range    Lactate 1.5 0.5 - 2.0 mmol/L         RADIOLOGY  XR Chest 1 View  Result Date: 3/18/2025  XR CHEST 1 VW-3/18/2025  HISTORY: Shortness of breath.  Heart size is within normal limits. Lungs are underinflated with some vascular crowding. No focal infiltrates are seen. Right-sided central venous catheter is seen with its tip overlying the right upper hemithorax near the right innominate vein.  Bony structures appear unremarkable.      1. Underinflation of the lungs. 2. No acute process identified.   This report was finalized on 3/18/2025 10:49 AM by Dr. Wilner Downey M.D on Workstation: [id number]          MEDICATIONS GIVEN IN ER  Medications   sodium chloride 0.9 % flush 10 mL (has no administration in time range)   ondansetron (ZOFRAN) injection 4 mg (4 mg Intravenous Given 3/18/25 1155)   acetaminophen (TYLENOL) tablet 1,000 mg (1,000 mg Oral Given 3/18/25 1200)   metoprolol tartrate (LOPRESSOR) injection 5 mg (5 mg Intravenous Given 3/18/25 1214)         ORDERS PLACED DURING THIS VISIT:  Orders Placed This Encounter   Procedures    Respiratory Panel PCR w/COVID-19(SARS-CoV-2) KEVIN/KEELY/RAMIREZ/PAD/COR/ESDRAS In-House, NP Swab in UTM/VTM, 2 HR TAT - Swab,  Nasopharynx    XR Chest 1 View    [facility] Draw    Comprehensive Metabolic Panel    BNP    High Sensitivity Troponin T    CBC Auto Differential    High Sensitivity Troponin T 1Hr    Procalcitonin    Lactic Acid, Plasma    NPO Diet NPO Type: Strict NPO    Undress & Gown    Continuous Pulse Oximetry    Vital Signs    Oxygen Therapy- Nasal Cannula; Titrate 1-6 LPM Per SpO2; 90 - 95%    ECG 12 Lead ED Triage Standing Order; SOA    Insert Peripheral IV    CBC & Differential    Green Top (Gel)    Lavender Top    Gold Top - SST    Light Blue Top         OUTPATIENT MEDICATION MANAGEMENT:  Current Facility-Administered Medications Ordered in Epic   Medication Dose Route Frequency Provider Last Rate Last Admin    sodium chloride 0.9 % flush 10 mL  10 mL Intravenous PRN Jerri Noe MD         Current Outpatient Medications Ordered in Epic   Medication Sig Dispense Refill    aspirin 81 MG EC tablet Take 1 tablet by mouth Daily.      atorvastatin (LIPITOR) 10 MG tablet Take 1 tablet by mouth Every Night for 30 days. 30 tablet 0    B-D UF III MINI PEN NEEDLES 31G X 5 MM misc USE 1 TO CHECK BLOOD SUGAR FIVE TIMES DAILY      bumetanide (BUMEX) 2 MG tablet Take 1 tablet by mouth 2 (Two) Times a Day for 30 days. 60 tablet 0    carvedilol (COREG) 25 MG tablet Take 2 tablets by mouth 2 (Two) Times a Day.      clopidogrel (PLAVIX) 75 MG tablet Take 1 tablet by mouth Daily for 30 days. 30 tablet 0    docusate sodium 100 MG capsule Take 1 capsule by mouth 2 (Two) Times a Day for 30 days. 60 capsule 0    epoetin jamil-epbx (RETACRIT) 03906 UNIT/ML injection Inject 1 mL under the skin into the appropriate area as directed 3 (Three) Times a Week for 30 days. Indications: ESRD on Dialysis 12 mL 0    Insulin Degludec (TRESIBA FLEXTOUCH) 200 UNIT/ML solution pen-injector pen injection Inject 40 Units under the skin into the appropriate area as directed Daily.      NovoLOG FlexPen 100 UNIT/ML solution pen-injector sc pen INJECT 5 UNITS  SUBCUTANEOUSLY THREE TIMES DAILY WITH MEALS PLUS UP TO 30 UNITS SLIDING SCALE. MAX OF 45 UNITS DAILY      pantoprazole (PROTONIX) 40 MG EC tablet Take 1 tablet by mouth 2 (Two) Times a Day Before Meals for 30 days. 60 tablet 0    polyethylene glycol (MIRALAX) 17 g packet Take 17 g by mouth.      sodium chloride 0.9 % solution 100 mL with piperacillin-tazobactam 3.375 (3-0.375) g reconstituted solution 3.375 g IVPB Infuse 3.375 g into a venous catheter Every 12 (Twelve) Hours for 70 doses. Indications: Bone and/or Joint Infection      sucralfate (CARAFATE) 1 g tablet Take 1 tablet by mouth 4 (Four) Times a Day Before Meals & at Bedtime for 30 days. 120 tablet 0         PROCEDURES  Procedures            PROGRESS, DATA ANALYSIS, CONSULTS, AND MEDICAL DECISION MAKING  All labs have been independently interpreted by me.  All radiology studies have been reviewed by me. All EKG's have been independently viewed and interpreted by me.  Discussion below represents my analysis of pertinent findings related to patient's condition, differential diagnosis, treatment plan and final disposition.    DIFFERENTIAL    DDx includes but is not limited to acute coronary syndrome, pulmonary embolism, thoracic aortic dissection, pneumonia, pneumothorax, musculoskeletal pain, GERD or esophageal spasm, anxiety, myocarditis/pericarditis, esophageal rupture, pancreatitis.           Clinical Scores:                  ED Course as of 03/19/25 1529   Tue Mar 18, 2025   1210 EKG ER MD interpretation   Time: 10: 13  Rhythm and rate: Sinus tachycardia at a rate of 113  Axis: Normal  P waves: Normal  QRS complexes: LVH with some repolarization abnormality  ST segments: no elevation but patient does have ST depressions in 1 and aVL  T waves: no flattening or inversions  QTc is prolonged [AR]   1215 I viewed patient's chest x-ray and on my interpretation patient has cardiomegaly but no pulmonary infiltrates [AR]   1400 I discussed with   Aleksandra--agreed to consult.  Troponin that is flat was reassuring although patient has known coronary artery disease with total occlusion of mid RCA on last cath 08/2024. [AR]   7458 After re-page to [facility], I discussed patient's case with Dr. Thornton who is amenable to admitting the patient for further care [AR]      ED Course User Index  [AR] Jerri Noe MD             AS OF 12:52 EDT VITALS:    BP - 161/99  HR - 110  TEMP - 99.9 °F (37.7 °C) (Tympanic)  O2 SATS - 91%    COMPLEXITY OF CARE  The patient requires admission.      DIAGNOSIS  Final diagnoses:   Shortness of breath   Chest pain, unspecified type   Elevated troponin   ESRD on hemodialysis   Hypertensive urgency         DISPOSITION  ED Disposition       ED Disposition   Decision to Admit    Condition   --    Comment   Level of Care: Telemetry [5]   Diagnosis: Chest pain [112510]   Admitting Physician: JASKARAN THORNTON [[id number]]   Attending Physician: JASKARAN THORNTON [[id number]]   Is patient appropriate for Inpatient Observation Unit?: No [0]                          Please note that portions of this document were completed with a voice recognition program.    Note Disclaimer: At [facility], we believe that sharing information builds trust and better relationships. You are receiving this note because you recently visited [facility]. It is possible you will see health information before a provider has talked with you about it. This kind of information can be easy to misunderstand. To help you fully understand what it means for your health, we urge you to discuss this note with your provider.         Kassandra Cho PA-C  03/19/25 [id number]

## 2025-03-18 NOTE — ED NOTES
Nursing report ED to floor  Wilner Menjivar  40 y.o.  male    HPI :  HPI  Stated Reason for Visit: SOA/CP    Chief Complaint  Chief Complaint   Patient presents with    Chest Pain    Shortness of Breath       Admitting doctor:   Remy Thornton MD    Admitting diagnosis:   The primary encounter diagnosis was Shortness of breath. Diagnoses of Chest pain, unspecified type, Elevated troponin, ESRD on hemodialysis, and Hypertensive urgency were also pertinent to this visit.    Code status:   Current Code Status       Date Active Code Status Order ID Comments User Context       Prior            Allergies:   Azithromycin and Penicillins    Isolation:   No active isolations    Intake and Output  No intake or output data in the 24 hours ending 03/18/25 1711    Weight:       03/18/25  1011   Weight: 106 kg (233 lb 11 oz)       Most recent vitals:   Vitals:    03/18/25 1241 03/18/25 1301 03/18/25 1454 03/18/25 1459   BP:  150/82  154/83   Pulse:  104  104   Resp:       Temp:       TempSrc:       SpO2: 91% 94% 92%    Weight:       Height:           Active LDAs/IV Access:   Lines, Drains & Airways       Active LDAs       Name Placement date Placement time Site Days    CVC Double Lumen 02/28/25 Right Internal jugular 02/28/25  --  Internal jugular  18    Peripheral IV 02/28/25 0429 Anterior;Right;Upper Arm 02/28/25  0429  Arm  18                    Labs (abnormal labs have a star):   Labs Reviewed   COMPREHENSIVE METABOLIC PANEL - Abnormal; Notable for the following components:       Result Value    Glucose 173 (*)     BUN 28 (*)     Creatinine 5.26 (*)     Sodium 134 (*)     Alkaline Phosphatase 126 (*)     BUN/Creatinine Ratio 5.3 (*)     eGFR 13.2 (*)     All other components within normal limits    Narrative:     GFR Categories in Chronic Kidney Disease (CKD)      GFR Category          GFR (mL/min/1.73)    Interpretation  G1                     90 or greater         Normal or high (1)  G2                      60-89                 Mild decrease (1)  G3a                   45-59                Mild to moderate decrease  G3b                   30-44                Moderate to severe decrease  G4                    15-29                Severe decrease  G5                    14 or less           Kidney failure          (1)In the absence of evidence of kidney disease, neither GFR category G1 or G2 fulfill the criteria for CKD.    eGFR calculation 2021 CKD-EPI creatinine equation, which does not include race as a factor   BNP (IN-HOUSE) - Abnormal; Notable for the following components:    proBNP 20,250.0 (*)     All other components within normal limits    Narrative:     This assay is used as an aid in the diagnosis of individuals suspected of having heart failure. It can be used as an aid in the diagnosis of acute decompensated heart failure (ADHF) in patients presenting with signs and symptoms of ADHF to the emergency department (ED). In addition, NT-proBNP of <300 pg/mL indicates ADHF is not likely.    Age Range Result Interpretation  NT-proBNP Concentration (pg/mL:      <50             Positive            >450                   Gray                 300-450                    Negative             <300    50-75           Positive            >900                  Gray                300-900                  Negative            <300      >75             Positive            >1800                  Gray                300-1800                  Negative            <300   TROPONIN - Abnormal; Notable for the following components:    HS Troponin T 265 (*)     All other components within normal limits    Narrative:     High Sensitive Troponin T Reference Range:  <14.0 ng/L- Negative Female for AMI  <22.0 ng/L- Negative Male for AMI  >=14 - Abnormal Female indicating possible myocardial injury.  >=22 - Abnormal Male indicating possible myocardial injury.   Clinicians would have to utilize clinical acumen, EKG, Troponin, and serial changes to determine if  it is an Acute Myocardial Infarction or myocardial injury due to an underlying chronic condition.        CBC WITH AUTO DIFFERENTIAL - Abnormal; Notable for the following components:    WBC 10.95 (*)     RBC 3.13 (*)     Hemoglobin 9.1 (*)     Hematocrit 28.6 (*)     Platelets 120 (*)     Neutrophil % 87.1 (*)     Lymphocyte % 3.4 (*)     Immature Grans % 0.6 (*)     Neutrophils, Absolute 9.53 (*)     Lymphocytes, Absolute 0.37 (*)     Immature Grans, Absolute 0.07 (*)     All other components within normal limits   HIGH SENSITIVITIY TROPONIN T 1HR - Abnormal; Notable for the following components:    HS Troponin T 260 (*)     All other components within normal limits    Narrative:     High Sensitive Troponin T Reference Range:  <14.0 ng/L- Negative Female for AMI  <22.0 ng/L- Negative Male for AMI  >=14 - Abnormal Female indicating possible myocardial injury.  >=22 - Abnormal Male indicating possible myocardial injury.   Clinicians would have to utilize clinical acumen, EKG, Troponin, and serial changes to determine if it is an Acute Myocardial Infarction or myocardial injury due to an underlying chronic condition.        RESPIRATORY PANEL PCR W/ COVID-19 (SARS-COV-2), NP SWAB IN UTM/VTP, 2 HR TAT - Normal    Narrative:     In the setting of a positive respiratory panel with a viral infection PLUS a negative procalcitonin without other underlying concern for bacterial infection, consider observing off antibiotics or discontinuation of antibiotics and continue supportive care. If the respiratory panel is positive for atypical bacterial infection (Bordetella pertussis, Chlamydophila pneumoniae, or Mycoplasma pneumoniae), consider antibiotic de-escalation to target atypical bacterial infection.   PROCALCITONIN - Normal    Narrative:     As a Marker for Sepsis (Non-Neonates):    1. <0.5 ng/mL represents a low risk of severe sepsis and/or septic shock.  2. >2 ng/mL represents a high risk of severe sepsis and/or septic  "shock.    As a Marker for Lower Respiratory Tract Infections that require antibiotic therapy:    PCT on Admission    Antibiotic Therapy       6-12 Hrs later    >0.5                Strongly Recommended  >0.25 - <0.5        Recommended   0.1 - 0.25          Discouraged              Remeasure/reassess PCT  <0.1                Strongly Discouraged     Remeasure/reassess PCT    As 28 day mortality risk marker: \"Change in Procalcitonin Result\" (>80% or <=80%) if Day 0 (or Day 1) and Day 4 values are available. Refer to [email or web address]    Change in PCT <=80%  A decrease of PCT levels below or equal to 80% defines a positive change in PCT test result representing a higher risk for 28-day all-cause mortality of patients diagnosed with severe sepsis for septic shock.    Change in PCT >80%  A decrease of PCT levels of more than 80% defines a negative change in PCT result representing a lower risk for 28-day all-cause mortality of patients diagnosed with severe sepsis or septic shock.      LACTIC ACID, PLASMA - Normal   RAINBOW DRAW    Narrative:     The following orders were created for panel order [place] Draw.  Procedure                               Abnormality         Status                     ---------                               -----------         ------                     Green Top (Gel)[[id number]]                                  Final result               Lavender Top[[id number]]                                     Final result               Gold Top - SST[[id number]]                                   Final result               Light Blue Top[[id number]]                                   Final result                 Please view results for these tests on the individual orders.   CBC AND DIFFERENTIAL    Narrative:     The following orders were created for panel order CBC & Differential.  Procedure                               Abnormality         Status                     ---------               "                 -----------         ------                     CBC Auto Differential[[id number]]        Abnormal            Final result                 Please view results for these tests on the individual orders.   GREEN TOP   LAVENDER TOP   GOLD TOP - SST   LIGHT BLUE TOP       EKG:   ECG 12 Lead ED Triage Standing Order; SOA   Preliminary Result   HEART JKEA=108  bpm   RR Dscuglaw=860  ms   MI Rvqsjkfi=012  ms   P Horizontal Axis=-7  deg   P Front Axis=77  deg   QRSD Interval=95  ms   QT Evpnjnhz=433  ms   INfI=514  ms   QRS Axis=-11  deg   T Wave Axis=123  deg   - ABNORMAL ECG -   Sinus tachycardia   Probable LVH with secondary repol abnrm   Inferior infarct, old   Prolonged QT interval   Date and Time of Study:2025-03-18 10:13:07          Meds given in ED:   Medications   sodium chloride 0.9 % flush 10 mL (has no administration in time range)   ondansetron (ZOFRAN) injection 4 mg (4 mg Intravenous Given 3/18/25 1155)   acetaminophen (TYLENOL) tablet 1,000 mg (1,000 mg Oral Given 3/18/25 1200)   metoprolol tartrate (LOPRESSOR) injection 5 mg (5 mg Intravenous Given 3/18/25 1214)   oxyCODONE-acetaminophen (PERCOCET) 5-325 MG per tablet 1 tablet (1 tablet Oral Given 3/18/25 1314)   piperacillin-tazobactam (ZOSYN) 3.375 g IVPB in 100 mL NS MBP (CD) (3.375 g Intravenous New Bag 3/18/25 1530)       Imaging results:  XR Chest 1 View  Result Date: 3/18/2025  1. Underinflation of the lungs. 2. No acute process identified.   This report was finalized on 3/18/2025 10:49 AM by Dr. Wilner Downey M.D on Workstation: [id number]        Ambulatory status:   - x1    Social issues:   Social History     Socioeconomic History    Marital status:    Tobacco Use    Smoking status: Never     Passive exposure: Never    Smokeless tobacco: Never   Vaping Use    Vaping status: Never Used   Substance and Sexual Activity    Alcohol use: Yes     Comment: occ    Drug use: Yes     Types: Marijuana    Sexual activity: Defer        Peripheral Neurovascular  Peripheral Neurovascular (Adult)  Peripheral Neurovascular WDL: .WDL except, neurovascular assessment lower  LLE Neurovascular Assessment  Sensation LLE: tingling present    Neuro Cognitive  Neuro Cognitive (Adult)  Cognitive/Neuro/Behavioral WDL: WDL    Learning  Learning Assessment  Learning Readiness and Ability: no barriers identified    Respiratory  Respiratory  Airway WDL: WDL  Respiratory WDL  Respiratory WDL: .WDL except, all  Rhythm/Pattern, Respiratory: shortness of breath    Abdominal Pain       Pain Assessments  Pain (Adult)  (0-10) Pain Rating: Rest: 8    NIH Stroke Scale       Florentino Pickard RN  03/18/25 17:11 EDT

## 2025-03-18 NOTE — ED PROVIDER NOTES
MD ATTESTATION NOTE    SHARED VISIT: This visit was performed by BOTH a physician and an APC. The substantive portion of the medical decision making was performed by this attesting physician who made or approved the management plan and takes responsibility for patient management. All studies in the APC note (if performed) were independently interpreted by me.     The MICHEAL and I have discussed this patient's history, physical exam, and treatment plan.  I have reviewed the documentation and affirm the documentation and agree with the treatment and plan.  The attached note describes my personal findings.      Independent Historians: Patient    A complete HPI/ROS/PMH/PSH/SH/FH are unobtainable due to: Not    Chronic or social conditions impacting patient care (social determinants of health): None    Wilner Menjivar is a 40 y.o. male who presents to the ED c/o acute chest pain and palpitations along with generally not feeling well since last night.  Patient felt like his heart was racing with diffuse chest pressure.  Denies any fevers, cough, congestion, vomiting or diarrhea.  Patient has dialysis Monday Wednesday Friday and his last session was yesterday and it was normal session although patient's dry weight has been increased.  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          On exam:  GENERAL: Cooperative -American male, ill-appearing, alert, conversant  SKIN: Warm, dry  HENT: Normocephalic, atraumatic  EYES: no scleral icterus  CV: regular rhythm, regular rate  RESPIRATORY: normal effort, lungs clear, no wheezing  ABDOMEN: soft, nontender, nondistended  NEURO: alert,  follows commands                                                             Labs  Recent Results (from the past 24 hours)   ECG 12 Lead ED Triage Standing Order; SOA    Collection Time: 03/18/25 10:13 AM   Result Value Ref Range    QT Interval 376 ms    QTC Interval 517 ms    Comprehensive Metabolic Panel    Collection Time: 03/18/25 10:16 AM    Specimen: Arm, Right; Blood   Result Value Ref Range    Glucose 173 (H) 65 - 99 mg/dL    BUN 28 (H) 6 - 20 mg/dL    Creatinine 5.26 (H) 0.76 - 1.27 mg/dL    Sodium 134 (L) 136 - 145 mmol/L    Potassium 3.8 3.5 - 5.2 mmol/L    Chloride 99 98 - 107 mmol/L    CO2 24.0 22.0 - 29.0 mmol/L    Calcium 9.2 8.6 - 10.5 mg/dL    Total Protein 7.2 6.0 - 8.5 g/dL    Albumin 3.6 3.5 - 5.2 g/dL    ALT (SGPT) 14 1 - 41 U/L    AST (SGOT) 19 1 - 40 U/L    Alkaline Phosphatase 126 (H) 39 - 117 U/L    Total Bilirubin 0.6 0.0 - 1.2 mg/dL    Globulin 3.6 gm/dL    A/G Ratio 1.0 g/dL    BUN/Creatinine Ratio 5.3 (L) 7.0 - 25.0    Anion Gap 11.0 5.0 - 15.0 mmol/L    eGFR 13.2 (L) >60.0 mL/min/1.73   BNP    Collection Time: 03/18/25 10:16 AM    Specimen: Arm, Right; Blood   Result Value Ref Range    proBNP 20,250.0 (H) 0.0 - 450.0 pg/mL   High Sensitivity Troponin T    Collection Time: 03/18/25 10:16 AM    Specimen: Arm, Right; Blood   Result Value Ref Range    HS Troponin T 265 (C) <22 ng/L   Green Top (Gel)    Collection Time: 03/18/25 10:16 AM   Result Value Ref Range    Extra Tube Hold for add-ons.    Lavender Top    Collection Time: 03/18/25 10:16 AM   Result Value Ref Range    Extra Tube hold for add-on    Gold Top - SST    Collection Time: 03/18/25 10:16 AM   Result Value Ref Range    Extra Tube Hold for add-ons.    Light Blue Top    Collection Time: 03/18/25 10:16 AM   Result Value Ref Range    Extra Tube Hold for add-ons.    CBC Auto Differential    Collection Time: 03/18/25 10:16 AM    Specimen: Arm, Right; Blood   Result Value Ref Range    WBC 10.95 (H) 3.40 - 10.80 10*3/mm3    RBC 3.13 (L) 4.14 - 5.80 10*6/mm3    Hemoglobin 9.1 (L) 13.0 - 17.7 g/dL    Hematocrit 28.6 (L) 37.5 - 51.0 %    MCV 91.4 79.0 - 97.0 fL    MCH 29.1 26.6 - 33.0 pg    MCHC 31.8 31.5 - 35.7 g/dL    RDW 14.9 12.3 - 15.4 %    RDW-SD 49.6 37.0 - 54.0 fl    MPV 8.2 6.0 - 12.0 fL    Platelets  120 (L) 140 - 450 10*3/mm3    Neutrophil % 87.1 (H) 42.7 - 76.0 %    Lymphocyte % 3.4 (L) 19.6 - 45.3 %    Monocyte % 7.6 5.0 - 12.0 %    Eosinophil % 0.7 0.3 - 6.2 %    Basophil % 0.6 0.0 - 1.5 %    Immature Grans % 0.6 (H) 0.0 - 0.5 %    Neutrophils, Absolute 9.53 (H) 1.70 - 7.00 10*3/mm3    Lymphocytes, Absolute 0.37 (L) 0.70 - 3.10 10*3/mm3    Monocytes, Absolute 0.83 0.10 - 0.90 10*3/mm3    Eosinophils, Absolute 0.08 0.00 - 0.40 10*3/mm3    Basophils, Absolute 0.07 0.00 - 0.20 10*3/mm3    Immature Grans, Absolute 0.07 (H) 0.00 - 0.05 10*3/mm3    nRBC 0.0 0.0 - 0.2 /100 WBC   Procalcitonin    Collection Time: 03/18/25 10:16 AM    Specimen: Arm, Right; Blood   Result Value Ref Range    Procalcitonin 0.25 0.00 - 0.25 ng/mL   High Sensitivity Troponin T 1Hr    Collection Time: 03/18/25 11:58 AM    Specimen: Blood   Result Value Ref Range    HS Troponin T 260 (C) <22 ng/L    Troponin T Numeric Delta -5 ng/L    Troponin T % Delta -2 Abnormal if >/= 20%   Lactic Acid, Plasma    Collection Time: 03/18/25 11:58 AM    Specimen: Blood   Result Value Ref Range    Lactate 1.5 0.5 - 2.0 mmol/L   Respiratory Panel PCR w/COVID-19(SARS-CoV-2) KEVIN/KEELY/RAMIREZ/PAD/COR/ESDRAS In-House, NP Swab in UTM/VTM, 2 HR TAT - Swab, Nasopharynx    Collection Time: 03/18/25 11:59 AM    Specimen: Nasopharynx; Swab   Result Value Ref Range    ADENOVIRUS, PCR Not Detected Not Detected    Coronavirus 229E Not Detected Not Detected    Coronavirus HKU1 Not Detected Not Detected    Coronavirus NL63 Not Detected Not Detected    Coronavirus OC43 Not Detected Not Detected    COVID19 Not Detected Not Detected - Ref. Range    Human Metapneumovirus Not Detected Not Detected    Human Rhinovirus/Enterovirus Not Detected Not Detected    Influenza A PCR Not Detected Not Detected    Influenza B PCR Not Detected Not Detected    Parainfluenza Virus 1 Not Detected Not Detected    Parainfluenza Virus 2 Not Detected Not Detected    Parainfluenza Virus 3 Not Detected Not  Detected    Parainfluenza Virus 4 Not Detected Not Detected    RSV, PCR Not Detected Not Detected    Bordetella pertussis pcr Not Detected Not Detected    Bordetella parapertussis PCR Not Detected Not Detected    Chlamydophila pneumoniae PCR Not Detected Not Detected    Mycoplasma pneumo by PCR Not Detected Not Detected   ECG 12 Lead Chest Pain    Collection Time: 03/18/25  7:09 PM   Result Value Ref Range    QT Interval 318 ms    QTC Interval 465 ms       Radiology  XR Chest 1 View  Result Date: 3/18/2025  XR CHEST 1 VW-3/18/2025  HISTORY: Shortness of breath.  Heart size is within normal limits. Lungs are underinflated with some vascular crowding. No focal infiltrates are seen. Right-sided central venous catheter is seen with its tip overlying the right upper hemithorax near the right innominate vein.  Bony structures appear unremarkable.      1. Underinflation of the lungs. 2. No acute process identified.   This report was finalized on 3/18/2025 10:49 AM by Dr. Wilner Downey M.D on Workstation: [id number]        Medical Decision Making:  ED Course as of 03/18/25 2117   Tue Mar 18, 2025   1210 EKG ER MD interpretation   Time: 10: 13  Rhythm and rate: Sinus tachycardia at a rate of 113  Axis: Normal  P waves: Normal  QRS complexes: LVH with some repolarization abnormality  ST segments: no elevation but patient does have ST depressions in 1 and aVL  T waves: no flattening or inversions  QTc is prolonged [AR]   1215 I viewed patient's chest x-ray and on my interpretation patient has cardiomegaly but no pulmonary infiltrates [AR]   1400 I discussed with Dr. Lake--agreed to consult.  Troponin that is flat was reassuring although patient has known coronary artery disease with total occlusion of mid RCA on last cath 08/2024. [AR]   1658 After re-page to [facility], I discussed patient's case with Dr. Thornton who is amenable to admitting the patient for further care [AR]      ED Course User Index  [AR] Hasmukh, April  MD Amor       MDM: This patient presents with chest pain with a history suggestive of possible ACS. No evidence of volume overload or shock on exam. EKG without evidence of STEMI. I have a low suspicion for acute PE (no shortness of breath), pneumothorax, thoracic aortic dissection, pericardial effusion, pneumonia/infection, etc. Overall, ACS is being considered given higher risk features on this patient's history and physical exam and consideration of HEART score.    This patient will require admission for inpatient risk stratification and possible provocative testing. Plan for cardiac monitoring, serum labs including troponin, chest xray, pain control as needed, and likely admission. Patient is aware and amenable to plan.      Procedures:  Procedures        PPE: I followed hospital protocols for proper PPE based on patient presentation including use of N95 mask for suspected infectious respiratory conditions.  Proper hand hygiene was performed both before and after the patient encounter.          Diagnosis  Final diagnoses:   Shortness of breath   Chest pain, unspecified type   Elevated troponin   ESRD on hemodialysis   Hypertensive urgency       Note Disclaimer: At [facility], we believe that sharing information builds trust and better relationships. You are receiving this note because you recently visited [facility]. It is possible you will see health information before a provider has talked with you about it. This kind of information can be easy to misunderstand. To help you fully understand what it means for your health, we urge you to discuss this note with your provider.       Jerri Noe MD  03/19/25 3900

## 2025-03-19 LAB
GLUCOSE BLDC GLUCOMTR-MCNC: 129 MG/DL (ref 70–130)
GLUCOSE BLDC GLUCOMTR-MCNC: 136 MG/DL (ref 70–130)
GLUCOSE BLDC GLUCOMTR-MCNC: 241 MG/DL (ref 70–130)
QT INTERVAL: 318 MS
QT INTERVAL: 376 MS
QTC INTERVAL: 465 MS
QTC INTERVAL: 517 MS

## 2025-03-19 PROCEDURE — 25010000002 MIDAZOLAM PER 1 MG: Performed by: INTERNAL MEDICINE

## 2025-03-19 PROCEDURE — 25510000001 IOPAMIDOL PER 1 ML: Performed by: INTERNAL MEDICINE

## 2025-03-19 PROCEDURE — C1894 INTRO/SHEATH, NON-LASER: HCPCS | Performed by: INTERNAL MEDICINE

## 2025-03-19 PROCEDURE — G0378 HOSPITAL OBSERVATION PER HR: HCPCS

## 2025-03-19 PROCEDURE — C1769 GUIDE WIRE: HCPCS | Performed by: INTERNAL MEDICINE

## 2025-03-19 PROCEDURE — 25010000002 PIPERACILLIN SOD-TAZOBACTAM PER 1 G: Performed by: HOSPITALIST

## 2025-03-19 PROCEDURE — 96376 TX/PRO/DX INJ SAME DRUG ADON: CPT

## 2025-03-19 PROCEDURE — 93458 L HRT ARTERY/VENTRICLE ANGIO: CPT | Performed by: INTERNAL MEDICINE

## 2025-03-19 PROCEDURE — 25010000002 HEPARIN (PORCINE) PER 1000 UNITS: Performed by: INTERNAL MEDICINE

## 2025-03-19 PROCEDURE — 25010000002 ONDANSETRON PER 1 MG: Performed by: HOSPITALIST

## 2025-03-19 PROCEDURE — 25010000002 LIDOCAINE 2% SOLUTION: Performed by: INTERNAL MEDICINE

## 2025-03-19 PROCEDURE — 82948 REAGENT STRIP/BLOOD GLUCOSE: CPT

## 2025-03-19 PROCEDURE — 96366 THER/PROPH/DIAG IV INF ADDON: CPT

## 2025-03-19 PROCEDURE — 25010000002 FENTANYL CITRATE (PF) 50 MCG/ML SOLUTION: Performed by: INTERNAL MEDICINE

## 2025-03-19 PROCEDURE — 99214 OFFICE O/P EST MOD 30 MIN: CPT | Performed by: INTERNAL MEDICINE

## 2025-03-19 PROCEDURE — 25010000002 PIPERACILLIN SOD-TAZOBACTAM PER 1 G: Performed by: INTERNAL MEDICINE

## 2025-03-19 PROCEDURE — 63710000001 INSULIN LISPRO (HUMAN) PER 5 UNITS: Performed by: INTERNAL MEDICINE

## 2025-03-19 PROCEDURE — G0257 UNSCHED DIALYSIS ESRD PT HOS: HCPCS

## 2025-03-19 RX ORDER — DEXTROSE MONOHYDRATE 25 G/50ML
25 INJECTION, SOLUTION INTRAVENOUS
Status: DISCONTINUED | OUTPATIENT
Start: 2025-03-19 | End: 2025-03-21 | Stop reason: HOSPADM

## 2025-03-19 RX ORDER — BUMETANIDE 2 MG/1
2 TABLET ORAL 2 TIMES DAILY
Status: DISCONTINUED | OUTPATIENT
Start: 2025-03-19 | End: 2025-03-19

## 2025-03-19 RX ORDER — ACETAMINOPHEN 500 MG
1000 TABLET ORAL ONCE
Status: DISCONTINUED | OUTPATIENT
Start: 2025-03-19 | End: 2025-03-19

## 2025-03-19 RX ORDER — CLOPIDOGREL BISULFATE 75 MG/1
75 TABLET ORAL DAILY
Status: DISCONTINUED | OUTPATIENT
Start: 2025-03-19 | End: 2025-03-21 | Stop reason: HOSPADM

## 2025-03-19 RX ORDER — POLYETHYLENE GLYCOL 3350 17 G/17G
17 POWDER, FOR SOLUTION ORAL DAILY
Status: DISCONTINUED | OUTPATIENT
Start: 2025-03-19 | End: 2025-03-21 | Stop reason: HOSPADM

## 2025-03-19 RX ORDER — ACETAMINOPHEN 325 MG/1
650 TABLET ORAL EVERY 4 HOURS PRN
Status: DISCONTINUED | OUTPATIENT
Start: 2025-03-19 | End: 2025-03-21 | Stop reason: HOSPADM

## 2025-03-19 RX ORDER — FENTANYL CITRATE 50 UG/ML
INJECTION, SOLUTION INTRAMUSCULAR; INTRAVENOUS
Status: DISCONTINUED | OUTPATIENT
Start: 2025-03-19 | End: 2025-03-19 | Stop reason: HOSPADM

## 2025-03-19 RX ORDER — HEPARIN SODIUM (PORCINE) LOCK FLUSH IV SOLN 100 UNIT/ML 100 UNIT/ML
3 SOLUTION INTRAVENOUS DAILY PRN
Status: DISCONTINUED | OUTPATIENT
Start: 2025-03-19 | End: 2025-03-21 | Stop reason: HOSPADM

## 2025-03-19 RX ORDER — NITROGLYCERIN 0.4 MG/1
0.4 TABLET SUBLINGUAL
Status: DISCONTINUED | OUTPATIENT
Start: 2025-03-19 | End: 2025-03-20

## 2025-03-19 RX ORDER — INSULIN LISPRO 100 [IU]/ML
5 INJECTION, SOLUTION INTRAVENOUS; SUBCUTANEOUS
Status: DISCONTINUED | OUTPATIENT
Start: 2025-03-19 | End: 2025-03-21 | Stop reason: HOSPADM

## 2025-03-19 RX ORDER — SODIUM CHLORIDE 9 MG/ML
INJECTION, SOLUTION INTRAVENOUS
Status: COMPLETED | OUTPATIENT
Start: 2025-03-19 | End: 2025-03-19

## 2025-03-19 RX ORDER — HEPARIN SODIUM 1000 [USP'U]/ML
INJECTION, SOLUTION INTRAVENOUS; SUBCUTANEOUS
Status: DISCONTINUED | OUTPATIENT
Start: 2025-03-19 | End: 2025-03-19 | Stop reason: HOSPADM

## 2025-03-19 RX ORDER — IBUPROFEN 600 MG/1
1 TABLET ORAL
Status: DISCONTINUED | OUTPATIENT
Start: 2025-03-19 | End: 2025-03-21 | Stop reason: HOSPADM

## 2025-03-19 RX ORDER — MIDAZOLAM HYDROCHLORIDE 1 MG/ML
INJECTION, SOLUTION INTRAMUSCULAR; INTRAVENOUS
Status: DISCONTINUED | OUTPATIENT
Start: 2025-03-19 | End: 2025-03-19 | Stop reason: HOSPADM

## 2025-03-19 RX ORDER — NICOTINE POLACRILEX 4 MG
15 LOZENGE BUCCAL
Status: DISCONTINUED | OUTPATIENT
Start: 2025-03-19 | End: 2025-03-21 | Stop reason: HOSPADM

## 2025-03-19 RX ORDER — VERAPAMIL HYDROCHLORIDE 2.5 MG/ML
INJECTION, SOLUTION INTRAVENOUS
Status: DISCONTINUED | OUTPATIENT
Start: 2025-03-19 | End: 2025-03-19 | Stop reason: HOSPADM

## 2025-03-19 RX ORDER — INSULIN LISPRO 100 [IU]/ML
2-7 INJECTION, SOLUTION INTRAVENOUS; SUBCUTANEOUS
Status: DISCONTINUED | OUTPATIENT
Start: 2025-03-19 | End: 2025-03-21 | Stop reason: HOSPADM

## 2025-03-19 RX ORDER — SUCRALFATE 1 G/1
1 TABLET ORAL
Status: DISCONTINUED | OUTPATIENT
Start: 2025-03-19 | End: 2025-03-21 | Stop reason: HOSPADM

## 2025-03-19 RX ORDER — LIDOCAINE HYDROCHLORIDE 20 MG/ML
INJECTION, SOLUTION INFILTRATION; PERINEURAL
Status: DISCONTINUED | OUTPATIENT
Start: 2025-03-19 | End: 2025-03-19 | Stop reason: HOSPADM

## 2025-03-19 RX ORDER — DOCUSATE SODIUM 100 MG/1
100 CAPSULE, LIQUID FILLED ORAL 2 TIMES DAILY
Status: DISCONTINUED | OUTPATIENT
Start: 2025-03-19 | End: 2025-03-21 | Stop reason: HOSPADM

## 2025-03-19 RX ADMIN — PANTOPRAZOLE SODIUM 40 MG: 40 TABLET, DELAYED RELEASE ORAL at 09:19

## 2025-03-19 RX ADMIN — OXYCODONE AND ACETAMINOPHEN 1 TABLET: 5; 325 TABLET ORAL at 04:14

## 2025-03-19 RX ADMIN — CETIRIZINE HYDROCHLORIDE 10 MG: 10 TABLET ORAL at 09:29

## 2025-03-19 RX ADMIN — CLOPIDOGREL BISULFATE 75 MG: 75 TABLET ORAL at 17:57

## 2025-03-19 RX ADMIN — CARVEDILOL 25 MG: 25 TABLET, FILM COATED ORAL at 17:57

## 2025-03-19 RX ADMIN — GUAIFENESIN 600 MG: 600 TABLET, MULTILAYER, EXTENDED RELEASE ORAL at 09:29

## 2025-03-19 RX ADMIN — INSULIN LISPRO 3 UNITS: 100 INJECTION, SOLUTION INTRAVENOUS; SUBCUTANEOUS at 17:43

## 2025-03-19 RX ADMIN — SUCRALFATE 1 G: 1 TABLET ORAL at 17:57

## 2025-03-19 RX ADMIN — INSULIN LISPRO 5 UNITS: 100 INJECTION, SOLUTION INTRAVENOUS; SUBCUTANEOUS at 17:43

## 2025-03-19 RX ADMIN — ONDANSETRON 4 MG: 2 INJECTION, SOLUTION INTRAMUSCULAR; INTRAVENOUS at 12:47

## 2025-03-19 RX ADMIN — OXYCODONE AND ACETAMINOPHEN 1 TABLET: 5; 325 TABLET ORAL at 17:57

## 2025-03-19 RX ADMIN — CARVEDILOL 25 MG: 25 TABLET, FILM COATED ORAL at 09:18

## 2025-03-19 RX ADMIN — PANTOPRAZOLE SODIUM 40 MG: 40 TABLET, DELAYED RELEASE ORAL at 17:58

## 2025-03-19 RX ADMIN — ONDANSETRON 4 MG: 2 INJECTION, SOLUTION INTRAMUSCULAR; INTRAVENOUS at 04:17

## 2025-03-19 RX ADMIN — PIPERACILLIN AND TAZOBACTAM 3.38 G: 3; .375 INJECTION, POWDER, FOR SOLUTION INTRAVENOUS at 04:14

## 2025-03-19 NOTE — H&P
History and physical    Primary care physician      Chief complaint  Chest pain    History of present illness  40-year-old -American male with history of end-stage renal disease on hemodialysis chronic anemia diabetes hypertension hyperlipidemia coronary artery disease who is legally blind and well-known to our service as he was recently admitted for right great toe wound with infection and surrounding cellulitis and also found to have osteomyelitis underwent I&D and hallux amputation followed by partial first right metatarsal excision and discharge home on IV antibiotics on the third of this month and is still getting IV antibiotic presented [facility] with chest pain and also have shortness of breath and palpitation with bodyaches cold chills headache nausea but no vomiting diarrhea.  Patient evaluated in ER found to have elevated troponin admitted for further workup.  At the time of examination he has no chest pain but is still very weak.      PAST MEDICAL HISTORY   CKD stage 4 due to type 2 diabetes mellitus      GERD (gastroesophageal reflux disease)      Heart failure      Hypertension      Type 2 diabetes mellitus      Vision impairment        PAST SURGICAL HISTORY              Procedure Laterality Date    AMPUTATION DIGIT Right 2/18/2025     Procedure: Hallux amputation, right foot SESAMOIDECTOMY, INCISION AN DRAINAGE;  Surgeon: Trevon Garcia DPM;  Location: [facility];  Service: Podiatry;  Laterality: Right;    BONE EXCISION LEG Right 2/21/2025     Procedure: Partial first metatarsal excision, right foot;  Surgeon: Trevon Garcia DPM;  Location: [facility] OR;  Service: Podiatry;  Laterality: Right;    CORONARY STENT PLACEMENT        EYE SURGERY        INCISION AND DRAINAGE LEG Right 2/24/2025     Procedure: INCISION AND DRAINAGE LOWER EXTREMITY, PARTIAL BONE EXCISION;  Surgeon: Trevon Garcia DPM;  Location: [facility] OR;  Service: Podiatry;   "Laterality: Right;    TUNNELED VENOUS CATHETER PLACEMENT N/A 2/28/2025     Procedure: INSERTION OF PETER CATHETER;  Surgeon: Lam Gomez MD;  Location: [facility] MAIN OR;  Service: Vascular;  Laterality: N/A;    WISDOM TOOTH EXTRACTION        WOUND DEBRIDEMENT Right 2/21/2025     Procedure: DEBRIDEMENT FOOT, RIGHT;  Surgeon: Trevon Garcia DPM;  Location: [facility] MAIN OR;  Service: Podiatry;  Laterality: Right;         FAMILY HISTORY           Problem Relation Age of Onset    Malig Hyperthermia Neg Hx        SOCIAL HISTORY                 Socioeconomic History    Marital status:    Tobacco Use    Smoking status: Never       Passive exposure: Never    Smokeless tobacco: Never   Vaping Use    Vaping status: Never Used   Substance and Sexual Activity    Alcohol use: Yes       Comment: occ    Drug use: Yes       Types: Marijuana    Sexual activity: Defer         ALLERGIES  Azithromycin and Penicillins  Home medications reviewed     REVIEW OF SYSTEMS  All systems reviewed and negative except for those discussed in HPI.     PHYSICAL EXAM   Blood pressure 111/76, pulse 94, temperature 98.2 °F (36.8 °C), temperature source Oral, resp. rate 28, height 170.2 cm (67.01\"), weight 106 kg (233 lb 11 oz), SpO2 100%.    GENERAL: Awake and alert, no acute distress  SKIN: Warm, dry  HENT: Normocephalic, atraumatic  EYES: no scleral icterus  CV: regular rhythm, mild tachycardia, left upper extremity fistula with a palpable thrill,   RESPIRATORY: normal effort, moving air bilaterally  ABDOMEN: nondistended soft nontender normal bowel sounds no guarding or rigidity  MUSCULOSKELETAL: no deformity.  Right foot is bandaged with wound VAC in place.  NEURO: alert, moves all extremities, follows commands     LAB RESULTS        Procedure Component Value Units Date/Time    Respiratory Panel PCR w/COVID-19(SARS-CoV-2) KEVIN/KEELY/RAMIREZ/PAD/COR/ESDRAS In-House, NP Swab in UTM/VTM, 2 HR TAT - Swab, Nasopharynx [[id number]]  (Normal) " Collected: 03/18/25 1159    Specimen: Swab from Nasopharynx Updated: 03/18/25 1305     ADENOVIRUS, PCR Not Detected     Coronavirus 229E Not Detected     Coronavirus HKU1 Not Detected     Coronavirus NL63 Not Detected     Coronavirus OC43 Not Detected     COVID19 Not Detected     Human Metapneumovirus Not Detected     Human Rhinovirus/Enterovirus Not Detected     Influenza A PCR Not Detected     Influenza B PCR Not Detected     Parainfluenza Virus 1 Not Detected     Parainfluenza Virus 2 Not Detected     Parainfluenza Virus 3 Not Detected     Parainfluenza Virus 4 Not Detected     RSV, PCR Not Detected     Bordetella pertussis pcr Not Detected     Bordetella parapertussis PCR Not Detected     Chlamydophila pneumoniae PCR Not Detected     Mycoplasma pneumo by PCR Not Detected    Narrative:      In the setting of a positive respiratory panel with a viral infection PLUS a negative procalcitonin without other underlying concern for bacterial infection, consider observing off antibiotics or discontinuation of antibiotics and continue supportive care. If the respiratory panel is positive for atypical bacterial infection (Bordetella pertussis, Chlamydophila pneumoniae, or Mycoplasma pneumoniae), consider antibiotic de-escalation to target atypical bacterial infection.    High Sensitivity Troponin T 1Hr [[id number]]  (Abnormal) Collected: 03/18/25 1158    Specimen: Blood Updated: 03/18/25 1236     HS Troponin T 260 ng/L      Troponin T Numeric Delta -5 ng/L      Troponin T % Delta -2    Narrative:      High Sensitive Troponin T Reference Range:  <14.0 ng/L- Negative Female for AMI  <22.0 ng/L- Negative Male for AMI  >=14 - Abnormal Female indicating possible myocardial injury.  >=22 - Abnormal Male indicating possible myocardial injury.   Clinicians would have to utilize clinical acumen, EKG, Troponin, and serial changes to determine if it is an Acute Myocardial Infarction or myocardial injury due to an underlying chronic  "condition.         Lactic Acid, Plasma [[id number]]  (Normal) Collected: 03/18/25 1158    Specimen: Blood Updated: 03/18/25 1232     Lactate 1.5 mmol/L     Procalcitonin [[id number]]  (Normal) Collected: 03/18/25 1016    Specimen: Blood from Arm, Right Updated: 03/18/25 1226     Procalcitonin 0.25 ng/mL     Narrative:      As a Marker for Sepsis (Non-Neonates):    1. <0.5 ng/mL represents a low risk of severe sepsis and/or septic shock.  2. >2 ng/mL represents a high risk of severe sepsis and/or septic shock.    As a Marker for Lower Respiratory Tract Infections that require antibiotic therapy:    PCT on Admission    Antibiotic Therapy       6-12 Hrs later    >0.5                Strongly Recommended  >0.25 - <0.5        Recommended   0.1 - 0.25          Discouraged              Remeasure/reassess PCT  <0.1                Strongly Discouraged     Remeasure/reassess PCT    As 28 day mortality risk marker: \"Change in Procalcitonin Result\" (>80% or <=80%) if Day 0 (or Day 1) and Day 4 values are available. Refer to http://[email or web address].coms-pct-calculator.com    Change in PCT <=80%  A decrease of PCT levels below or equal to 80% defines a positive change in PCT test result representing a higher risk for 28-day all-cause mortality of patients diagnosed with severe sepsis for septic shock.    Change in PCT >80%  A decrease of PCT levels of more than 80% defines a negative change in PCT result representing a lower risk for 28-day all-cause mortality of patients diagnosed with severe sepsis or septic shock.       High Sensitivity Troponin T [[id number]]  (Abnormal) Collected: 03/18/25 1016    Specimen: Blood from Arm, Right Updated: 03/18/25 1055     HS Troponin T 265 ng/L     Narrative:      High Sensitive Troponin T Reference Range:  <14.0 ng/L- Negative Female for AMI  <22.0 ng/L- Negative Male for AMI  >=14 - Abnormal Female indicating possible myocardial injury.  >=22 - Abnormal Male indicating possible myocardial injury. "   Clinicians would have to utilize clinical acumen, EKG, Troponin, and serial changes to determine if it is an Acute Myocardial Infarction or myocardial injury due to an underlying chronic condition.         Comprehensive Metabolic Panel [[id number]]  (Abnormal) Collected: 03/18/25 1016    Specimen: Blood from Arm, Right Updated: 03/18/25 1050     Glucose 173 mg/dL      BUN 28 mg/dL      Creatinine 5.26 mg/dL      Sodium 134 mmol/L      Potassium 3.8 mmol/L      Chloride 99 mmol/L      CO2 24.0 mmol/L      Calcium 9.2 mg/dL      Total Protein 7.2 g/dL      Albumin 3.6 g/dL      ALT (SGPT) 14 U/L      AST (SGOT) 19 U/L      Alkaline Phosphatase 126 U/L      Total Bilirubin 0.6 mg/dL      Globulin 3.6 gm/dL      A/G Ratio 1.0 g/dL      BUN/Creatinine Ratio 5.3     Anion Gap 11.0 mmol/L      eGFR 13.2 mL/min/1.73     Narrative:      GFR Categories in Chronic Kidney Disease (CKD)      GFR Category          GFR (mL/min/1.73)    Interpretation  G1                     90 or greater         Normal or high (1)  G2                      60-89                Mild decrease (1)  G3a                   45-59                Mild to moderate decrease  G3b                   30-44                Moderate to severe decrease  G4                    15-29                Severe decrease  G5                    14 or less           Kidney failure          (1)In the absence of evidence of kidney disease, neither GFR category G1 or G2 fulfill the criteria for CKD.    eGFR calculation 2021 CKD-EPI creatinine equation, which does not include race as a factor    BNP [[id number]]  (Abnormal) Collected: 03/18/25 1016    Specimen: Blood from Arm, Right Updated: 03/18/25 1050     proBNP 20,250.0 pg/mL     Narrative:      This assay is used as an aid in the diagnosis of individuals suspected of having heart failure. It can be used as an aid in the diagnosis of acute decompensated heart failure (ADHF) in patients presenting with signs and symptoms of ADHF  to the emergency department (ED). In addition, NT-proBNP of <300 pg/mL indicates ADHF is not likely.    Age Range Result Interpretation  NT-proBNP Concentration (pg/mL:      <50             Positive            >450                   Gray                 300-450                    Negative             <300    50-75           Positive            >900                  Gray                300-900                  Negative            <300      >75             Positive            >1800                  Gray                300-1800                  Negative            <300    Jesse Draw [[id number]] Collected: 03/18/25 1016    Specimen: Blood from Arm, Right Updated: 03/18/25 1030    Narrative:      The following orders were created for panel order Jesse Draw.  Procedure                               Abnormality         Status                     ---------                               -----------         ------                     Green Top (Gel)[[id number]]                                  Final result               Lavender Top[[id number]]                                     Final result               Gold Top - SST[[id number]]                                   Final result               Light Blue Top[[id number]]                                   Final result                 Please view results for these tests on the individual orders.    Green Top (Gel) [[id number]] Collected: 03/18/25 1016    Specimen: Blood from Arm, Right Updated: 03/18/25 1030     Extra Tube Hold for add-ons.     Comment: Auto resulted.       Lavender Top [[id number]] Collected: 03/18/25 1016    Specimen: Blood from Arm, Right Updated: 03/18/25 1030     Extra Tube hold for add-on     Comment: Auto resulted       Gold Top - SST [[id number]] Collected: 03/18/25 1016    Specimen: Blood from Arm, Right Updated: 03/18/25 1030     Extra Tube Hold for add-ons.     Comment: Auto resulted.       Light Blue Top [[id number]] Collected: 03/18/25 1016    Specimen:  Blood from Arm, Right Updated: 03/18/25 1030     Extra Tube Hold for add-ons.     Comment: Auto resulted       CBC & Differential [[id number]]  (Abnormal) Collected: 03/18/25 1016    Specimen: Blood from Arm, Right Updated: 03/18/25 1028    Narrative:      The following orders were created for panel order CBC & Differential.  Procedure                               Abnormality         Status                     ---------                               -----------         ------                     CBC Auto Differential[[id number]]        Abnormal            Final result                 Please view results for these tests on the individual orders.    CBC Auto Differential [[id number]]  (Abnormal) Collected: 03/18/25 1016    Specimen: Blood from Arm, Right Updated: 03/18/25 1028     WBC 10.95 10*3/mm3      RBC 3.13 10*6/mm3      Hemoglobin 9.1 g/dL      Hematocrit 28.6 %      MCV 91.4 fL      MCH 29.1 pg      MCHC 31.8 g/dL      RDW 14.9 %      RDW-SD 49.6 fl      MPV 8.2 fL      Platelets 120 10*3/mm3      Neutrophil % 87.1 %      Lymphocyte % 3.4 %      Monocyte % 7.6 %      Eosinophil % 0.7 %      Basophil % 0.6 %      Immature Grans % 0.6 %      Neutrophils, Absolute 9.53 10*3/mm3      Lymphocytes, Absolute 0.37 10*3/mm3      Monocytes, Absolute 0.83 10*3/mm3      Eosinophils, Absolute 0.08 10*3/mm3      Basophils, Absolute 0.07 10*3/mm3      Immature Grans, Absolute 0.07 10*3/mm3      nRBC 0.0 /100 WBC            Narrative & Impression   XR CHEST 1 VW-3/18/2025     HISTORY: Shortness of breath.     Heart size is within normal limits. Lungs are underinflated with some  vascular crowding. No focal infiltrates are seen. Right-sided central  venous catheter is seen with its tip overlying the right upper  hemithorax near the right innominate vein.     Bony structures appear unremarkable.     IMPRESSION:  1. Underinflation of the lungs.  2. No acute process identified.     Scan on 3/18/2025 1913 by [facility], [place]: ECG  12-LEAD         Author: -- Service: -- Author Type: --   Filed: Date of Service: Creation Time:   Status: (Other)   HEART ZZEU=631  bpm  RR Ebdnewik=080  ms  TN Pxmdvoyf=236  ms  P Horizontal Axis=0  deg  P Front Axis=67  deg  QRSD Interval=86  ms  QT Fhtzhjlu=828  ms  LXtY=189  ms  QRS Axis=22  deg  T Wave Axis=181  deg  - ABNORMAL ECG -  Sinus tachycardia  Nonspecific  repol abnormality, lateral leads  When compared with ECG of 18-Mar-2025 10:13:07,  No significant change          Current Facility-Administered Medications:     [Transfer Hold] aspirin chewable tablet 81 mg, 81 mg, Oral, Daily, Remy Thornton MD, 81 mg at 03/18/25 1932    [Transfer Hold] atorvastatin (LIPITOR) tablet 10 mg, 10 mg, Oral, Nightly, Remy Thornton MD, 10 mg at 03/18/25 2007    carvedilol (COREG) tablet 25 mg, 25 mg, Oral, BID With Meals, Remy Thornton MD, 25 mg at 03/19/25 0918    [Transfer Hold] cetirizine (zyrTEC) tablet 10 mg, 10 mg, Oral, Daily, Remy Thornton MD, 10 mg at 03/19/25 0929    clopidogrel (PLAVIX) tablet 75 mg, 75 mg, Oral, Daily, Remy Thornton MD    [Transfer Hold] dextrose (D50W) (25 g/50 mL) IV injection 25 g, 25 g, Intravenous, Q15 Min PRN, Remy Thornton MD    [Transfer Hold] dextrose (GLUTOSE) oral gel 15 g, 15 g, Oral, Q15 Min PRN, Remy Thornton MD    docusate sodium (COLACE) capsule 100 mg, 100 mg, Oral, BID, Remy Thornton MD    epoetin jamil-epbx (RETACRIT) injection 10,000 Units, 10,000 Units, Subcutaneous, Once per day on Monday Wednesday Friday, Remy Thornton MD    fentaNYL citrate (PF) (SUBLIMAZE) injection, , , Code / Trauma / Sedation Medication, Carmen Erickson MD, 25 mcg at 03/19/25 1431    [Transfer Hold] glucagon (GLUCAGEN) injection 1 mg, 1 mg, Intramuscular, Q15 Min PRN, Remy Thornton MD    [Transfer Hold] guaiFENesin (MUCINEX) 12 hr tablet 600 mg, 600 mg, Oral, Q12H, Remy Thornton MD, 600 mg at 03/19/25 0929    [Transfer Hold] heparin injection 300 Units, 3 mL, Intravenous, Daily PRN, Remy Thornton MD     insulin glargine (LANTUS, SEMGLEE) injection 30 Units, 30 Units, Subcutaneous, Nightly, Remy Thornton MD    [Transfer Hold] insulin lispro (HUMALOG/ADMELOG) injection 2-7 Units, 2-7 Units, Subcutaneous, 4x Daily AC & at Bedtime, Remy Thornton MD    insulin lispro (HUMALOG/ADMELOG) injection 5 Units, 5 Units, Subcutaneous, TID With Meals, Remy Thornton MD    midazolam (VERSED) injection, , , Code / Trauma / Sedation Medication, Carmen Erickson MD, 1 mg at 03/19/25 1431    [Transfer Hold] ondansetron (ZOFRAN) injection 4 mg, 4 mg, Intravenous, Q6H PRN, Remy Thornton MD, 4 mg at 03/19/25 1247    [Transfer Hold] oxyCODONE-acetaminophen (PERCOCET) 5-325 MG per tablet 1 tablet, 1 tablet, Oral, Q4H PRN, Remy Thornton MD, 1 tablet at 03/19/25 0414    [Transfer Hold] pantoprazole (PROTONIX) EC tablet 40 mg, 40 mg, Oral, BID AC, Remy Thornton MD, 40 mg at 03/19/25 0919    piperacillin-tazobactam (ZOSYN) 3.375 g IVPB in 100 mL NS MBP (CD), 3.375 g, Intravenous, Q12H, Remy Thornton MD, Stopped at 03/19/25 1049    polyethylene glycol (MIRALAX) packet 17 g, 17 g, Oral, Daily, Remy Thornton MD    sodium chloride 0.9 % flush 10 mL, 10 mL, Intravenous, PRN, Remy Thornton MD    sodium chloride 0.9 % infusion, , , Code / Trauma / Sedation Continuous Med, Carmen Erickson MD, Last Rate: 75 mL/hr at 03/19/25 1415, 75 mL/hr at 03/19/25 1415    sucralfate (CARAFATE) tablet 1 g, 1 g, Oral, 4x Daily AC & at Bedtime, Remy Thornton MD     ASSESSMENT  Chest pain with known coronary artery disease and elevated troponin per cardiology  Chronic systolic diastolic congestive heart failure  End-stage renal disease on hemodialysis  Diabetes mellitus  Hypertension  Hyperlipidemia  Chronic anemia  Patient is legally blind  Gastroesophageal reflux disease  Recent right great toe wound with infection and surrounding cellulitis and osteomyelitis underwent I&D followed by hallux amputation followed by partial first right metatarsal excision on IV  antibiotics    PLAN  Admit  Serial cardiac enzymes and EKG  Check 2D echo  Continue aspirin Plavix Coreg Lipitor  Cardiology consult  Continue home medications including IV antibiotics  Stress ulcer DVT prophylaxis  Nephrology to follow patient for hemodialysis  Supportive care  Patient is full code  Discussed with nursing staff and family  Follow closely further recommendation current hospital course    JASKARAN LANGFORD MD

## 2025-03-19 NOTE — CONSULTS
[facility] Consult Note      Patient Name: Wilner Menjivar  : 1985  MRN: [id number]  Primary Care Physician:  Roosevelt Thao MD  Referring Physician: Remy Thornton MD  Date of admission: 3/18/2025    Subjective     Reason for Consult:  ESRD    HPI:   Wilner Menjivar is a 40 y.o. male with ESRD on HD for the past 9 months ([facility] [place]; MWF; left arm AV fistula; Dr. Heladio Wasserman of our group), DM2 with , and hypertension, admitted yesterday for further evaluation of chest pain.  He states that he was at rest when chest pain developed, with radiation to the back; no diaphoresis or nausea.  Pain lasted 30 minutes.  He wonders whether pain actually is GI in origin, as he has had similar discomfort with heartburn.  Last HD was 3/17; had 3 L removed, but still left well above dry weight.  Full PMH outlined below; notable is recent (25) amputation of right first toe; remains on IV Zosyn through  via Estevez catheter right chest.    Makes very little urine  No shortness of breath at rest; no orthopnea or leg swelling  No further chest pain since arrival here  No fever or chills    Review of Systems:   14 point review of systems is otherwise negative except for mentioned above on HPI    Personal History     Past Medical History:   Diagnosis Date    CKD stage 4 due to type 2 diabetes mellitus     GERD (gastroesophageal reflux disease)     Heart failure     Hypertension     Type 2 diabetes mellitus     Vision impairment        Past Surgical History:   Procedure Laterality Date    AMPUTATION DIGIT Right 2025    Procedure: Hallux amputation, right foot SESAMOIDECTOMY, INCISION AN DRAINAGE;  Surgeon: Trevon Garcia DPM;  Location: [facility] OR;  Service: Podiatry;  Laterality: Right;    BONE EXCISION LEG Right 2025    Procedure: Partial first metatarsal excision, right foot;  Surgeon: Trevon Garcia DPM;  Location: [facility] OR;  Service: Podiatry;  Laterality:  Right;    CORONARY STENT PLACEMENT      EYE SURGERY      INCISION AND DRAINAGE LEG Right 2/24/2025    Procedure: INCISION AND DRAINAGE LOWER EXTREMITY, PARTIAL BONE EXCISION;  Surgeon: Trevon Garcia DPM;  Location: [facility] MAIN OR;  Service: Podiatry;  Laterality: Right;    TUNNELED VENOUS CATHETER PLACEMENT N/A 2/28/2025    Procedure: INSERTION OF PETER CATHETER;  Surgeon: Lam Gomez MD;  Location: [facility] MAIN OR;  Service: Vascular;  Laterality: N/A;    WISDOM TOOTH EXTRACTION      WOUND DEBRIDEMENT Right 2/21/2025    Procedure: DEBRIDEMENT FOOT, RIGHT;  Surgeon: Trevon Garcia DPM;  Location[facility] MAIN OR;  Service: Podiatry;  Laterality: Right;       Family History: family history is not on file.    Social History:  reports that he has never smoked. He has never been exposed to tobacco smoke. He has never used smokeless tobacco. He reports current alcohol use. He reports current drug use. Drug: Marijuana.    Home Medications:  Prior to Admission medications    Medication Sig Start Date End Date Taking? Authorizing Provider   aspirin 81 MG EC tablet Take 1 tablet by mouth Daily.   Yes Lonnie Colin MD   atorvastatin (LIPITOR) 10 MG tablet Take 1 tablet by mouth Every Night for 30 days. 3/3/25 4/2/25 Yes Remy Thornton MD   bumetanide (BUMEX) 2 MG tablet Take 1 tablet by mouth 2 (Two) Times a Day for 30 days. 3/3/25 4/2/25 Yes Remy Thornton MD   carvedilol (COREG) 25 MG tablet Take 2 tablets by mouth 2 (Two) Times a Day. 1/31/24  Yes Lonnie Colin MD   clopidogrel (PLAVIX) 75 MG tablet Take 1 tablet by mouth Daily for 30 days. 3/4/25 4/3/25 Yes Remy Thornton MD   docusate sodium 100 MG capsule Take 1 capsule by mouth 2 (Two) Times a Day for 30 days. 3/3/25 4/2/25 Yes Remy Thornton MD   epoetin jamil-epbx (RETACRIT) 07645 UNIT/ML injection Inject 1 mL under the skin into the appropriate area as directed 3 (Three) Times a Week for 30 days. Indications: ESRD on Dialysis 3/5/25  4/4/25 Yes Remy Thornton MD   Insulin Degludec (TRESIBA FLEXTOUCH) 200 UNIT/ML solution pen-injector pen injection Inject 40 Units under the skin into the appropriate area as directed Daily. 2/21/24  Yes Lonnie Colin MD   NovoLOG FlexPen 100 UNIT/ML solution pen-injector sc pen INJECT 5 UNITS SUBCUTANEOUSLY THREE TIMES DAILY WITH MEALS PLUS UP TO 30 UNITS SLIDING SCALE. MAX OF 45 UNITS DAILY 3/2/24  Yes Lonnie Colin MD   pantoprazole (PROTONIX) 40 MG EC tablet Take 1 tablet by mouth 2 (Two) Times a Day Before Meals for 30 days. 3/3/25 4/2/25 Yes Remy Thornton MD   polyethylene glycol (MIRALAX) 17 g packet Take 17 g by mouth. 3/11/24  Yes Lonnie Colin MD   sodium chloride 0.9 % solution 100 mL with piperacillin-tazobactam 3.375 (3-0.375) g reconstituted solution 3.375 g IVPB Infuse 3.375 g into a venous catheter Every 12 (Twelve) Hours for 70 doses. Indications: Bone and/or Joint Infection 3/3/25 4/7/25 Yes Remy Thornton MD   sucralfate (CARAFATE) 1 g tablet Take 1 tablet by mouth 4 (Four) Times a Day Before Meals & at Bedtime for 30 days. 3/3/25 4/2/25 Yes Remy Thornton MD B-LAURA UF III MINI PEN NEEDLES 31G X 5 MM misc USE 1 TO CHECK BLOOD SUGAR FIVE TIMES DAILY 1/18/24   Lonnie Colin MD       Allergies:  Allergies   Allergen Reactions    Azithromycin Nausea And Vomiting     Pt given IV azithro, reported nausea/vomiting and hot flashes within 3-5 minutes of admin. Pt also c/o new abd pain with admin.     Pt given IV azithro, reported nausea/vomiting and hot flashes within 3-5 minutes of admin. Pt also c/o new abd pain with admin.    Penicillins Unknown - Low Severity     reaction as a child. Does not recall reaction.     reaction as a child. Does not recall reaction.  Beta lactam allergy details  Antibiotic reaction: unknown  Age at reaction: infant  Dose to reaction time: unknown  Reason for antibiotic: unknown  Epinephrine required for reaction?: unknown  Tolerated antibiotics:  unknown          Objective     Vitals:   Temp:  [98.8 °F (37.1 °C)-99.9 °F (37.7 °C)] 99.3 °F (37.4 °C)  Heart Rate:  [104-127] 113  Resp:  [18] 18  BP: (110-173)/() 110/60  Flow (L/min) (Oxygen Therapy):  [0-2] 0  No intake or output data in the 24 hours ending 03/19/25 0754    Physical Exam:   Constitutional: Awake, alert, NAD, pleasant, oriented  HEENT: Sclera anicteric, no conjunctival injection; blind with opaque pupils  Neck: Supple, no carotid bruit, trachea at midline, no JVD  Respiratory: Crackles in flanks bilaterally, nonlabored on RA  Cardiovascular: RR, tachycardic, no rub  Vascular: Left arm AV fistula patent  Gastrointestinal: BS +, soft, nontender and nondistended  : No palpable bladder  Musculoskeletal: +1-2 LE edema, no clubbing or cyanosis  Psychiatric: Appropriate affect, cooperative, oriented  Neurologic: No asterixis, moving all extremities, normal speech  Skin: Warm and dry       Scheduled Meds:     aspirin, 81 mg, Oral, Daily  atorvastatin, 10 mg, Oral, Nightly  carvedilol, 25 mg, Oral, BID With Meals  cetirizine, 10 mg, Oral, Daily  guaiFENesin, 600 mg, Oral, Q12H  pantoprazole, 40 mg, Oral, BID AC  piperacillin-tazobactam, 3.375 g, Intravenous, Q12H      IV Meds:        Results Reviewed:   I have personally reviewed the results from the time of this admission to 3/19/2025 07:54 EDT     Lab Results   Component Value Date    GLUCOSE 173 (H) 03/18/2025    CALCIUM 9.2 03/18/2025     (L) 03/18/2025    K 3.8 03/18/2025    CO2 24.0 03/18/2025    CL 99 03/18/2025    BUN 28 (H) 03/18/2025    CREATININE 5.26 (H) 03/18/2025    EGFRIFAFRI 28 (L) 02/07/2023    BCR 5.3 (L) 03/18/2025    ANIONGAP 11.0 03/18/2025      Lab Results   Component Value Date    MG 2.1 03/02/2025    PHOS 4.5 03/03/2025    ALBUMIN 3.6 03/18/2025           Assessment / Plan       Chest pain      ASSESSMENT:  1.  ESRD:  volume excess by exam (edema and crackles); normal potassium.  Hypervolemic hyponatremia.  Due for  HD today  2.  Chest pain and elevated troponin with known CAD and PCI in August '24  3.  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  Hypertension of ESRD, controlled  5.  PVD with right hallux amputation 2/18; required platelet holiday beforehand.  On IV Zosyn via Estevez until 4/9/25  6.  Anemia of ESRD: On outpatient JOSELINE and as needed IV iron    PLAN:  1.  HD today with fluid removal  2.  Cardiology evaluation underway  3.  Low threshold for extra iUF tomorrow based on exam and symptoms    Thank you for involving us in the care of Wilner ANGELLA DelarosaMenjivar.  Please feel free to call with any questions.    Sascha Randolph MD  03/19/25  07:54 EDT    [facility]  [phone]      Please note that portions of this note were completed with a voice recognition program.

## 2025-03-19 NOTE — DISCHARGE INSTRUCTIONS
[facility]  [address] [place] [address]    Coronary Angiogram (Radial/Ulnar Approach) After Care    Refer to this sheet in the next few weeks. These instructions provide you with information on caring for yourself after your procedure. Your caregiver may also give you more specific instructions. Your treatment has been planned according to current medical practices, but problems sometimes occur. Call your caregiver if you have any problems or questions after your procedure.    Home Care Instructions:  You may shower the day after the procedure. Remove the bandage (dressing) and gently wash the site with plain soap and water. Gently pat the site dry. You may apply a band aid daily for 2 days if desired.    Do not apply powder or lotion to the site.  Do not submerge the affected site in water for 3 to 5 days or until the site is completely healed.   Do not lift, push or pull anything over 5 pounds for 5 days after your procedure or as directed by your physician.  As a reference, a gallon of milk weighs 8 pounds.   Inspect the site at least twice daily. You may notice some bruising at the site and it may be tender for 1 to 2 weeks.     Increase your fluid intake for the next 2 days.    Keep arm elevated for 24 hours. For the remainder of the day, keep your arm in “Pledge of Allegiance” position when up and about.     You may drive 24 hours after the procedure unless otherwise instructed by your caregiver.  Do not operate machinery or power tools for 24 hours.  A responsible adult should be with you for the first 24 hours after you arrive home. Do not make any important legal decisions or sign legal papers for 24 hours.  Do not drink alcohol for 24 hours.    Metformin or any medications containing Metformin should not be taken for 48 hours after your procedure.      Call Your Doctor if:   You have unusual pain at the radial/ulnar (wrist) site.  You have redness, warmth, swelling, or pain at the  radial/ulnar (wrist) site.  You have drainage (other than a small amount of blood on the dressing).  `You have chills or a fever > 101.  Your arm becomes pale or dark, cool, tingly, or numb.  You develop chest pain, shortness of breath, feel faint or pass out.    You have heavy bleeding from the site, hold pressure on the site for 20 minutes.  If the bleeding stops, apply a fresh bandage and call your cardiologist.  However, if you        continue to have bleeding, call 911 and continue to apply pressure to the site.   You have any symptoms of a stroke.  Remember BE FAST  B-balance. Sudden trouble walking or loss of balance.  E-eyes.  Sudden changes in how you see or a sudden onset of a very bad headache.   F-face. Sudden weakness or loss of feeling of the face or facial droop on one side.   A-arms Sudden weakness or numbness in one arm.  One arm drifts down if they are both held out in front of you. This happens suddenly and usually on one side of the body.   S-speech.  Sudden trouble speaking, slurred speech or trouble understanding what are saying.   T-time  Time to call emergency services.  Write down the symptoms and the time they started.

## 2025-03-19 NOTE — PROGRESS NOTES
[facility] Clinical Pharmacy Services: Piperacillin-Tazobactam Consult    Pt Name: Wilner Menjivar   : 1985    Indication: Bone and/or Joint Infection    Relevant clinical data and objective history reviewed:    Past Medical History:   Diagnosis Date    CKD stage 4 due to type 2 diabetes mellitus     GERD (gastroesophageal reflux disease)     Heart failure     Hypertension     Type 2 diabetes mellitus     Vision impairment      Creatinine   Date Value Ref Range Status   2025 5.26 (H) 0.76 - 1.27 mg/dL Final   2025 9.31 (H) 0.76 - 1.27 mg/dL Final   2025 8.66 (H) 0.76 - 1.27 mg/dL Final   2023 3.08 (H) 0.73 - 1.18 mg/dL Final   2022 3.08 (H) 0.73 - 1.18 mg/dL Final   2022 3.78 (H) 0.73 - 1.18 mg/dL Final     BUN   Date Value Ref Range Status   2025 28 (H) 6 - 20 mg/dL Final   2025 39 (H) 6 - 19 mg/dL Final     Estimated Creatinine Clearance: 21.5 mL/min (A) (by C-G formula based on SCr of 5.26 mg/dL (H)).    Lab Results   Component Value Date    WBC 10.95 (H) 2025     Temp Readings from Last 3 Encounters:   25 98.8 °F (37.1 °C) (Oral)   25 98 °F (36.7 °C) (Temporal)      Assessment/Plan  Estimated CrCl <20 mL/min/HD patient at this time; BMI 36.59 kg/m2  Will start piperacillin-tazobactam 3.375 g IV every 12 hours     Pharmacy will continue to follow daily while on piperacillin-tazobactam and adjust as needed. Thank you for this consult.    Avi Herrera PharmD   Clinical Pharmacist

## 2025-03-19 NOTE — CONSULTS
[facility] Consult Note       Encounter Date:25  Patient:Wilner Menjivar  :1985  MRN:[id number]    Date of Admission: 3/18/2025  Date of Encounter Visit: 25  Encounter Provider: Everett Lake MD  Referring Provider: Remy Thornton MD  Place of Service: [facility]  Patient Care Team:  Roosevelt Thao MD as PCP - General (Internal Medicine)      Consulted for: Chest pain    Chief Complaint: Chest pain/indigestion      History of Presenting Illness:      Mr. Menjivar is a 40 y.o. gentleman the past medical history notable for coronary artery disease status post percutaneous intervention, chronic systolic congestive heart failure with recovered ejection fraction, type 2 diabetes, legally blind, end-stage renal disease on hemodialysis, severe anemia multifactorial, hypertension, and osteomyelitis of the feet who presented to the hospital on 3/18/2025 with indigestion and chest discomfort.  In the emergency room he was noted of elevated troponin at almost 300 but was fairly flat and EKG showed prior inferior infarct with left ventricular pretrip he which was relatively unchanged from his last EKG.  He became chest pain-free in the emergency room and overnight has been doing better.  I saw him about a month ago for symptoms of chest discomfort.  This occurred in the setting of foot debridement he had a hemoglobin of 5 at the time and after transfusing blood he actually was feeling better.  He noted to have a mildly elevated troponin at approximately 90 which was also flat.  We deferred ischemic evaluation at that time given recent testing showed improvement in LV function and had a pretty clear reason for his symptoms.  This time around his hemoglobin is stable blood pressure is also reasonably controlled and patient is not tachycardic.  Of note he did have to hold his antiplatelet therapy for few days due to ongoing bleeding but has since been on aspirin and  clopidogrel.        Chest pain/type II myocardial infarction:  Patient had chest discomfort and elevated troponins in the setting of anemia which is consistent with a type II myocardial infarction  His EKG is likely unchanged compared to reports from outside hospitals  Symptoms improved with treating his anemia  Echocardiogram earlier this month demonstrates recovery of left ventricular function  Would hold off on any further ischemic testing at this time unless significant changes in clinical status  Does have known RCA  which could be contributing as well     Coronary artery disease without angina:  Prior to this patient was doing well clinically  Did have revascularization August 2024  Currently on aspirin and Brilinta given his anemia needs for procedures and bleeding issues would be reasonable to try transitioning to aspirin and clopidogrel first if still significant issues is close to 6 months out could consider single antiplatelet therapy if reasonable but would defer to primary cardiology at [place] who he normally follows  Continue statin and beta-blocker     Chronic systolic and diastolic congestive heart failure:  Actually had recovery of his left ventricular function after revascularization of his circumflex in August based upon echocardiogram 2/5/2025  Continue carvedilol  No ACE/ARB given instead renal disease            Review of Systems:  Review of Systems   Constitutional: Negative. Positive for malaise/fatigue.   HENT: Negative.     Eyes: Negative.    Cardiovascular: Negative.  Positive for chest pain.   Respiratory: Negative.  Positive for shortness of breath.    Endocrine: Negative.    Hematologic/Lymphatic: Negative.    Skin: Negative.    Musculoskeletal: Negative.    Gastrointestinal: Negative.  Positive for heartburn.   Genitourinary: Negative.    Neurological: Negative.    Psychiatric/Behavioral: Negative.     Allergic/Immunologic: Negative.        Medications:    Current  Facility-Administered Medications:     aspirin chewable tablet 81 mg, 81 mg, Oral, Daily, Remy Thornton MD, 81 mg at 03/18/25 1932    atorvastatin (LIPITOR) tablet 10 mg, 10 mg, Oral, Nightly, Remy Thornton MD, 10 mg at 03/18/25 2007    carvedilol (COREG) tablet 25 mg, 25 mg, Oral, BID With Meals, Remy Thornton MD, 25 mg at 03/19/25 0918    cetirizine (zyrTEC) tablet 10 mg, 10 mg, Oral, Daily, Remy Thornton MD, 10 mg at 03/19/25 0929    guaiFENesin (MUCINEX) 12 hr tablet 600 mg, 600 mg, Oral, Q12H, Reym Thornton MD, 600 mg at 03/19/25 0929    heparin injection 300 Units, 3 mL, Intravenous, Daily PRN, Remy Thornton MD    ondansetron (ZOFRAN) injection 4 mg, 4 mg, Intravenous, Q6H PRN, Remy Thornton MD, 4 mg at 03/19/25 0417    oxyCODONE-acetaminophen (PERCOCET) 5-325 MG per tablet 1 tablet, 1 tablet, Oral, Q4H PRN, Remy Thornton MD, 1 tablet at 03/19/25 0414    pantoprazole (PROTONIX) EC tablet 40 mg, 40 mg, Oral, BID AC, Remy Thornton MD, 40 mg at 03/19/25 0919    piperacillin-tazobactam (ZOSYN) 3.375 g IVPB in 100 mL NS MBP (CD), 3.375 g, Intravenous, Q12H, Remy Thornton MD, Currently Infusing at 03/19/25 0748    sodium chloride 0.9 % flush 10 mL, 10 mL, Intravenous, PRN, Jerri Noe MD    Allergies   Allergen Reactions    Azithromycin Nausea And Vomiting     Pt given IV azithro, reported nausea/vomiting and hot flashes within 3-5 minutes of admin. Pt also c/o new abd pain with admin.     Pt given IV azithro, reported nausea/vomiting and hot flashes within 3-5 minutes of admin. Pt also c/o new abd pain with admin.    Penicillins Unknown - Low Severity     reaction as a child. Does not recall reaction.     reaction as a child. Does not recall reaction.  Beta lactam allergy details  Antibiotic reaction: unknown  Age at reaction: infant  Dose to reaction time: unknown  Reason for antibiotic: unknown  Epinephrine required for reaction?: unknown  Tolerated antibiotics: unknown          Past Medical History:    Diagnosis Date    CKD stage 4 due to type 2 diabetes mellitus     GERD (gastroesophageal reflux disease)     Heart failure     Hypertension     Type 2 diabetes mellitus     Vision impairment        Past Surgical History:   Procedure Laterality Date    AMPUTATION DIGIT Right 2/18/2025    Procedure: Hallux amputation, right foot SESAMOIDECTOMY, INCISION AN DRAINAGE;  Surgeon: Trevon Garcia DPM;  Location: [facility] MAIN OR;  Service: Podiatry;  Laterality: Right;    BONE EXCISION LEG Right 2/21/2025    Procedure: Partial first metatarsal excision, right foot;  Surgeon: Trevon Garcia DPM;  Location: [facility] OR;  Service: Podiatry;  Laterality: Right;    CORONARY STENT PLACEMENT      EYE SURGERY      INCISION AND DRAINAGE LEG Right 2/24/2025    Procedure: INCISION AND DRAINAGE LOWER EXTREMITY, PARTIAL BONE EXCISION;  Surgeon: Trevon Garcia DPM;  Location: [facility] OR;  Service: Podiatry;  Laterality: Right;    TUNNELED VENOUS CATHETER PLACEMENT N/A 2/28/2025    Procedure: INSERTION OF PETER CATHETER;  Surgeon: Lam Gomez MD;  Location: [facility] OR;  Service: Vascular;  Laterality: N/A;    WISDOM TOOTH EXTRACTION      WOUND DEBRIDEMENT Right 2/21/2025    Procedure: DEBRIDEMENT FOOT, RIGHT;  Surgeon: Trevon Garcia DPM;  Location: [facility] OR;  Service: Podiatry;  Laterality: Right;       Social History     Socioeconomic History    Marital status:    Tobacco Use    Smoking status: Never     Passive exposure: Never    Smokeless tobacco: Never   Vaping Use    Vaping status: Never Used   Substance and Sexual Activity    Alcohol use: Yes     Comment: occ    Drug use: Yes     Types: Marijuana    Sexual activity: Defer       Family History   Problem Relation Age of Onset    Malig Hyperthermia Neg Hx        The following portions of the patient's history were reviewed and updated as appropriate: allergies, current medications, past family history, past medical history, past social  "history, past surgical history and problem list.         Objective:      Temp:  [98.2 °F (36.8 °C)-99.9 °F (37.7 °C)] 98.2 °F (36.8 °C)  Heart Rate:  [102-127] 102  Resp:  [18] 18  BP: (107-173)/() 107/72   No intake or output data in the 24 hours ending 03/19/25 0929  Body mass index is 36.59 kg/m².      03/18/25  1011   Weight: 106 kg (233 lb 11 oz)           Physical Exam:  Constitutional: Chronically ill, well developed, no acute distress   HENT: Oropharynx clear and membrane moist  Eyes: Normal conjunctiva, no sclera icterus.  Neck: Supple, no carotid bruit bilaterally.  Cardiovascular: Regular rate and rhythm, No Murmur, Trace bilateral lower extremity edema.  Pulmonary: Normal respiratory effort, normal lung sounds, no wheezing.  Abdominal: Soft, nontender, no hepatosplenomegaly, liver is non-pulsatile.  Neurological: Alert and orient x 3.   Skin: Warm, dry, no ecchymosis, no rash.  Psych: Appropriate mood and affect. Normal judgment and insight.         Lab Review:   Results from last 7 days   Lab Units 03/18/25  1016 03/17/25  0000 03/14/25  0000   SODIUM mmol/L 134*  --   --    POTASSIUM mmol/L 3.8  --  3.2*   CHLORIDE mmol/L 99  --   --    CO2 mmol/L 24.0  --   --    BUN mg/dL 28* 39*  --    CREATININE mg/dL 5.26*  --   --    GLUCOSE mg/dL 173*  --   --    CALCIUM mg/dL 9.2  --   --    AST (SGOT) U/L 19  --   --    ALT (SGPT) U/L 14  --   --      Results from last 7 days   Lab Units 03/18/25  1158 03/18/25  1016   HSTROP T ng/L 260* 265*     Results from last 7 days   Lab Units 03/18/25  1016   WBC 10*3/mm3 10.95*   HEMOGLOBIN g/dL 9.1*   HEMATOCRIT % 28.6*   PLATELETS 10*3/mm3 120*                   Invalid input(s): \"LDLCALC\"  The ASCVD Risk score (Selam DK, et al., 2019) failed to calculate for the following reasons:    Risk score cannot be calculated because patient has a medical history suggesting prior/existing ASCVD     Results from last 7 days   Lab Units 03/18/25  1016   PROBNP pg/mL " 20,250.0*                Echocardiogram 2/4/2025 [facility]:  Technically difficult examination; Lumason echocontrast utilized to enhance endocardial definition.   The estimated ejection fraction is 55-60% with no clear regional wall motion abnormalities.   There is mild, concentric, left ventricular hypertrophy.   There is grade I left ventricular diastolic dysfunction (impaired relaxation).   The right ventricular chamber size and systolic function are within normal limits.   No significant valvular abnormalities are visualized.   There is no evidence of a pericardial effusion.      Cardiac catheterization 9/20/2024 [facility]:  Successful percutaneous intervention to 80% mid marginal branch stenoses with 2.25 x 38 mm Synergy drug-eluting stent with a 2.5 x 48 mm Synergy from the mid marginal into the proximal circumflex postdilated the 2.75 mm noncompliant balloon  Patent stent within the mid LAD  Right coronary artery 100% mid with left-to-right collaterals filling the distal vascular bed     Stress MPI 9/17/2024 [facility]:  Gated SPECT analysis demonstrates a post stress ejection fraction of 21%. Global LV systolic function is severely reduced. Severely decreased wall thickening and severe hypokinesis is noted in the basal inferior, basal inferolateral, mid inferior, mid inferolateral, apical septal, apical inferior, apical lateral wall segments. Otherwise, there is moderately decreased wall thickening and mild hypokinesis in all the remaining left ventricular wall segments.      Echocardiogram 5/3/2024 [facility]:  The ejection fraction biplane was calculated at 43%. Left ventricle size is normal. Mild left ventricular hypertrophy. The mid to distal anteroseptal mid anterior and apical walls are severely hypokinetic   Structurally normal mitral valve. Mild mitral regurgitation is present.   Right ventricular systolic pressure of 36 mmHg.   Mild tricuspid regurgitation.    There is no evidence of pericardial effusion.      Cardiac Catheterization 9/6/2022 [facility]:  Mildly elevated right heart catheterization pressures are noted with   cardiac index between 2.5 and 3.0 L/min/m^2.   Severe multivessel CAD is identified including high-grade 99% LAD stenosis   (clear culprit for ACS).   Successful PCI to the mid LAD was completed with 2 Synergy drug-eluting stents (2.5 x 38 mm and 3.0 x 24 mm).   Intravascular ultrasound imaging with diffuse lipidic plaquing with mild calcification.   70% mid marginal branch stenoses  100% mid RCA segment stenoses with left-to-right collaterals             Assessment:           Chest pain         Plan:       Mr. Menjivar is a 40 y.o. gentleman the past medical history notable for coronary artery disease status post percutaneous intervention, chronic systolic congestive heart failure with recovered ejection fraction, type 2 diabetes, legally blind, end-stage renal disease on hemodialysis, severe anemia multifactorial, hypertension, and osteomyelitis of the feet who presented to the hospital on 3/18/2025 with indigestion and chest discomfort.  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  Obviously with recent interventions and needing to hold antiplatelet therapy there could be a concern for his stent which is about 6 months old.  Given the degree of troponin elevation I am concerned that we might be dealing with a possible coronary component to his presentation such as possible stent problem or new disease specially given that he would be high risk for new coronary disease and I would recommend a heart catheterization.  I have also ordered a repeat echocardiogram to evaluate given elevated proBNP although recent follow-up echocardiogram had shown improvement in his LV function.        Chest  pain/type II myocardial infarction:  Patient had chest discomfort and elevated troponins but trajectory seems somewhat stable I would favor his presentation to be more consistent with a demand ischemia either in the setting of his underlying heart failure or due to new coronary disease but I do not think that this is a non-ST elevation myocardial infarction  His EKG is likely unchanged compared to prior  Symptoms improved  Will get repeat echocardiogram  Given degree of troponin elevation and recurrent symptoms without clear etiology I would recommend heart catheterization today  Does have known RCA  which could be contributing as well     Coronary artery disease without angina:  Prior to this patient was doing well clinically  Did have revascularization August 2024  Continue aspirin and clopidogrel previously on Brilinta but had a fair amount of bleeding on this and was transitioned over to clopidogrel last month  Continue statin and beta-blocker     Chronic systolic and diastolic congestive heart failure:  Actually had recovery of his left ventricular function after revascularization of his circumflex in August based upon echocardiogram 2/5/2025  Repeat echo ordered  Continue carvedilol  No ACE/ARB given instead renal disease  Volume management per nephrology             Thank you for allowing me to participate in the care of Wilner Menjivar. Feel free to contact me directly with any further questions or concerns.    Everett Lake MD  [facility]  03/19/25  09:29 EDT

## 2025-03-19 NOTE — H&P (VIEW-ONLY)
[facility] Consult Note       Encounter Date:25  Patient:Wilner Menjivar  :1985  MRN:[id number]    Date of Admission: 3/18/2025  Date of Encounter Visit: 25  Encounter Provider: Everett Lake MD  Referring Provider: Remy Thornton MD  Place of Service: [facility]  Patient Care Team:  Roosevelt Thao MD as PCP - General (Internal Medicine)      Consulted for: Chest pain    Chief Complaint: Chest pain/indigestion      History of Presenting Illness:      Mr. Menjivar is a 40 y.o. gentleman the past medical history notable for coronary artery disease status post percutaneous intervention, chronic systolic congestive heart failure with recovered ejection fraction, type 2 diabetes, legally blind, end-stage renal disease on hemodialysis, severe anemia multifactorial, hypertension, and osteomyelitis of the feet who presented to the hospital on 3/18/2025 with indigestion and chest discomfort.  In the emergency room he was noted of elevated troponin at almost 300 but was fairly flat and EKG showed prior inferior infarct with left ventricular pretrip he which was relatively unchanged from his last EKG.  He became chest pain-free in the emergency room and overnight has been doing better.  I saw him about a month ago for symptoms of chest discomfort.  This occurred in the setting of foot debridement he had a hemoglobin of 5 at the time and after transfusing blood he actually was feeling better.  He noted to have a mildly elevated troponin at approximately 90 which was also flat.  We deferred ischemic evaluation at that time given recent testing showed improvement in LV function and had a pretty clear reason for his symptoms.  This time around his hemoglobin is stable blood pressure is also reasonably controlled and patient is not tachycardic.  Of note he did have to hold his antiplatelet therapy for few days due to ongoing bleeding but has since been on aspirin and  clopidogrel.        Chest pain/type II myocardial infarction:  Patient had chest discomfort and elevated troponins in the setting of anemia which is consistent with a type II myocardial infarction  His EKG is likely unchanged compared to reports from outside hospitals  Symptoms improved with treating his anemia  Echocardiogram earlier this month demonstrates recovery of left ventricular function  Would hold off on any further ischemic testing at this time unless significant changes in clinical status  Does have known RCA  which could be contributing as well     Coronary artery disease without angina:  Prior to this patient was doing well clinically  Did have revascularization August 2024  Currently on aspirin and Brilinta given his anemia needs for procedures and bleeding issues would be reasonable to try transitioning to aspirin and clopidogrel first if still significant issues is close to 6 months out could consider single antiplatelet therapy if reasonable but would defer to primary cardiology at [facility] who he normally follows  Continue statin and beta-blocker     Chronic systolic and diastolic congestive heart failure:  Actually had recovery of his left ventricular function after revascularization of his circumflex in August based upon echocardiogram 2/5/2025  Continue carvedilol  No ACE/ARB given instead renal disease            Review of Systems:  Review of Systems   Constitutional: Negative. Positive for malaise/fatigue.   HENT: Negative.     Eyes: Negative.    Cardiovascular: Negative.  Positive for chest pain.   Respiratory: Negative.  Positive for shortness of breath.    Endocrine: Negative.    Hematologic/Lymphatic: Negative.    Skin: Negative.    Musculoskeletal: Negative.    Gastrointestinal: Negative.  Positive for heartburn.   Genitourinary: Negative.    Neurological: Negative.    Psychiatric/Behavioral: Negative.     Allergic/Immunologic: Negative.        Medications:    Current  Facility-Administered Medications:     aspirin chewable tablet 81 mg, 81 mg, Oral, Daily, Remy Thornton MD, 81 mg at 03/18/25 1932    atorvastatin (LIPITOR) tablet 10 mg, 10 mg, Oral, Nightly, Remy Thornton MD, 10 mg at 03/18/25 2007    carvedilol (COREG) tablet 25 mg, 25 mg, Oral, BID With Meals, Remy Thornton MD, 25 mg at 03/19/25 0918    cetirizine (zyrTEC) tablet 10 mg, 10 mg, Oral, Daily, Remy Thornton MD, 10 mg at 03/19/25 0929    guaiFENesin (MUCINEX) 12 hr tablet 600 mg, 600 mg, Oral, Q12H, Remy Thornton MD, 600 mg at 03/19/25 0929    heparin injection 300 Units, 3 mL, Intravenous, Daily PRN, Remy Thornton MD    ondansetron (ZOFRAN) injection 4 mg, 4 mg, Intravenous, Q6H PRN, Remy Thornton MD, 4 mg at 03/19/25 0417    oxyCODONE-acetaminophen (PERCOCET) 5-325 MG per tablet 1 tablet, 1 tablet, Oral, Q4H PRN, Remy Thornton MD, 1 tablet at 03/19/25 0414    pantoprazole (PROTONIX) EC tablet 40 mg, 40 mg, Oral, BID AC, Remy Thornton MD, 40 mg at 03/19/25 0919    piperacillin-tazobactam (ZOSYN) 3.375 g IVPB in 100 mL NS MBP (CD), 3.375 g, Intravenous, Q12H, Remy Thornton MD, Currently Infusing at 03/19/25 0748    sodium chloride 0.9 % flush 10 mL, 10 mL, Intravenous, PRN, Jerri Noe MD    Allergies   Allergen Reactions    Azithromycin Nausea And Vomiting     Pt given IV azithro, reported nausea/vomiting and hot flashes within 3-5 minutes of admin. Pt also c/o new abd pain with admin.     Pt given IV azithro, reported nausea/vomiting and hot flashes within 3-5 minutes of admin. Pt also c/o new abd pain with admin.    Penicillins Unknown - Low Severity     reaction as a child. Does not recall reaction.     reaction as a child. Does not recall reaction.  Beta lactam allergy details  Antibiotic reaction: unknown  Age at reaction: infant  Dose to reaction time: unknown  Reason for antibiotic: unknown  Epinephrine required for reaction?: unknown  Tolerated antibiotics: unknown          Past Medical History:    Diagnosis Date    CKD stage 4 due to type 2 diabetes mellitus     GERD (gastroesophageal reflux disease)     Heart failure     Hypertension     Type 2 diabetes mellitus     Vision impairment        Past Surgical History:   Procedure Laterality Date    AMPUTATION DIGIT Right 2/18/2025    Procedure: Hallux amputation, right foot SESAMOIDECTOMY, INCISION AN DRAINAGE;  Surgeon: Trevon Garcia DPM;  Location: [facility] MAIN OR;  Service: Podiatry;  Laterality: Right;    BONE EXCISION LEG Right 2/21/2025    Procedure: Partial first metatarsal excision, right foot;  Surgeon: Trevon Garcia DPM;  Location: [facility] OR;  Service: Podiatry;  Laterality: Right;    CORONARY STENT PLACEMENT      EYE SURGERY      INCISION AND DRAINAGE LEG Right 2/24/2025    Procedure: INCISION AND DRAINAGE LOWER EXTREMITY, PARTIAL BONE EXCISION;  Surgeon: Trevon Garcia DPM;  Location: [facility] OR;  Service: Podiatry;  Laterality: Right;    TUNNELED VENOUS CATHETER PLACEMENT N/A 2/28/2025    Procedure: INSERTION OF PETER CATHETER;  Surgeon: Lam Gomez MD;  Location: [facility] OR;  Service: Vascular;  Laterality: N/A;    WISDOM TOOTH EXTRACTION      WOUND DEBRIDEMENT Right 2/21/2025    Procedure: DEBRIDEMENT FOOT, RIGHT;  Surgeon: Trevon Garcia DPM;  Location: [facility] OR;  Service: Podiatry;  Laterality: Right;       Social History     Socioeconomic History    Marital status:    Tobacco Use    Smoking status: Never     Passive exposure: Never    Smokeless tobacco: Never   Vaping Use    Vaping status: Never Used   Substance and Sexual Activity    Alcohol use: Yes     Comment: occ    Drug use: Yes     Types: Marijuana    Sexual activity: Defer       Family History   Problem Relation Age of Onset    Malig Hyperthermia Neg Hx        The following portions of the patient's history were reviewed and updated as appropriate: allergies, current medications, past family history, past medical history, past social  "history, past surgical history and problem list.         Objective:      Temp:  [98.2 °F (36.8 °C)-99.9 °F (37.7 °C)] 98.2 °F (36.8 °C)  Heart Rate:  [102-127] 102  Resp:  [18] 18  BP: (107-173)/() 107/72   No intake or output data in the 24 hours ending 03/19/25 0929  Body mass index is 36.59 kg/m².      03/18/25  1011   Weight: 106 kg (233 lb 11 oz)           Physical Exam:  Constitutional: Chronically ill, well developed, no acute distress   HENT: Oropharynx clear and membrane moist  Eyes: Normal conjunctiva, no sclera icterus.  Neck: Supple, no carotid bruit bilaterally.  Cardiovascular: Regular rate and rhythm, No Murmur, Trace bilateral lower extremity edema.  Pulmonary: Normal respiratory effort, normal lung sounds, no wheezing.  Abdominal: Soft, nontender, no hepatosplenomegaly, liver is non-pulsatile.  Neurological: Alert and orient x 3.   Skin: Warm, dry, no ecchymosis, no rash.  Psych: Appropriate mood and affect. Normal judgment and insight.         Lab Review:   Results from last 7 days   Lab Units 03/18/25  1016 03/17/25  0000 03/14/25  0000   SODIUM mmol/L 134*  --   --    POTASSIUM mmol/L 3.8  --  3.2*   CHLORIDE mmol/L 99  --   --    CO2 mmol/L 24.0  --   --    BUN mg/dL 28* 39*  --    CREATININE mg/dL 5.26*  --   --    GLUCOSE mg/dL 173*  --   --    CALCIUM mg/dL 9.2  --   --    AST (SGOT) U/L 19  --   --    ALT (SGPT) U/L 14  --   --      Results from last 7 days   Lab Units 03/18/25  1158 03/18/25  1016   HSTROP T ng/L 260* 265*     Results from last 7 days   Lab Units 03/18/25  1016   WBC 10*3/mm3 10.95*   HEMOGLOBIN g/dL 9.1*   HEMATOCRIT % 28.6*   PLATELETS 10*3/mm3 120*                   Invalid input(s): \"LDLCALC\"  The ASCVD Risk score (Selam DK, et al., 2019) failed to calculate for the following reasons:    Risk score cannot be calculated because patient has a medical history suggesting prior/existing ASCVD     Results from last 7 days   Lab Units 03/18/25  1016   PROBNP pg/mL " 20,250.0*                Echocardiogram 2/4/2025 [facility]:  Technically difficult examination; Lumason echocontrast utilized to enhance endocardial definition.   The estimated ejection fraction is 55-60% with no clear regional wall motion abnormalities.   There is mild, concentric, left ventricular hypertrophy.   There is grade I left ventricular diastolic dysfunction (impaired relaxation).   The right ventricular chamber size and systolic function are within normal limits.   No significant valvular abnormalities are visualized.   There is no evidence of a pericardial effusion.      Cardiac catheterization 9/20/2024 [facility]:  Successful percutaneous intervention to 80% mid marginal branch stenoses with 2.25 x 38 mm Synergy drug-eluting stent with a 2.5 x 48 mm Synergy from the mid marginal into the proximal circumflex postdilated the 2.75 mm noncompliant balloon  Patent stent within the mid LAD  Right coronary artery 100% mid with left-to-right collaterals filling the distal vascular bed     Stress MPI 9/17/2024 [facility]:  Gated SPECT analysis demonstrates a post stress ejection fraction of 21%. Global LV systolic function is severely reduced. Severely decreased wall thickening and severe hypokinesis is noted in the basal inferior, basal inferolateral, mid inferior, mid inferolateral, apical septal, apical inferior, apical lateral wall segments. Otherwise, there is moderately decreased wall thickening and mild hypokinesis in all the remaining left ventricular wall segments.      Echocardiogram 5/3/2024 [facility]:  The ejection fraction biplane was calculated at 43%. Left ventricle size is normal. Mild left ventricular hypertrophy. The mid to distal anteroseptal mid anterior and apical walls are severely hypokinetic   Structurally normal mitral valve. Mild mitral regurgitation is present.   Right ventricular systolic pressure of 36 mmHg.   Mild tricuspid regurgitation.    There is no evidence of pericardial effusion.      Cardiac Catheterization 9/6/2022 [facility]:  Mildly elevated right heart catheterization pressures are noted with   cardiac index between 2.5 and 3.0 L/min/m^2.   Severe multivessel CAD is identified including high-grade 99% LAD stenosis   (clear culprit for ACS).   Successful PCI to the mid LAD was completed with 2 Synergy drug-eluting stents (2.5 x 38 mm and 3.0 x 24 mm).   Intravascular ultrasound imaging with diffuse lipidic plaquing with mild calcification.   70% mid marginal branch stenoses  100% mid RCA segment stenoses with left-to-right collaterals             Assessment:           Chest pain         Plan:       Mr. Menjivar is a 40 y.o. gentleman the past medical history notable for coronary artery disease status post percutaneous intervention, chronic systolic congestive heart failure with recovered ejection fraction, type 2 diabetes, legally blind, end-stage renal disease on hemodialysis, severe anemia multifactorial, hypertension, and osteomyelitis of the feet who presented to the hospital on 3/18/2025 with indigestion and chest discomfort.  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  Obviously with recent interventions and needing to hold antiplatelet therapy there could be a concern for his stent which is about 6 months old.  Given the degree of troponin elevation I am concerned that we might be dealing with a possible coronary component to his presentation such as possible stent problem or new disease specially given that he would be high risk for new coronary disease and I would recommend a heart catheterization.  I have also ordered a repeat echocardiogram to evaluate given elevated proBNP although recent follow-up echocardiogram had shown improvement in his LV function.        Chest  pain/type II myocardial infarction:  Patient had chest discomfort and elevated troponins but trajectory seems somewhat stable I would favor his presentation to be more consistent with a demand ischemia either in the setting of his underlying heart failure or due to new coronary disease but I do not think that this is a non-ST elevation myocardial infarction  His EKG is likely unchanged compared to prior  Symptoms improved  Will get repeat echocardiogram  Given degree of troponin elevation and recurrent symptoms without clear etiology I would recommend heart catheterization today  Does have known RCA  which could be contributing as well     Coronary artery disease without angina:  Prior to this patient was doing well clinically  Did have revascularization August 2024  Continue aspirin and clopidogrel previously on Brilinta but had a fair amount of bleeding on this and was transitioned over to clopidogrel last month  Continue statin and beta-blocker     Chronic systolic and diastolic congestive heart failure:  Actually had recovery of his left ventricular function after revascularization of his circumflex in August based upon echocardiogram 2/5/2025  Repeat echo ordered  Continue carvedilol  No ACE/ARB given instead renal disease  Volume management per nephrology             Thank you for allowing me to participate in the care of Wilner Menjivar. Feel free to contact me directly with any further questions or concerns.    Everett Lake MD  [facility]  03/19/25  09:29 EDT

## 2025-03-19 NOTE — NURSING NOTE
CWCN: Consult received for NPWT, but the patient is in the Cath Lab for catheterization. The wound vac will be placed tomorrow.  N/S wet-to-dry gauze can be used.  RN notified.

## 2025-03-20 ENCOUNTER — APPOINTMENT (OUTPATIENT)
Dept: CARDIOLOGY | Facility: HOSPITAL | Age: 40
End: 2025-03-20
Payer: MEDICARE

## 2025-03-20 LAB
ALBUMIN SERPL-MCNC: 3.3 G/DL (ref 3.5–5.2)
ALBUMIN/GLOB SERPL: 0.9 G/DL
ALP SERPL-CCNC: 128 U/L (ref 39–117)
ALT SERPL W P-5'-P-CCNC: 232 U/L (ref 1–41)
ANION GAP SERPL CALCULATED.3IONS-SCNC: 10 MMOL/L (ref 5–15)
AORTIC DIMENSIONLESS INDEX: 0.6 (DI)
ASCENDING AORTA: 2.7 CM
AST SERPL-CCNC: 126 U/L (ref 1–40)
AV MEAN PRESS GRAD SYS DOP V1V2: 2.22 MMHG
AV VMAX SYS DOP: 100.2 CM/SEC
BASOPHILS # BLD AUTO: 0.04 10*3/MM3 (ref 0–0.2)
BASOPHILS NFR BLD AUTO: 0.7 % (ref 0–1.5)
BH CV ECHO MEAS - ACS: 2.38 CM
BH CV ECHO MEAS - AO MAX PG: 4 MMHG
BH CV ECHO MEAS - AO ROOT DIAM: 3.4 CM
BH CV ECHO MEAS - AO V2 VTI: 18.3 CM
BH CV ECHO MEAS - AVA(I,D): 2.27 CM2
BH CV ECHO MEAS - EDV(CUBED): 203.5 ML
BH CV ECHO MEAS - EDV(MOD-SP2): 213 ML
BH CV ECHO MEAS - EDV(MOD-SP4): 240 ML
BH CV ECHO MEAS - EF(MOD-SP2): 16.9 %
BH CV ECHO MEAS - EF(MOD-SP4): 30 %
BH CV ECHO MEAS - ESV(CUBED): 124.8 ML
BH CV ECHO MEAS - ESV(MOD-SP2): 177 ML
BH CV ECHO MEAS - ESV(MOD-SP4): 168 ML
BH CV ECHO MEAS - FS: 15.1 %
BH CV ECHO MEAS - IVS/LVPW: 1.04 CM
BH CV ECHO MEAS - IVSD: 1.14 CM
BH CV ECHO MEAS - LAT PEAK E' VEL: 6.6 CM/SEC
BH CV ECHO MEAS - LV DIASTOLIC VOL/BSA (35-75): 111.2 CM2
BH CV ECHO MEAS - LV MASS(C)D: 275.4 GRAMS
BH CV ECHO MEAS - LV MAX PG: 1.47 MMHG
BH CV ECHO MEAS - LV MEAN PG: 0.72 MMHG
BH CV ECHO MEAS - LV SYSTOLIC VOL/BSA (12-30): 77.9 CM2
BH CV ECHO MEAS - LV V1 MAX: 60.7 CM/SEC
BH CV ECHO MEAS - LV V1 VTI: 11 CM
BH CV ECHO MEAS - LVIDD: 5.9 CM
BH CV ECHO MEAS - LVIDS: 5 CM
BH CV ECHO MEAS - LVOT AREA: 3.8 CM2
BH CV ECHO MEAS - LVOT DIAM: 2.2 CM
BH CV ECHO MEAS - LVPWD: 1.09 CM
BH CV ECHO MEAS - MED PEAK E' VEL: 6.4 CM/SEC
BH CV ECHO MEAS - MV A DUR: 0.11 SEC
BH CV ECHO MEAS - MV A MAX VEL: 76 CM/SEC
BH CV ECHO MEAS - MV DEC SLOPE: 414.2 CM/SEC2
BH CV ECHO MEAS - MV DEC TIME: 0.22 SEC
BH CV ECHO MEAS - MV E MAX VEL: 93.5 CM/SEC
BH CV ECHO MEAS - MV E/A: 1.23
BH CV ECHO MEAS - MV MAX PG: 4 MMHG
BH CV ECHO MEAS - MV MEAN PG: 2.1 MMHG
BH CV ECHO MEAS - MV P1/2T: 70 MSEC
BH CV ECHO MEAS - MV V2 VTI: 23.4 CM
BH CV ECHO MEAS - MVA(P1/2T): 3.1 CM2
BH CV ECHO MEAS - MVA(VTI): 1.78 CM2
BH CV ECHO MEAS - PA ACC TIME: 0.15 SEC
BH CV ECHO MEAS - PA V2 MAX: 77.1 CM/SEC
BH CV ECHO MEAS - PULM DIAS VEL: 22.3 CM/SEC
BH CV ECHO MEAS - PULM S/D: 1.91
BH CV ECHO MEAS - PULM SYS VEL: 42.6 CM/SEC
BH CV ECHO MEAS - RAP SYSTOLE: 3 MMHG
BH CV ECHO MEAS - RV MAX PG: 0.83 MMHG
BH CV ECHO MEAS - RV V1 MAX: 45.4 CM/SEC
BH CV ECHO MEAS - RV V1 VTI: 10.1 CM
BH CV ECHO MEAS - RVSP: 28 MMHG
BH CV ECHO MEAS - SV(LVOT): 41.6 ML
BH CV ECHO MEAS - SV(MOD-SP2): 36 ML
BH CV ECHO MEAS - SV(MOD-SP4): 72 ML
BH CV ECHO MEAS - SVI(LVOT): 19.3 ML/M2
BH CV ECHO MEAS - SVI(MOD-SP2): 16.7 ML/M2
BH CV ECHO MEAS - SVI(MOD-SP4): 33.4 ML/M2
BH CV ECHO MEAS - TAPSE (>1.6): 1.98 CM
BH CV ECHO MEAS - TR MAX PG: 25 MMHG
BH CV ECHO MEAS - TR MAX VEL: 249.8 CM/SEC
BH CV ECHO MEASUREMENTS AVERAGE E/E' RATIO: 14.38
BH CV XLRA - RV BASE: 3.7 CM
BH CV XLRA - RV LENGTH: 7.5 CM
BH CV XLRA - RV MID: 3 CM
BH CV XLRA - TDI S': 10.1 CM/SEC
BILIRUB SERPL-MCNC: 0.7 MG/DL (ref 0–1.2)
BUN SERPL-MCNC: 24 MG/DL (ref 6–20)
BUN/CREAT SERPL: 5.2 (ref 7–25)
CALCIUM SPEC-SCNC: 9 MG/DL (ref 8.6–10.5)
CHLORIDE SERPL-SCNC: 100 MMOL/L (ref 98–107)
CHOLEST SERPL-MCNC: 99 MG/DL (ref 0–200)
CO2 SERPL-SCNC: 26 MMOL/L (ref 22–29)
CREAT SERPL-MCNC: 4.59 MG/DL (ref 0.76–1.27)
DEPRECATED RDW RBC AUTO: 48.9 FL (ref 37–54)
EGFRCR SERPLBLD CKD-EPI 2021: 15.6 ML/MIN/1.73
EOSINOPHIL # BLD AUTO: 0.19 10*3/MM3 (ref 0–0.4)
EOSINOPHIL NFR BLD AUTO: 3.1 % (ref 0.3–6.2)
ERYTHROCYTE [DISTWIDTH] IN BLOOD BY AUTOMATED COUNT: 14.9 % (ref 12.3–15.4)
GEN 5 1HR TROPONIN T REFLEX: 1506 NG/L
GLOBULIN UR ELPH-MCNC: 3.5 GM/DL
GLUCOSE BLDC GLUCOMTR-MCNC: 136 MG/DL (ref 70–130)
GLUCOSE BLDC GLUCOMTR-MCNC: 137 MG/DL (ref 70–130)
GLUCOSE BLDC GLUCOMTR-MCNC: 155 MG/DL (ref 70–130)
GLUCOSE BLDC GLUCOMTR-MCNC: 168 MG/DL (ref 70–130)
GLUCOSE SERPL-MCNC: 133 MG/DL (ref 65–99)
HBA1C MFR BLD: 4.9 % (ref 4.8–5.6)
HCT VFR BLD AUTO: 24.1 % (ref 37.5–51)
HDLC SERPL-MCNC: 29 MG/DL (ref 40–60)
HGB BLD-MCNC: 7.6 G/DL (ref 13–17.7)
IMM GRANULOCYTES # BLD AUTO: 0.03 10*3/MM3 (ref 0–0.05)
IMM GRANULOCYTES NFR BLD AUTO: 0.5 % (ref 0–0.5)
LDLC SERPL CALC-MCNC: 48 MG/DL (ref 0–100)
LDLC/HDLC SERPL: 1.56 {RATIO}
LEFT ATRIUM VOLUME INDEX: 23.2 ML/M2
LV EF BIPLANE MOD: 22.5 %
LYMPHOCYTES # BLD AUTO: 0.88 10*3/MM3 (ref 0.7–3.1)
LYMPHOCYTES NFR BLD AUTO: 14.4 % (ref 19.6–45.3)
MCH RBC QN AUTO: 28.4 PG (ref 26.6–33)
MCHC RBC AUTO-ENTMCNC: 31.5 G/DL (ref 31.5–35.7)
MCV RBC AUTO: 89.9 FL (ref 79–97)
MONOCYTES # BLD AUTO: 0.5 10*3/MM3 (ref 0.1–0.9)
MONOCYTES NFR BLD AUTO: 8.2 % (ref 5–12)
NEUTROPHILS NFR BLD AUTO: 4.47 10*3/MM3 (ref 1.7–7)
NEUTROPHILS NFR BLD AUTO: 73.1 % (ref 42.7–76)
NRBC BLD AUTO-RTO: 0 /100 WBC (ref 0–0.2)
PHOSPHATE SERPL-MCNC: 2.2 MG/DL (ref 2.5–4.5)
PLATELET # BLD AUTO: 114 10*3/MM3 (ref 140–450)
PMV BLD AUTO: 9.5 FL (ref 6–12)
POTASSIUM SERPL-SCNC: 3.5 MMOL/L (ref 3.5–5.2)
PROT SERPL-MCNC: 6.8 G/DL (ref 6–8.5)
RBC # BLD AUTO: 2.68 10*6/MM3 (ref 4.14–5.8)
SINUS: 3 CM
SODIUM SERPL-SCNC: 136 MMOL/L (ref 136–145)
STJ: 2.9 CM
TRIGL SERPL-MCNC: 124 MG/DL (ref 0–150)
TROPONIN T % DELTA: 10
TROPONIN T NUMERIC DELTA: 139 NG/L
TROPONIN T SERPL HS-MCNC: 1367 NG/L
TSH SERPL DL<=0.05 MIU/L-ACNC: 2.64 UIU/ML (ref 0.27–4.2)
VLDLC SERPL-MCNC: 22 MG/DL (ref 5–40)
WBC NRBC COR # BLD AUTO: 6.11 10*3/MM3 (ref 3.4–10.8)

## 2025-03-20 PROCEDURE — 99232 SBSQ HOSP IP/OBS MODERATE 35: CPT | Performed by: NURSE PRACTITIONER

## 2025-03-20 PROCEDURE — 93306 TTE W/DOPPLER COMPLETE: CPT

## 2025-03-20 PROCEDURE — 84443 ASSAY THYROID STIM HORMONE: CPT | Performed by: INTERNAL MEDICINE

## 2025-03-20 PROCEDURE — 80061 LIPID PANEL: CPT | Performed by: INTERNAL MEDICINE

## 2025-03-20 PROCEDURE — 97605 NEG PRS WND THER DME<=50SQCM: CPT

## 2025-03-20 PROCEDURE — 84100 ASSAY OF PHOSPHORUS: CPT | Performed by: INTERNAL MEDICINE

## 2025-03-20 PROCEDURE — 25010000002 HYDROMORPHONE 1 MG/ML SOLUTION: Performed by: HOSPITALIST

## 2025-03-20 PROCEDURE — 25510000001 PERFLUTREN 6.52 MG/ML SUSPENSION 2 ML VIAL: Performed by: INTERNAL MEDICINE

## 2025-03-20 PROCEDURE — 63710000001 INSULIN LISPRO (HUMAN) PER 5 UNITS: Performed by: INTERNAL MEDICINE

## 2025-03-20 PROCEDURE — 25010000002 PIPERACILLIN SOD-TAZOBACTAM PER 1 G: Performed by: INTERNAL MEDICINE

## 2025-03-20 PROCEDURE — 84484 ASSAY OF TROPONIN QUANT: CPT | Performed by: HOSPITALIST

## 2025-03-20 PROCEDURE — 82948 REAGENT STRIP/BLOOD GLUCOSE: CPT

## 2025-03-20 PROCEDURE — G0378 HOSPITAL OBSERVATION PER HR: HCPCS

## 2025-03-20 PROCEDURE — 63710000001 INSULIN GLARGINE PER 5 UNITS: Performed by: INTERNAL MEDICINE

## 2025-03-20 PROCEDURE — 93306 TTE W/DOPPLER COMPLETE: CPT | Performed by: INTERNAL MEDICINE

## 2025-03-20 PROCEDURE — 83036 HEMOGLOBIN GLYCOSYLATED A1C: CPT | Performed by: INTERNAL MEDICINE

## 2025-03-20 PROCEDURE — 80053 COMPREHEN METABOLIC PANEL: CPT | Performed by: INTERNAL MEDICINE

## 2025-03-20 PROCEDURE — 25010000002 ONDANSETRON PER 1 MG: Performed by: INTERNAL MEDICINE

## 2025-03-20 PROCEDURE — 85025 COMPLETE CBC W/AUTO DIFF WBC: CPT | Performed by: INTERNAL MEDICINE

## 2025-03-20 RX ORDER — ISOSORBIDE MONONITRATE 30 MG/1
30 TABLET, EXTENDED RELEASE ORAL
Status: DISCONTINUED | OUTPATIENT
Start: 2025-03-20 | End: 2025-03-21 | Stop reason: HOSPADM

## 2025-03-20 RX ADMIN — ONDANSETRON 4 MG: 2 INJECTION, SOLUTION INTRAMUSCULAR; INTRAVENOUS at 02:35

## 2025-03-20 RX ADMIN — GUAIFENESIN 600 MG: 600 TABLET, MULTILAYER, EXTENDED RELEASE ORAL at 09:07

## 2025-03-20 RX ADMIN — SUCRALFATE 1 G: 1 TABLET ORAL at 12:05

## 2025-03-20 RX ADMIN — PIPERACILLIN AND TAZOBACTAM 3.38 G: 3; .375 INJECTION, POWDER, FOR SOLUTION INTRAVENOUS at 14:00

## 2025-03-20 RX ADMIN — PANTOPRAZOLE SODIUM 40 MG: 40 TABLET, DELAYED RELEASE ORAL at 06:19

## 2025-03-20 RX ADMIN — OXYCODONE AND ACETAMINOPHEN 1 TABLET: 5; 325 TABLET ORAL at 02:35

## 2025-03-20 RX ADMIN — PERFLUTREN 2 ML: 6.52 INJECTION, SUSPENSION INTRAVENOUS at 10:37

## 2025-03-20 RX ADMIN — SUCRALFATE 1 G: 1 TABLET ORAL at 20:13

## 2025-03-20 RX ADMIN — SUCRALFATE 1 G: 1 TABLET ORAL at 17:36

## 2025-03-20 RX ADMIN — INSULIN LISPRO 5 UNITS: 100 INJECTION, SOLUTION INTRAVENOUS; SUBCUTANEOUS at 12:19

## 2025-03-20 RX ADMIN — CARVEDILOL 25 MG: 25 TABLET, FILM COATED ORAL at 09:07

## 2025-03-20 RX ADMIN — GUAIFENESIN 600 MG: 600 TABLET, MULTILAYER, EXTENDED RELEASE ORAL at 20:13

## 2025-03-20 RX ADMIN — CLOPIDOGREL BISULFATE 75 MG: 75 TABLET ORAL at 09:07

## 2025-03-20 RX ADMIN — CARVEDILOL 25 MG: 25 TABLET, FILM COATED ORAL at 17:56

## 2025-03-20 RX ADMIN — INSULIN GLARGINE 30 UNITS: 100 INJECTION, SOLUTION SUBCUTANEOUS at 20:22

## 2025-03-20 RX ADMIN — OXYCODONE AND ACETAMINOPHEN 1 TABLET: 5; 325 TABLET ORAL at 20:13

## 2025-03-20 RX ADMIN — ASPIRIN 81 MG CHEWABLE TABLET 81 MG: 81 TABLET CHEWABLE at 09:07

## 2025-03-20 RX ADMIN — INSULIN LISPRO 2 UNITS: 100 INJECTION, SOLUTION INTRAVENOUS; SUBCUTANEOUS at 20:22

## 2025-03-20 RX ADMIN — SUCRALFATE 1 G: 1 TABLET ORAL at 06:19

## 2025-03-20 RX ADMIN — INSULIN LISPRO 2 UNITS: 100 INJECTION, SOLUTION INTRAVENOUS; SUBCUTANEOUS at 12:19

## 2025-03-20 RX ADMIN — ISOSORBIDE MONONITRATE 30 MG: 30 TABLET, EXTENDED RELEASE ORAL at 12:05

## 2025-03-20 RX ADMIN — CETIRIZINE HYDROCHLORIDE 10 MG: 10 TABLET ORAL at 09:07

## 2025-03-20 RX ADMIN — PIPERACILLIN AND TAZOBACTAM 3.38 G: 3; .375 INJECTION, POWDER, FOR SOLUTION INTRAVENOUS at 02:35

## 2025-03-20 RX ADMIN — ATORVASTATIN CALCIUM 10 MG: 10 TABLET ORAL at 20:13

## 2025-03-20 RX ADMIN — HYDROMORPHONE HYDROCHLORIDE 1 MG: 1 INJECTION, SOLUTION INTRAMUSCULAR; INTRAVENOUS; SUBCUTANEOUS at 14:16

## 2025-03-20 RX ADMIN — ONDANSETRON 4 MG: 2 INJECTION, SOLUTION INTRAMUSCULAR; INTRAVENOUS at 20:11

## 2025-03-20 RX ADMIN — PANTOPRAZOLE SODIUM 40 MG: 40 TABLET, DELAYED RELEASE ORAL at 17:36

## 2025-03-20 NOTE — PROGRESS NOTES
Hospital Follow Up    LOS:  LOS: 0 days   Patient Name: Wilner Menjivar  Age/Sex: 40 y.o. male  : 1985  MRN: [id number]    Day of Service: 25   Length of Stay: 0  Encounter Provider: PAYTON Mena  Place of Service: [facility] CARDIOLOGY  Patient Care Team:  Roosevelt Thao MD as PCP - General (Internal Medicine)    Subjective:     Chief Complaint: chest pain/ Elevate troponin    Interval History: No chest pain today.     Objective:     Objective:  Temp:  [97.9 °F (36.6 °C)-98.8 °F (37.1 °C)] 98.4 °F (36.9 °C)  Heart Rate:  [] 96  Resp:  [15-28] 18  BP: (111-157)/(61-93) 121/71     Intake/Output Summary (Last 24 hours) at 3/20/2025 1000  Last data filed at 3/19/2025 1600  Gross per 24 hour   Intake 730 ml   Output --   Net 730 ml     Body mass index is 36.59 kg/m².      25  1011   Weight: 106 kg (233 lb 11 oz)     Weight change:     Physical Exam:   General Appearance:    Awake alert and oriented in no acute distress.   Color:  Skin:  Neuro:  HEENT:    Lungs:     Pink  Warm and dry  No focal, motor or sensory deficits  Neck supple, pupils equal, round and reactive. No JVD, No Bruit  Clear to auscultation,respirations regular, even and                  unlabored    Heart:    Regular rate and rhythm, S1 and S2, no murmur, no gallop, no rub. No edema, DP/PT pulses are 2+   Chest Wall:    No abnormalities observed   Abdomen:     Normal bowel sounds, no masses, no organomegaly, soft        non-tender, non-distended, no guarding, no ascites noted   Extremities:   Moves all extremities well, no edema, no cyanosis, no redness       Lab Review:   Results from last 7 days   Lab Units 25  0618 25  1016   SODIUM mmol/L 136 134*   POTASSIUM mmol/L 3.5 3.8   CHLORIDE mmol/L 100 99   CO2 mmol/L 26.0 24.0   BUN mg/dL 24* 28*   CREATININE mg/dL 4.59* 5.26*   GLUCOSE mg/dL 133* 173*   CALCIUM mg/dL 9.0 9.2   AST (SGOT) U/L 126* 19   ALT (SGPT) U/L 232* 14      Results from last 7 days   Lab Units 03/20/25  0742 03/20/25  0618 03/18/25  1158 03/18/25  1016   HSTROP T ng/L 1,506* 1,367* 260* 265*     Results from last 7 days   Lab Units 03/20/25  0618 03/18/25  1016   WBC 10*3/mm3 6.11 10.95*   HEMOGLOBIN g/dL 7.6* 9.1*   HEMATOCRIT % 24.1* 28.6*   PLATELETS 10*3/mm3 114* 120*             Results from last 7 days   Lab Units 03/20/25  0618   CHOLESTEROL mg/dL 99   TRIGLYCERIDES mg/dL 124   HDL CHOL mg/dL 29*     Results from last 7 days   Lab Units 03/18/25  1016   PROBNP pg/mL 20,250.0*     Results from last 7 days   Lab Units 03/20/25  0618   TSH uIU/mL 2.640     I reviewed the patient's new clinical results.  I personally viewed and interpreted the patient's EKG  Current Medications:   Scheduled Meds:aspirin, 81 mg, Oral, Daily  atorvastatin, 10 mg, Oral, Nightly  carvedilol, 25 mg, Oral, BID With Meals  cetirizine, 10 mg, Oral, Daily  clopidogrel, 75 mg, Oral, Daily  docusate sodium, 100 mg, Oral, BID  epoetin jamil-epbx, 10,000 Units, Subcutaneous, Once per day on Monday Wednesday Friday  guaiFENesin, 600 mg, Oral, Q12H  insulin glargine, 30 Units, Subcutaneous, Nightly  insulin lispro, 2-7 Units, Subcutaneous, 4x Daily AC & at Bedtime  insulin lispro, 5 Units, Subcutaneous, TID With Meals  isosorbide mononitrate, 30 mg, Oral, Q24H  pantoprazole, 40 mg, Oral, BID AC  piperacillin-tazobactam, 3.375 g, Intravenous, Q12H  polyethylene glycol, 17 g, Oral, Daily  sucralfate, 1 g, Oral, 4x Daily AC & at Bedtime      Continuous Infusions:     Allergies:  Allergies   Allergen Reactions    Azithromycin Nausea And Vomiting     Pt given IV azithro, reported nausea/vomiting and hot flashes within 3-5 minutes of admin. Pt also c/o new abd pain with admin.     Pt given IV azithro, reported nausea/vomiting and hot flashes within 3-5 minutes of admin. Pt also c/o new abd pain with admin.    Penicillins Unknown - Low Severity     reaction as a child. Does not recall reaction.      reaction as a child. Does not recall reaction.  Beta lactam allergy details  Antibiotic reaction: unknown  Age at reaction: infant  Dose to reaction time: unknown  Reason for antibiotic: unknown  Epinephrine required for reaction?: unknown  Tolerated antibiotics: unknown          Assessment/Plan:      Type 2 NSTEMI relater to supply and demand from underlying CAD and heart failure  Coronary Artery disease  of the RCA, small occluded LAD. On ASA and plavix and Coreg. Add nitrate. He has some chest pain at home. Radial cath site stable.   Chronic combined systolic and diastolic dysfunction- getting echo today. No ACE/ARB due to CKD  Type 2 diabetes Mellitus  Dyslipidemia- Continue statin LDL goal 55 or less          PAYTON Mena  03/20/25  10:00 EDT  Electronically signed by PAYTON Mena, 03/20/25, 10:00 AM EDT.

## 2025-03-20 NOTE — CASE MANAGEMENT/SOCIAL WORK
Discharge Planning Assessment  [facility]     Patient Name: Wilner Menjivar  MRN: [id number]  Today's Date: 3/20/2025    Admit Date: 3/18/2025    Plan: Home, spouse to transport with [facility] and [facility].   Discharge Needs Assessment       Row Name 03/20/25 0929       Living Environment    People in Home spouse    Current Living Arrangements home    Family Caregiver if Needed spouse    Quality of Family Relationships helpful;involved;supportive    Able to Return to Prior Arrangements yes       Transition Planning    Patient/Family Anticipates Transition to home with family    Patient/Family Anticipated Services at Transition     Transportation Anticipated family or friend will provide       Discharge Needs Assessment    Readmission Within the Last 30 Days current reason for admission unrelated to previous admission    Current Outpatient/Agency/Support Group homecare agency;infusion therapy, home    Equipment Currently Used at Home medication pump;negative pressure wound therapy device    Concerns to be Addressed discharge planning    Equipment Needed After Discharge none    Outpatient/Agency/Support Group Needs homecare agency    Discharge Facility/Level of Care Needs home with home health                   Discharge Plan       Row Name 03/20/25 0987       Plan    Plan Home, spouse to transport with [facility] and [facility].    Patient/Family in Agreement with Plan yes    Plan Comments CCP met with pt at the bedside, introduced self and role of CCP. Face sheet information and pharmacy verified. Pt lives in a single-story home with his wife. Pt does not drive, mostly IADL's and pt has a glucometer at home. Pt receives HD MWF thru [facility] on [address]. Pt is enrolled in meds to beds and denies trouble affording or managing his medications. Pt is still current with [facility] , [facility] for IV Abx and [facility] for his wound vac supplies. DC plan is to return home,  spouse to transport. Lu[facility] notified. Ngozi/[facility] notified. LVM for [facility]. Saad RN/CCP                  Continued Care and Services - Admitted Since 3/18/2025       Durable Medical Equipment       Service Provider Request Status Services Address Phone Fax Patient Preferred    [facility] Pending - Request Sent -- [id number], , [facility] [id number] [phone] --              Dialysis/Infusion       Service Provider Request Status Services Address Phone Fax Patient Preferred    [facility]  Selected Infusion and IV Therapy [address], [place] [id number] [phone] --              Home Medical Care       Service Provider Request Status Services Address Phone Fax Patient Preferred    [facility]  Selected Home Rehabilitation, Home Living Aide Services, Home Medical , [facility] [address] [facility], [address] [id number] [phone] --                  Selected Continued Care - Prior Encounters Includes continued care and service providers with selected services from prior encounters from 12/18/2024 to 3/20/2025      Discharged on 3/3/2025 Admission date: 2/11/2025 - Discharge disposition: Home or Self Care      Durable Medical Equipment       Service Provider Services Address Phone Fax Patient Preferred    [facility] Durable Medical Equipment [address], [facility] [address] [id number] [phone] --              Home Medical Care       Service Provider Services Address Phone Fax Patient Preferred    [facility] [facility], Home Rehabilitation, Home Medical , Home Nursing [address] [facility], [address], [facility] [id number] [phone] --                          Expected Discharge Date and Time       Expected Discharge Date Expected  Discharge Time    Mar 21, 2025            Demographic Summary       Row Name 03/20/25 0929       General Information    Admission Type observation    Arrived From home    Required Notices Provided Observation Status Notice    Referral Source case finding;admission list    Reason for Consult discharge planning    Preferred Language English       Contact Information    Permission Granted to Share Info With ;family/designee                   Functional Status       Row Name 03/20/25 0929       Functional Status    Usual Activity Tolerance good    Current Activity Tolerance moderate       Assessment of Health Literacy    How often do you have someone help you read hospital materials? Never    How often do you have problems learning about your medical condition because of difficulty understanding written information? Never    How often do you have a problem understanding what is told to you about your medical condition? Never    How confident are you filling out medical forms by yourself? Quite a bit    Health Literacy Good       Functional Status, IADL    Medications assistive person    Meal Preparation assistive person    Housekeeping assistive person    Laundry assistive person    Shopping assistive person                               Una Garcia, WYATT

## 2025-03-20 NOTE — PROGRESS NOTES
Nephrology    Chart reviewed; laboratory data noted  Patient is off floor for cardiac testing  Cardiac cath yesterday; severe disease and medical management recommended  Concluded HD very early this morning; 4.5 L removed  Will arrange HD tomorrow if still here    --Sascha Randolph MD

## 2025-03-20 NOTE — NURSING NOTE
Dialysis notes:    HD completed without complications, patient stable awake and oriented x3. AVF site was clean, dry and intact, with a palpable thrill and audible bruit, and no signs of bleeding, redness, or swelling noted. UF removed 4.5L as ordered. /51 HR 90

## 2025-03-20 NOTE — NURSING NOTE
03/20/25 1430   Wound 03/20/25 1430 Right medial foot   Placement Date/Time: 03/20/25 1430   Present on Original Admission: Yes  Side: Right  Orientation: medial  Location: foot   Wound Image    Dressing Appearance dry;intact   Closure None   Base epithelialization;exposed structure;granulating;moist;red   Periwound excoriated;moist;macerated   Periwound Temperature warm   Periwound Skin Turgor soft   Edges open   Wound Length (cm) 8 cm   Wound Width (cm) 6 cm   Wound Depth (cm) 1 cm   Wound Surface Area (cm^2) 37.7 cm^2   Wound Volume (cm^3) 25.133 cm^3   Drainage Characteristics/Odor serosanguineous   Drainage Amount small   Care, Wound cleansed with;sterile water;negative pressure wound therapy   Dressing Care dressing changed;foam;transparent film;skin barrier agent applied   Periwound Care barrier film applied   NPWT (Negative Pressure Wound Therapy) 02/25/25 1610 Rt medial Foot   Placement Date/Time: 02/25/25 1610   Location: Rt medial Foot   Therapy Setting continuous therapy   Dressing foam, black;transparent dressing   Contact Layer   (Versatel placed to protect structures)   Pressure Setting 125 mmHg   Sponges Inserted 1   Sponges Removed 1   Finger sweep complete Yes     Reason for Visit: CWCN: We see a known patient from a previous admission who was discharged with NPWT, returning for a cardiovascular issue. He underwent catheterization yesterday. IV pain medication was administered previa home health wound vac removed, the area cleaned, Versatel dressing was applied to protect structures, followed by black foam,  was covered with drape, track pad attached, and connected to the hospital wound vac. Good seal noted. Patient tolerated well.  Treatment Plan/Recommendations: NPWT  Wound Team Follow up Plan: Plan to change the dressing will be every three days, m/w/f, but the next day will be Monday if the patient remains impatient.

## 2025-03-20 NOTE — PROGRESS NOTES
"Daily progress note    Primary care physician      Subjective  Awake and alert and feeling same like yesterday but no more chest pain and family at bedside.    History of present illness  40-year-old -American male with history of end-stage renal disease on hemodialysis chronic anemia diabetes hypertension hyperlipidemia coronary artery disease who is legally blind and well-known to our service as he was recently admitted for right great toe wound with infection and surrounding cellulitis and also found to have osteomyelitis underwent I&D and hallux amputation followed by partial first right metatarsal excision and discharge home on IV antibiotics on the third of this month and is still getting IV antibiotic presented to [facility] emergency room with chest pain and also have shortness of breath and palpitation with bodyaches cold chills headache nausea but no vomiting diarrhea.  Patient evaluated in ER found to have elevated troponin admitted for further workup.  At the time of examination he has no chest pain but is still very weak.     REVIEW OF SYSTEMS  All systems reviewed and negative except for those discussed in HPI.     PHYSICAL EXAM   Blood pressure 114/67, pulse 87, temperature 98.1 °F (36.7 °C), temperature source Oral, resp. rate 18, height 170.2 cm (67.01\"), weight 106 kg (233 lb 11 oz), SpO2 99%.    GENERAL: Awake and alert, no acute distress  SKIN: Warm, dry  HENT: Normocephalic, atraumatic  EYES: no scleral icterus  CV: regular rhythm, mild tachycardia, left upper extremity fistula with a palpable thrill,   RESPIRATORY: normal effort, moving air bilaterally  ABDOMEN: nondistended soft nontender normal bowel sounds no guarding or rigidity  MUSCULOSKELETAL: no deformity.  Right foot is bandaged with wound VAC in place.  NEURO: alert, moves all extremities, follows commands     LAB RESULTS  Lab Results (last 24 hours)       Procedure Component Value Units Date/Time    POC " Glucose Once [[id number]]  (Abnormal) Collected: 03/20/25 1112    Specimen: Blood Updated: 03/20/25 1113     Glucose 168 mg/dL     High Sensitivity Troponin T 1Hr [[id number]]  (Abnormal) Collected: 03/20/25 0742    Specimen: Blood Updated: 03/20/25 0835     HS Troponin T 1,506 ng/L      Troponin T Numeric Delta 139 ng/L      Troponin T % Delta 10    Narrative:      High Sensitive Troponin T Reference Range:  <14.0 ng/L- Negative Female for AMI  <22.0 ng/L- Negative Male for AMI  >=14 - Abnormal Female indicating possible myocardial injury.  >=22 - Abnormal Male indicating possible myocardial injury.   Clinicians would have to utilize clinical acumen, EKG, Troponin, and serial changes to determine if it is an Acute Myocardial Infarction or myocardial injury due to an underlying chronic condition.         High Sensitivity Troponin T [[id number]]  (Abnormal) Collected: 03/20/25 0618    Specimen: Blood Updated: 03/20/25 0712     HS Troponin T 1,367 ng/L     Narrative:      High Sensitive Troponin T Reference Range:  <14.0 ng/L- Negative Female for AMI  <22.0 ng/L- Negative Male for AMI  >=14 - Abnormal Female indicating possible myocardial injury.  >=22 - Abnormal Male indicating possible myocardial injury.   Clinicians would have to utilize clinical acumen, EKG, Troponin, and serial changes to determine if it is an Acute Myocardial Infarction or myocardial injury due to an underlying chronic condition.         TSH [[id number]]  (Normal) Collected: 03/20/25 0618    Specimen: Blood Updated: 03/20/25 0710     TSH 2.640 uIU/mL     Comprehensive Metabolic Panel [[id number]]  (Abnormal) Collected: 03/20/25 0618    Specimen: Blood Updated: 03/20/25 0705     Glucose 133 mg/dL      BUN 24 mg/dL      Creatinine 4.59 mg/dL      Sodium 136 mmol/L      Potassium 3.5 mmol/L      Chloride 100 mmol/L      CO2 26.0 mmol/L      Calcium 9.0 mg/dL      Total Protein 6.8 g/dL      Albumin 3.3 g/dL      ALT (SGPT) 232 U/L      AST (SGOT) 126  U/L      Alkaline Phosphatase 128 U/L      Total Bilirubin 0.7 mg/dL      Globulin 3.5 gm/dL      A/G Ratio 0.9 g/dL      BUN/Creatinine Ratio 5.2     Anion Gap 10.0 mmol/L      eGFR 15.6 mL/min/1.73     Narrative:      GFR Categories in Chronic Kidney Disease (CKD)      GFR Category          GFR (mL/min/1.73)    Interpretation  G1                     90 or greater         Normal or high (1)  G2                      60-89                Mild decrease (1)  G3a                   45-59                Mild to moderate decrease  G3b                   30-44                Moderate to severe decrease  G4                    15-29                Severe decrease  G5                    14 or less           Kidney failure          (1)In the absence of evidence of kidney disease, neither GFR category G1 or G2 fulfill the criteria for CKD.    eGFR calculation 2021 CKD-EPI creatinine equation, which does not include race as a factor    Phosphorus [[id number]]  (Abnormal) Collected: 03/20/25 0618    Specimen: Blood Updated: 03/20/25 0705     Phosphorus 2.2 mg/dL     Lipid Panel [[id number]]  (Abnormal) Collected: 03/20/25 0618    Specimen: Blood Updated: 03/20/25 0705     Total Cholesterol 99 mg/dL      Triglycerides 124 mg/dL      HDL Cholesterol 29 mg/dL      LDL Cholesterol  48 mg/dL      VLDL Cholesterol 22 mg/dL      LDL/HDL Ratio 1.56    Narrative:      Cholesterol Reference Ranges  (U.S. Department of Health and Human Services ATP III Classifications)    Desirable          <200 mg/dL  Borderline High    200-239 mg/dL  High Risk          >240 mg/dL      Triglyceride Reference Ranges  (U.S. Department of Health and Human Services ATP III Classifications)    Normal           <150 mg/dL  Borderline High  150-199 mg/dL  High             200-499 mg/dL  Very High        >500 mg/dL    HDL Reference Ranges  (U.S. Department of Health and Human Services ATP III Classifications)    Low     <40 mg/dl (major risk factor for CHD)  High     >60 mg/dl ('negative' risk factor for CHD)        LDL Reference Ranges  (U.S. Department of Health and Human Services ATP III Classifications)    Optimal          <100 mg/dL  Near Optimal     100-129 mg/dL  Borderline High  130-159 mg/dL  High             160-189 mg/dL  Very High        >189 mg/dL    LDL is calculated using the NIH LDL-C calculation.      CBC & Differential [[id number]]  (Abnormal) Collected: 03/20/25 0618    Specimen: Blood Updated: 03/20/25 0651    Narrative:      The following orders were created for panel order CBC & Differential.  Procedure                               Abnormality         Status                     ---------                               -----------         ------                     CBC Auto Differential[[id number]]        Abnormal            Final result                 Please view results for these tests on the individual orders.    CBC Auto Differential [[id number]]  (Abnormal) Collected: 03/20/25 0618    Specimen: Blood Updated: 03/20/25 0651     WBC 6.11 10*3/mm3      RBC 2.68 10*6/mm3      Hemoglobin 7.6 g/dL      Hematocrit 24.1 %      MCV 89.9 fL      MCH 28.4 pg      MCHC 31.5 g/dL      RDW 14.9 %      RDW-SD 48.9 fl      MPV 9.5 fL      Platelets 114 10*3/mm3      Neutrophil % 73.1 %      Lymphocyte % 14.4 %      Monocyte % 8.2 %      Eosinophil % 3.1 %      Basophil % 0.7 %      Immature Grans % 0.5 %      Neutrophils, Absolute 4.47 10*3/mm3      Lymphocytes, Absolute 0.88 10*3/mm3      Monocytes, Absolute 0.50 10*3/mm3      Eosinophils, Absolute 0.19 10*3/mm3      Basophils, Absolute 0.04 10*3/mm3      Immature Grans, Absolute 0.03 10*3/mm3      nRBC 0.0 /100 WBC     Hemoglobin A1c [[id number]]  (Normal) Collected: 03/20/25 0618    Specimen: Blood Updated: 03/20/25 0650     Hemoglobin A1C 4.90 %     Narrative:      Hemoglobin A1C Ranges:    Increased Risk for Diabetes  5.7% to 6.4%  Diabetes                     >= 6.5%  Diabetic Goal                < 7.0%    POC  Glucose Once [[id number]]  (Abnormal) Collected: 03/20/25 0618    Specimen: Blood Updated: 03/20/25 0620     Glucose 137 mg/dL     POC Glucose Once [[id number]]  (Abnormal) Collected: 03/19/25 2130    Specimen: Blood Updated: 03/19/25 2131     Glucose 136 mg/dL     POC Glucose Once [[id number]]  (Abnormal) Collected: 03/19/25 1733    Specimen: Blood Updated: 03/19/25 1734     Glucose 241 mg/dL     POC Glucose Once [[id number]]  (Normal) Collected: 03/19/25 1528    Specimen: Blood Updated: 03/19/25 1539     Glucose 129 mg/dL             XR CHEST 1 VW-3/18/2025     HISTORY: Shortness of breath.     Heart size is within normal limits. Lungs are underinflated with some  vascular crowding. No focal infiltrates are seen. Right-sided central  venous catheter is seen with its tip overlying the right upper  hemithorax near the right innominate vein.     Bony structures appear unremarkable.     IMPRESSION:  1. Underinflation of the lungs.  2. No acute process identified.     Scan on 3/18/2025 1913 by [facility], [place]: ECG 12-LEAD         Author: -- Service: -- Author Type: --   Filed: Date of Service: Creation Time:   Status: (Other)   HEART DSPV=384  bpm  RR Wgcozsri=508  ms  DE Krqwnvty=046  ms  P Horizontal Axis=0  deg  P Front Axis=67  deg  QRSD Interval=86  ms  QT Vaxjnjkt=992  ms  XEpE=537  ms  QRS Axis=22  deg  T Wave Axis=181  deg  - ABNORMAL ECG -  Sinus tachycardia  Nonspecific  repol abnormality, lateral leads  When compared with ECG of 18-Mar-2025 10:13:07,  No significant change        Cardiac catheterization results noted and discussed with patient and family    Current Facility-Administered Medications:     acetaminophen (TYLENOL) tablet 650 mg, 650 mg, Oral, Q4H PRN, Carmen Erickson MD    aspirin chewable tablet 81 mg, 81 mg, Oral, Daily, Carmen Erickson MD, 81 mg at 03/20/25 0907    atorvastatin (LIPITOR) tablet 10 mg, 10 mg, Oral, Nightly, Carmen Erickson MD, 10 mg at 03/18/25 2007    carvedilol (COREG) tablet  25 mg, 25 mg, Oral, BID With Meals, Carmen Erickson MD, 25 mg at 03/20/25 0907    cetirizine (zyrTEC) tablet 10 mg, 10 mg, Oral, Daily, Carmen Erickson MD, 10 mg at 03/20/25 0907    clopidogrel (PLAVIX) tablet 75 mg, 75 mg, Oral, Daily, Carmen Erickson MD, 75 mg at 03/20/25 0907    dextrose (D50W) (25 g/50 mL) IV injection 25 g, 25 g, Intravenous, Q15 Min PRN, Carmen Erickson MD    dextrose (GLUTOSE) oral gel 15 g, 15 g, Oral, Q15 Min PRN, Carmen Erickson MD    docusate sodium (COLACE) capsule 100 mg, 100 mg, Oral, BID, Carmen Erickson MD    epoetin jamil-epbx (RETACRIT) injection 10,000 Units, 10,000 Units, Subcutaneous, Once per day on Monday Wednesday Friday, Carmen Erickson MD    glucagon (GLUCAGEN) injection 1 mg, 1 mg, Intramuscular, Q15 Min PRN, Carmen Erickson MD    guaiFENesin (MUCINEX) 12 hr tablet 600 mg, 600 mg, Oral, Q12H, Carmen Erickson MD, 600 mg at 03/20/25 0907    heparin injection 300 Units, 3 mL, Intravenous, Daily PRN, Carmen Erickson MD    insulin glargine (LANTUS, SEMGLEE) injection 30 Units, 30 Units, Subcutaneous, Nightly, Carmen Erickson MD    insulin lispro (HUMALOG/ADMELOG) injection 2-7 Units, 2-7 Units, Subcutaneous, 4x Daily AC & at Bedtime, Carmen Erickson MD, 2 Units at 03/20/25 1219    insulin lispro (HUMALOG/ADMELOG) injection 5 Units, 5 Units, Subcutaneous, TID With Meals, Carmen Erickson MD, 5 Units at 03/20/25 1219    isosorbide mononitrate (IMDUR) 24 hr tablet 30 mg, 30 mg, Oral, Q24H, Stephanie Story, APRN, 30 mg at 03/20/25 1205    nitroglycerin (NITROSTAT) SL tablet 0.4 mg, 0.4 mg, Sublingual, Q5 Min PRN, Carmen Erickson MD    ondansetron (ZOFRAN) injection 4 mg, 4 mg, Intravenous, Q6H PRN, Carmen Erickson MD, 4 mg at 03/20/25 0235    oxyCODONE-acetaminophen (PERCOCET) 5-325 MG per tablet 1 tablet, 1 tablet, Oral, Q4H PRN, Carmen Erickson MD, 1 tablet at 03/20/25 0235    pantoprazole (PROTONIX) EC tablet 40 mg, 40 mg, Oral, BID AC, Carmen Erickson MD, 40 mg at 03/20/25 0619     piperacillin-tazobactam (ZOSYN) 3.375 g IVPB in 100 mL NS MBP (CD), 3.375 g, Intravenous, Q12H, Carmen Erickson MD, Last Rate: 0 mL/hr at 03/20/25 0920, 3.375 g at 03/20/25 1400    polyethylene glycol (MIRALAX) packet 17 g, 17 g, Oral, Daily, Carmen Erickson MD    sodium chloride 0.9 % flush 10 mL, 10 mL, Intravenous, PRN, Carmen Erickson MD    sucralfate (CARAFATE) tablet 1 g, 1 g, Oral, 4x Daily AC & at Bedtime, Carmen Erickson MD, 1 g at 03/20/25 1205     ASSESSMENT  Non-ST elevation MI type II  Chronic systolic diastolic congestive heart failure  End-stage renal disease on hemodialysis  Diabetes mellitus  Hypertension  Hyperlipidemia  Chronic anemia  Patient is legally blind  Gastroesophageal reflux disease  Recent right great toe wound with infection and surrounding cellulitis and osteomyelitis underwent I&D followed by hallux amputation followed by partial first right metatarsal excision on IV antibiotics    PLAN  CPM  Continue aspirin Plavix Coreg Lipitor  Cardiology consult appreciated  Hemodialysis in a.m.  Continue home medications including IV antibiotics  Stress ulcer DVT prophylaxis  Nephrology to follow patient   Supportive care  PT/OT  Discussed with nursing staff and family  Follow closely further recommendation current hospital course    JASKARAN LANGFORD MD    Copied text in this note has been reviewed and is accurate as of 03/20/25       97

## 2025-03-20 NOTE — DISCHARGE PLACEMENT REQUEST
"Wilner Messer (40 y.o. Male)       Date of Birth   1985    Social Security Number       Address   [address] [place] [address]    Home Phone   [phone]    MRN   [id number]       Holiness   Taoism    Marital Status                               Admission Date   3/18/2025    Admission Type   Emergency    Admitting Provider   Remy Thornton MD    Attending Provider   Remy Thornton MD    Department, Room/Bed   [address], [address]/1       Discharge Date       Discharge Disposition       Discharge Destination                                 Attending Provider: Remy Thornton MD    Allergies: Azithromycin, Penicillins    Isolation: None   Infection: None   Code Status: CPR    Ht: 170.2 cm (67.01\")   Wt: 106 kg (233 lb 11 oz)    Admission Cmt: None   Principal Problem: Chest pain [R07.9]                   Active Insurance as of 3/18/2025       Primary Coverage       Payor Plan Insurance Group Employer/Plan Group    HUMANA MEDICARE REPLACEMENT HU[employer]NA MEDICARE ADVANTAGE HMO [id number]       Payor Plan Address Payor Plan Phone Number Payor Plan Fax Number Effective Dates    [address] [address] [phone]  1/1/2023 - None Entered    [facility] [id number]         Subscriber Name Subscriber Birth Date Member ID       WILNER MESSER 1985 [id number]                     Emergency Contacts        (Rel.) Home Phone Work Phone Mobile Phone    LIT MESSER (Spouse) -- -- [phone]    RANJEET MESSER (Mother) -- -- [phone]                "

## 2025-03-21 ENCOUNTER — READMISSION MANAGEMENT (OUTPATIENT)
Dept: CALL CENTER | Facility: HOSPITAL | Age: 40
End: 2025-03-21
Payer: MEDICARE

## 2025-03-21 VITALS
WEIGHT: 233.69 LBS | OXYGEN SATURATION: 100 % | DIASTOLIC BLOOD PRESSURE: 85 MMHG | BODY MASS INDEX: 36.68 KG/M2 | HEIGHT: 67 IN | TEMPERATURE: 97.8 F | SYSTOLIC BLOOD PRESSURE: 147 MMHG | HEART RATE: 90 BPM | RESPIRATION RATE: 16 BRPM

## 2025-03-21 LAB
ALBUMIN SERPL-MCNC: 3.2 G/DL (ref 3.5–5.2)
ANION GAP SERPL CALCULATED.3IONS-SCNC: 13 MMOL/L (ref 5–15)
BUN SERPL-MCNC: 36 MG/DL (ref 6–20)
BUN/CREAT SERPL: 5.5 (ref 7–25)
CALCIUM SPEC-SCNC: 8.7 MG/DL (ref 8.6–10.5)
CHLORIDE SERPL-SCNC: 99 MMOL/L (ref 98–107)
CO2 SERPL-SCNC: 26 MMOL/L (ref 22–29)
CREAT SERPL-MCNC: 6.59 MG/DL (ref 0.76–1.27)
DEPRECATED RDW RBC AUTO: 48.9 FL (ref 37–54)
EGFRCR SERPLBLD CKD-EPI 2021: 10.1 ML/MIN/1.73
ERYTHROCYTE [DISTWIDTH] IN BLOOD BY AUTOMATED COUNT: 15 % (ref 12.3–15.4)
GLUCOSE BLDC GLUCOMTR-MCNC: 220 MG/DL (ref 70–130)
GLUCOSE SERPL-MCNC: 197 MG/DL (ref 65–99)
HCT VFR BLD AUTO: 22.7 % (ref 37.5–51)
HGB BLD-MCNC: 7.2 G/DL (ref 13–17.7)
MCH RBC QN AUTO: 28.6 PG (ref 26.6–33)
MCHC RBC AUTO-ENTMCNC: 31.7 G/DL (ref 31.5–35.7)
MCV RBC AUTO: 90.1 FL (ref 79–97)
PHOSPHATE SERPL-MCNC: 3.5 MG/DL (ref 2.5–4.5)
PLATELET # BLD AUTO: 113 10*3/MM3 (ref 140–450)
PMV BLD AUTO: 9.1 FL (ref 6–12)
POTASSIUM SERPL-SCNC: 3.6 MMOL/L (ref 3.5–5.2)
RBC # BLD AUTO: 2.52 10*6/MM3 (ref 4.14–5.8)
SODIUM SERPL-SCNC: 138 MMOL/L (ref 136–145)
WBC NRBC COR # BLD AUTO: 5.28 10*3/MM3 (ref 3.4–10.8)

## 2025-03-21 PROCEDURE — G0378 HOSPITAL OBSERVATION PER HR: HCPCS

## 2025-03-21 PROCEDURE — 82948 REAGENT STRIP/BLOOD GLUCOSE: CPT

## 2025-03-21 PROCEDURE — 99232 SBSQ HOSP IP/OBS MODERATE 35: CPT | Performed by: INTERNAL MEDICINE

## 2025-03-21 PROCEDURE — G0257 UNSCHED DIALYSIS ESRD PT HOS: HCPCS

## 2025-03-21 PROCEDURE — 85027 COMPLETE CBC AUTOMATED: CPT | Performed by: INTERNAL MEDICINE

## 2025-03-21 PROCEDURE — 25010000002 PIPERACILLIN SOD-TAZOBACTAM PER 1 G: Performed by: INTERNAL MEDICINE

## 2025-03-21 PROCEDURE — 80069 RENAL FUNCTION PANEL: CPT | Performed by: INTERNAL MEDICINE

## 2025-03-21 RX ORDER — ISOSORBIDE MONONITRATE 30 MG/1
30 TABLET, EXTENDED RELEASE ORAL
Qty: 30 TABLET | Refills: 0 | Status: SHIPPED | OUTPATIENT
Start: 2025-03-21 | End: 2025-04-20

## 2025-03-21 RX ORDER — CETIRIZINE HYDROCHLORIDE 10 MG/1
10 TABLET ORAL DAILY
Qty: 30 TABLET | Refills: 0 | Status: SHIPPED | OUTPATIENT
Start: 2025-03-21 | End: 2025-04-20

## 2025-03-21 RX ORDER — GUAIFENESIN 600 MG/1
600 TABLET, EXTENDED RELEASE ORAL EVERY 12 HOURS SCHEDULED
Qty: 60 TABLET | Refills: 0 | Status: SHIPPED | OUTPATIENT
Start: 2025-03-21 | End: 2025-04-20

## 2025-03-21 RX ADMIN — PANTOPRAZOLE SODIUM 40 MG: 40 TABLET, DELAYED RELEASE ORAL at 06:34

## 2025-03-21 RX ADMIN — CLOPIDOGREL BISULFATE 75 MG: 75 TABLET ORAL at 12:34

## 2025-03-21 RX ADMIN — PIPERACILLIN AND TAZOBACTAM 3.38 G: 3; .375 INJECTION, POWDER, FOR SOLUTION INTRAVENOUS at 00:20

## 2025-03-21 RX ADMIN — PIPERACILLIN AND TAZOBACTAM 3.38 G: 3; .375 INJECTION, POWDER, FOR SOLUTION INTRAVENOUS at 12:00

## 2025-03-21 RX ADMIN — ISOSORBIDE MONONITRATE 30 MG: 30 TABLET, EXTENDED RELEASE ORAL at 12:34

## 2025-03-21 RX ADMIN — OXYCODONE AND ACETAMINOPHEN 1 TABLET: 5; 325 TABLET ORAL at 00:29

## 2025-03-21 RX ADMIN — CARVEDILOL 25 MG: 25 TABLET, FILM COATED ORAL at 12:37

## 2025-03-21 RX ADMIN — SUCRALFATE 1 G: 1 TABLET ORAL at 06:34

## 2025-03-21 RX ADMIN — CETIRIZINE HYDROCHLORIDE 10 MG: 10 TABLET ORAL at 12:34

## 2025-03-21 RX ADMIN — GUAIFENESIN 600 MG: 600 TABLET, MULTILAYER, EXTENDED RELEASE ORAL at 12:34

## 2025-03-21 RX ADMIN — SUCRALFATE 1 G: 1 TABLET ORAL at 12:34

## 2025-03-21 RX ADMIN — ASPIRIN 81 MG CHEWABLE TABLET 81 MG: 81 TABLET CHEWABLE at 12:34

## 2025-03-21 NOTE — PROGRESS NOTES
"Daily progress note    Primary care physician      Subjective  Awake and alert and feeling same like yesterday but no more chest pain and family at bedside.    History of present illness  40-year-old -American male with history of end-stage renal disease on hemodialysis chronic anemia diabetes hypertension hyperlipidemia coronary artery disease who is legally blind and well-known to our service as he was recently admitted for right great toe wound with infection and surrounding cellulitis and also found to have osteomyelitis underwent I&D and hallux amputation followed by partial first right metatarsal excision and discharge home on IV antibiotics on the third of this month and is still getting IV antibiotic presented to [facility] emergency room with chest pain and also have shortness of breath and palpitation with bodyaches cold chills headache nausea but no vomiting diarrhea.  Patient evaluated in ER found to have elevated troponin admitted for further workup.  At the time of examination he has no chest pain but is still very weak.     REVIEW OF SYSTEMS  All systems reviewed and negative except for those discussed in HPI.     PHYSICAL EXAM   Blood pressure 147/85, pulse 90, temperature 97.8 °F (36.6 °C), temperature source Temporal, resp. rate 16, height 170.2 cm (67.01\"), weight 106 kg (233 lb 11 oz), SpO2 100%.    GENERAL: Awake and alert, no acute distress  SKIN: Warm, dry  HENT: Normocephalic, atraumatic  EYES: no scleral icterus  CV: regular rhythm, mild tachycardia, left upper extremity fistula with a palpable thrill,   RESPIRATORY: normal effort, moving air bilaterally  ABDOMEN: nondistended soft nontender normal bowel sounds no guarding or rigidity  MUSCULOSKELETAL: no deformity.  Right foot is bandaged with wound VAC in place.  NEURO: alert, moves all extremities, follows commands     LAB RESULTS  Lab Results (last 24 hours)       Procedure Component Value Units Date/Time    " Renal Function Panel [[id number]]  (Abnormal) Collected: 03/21/25 0633    Specimen: Blood, Central Line Updated: 03/21/25 0736     Glucose 197 mg/dL      BUN 36 mg/dL      Creatinine 6.59 mg/dL      Sodium 138 mmol/L      Potassium 3.6 mmol/L      Chloride 99 mmol/L      CO2 26.0 mmol/L      Calcium 8.7 mg/dL      Albumin 3.2 g/dL      Phosphorus 3.5 mg/dL      Anion Gap 13.0 mmol/L      BUN/Creatinine Ratio 5.5     eGFR 10.1 mL/min/1.73     Narrative:      GFR Categories in Chronic Kidney Disease (CKD)      GFR Category          GFR (mL/min/1.73)    Interpretation  G1                     90 or greater         Normal or high (1)  G2                      60-89                Mild decrease (1)  G3a                   45-59                Mild to moderate decrease  G3b                   30-44                Moderate to severe decrease  G4                    15-29                Severe decrease  G5                    14 or less           Kidney failure          (1)In the absence of evidence of kidney disease, neither GFR category G1 or G2 fulfill the criteria for CKD.    eGFR calculation 2021 CKD-EPI creatinine equation, which does not include race as a factor    CBC (No Diff) [[id number]]  (Abnormal) Collected: 03/21/25 0633    Specimen: Blood, Central Line Updated: 03/21/25 0700     WBC 5.28 10*3/mm3      RBC 2.52 10*6/mm3      Hemoglobin 7.2 g/dL      Hematocrit 22.7 %      MCV 90.1 fL      MCH 28.6 pg      MCHC 31.7 g/dL      RDW 15.0 %      RDW-SD 48.9 fl      MPV 9.1 fL      Platelets 113 10*3/mm3     POC Glucose Once [[id number]]  (Abnormal) Collected: 03/21/25 0605    Specimen: Blood Updated: 03/21/25 0606     Glucose 220 mg/dL     POC Glucose Once [[id number]]  (Abnormal) Collected: 03/20/25 2017    Specimen: Blood Updated: 03/20/25 2019     Glucose 155 mg/dL     POC Glucose Once [[id number]]  (Abnormal) Collected: 03/20/25 1605    Specimen: Blood Updated: 03/20/25 1607     Glucose 136 mg/dL             XR CHEST 1  -3/18/2025     HISTORY: Shortness of breath.     Heart size is within normal limits. Lungs are underinflated with some  vascular crowding. No focal infiltrates are seen. Right-sided central  venous catheter is seen with its tip overlying the right upper  hemithorax near the right innominate vein.     Bony structures appear unremarkable.     IMPRESSION:  1. Underinflation of the lungs.  2. No acute process identified.     Scan on 3/18/2025 1913 by [facility], [place]: ECG 12-LEAD         Author: -- Service: -- Author Type: --   Filed: Date of Service: Creation Time:   Status: (Other)   HEART BDGR=657  bpm  RR Fbqmvjmi=285  ms  ID Zsjhyzkl=949  ms  P Horizontal Axis=0  deg  P Front Axis=67  deg  QRSD Interval=86  ms  QT Wuylyosv=651  ms  GDuU=150  ms  QRS Axis=22  deg  T Wave Axis=181  deg  - ABNORMAL ECG -  Sinus tachycardia  Nonspecific  repol abnormality, lateral leads  When compared with ECG of 18-Mar-2025 10:13:07,  No significant change        Cardiac catheterization results noted and discussed with patient and family    Current Facility-Administered Medications:     acetaminophen (TYLENOL) tablet 650 mg, 650 mg, Oral, Q4H PRN, Carmen Erickson MD    aspirin chewable tablet 81 mg, 81 mg, Oral, Daily, Carmen Erickson MD, 81 mg at 03/20/25 0907    atorvastatin (LIPITOR) tablet 10 mg, 10 mg, Oral, Nightly, Carmen Erickson MD, 10 mg at 03/20/25 2013    carvedilol (COREG) tablet 25 mg, 25 mg, Oral, BID With Meals, Carmen Erickson MD, 25 mg at 03/20/25 1756    cetirizine (zyrTEC) tablet 10 mg, 10 mg, Oral, Daily, Carmen Erickson MD, 10 mg at 03/20/25 0907    clopidogrel (PLAVIX) tablet 75 mg, 75 mg, Oral, Daily, Carmen Erickson MD, 75 mg at 03/20/25 0907    dextrose (D50W) (25 g/50 mL) IV injection 25 g, 25 g, Intravenous, Q15 Min PRN, Carmen Erickson MD    dextrose (GLUTOSE) oral gel 15 g, 15 g, Oral, Q15 Min PRN, Carmen Erickson MD    docusate sodium (COLACE) capsule 100 mg, 100 mg, Oral, BID, Carmen Erickson MD    epoetin  jamil-epbx (RETACRIT) injection 10,000 Units, 10,000 Units, Subcutaneous, Once per day on Monday Wednesday Friday, Carmen Erickson MD    glucagon (GLUCAGEN) injection 1 mg, 1 mg, Intramuscular, Q15 Min PRN, Carmen Erickson MD    guaiFENesin (MUCINEX) 12 hr tablet 600 mg, 600 mg, Oral, Q12H, Carmen Erickson MD, 600 mg at 03/20/25 2013    heparin injection 300 Units, 3 mL, Intravenous, Daily PRN, Carmen Erickson MD    insulin glargine (LANTUS, SEMGLEE) injection 30 Units, 30 Units, Subcutaneous, Nightly, Carmen Erickson MD, 30 Units at 03/20/25 2022    insulin lispro (HUMALOG/ADMELOG) injection 2-7 Units, 2-7 Units, Subcutaneous, 4x Daily AC & at Bedtime, Carmen Erickson MD, 2 Units at 03/20/25 2022    insulin lispro (HUMALOG/ADMELOG) injection 5 Units, 5 Units, Subcutaneous, TID With Meals, Carmen Erickson MD, 5 Units at 03/20/25 1219    isosorbide mononitrate (IMDUR) 24 hr tablet 30 mg, 30 mg, Oral, Q24H, Stephanie Story, APRN, 30 mg at 03/20/25 1205    ondansetron (ZOFRAN) injection 4 mg, 4 mg, Intravenous, Q6H PRN, Carmen Erickson MD, 4 mg at 03/20/25 2011    oxyCODONE-acetaminophen (PERCOCET) 5-325 MG per tablet 1 tablet, 1 tablet, Oral, Q4H PRN, Carmen Erickson MD, 1 tablet at 03/21/25 0029    pantoprazole (PROTONIX) EC tablet 40 mg, 40 mg, Oral, BID AC, Carmen Erickson MD, 40 mg at 03/21/25 0634    piperacillin-tazobactam (ZOSYN) 3.375 g IVPB in 100 mL NS MBP (CD), 3.375 g, Intravenous, Q12H, Carmen Erickson MD, Last Rate: 0 mL/hr at 03/20/25 1646, 3.375 g at 03/21/25 0020    polyethylene glycol (MIRALAX) packet 17 g, 17 g, Oral, Daily, Carmen Erickson MD    sodium chloride 0.9 % flush 10 mL, 10 mL, Intravenous, PRN, Carmen Erickson MD    sucralfate (CARAFATE) tablet 1 g, 1 g, Oral, 4x Daily AC & at Bedtime, Carmen Erickson MD, 1 g at 03/21/25 0634     ASSESSMENT  Non-ST elevation MI type II  Acute on chronic systolic diastolic congestive heart failure  End-stage renal disease on hemodialysis  Diabetes  mellitus  Hypertension  Hyperlipidemia  Chronic anemia  Patient is legally blind  Gastroesophageal reflux disease  Recent right great toe wound with infection and surrounding cellulitis and osteomyelitis underwent I&D followed by hallux amputation followed by partial first right metatarsal excision on IV antibiotics    PLAN  Discharge home after hemodialysis  Discharge summary dictated    JASKARAN LANGFORD MD    Copied text in this note has been reviewed and is accurate as of 03/21/25

## 2025-03-21 NOTE — CASE MANAGEMENT/SOCIAL WORK
Case Management Discharge Note      Final Note: Home with family, [facility], [facility] for IV Abx and [facility] for wound vac. No additional CCP needs.         Selected Continued Care - Discharged on 3/21/2025 Admission date: 3/18/2025 - Discharge disposition: Home or Self Care      Destination    No services have been selected for the patient.                Durable Medical Equipment       Service Provider Services Address Phone Fax Patient Preferred    [facility] [id number], , [facility] [id number] [phone] --              Dialysis/Infusion       Service Provider Services Address Phone Fax Patient Preferred    [facility] Infusion and IV Therapy [address], [place] [id number] [phone] --              Home Medical Care       Service Provider Services Address Phone Fax Patient Preferred    [facility], [facility], Home Medical , Home Nursing [address], [address], [facility] [address] [phone] --              Therapy    No services have been selected for the patient.                Community Resources    No services have been selected for the patient.                Community & DME    No services have been selected for the patient.                    Selected Continued Care - Prior Encounters Includes continued care and service providers with selected services from prior encounters from 12/18/2024 to 3/21/2025      Discharged on 3/3/2025 Admission date: 2/11/2025 - Discharge disposition: Home or Self Care      Durable Medical Equipment       Service Provider Services Address Phone Fax Patient Preferred    [facility] [id number], , [facility] [id number] [phone] --              Home Medical Care       Service Provider Services Address Phone Fax Patient  Preferred    [facility] [facility], Home Rehabilitation, Home Medical , Home Nursing [address], [address], [place] [id number] [phone] --                               Final Discharge Disposition Code: 06 - home with home health care

## 2025-03-21 NOTE — NURSING NOTE
HD WITHOUT INCIDENT OR C/O. TOLERATED WELL. REFUSED TO RUN 4 H TREATMENT. REFUSED TO SIGN OFF EARLY TERMINATION AGAINST MEDICAL ADVICE AS WELL. REMOVED 4 L. NO MEDS ADMINISTERED. AVF NEEDLES REMOVED X 2. HEMOSTASIS ACHIEVED.  STABLE POST COMPLETION OF HD.

## 2025-03-21 NOTE — DISCHARGE SUMMARY
Discharge summary    Date of admission 3/18/2025  Date of discharge 3/21/2025    Final diagnosis  Non-ST elevation MI type II  Severe multivessel coronary artery disease medical management  Acute on chronic systolic diastolic congestive heart failure  End-stage renal disease on hemodialysis  Diabetes mellitus  Hypertension  Hyperlipidemia  Chronic anemia  Patient is legally blind  Gastroesophageal reflux disease  Recent right great toe wound with infection and surrounding cellulitis and osteomyelitis underwent I&D followed by hallux amputation followed by partial first right metatarsal excision on IV antibiotics    Discharge medications    Current Facility-Administered Medications:     acetaminophen (TYLENOL) tablet 650 mg, 650 mg, Oral, Q4H PRN, Carmen Erickson MD    aspirin chewable tablet 81 mg, 81 mg, Oral, Daily, Carmen Erickson MD, 81 mg at 03/20/25 0907    atorvastatin (LIPITOR) tablet 10 mg, 10 mg, Oral, Nightly, Carmen Erickson MD, 10 mg at 03/20/25 2013    carvedilol (COREG) tablet 25 mg, 25 mg, Oral, BID With Meals, Carmen Erickson MD, 25 mg at 03/20/25 1756    cetirizine (zyrTEC) tablet 10 mg, 10 mg, Oral, Daily, Carmen Erickson MD, 10 mg at 03/20/25 0907    clopidogrel (PLAVIX) tablet 75 mg, 75 mg, Oral, Daily, Carmen Erickson MD, 75 mg at 03/20/25 0907    dextrose (D50W) (25 g/50 mL) IV injection 25 g, 25 g, Intravenous, Q15 Min PRN, Carmen Erickson MD    dextrose (GLUTOSE) oral gel 15 g, 15 g, Oral, Q15 Min PRN, Carmen Erickson MD    docusate sodium (COLACE) capsule 100 mg, 100 mg, Oral, BID, Carmen Erickson MD    epoetin jamil-epbx (RETACRIT) injection 10,000 Units, 10,000 Units, Subcutaneous, Once per day on Monday Wednesday Friday, Carmen Erickson MD    glucagon (GLUCAGEN) injection 1 mg, 1 mg, Intramuscular, Q15 Min PRN, Carmen Erickson MD    guaiFENesin (MUCINEX) 12 hr tablet 600 mg, 600 mg, Oral, Q12H, Carmen Erickson MD, 600 mg at 03/20/25 2013    heparin injection 300 Units, 3 mL, Intravenous, Daily PRN,  Carmen Erickson MD    insulin glargine (LANTUS, SEMGLEE) injection 30 Units, 30 Units, Subcutaneous, Nightly, Carmen Erickson MD, 30 Units at 03/20/25 2022    insulin lispro (HUMALOG/ADMELOG) injection 2-7 Units, 2-7 Units, Subcutaneous, 4x Daily AC & at Bedtime, Carmen Erickson MD, 2 Units at 03/20/25 2022    insulin lispro (HUMALOG/ADMELOG) injection 5 Units, 5 Units, Subcutaneous, TID With Meals, Carmen Erickson MD, 5 Units at 03/20/25 1219    isosorbide mononitrate (IMDUR) 24 hr tablet 30 mg, 30 mg, Oral, Q24H, Stephanie Story, APRN, 30 mg at 03/20/25 1205    ondansetron (ZOFRAN) injection 4 mg, 4 mg, Intravenous, Q6H PRN, Carmen Erickson MD, 4 mg at 03/20/25 2011    oxyCODONE-acetaminophen (PERCOCET) 5-325 MG per tablet 1 tablet, 1 tablet, Oral, Q4H PRN, Carmen Erickson MD, 1 tablet at 03/21/25 0029    pantoprazole (PROTONIX) EC tablet 40 mg, 40 mg, Oral, BID AC, Carmen Erickson MD, 40 mg at 03/21/25 0634    piperacillin-tazobactam (ZOSYN) 3.375 g IVPB in 100 mL NS MBP (CD), 3.375 g, Intravenous, Q12H, Carmen Erickson MD, Last Rate: 0 mL/hr at 03/20/25 1646, 3.375 g at 03/21/25 0020    polyethylene glycol (MIRALAX) packet 17 g, 17 g, Oral, Daily, Carmen Erickson MD    sodium chloride 0.9 % flush 10 mL, 10 mL, Intravenous, PRN, Carmen Erickson MD    sucralfate (CARAFATE) tablet 1 g, 1 g, Oral, 4x Daily AC & at Bedtime, Carmen Erickson MD, 1 g at 03/21/25 0634     Consults obtained  Cardiology  Nephrology    Procedures  Cardiac catheterization    Hospital course  40-year-old -American male who is well-known to our service with recent admission with history of end-stage renal disease on hemodialysis chronic anemia diabetes hypertension hyperlipidemia coronary artery disease who is legally blind and was recently admitted and treated for osteomyelitis with wound infection and surrounding cellulitis required surgery and is still on IV antibiotics at home admitted for emergency room with chest pain.  Patient workup in  ER revealed elevated troponin and taken to the Cath Lab by cardiology and did do cardiac cath which showed multivessel coronary artery disease and medical management was recommended.  Patient remained on Imdur aspirin Plavix Coreg and Lipitor.  Patient is pain-free and getting hemodialysis this time encourage recommend okay to discharge on current medication.  Patient will continue remaining doses of IV antibiotics at home.  Patient also followed by nephrology for hemodialysis.    Discharge diet regular    Activity as tolerated    Medication as above    Follow-up with prime doctor in 1 week and follow-up with cardiology and nephrology per the instruction and take medication as directed and keep appointment for hemodialysis 3 times a week.    JASKARAN LANGFORD MD

## 2025-03-21 NOTE — NURSING NOTE
CWCN: We are seeing a patient for follow-up NPWT. The patient is being discharged today. The hospital wound vac unit is being discontinued, and home health is connected and attached to a track pad. The patient tolerated the condition well. A goo seal was noted.  RN notified.  Please re-consult for any additional needs.

## 2025-03-21 NOTE — PROGRESS NOTES
[facility] Progress Note       Encounter Date:25  Patient:Wilner Menjivar  :1985  MRN:[id number]      Chief Complaint: Follow-up elevated troponins and chest discomfort      Subjective:        Better today no complaints of chest pain    Review of Systems:  Review of Systems   Constitutional: Positive for malaise/fatigue.   Cardiovascular:  Negative for dyspnea on exertion.   Respiratory:  Negative for shortness of breath.        Medications:  Scheduled Meds:  aspirin, 81 mg, Oral, Daily  atorvastatin, 10 mg, Oral, Nightly  carvedilol, 25 mg, Oral, BID With Meals  cetirizine, 10 mg, Oral, Daily  clopidogrel, 75 mg, Oral, Daily  docusate sodium, 100 mg, Oral, BID  epoetin jamil-epbx, 10,000 Units, Subcutaneous, Once per day on   guaiFENesin, 600 mg, Oral, Q12H  insulin glargine, 30 Units, Subcutaneous, Nightly  insulin lispro, 2-7 Units, Subcutaneous, 4x Daily AC & at Bedtime  insulin lispro, 5 Units, Subcutaneous, TID With Meals  isosorbide mononitrate, 30 mg, Oral, Q24H  pantoprazole, 40 mg, Oral, BID AC  piperacillin-tazobactam, 3.375 g, Intravenous, Q12H  polyethylene glycol, 17 g, Oral, Daily  sucralfate, 1 g, Oral, 4x Daily AC & at Bedtime    Continuous Infusions:   PRN Meds:    acetaminophen    dextrose    dextrose    glucagon (human recombinant)    heparin    ondansetron    oxyCODONE-acetaminophen    sodium chloride         Objective:       Vitals:    25 1910 25 1911 25 2317 25 0726   BP: 131/92 129/80 120/70 115/63   BP Location: Right arm  Right arm Right arm   Patient Position: Sitting  Sitting Lying   Pulse: 94  89 94   Resp: 18  18 18   Temp: 97.7 °F (36.5 °C)  98.1 °F (36.7 °C) 98.2 °F (36.8 °C)   TempSrc: Oral  Oral Oral   SpO2: 100%  100%    Weight:       Height:               Physical Exam:  Constitutional: Well appearing, well developed, no acute distress   HENT: Oropharynx clear and membrane moist  Eyes: Normal  conjunctiva, no sclera icterus.  Neck: Supple, no carotid bruit bilaterally.  Cardiovascular: Regular rate and rhythm, No Murmur, No bilateral lower extremity edema.  Pulmonary: Normal respiratory effort, normal lung sounds, no wheezing.  Neurological: Alert and orient x 3.   Psych: Appropriate mood and affect. Normal judgment and insight.           Lab Review:   Results from last 7 days   Lab Units 03/21/25  0633 03/20/25  0618 03/18/25  1016 03/17/25  0000   SODIUM mmol/L 138 136 134*  --    POTASSIUM mmol/L 3.6 3.5 3.8  --    CHLORIDE mmol/L 99 100 99  --    CO2 mmol/L 26.0 26.0 24.0  --    BUN mg/dL 36* 24* 28* 39*   CREATININE mg/dL 6.59* 4.59* 5.26*  --    GLUCOSE mg/dL 197* 133* 173*  --    CALCIUM mg/dL 8.7 9.0 9.2  --    AST (SGOT) U/L  --  126* 19  --    ALT (SGPT) U/L  --  232* 14  --      Results from last 7 days   Lab Units 03/20/25  0742 03/20/25  0618 03/18/25  1158 03/18/25  1016   HSTROP T ng/L 1,506* 1,367* 260* 265*     Results from last 7 days   Lab Units 03/21/25  0633 03/20/25  0618 03/18/25  1016   WBC 10*3/mm3 5.28 6.11 10.95*   HEMOGLOBIN g/dL 7.2* 7.6* 9.1*   HEMATOCRIT % 22.7* 24.1* 28.6*   PLATELETS 10*3/mm3 113* 114* 120*             Results from last 7 days   Lab Units 03/20/25  0618   CHOLESTEROL mg/dL 99   TRIGLYCERIDES mg/dL 124   HDL CHOL mg/dL 29*     Results from last 7 days   Lab Units 03/18/25  1016   PROBNP pg/mL 20,250.0*     Results from last 7 days   Lab Units 03/20/25  0618   TSH uIU/mL 2.640            Assessment:          Diagnosis Plan   1. Shortness of breath        2. Chest pain, unspecified type        3. Elevated troponin        4. ESRD on hemodialysis        5. Hypertensive urgency        6. Acute on chronic HFrEF (heart failure with reduced ejection fraction)  Ambulatory Referral to Cardiac Rehab             Plan:       Mr. Menjivar is a 40 y.o. gentleman the past medical history notable for coronary artery disease status post percutaneous intervention, chronic  systolic congestive heart failure with recovered ejection fraction, type 2 diabetes, legally blind, end-stage renal disease on hemodialysis, severe anemia multifactorial, hypertension, and osteomyelitis of the feet who presented to the hospital on 3/18/2025 with indigestion and chest discomfort.  His troponins were fairly elevated but flat and we felt the need to definitively understand his coronary anatomy given recurrent presentation repeat heart catheterization showed patent stents unfortunately his get a  of his right coronary artery and distal LAD disease which is too small for stent.  Repeat echocardiogram and fortunately shows worsening of his cardiomyopathy we have limited options to help address his cardiomyopathy with medications given his end-stage renal disease.  We continue his current medical therapy was also started on Imdur to try and help not only with his chest discomfort but also with blood pressure.  Try to be more aggressive with volume management that might be some of the issues as well with his chest pain and elevated troponins.        Chest pain/type II myocardial infarction:  Patient consistent with a type II myocardial infarction no acute lesions noted on his heart catheterization and unfortunately no clear revascularization targets available  His EKG is likely unchanged compared to prior  Repeat echocardiogram shows global hypokinesis no focal wall motion abnormality  Has chronic total occlusion of his RCA as well as distal LAD disease both of these are not particular amenable to PCI and likely explains elevated troponins     Coronary artery disease without angina:  Prior to this patient was doing well clinically  Did have revascularization August 2024  Continue aspirin and clopidogrel previously on Brilinta but had a fair amount of bleeding on this and was transitioned over to clopidogrel last month  Continue statin and beta-blocker     Chronic systolic and diastolic congestive heart  failure:  Repeat echocardiogram this admission shows a decline in his ejection fraction back to previous baseline in the 20 to 30% range which is a change from just back in January from report from [facility] where his ejection fraction was in the 50% range.  Continue carvedilol  No ACE/ARB given instead renal disease  Volume management per nephrology               Everett Lake MD  [facility]  03/21/25  08:52 EDT

## 2025-03-22 ENCOUNTER — HOSPITAL ENCOUNTER (EMERGENCY)
Facility: HOSPITAL | Age: 40
Discharge: HOME | End: 2025-03-22
Attending: EMERGENCY MEDICINE
Payer: MEDICARE

## 2025-03-22 VITALS
WEIGHT: 235 LBS | TEMPERATURE: 94 F | RESPIRATION RATE: 18 BRPM | HEIGHT: 67 IN | HEART RATE: 93 BPM | DIASTOLIC BLOOD PRESSURE: 96 MMHG | BODY MASS INDEX: 36.88 KG/M2 | OXYGEN SATURATION: 100 % | SYSTOLIC BLOOD PRESSURE: 160 MMHG

## 2025-03-22 DIAGNOSIS — N18.6 ESRD (END STAGE RENAL DISEASE): ICD-10-CM

## 2025-03-22 DIAGNOSIS — E16.2 HYPOGLYCEMIA: Primary | ICD-10-CM

## 2025-03-22 LAB
ALBUMIN SERPL-MCNC: 4 G/DL (ref 3.5–5.2)
ALBUMIN/GLOB SERPL: 0.9 G/DL
ALP SERPL-CCNC: 158 U/L (ref 39–117)
ALT SERPL W P-5'-P-CCNC: 159 U/L (ref 1–41)
ANION GAP SERPL CALCULATED.3IONS-SCNC: 17 MMOL/L (ref 5–15)
AST SERPL-CCNC: 53 U/L (ref 1–40)
BASOPHILS # BLD AUTO: 0.06 10*3/MM3 (ref 0–0.2)
BASOPHILS NFR BLD AUTO: 0.9 % (ref 0–1.5)
BILIRUB SERPL-MCNC: 0.5 MG/DL (ref 0–1.2)
BUN SERPL-MCNC: 25 MG/DL (ref 6–20)
BUN/CREAT SERPL: 4.5 (ref 7–25)
CALCIUM SPEC-SCNC: 9.6 MG/DL (ref 8.6–10.5)
CHLORIDE SERPL-SCNC: 98 MMOL/L (ref 98–107)
CO2 SERPL-SCNC: 26 MMOL/L (ref 22–29)
CREAT SERPL-MCNC: 5.58 MG/DL (ref 0.76–1.27)
DEPRECATED RDW RBC AUTO: 49.1 FL (ref 37–54)
EGFRCR SERPLBLD CKD-EPI 2021: 12.3 ML/MIN/1.73
EOSINOPHIL # BLD AUTO: 0.41 10*3/MM3 (ref 0–0.4)
EOSINOPHIL NFR BLD AUTO: 6.2 % (ref 0.3–6.2)
ERYTHROCYTE [DISTWIDTH] IN BLOOD BY AUTOMATED COUNT: 15 % (ref 12.3–15.4)
GLOBULIN UR ELPH-MCNC: 4.3 GM/DL
GLUCOSE BLDC GLUCOMTR-MCNC: 114 MG/DL (ref 70–130)
GLUCOSE BLDC GLUCOMTR-MCNC: 122 MG/DL (ref 70–130)
GLUCOSE BLDC GLUCOMTR-MCNC: 131 MG/DL (ref 70–130)
GLUCOSE BLDC GLUCOMTR-MCNC: 157 MG/DL (ref 70–130)
GLUCOSE BLDC GLUCOMTR-MCNC: 90 MG/DL (ref 70–130)
GLUCOSE BLDC GLUCOMTR-MCNC: 99 MG/DL (ref 70–130)
GLUCOSE SERPL-MCNC: 46 MG/DL (ref 65–99)
HCT VFR BLD AUTO: 30.7 % (ref 37.5–51)
HGB BLD-MCNC: 9.3 G/DL (ref 13–17.7)
IMM GRANULOCYTES # BLD AUTO: 0.04 10*3/MM3 (ref 0–0.05)
IMM GRANULOCYTES NFR BLD AUTO: 0.6 % (ref 0–0.5)
LYMPHOCYTES # BLD AUTO: 0.63 10*3/MM3 (ref 0.7–3.1)
LYMPHOCYTES NFR BLD AUTO: 9.5 % (ref 19.6–45.3)
MCH RBC QN AUTO: 27.4 PG (ref 26.6–33)
MCHC RBC AUTO-ENTMCNC: 30.3 G/DL (ref 31.5–35.7)
MCV RBC AUTO: 90.3 FL (ref 79–97)
MONOCYTES # BLD AUTO: 0.66 10*3/MM3 (ref 0.1–0.9)
MONOCYTES NFR BLD AUTO: 10 % (ref 5–12)
NEUTROPHILS NFR BLD AUTO: 4.81 10*3/MM3 (ref 1.7–7)
NEUTROPHILS NFR BLD AUTO: 72.8 % (ref 42.7–76)
NRBC BLD AUTO-RTO: 0.3 /100 WBC (ref 0–0.2)
PLATELET # BLD AUTO: 276 10*3/MM3 (ref 140–450)
PMV BLD AUTO: 8.7 FL (ref 6–12)
POTASSIUM SERPL-SCNC: 3.1 MMOL/L (ref 3.5–5.2)
PROT SERPL-MCNC: 8.3 G/DL (ref 6–8.5)
RBC # BLD AUTO: 3.4 10*6/MM3 (ref 4.14–5.8)
SODIUM SERPL-SCNC: 141 MMOL/L (ref 136–145)
WBC NRBC COR # BLD AUTO: 6.61 10*3/MM3 (ref 3.4–10.8)

## 2025-03-22 PROCEDURE — 82948 REAGENT STRIP/BLOOD GLUCOSE: CPT

## 2025-03-22 PROCEDURE — 80053 COMPREHEN METABOLIC PANEL: CPT | Performed by: PHYSICIAN ASSISTANT

## 2025-03-22 PROCEDURE — 85025 COMPLETE CBC W/AUTO DIFF WBC: CPT | Performed by: PHYSICIAN ASSISTANT

## 2025-03-22 PROCEDURE — 96366 THER/PROPH/DIAG IV INF ADDON: CPT

## 2025-03-22 PROCEDURE — 99283 EMERGENCY DEPT VISIT LOW MDM: CPT

## 2025-03-22 PROCEDURE — 96365 THER/PROPH/DIAG IV INF INIT: CPT

## 2025-03-22 RX ORDER — DEXTROSE MONOHYDRATE 100 MG/ML
100 INJECTION, SOLUTION INTRAVENOUS CONTINUOUS
Status: DISCONTINUED | OUTPATIENT
Start: 2025-03-22 | End: 2025-03-22 | Stop reason: HOSPADM

## 2025-03-22 RX ADMIN — DEXTROSE MONOHYDRATE 100 ML/HR: 100 INJECTION, SOLUTION INTRAVENOUS at 05:40

## 2025-03-22 NOTE — ED PROVIDER NOTES
SHARED VISIT: This visit was performed by BOTH a physician and an APC. The substantive portion of the medical decision making was performed by this attesting physician who made or approved the management plan and takes responsibility for patient management. All studies in the APC note (if performed) were independently interpreted by me.      The MICHEAL and I have discussed this patient's history, physical exam, and treatment plan.  I have reviewed the documentation and personally had a face to face interaction with the patient. I affirm the documentation and agree with the treatment and plan.  The attached note describes my personal findings.       I provided a substantive portion of the care of the patient.  I personally performed the physical exam in its entirety, and below are my findings.        History  40-year-old male recently discharged from the hospital for workup of chest pain presents with altered mental status, diaphoresis.  Patient found to have low glucose and treated with IV glucose.  Patient inadvertently got 2 doses of Tresiba yesterday, 1 in the hospital and 1 at home.  At this point patient has eaten a meal and is feeling much better.    Physical Exam  Vital Signs reviewed  GENERAL: Alert male no obvious distress.  Triage vitals reviewed and are benign.  HENT: nares patent  EYES: no scleral icterus  CV: regular rhythm, regular rate  RESPIRATORY: normal effort, clear to auscultation bilaterally  ABDOMEN: soft  MUSCULOSKELETAL: no deformity  NEURO: Strength sensation and coordination are grossly intact.  Speech and mentation are unremarkable  SKIN: warm, dry      Assessment and Data Review    ED Course as of 03/22/25 1520   Sat Mar 22, 2025   0532 First look: Patient presents to emergency department with hypoglycemia.  He was just discharged from the hospital yesterday.  He received 40 units of Tresiba yesterday prior to discharge.  Patient got confused and took an additional 40 units at bedtime since  he normally takes it at night.  Wife awoke to the patient confused and diaphoretic.  On EMS arrival, patient had blood glucose of 60.  He was given oral glucose with improvement in his sugar to 80.  Patient complains of feeling cold and is mildly hypothermic upon arrival.    Will initiate workup with labs and frequent Accu-Chek.  Will start patient on D10 drip.  Will defer additional workup to oncoming provider. [MP]   0654 Patient still pretty drowsy but does easily arouse to verbal stimuli and is oriented to person, place and time.  Glucose 131.  Awaiting other labs to result. [MS]   0813 Glucose(!): 157  Patient much more alert and awake than upon arrival.  Wife is going to get him something to eat for breakfast. [MS]   0900 Reviewed pt's history and workup with Dr. Art.  After a bedside evaluation, he agrees with the plan of care.     [MS]   0906 Patient is eaten, feeling much better and is wanting to go home.  Wife will monitor his blood sugars closely today.  He will follow-up with his primary care physician as needed.  He was provided strict return to ER precautions. [MS]      ED Course User Index  [MP] Alessandra Handley, TANVIR  [MS] Marlo, PAYTON Romero       I did discuss treatment and evaluation of this patient with PAYTON Sellers.    I did independently interpret and evaluate ED testing which is notable for the following:    CBC notable for initial glucose of 46.  Repeat glucose 157.  CBC benign without evidence of infection.  Hemoglobin 9.3 near baseline    Patient is improved after dextrose infusion and eating some breakfast.  He has his wife around and they know things to look for.  He has never had episodes of hypoglycemia before and I believe this is related to inadvertent duplication of Tresiba last night.  He should be safe to be discharged at this point.     Geovany Art MD  03/22/25 0627

## 2025-03-22 NOTE — ED PROVIDER NOTES
EMERGENCY DEPARTMENT ENCOUNTER  Room Number:  07/07  PCP: Roosevelt Thao MD  Independent Historians: Patient, Family, and EMS      HPI:  Chief Complaint: had concerns including Hypoglycemia.     A complete HPI/ROS/PMH/PSH/SH/FH are unobtainable due to: None    Chronic or social conditions impacting patient care (Social Determinants of Health): None      Context: Wilner Menjivar is a 40 y.o. male with a medical history of ESRD on dialysis, HTN, DM 2, CHF who presents to the ED c/o acute hypoglycemia.  Wife at bedside provides information.  She states patient was discharged yesterday around 130 after being admitted for chest pain workup.  She states last night around 11 PM she gave patient his 40 units of Tresiba, she was unaware that he had received it prior to his discharge from the hospital yesterday.  She states around 4 AM he woke up and was lethargic, diaphoretic and very weak with some mild confusion.  She gave patient some grape juice and gummy bears, upon EMS arrival his blood sugar was 50.  Patient still remains pretty drowsy and does not provide much information for the HPI or review of symptoms.      Review of prior external notes (non-ED) -and- Review of prior external test results outside of this encounter:  Medical records reviewed in epic patent was admitted 3/18 - 3/21 for non-- ST elevation MI, multivessel CAD.  Patient had a cardiac cath during this admission that showed multivessel coronary artery disease and medical management was recommended.  Patient was instructed to remain on Imdur, aspirin, Plavix, Coreg and Lipitor.  Patient currently also being treated for osteomyelitis and receiving IV antibiotics at home.    Prescription drug monitoring program review:     N/A    PAST MEDICAL HISTORY  Active Ambulatory Problems     Diagnosis Date Noted    Morbid (severe) obesity due to excess calories (*specify comorbidity) 04/02/2024    Diabetic toe ulcer 02/11/2025    Osteomyelitis of great toe of  right foot 02/11/2025    Status post insertion of drug eluting coronary artery stent 09/24/2024    Diabetic ulcer of right great toe 02/11/2025    Chest pain 03/18/2025     Resolved Ambulatory Problems     Diagnosis Date Noted    No Resolved Ambulatory Problems     Past Medical History:   Diagnosis Date    CKD stage 4 due to type 2 diabetes mellitus     GERD (gastroesophageal reflux disease)     Heart failure     Hypertension     Type 2 diabetes mellitus     Vision impairment          PAST SURGICAL HISTORY  Past Surgical History:   Procedure Laterality Date    AMPUTATION DIGIT Right 2/18/2025    Procedure: Hallux amputation, right foot SESAMOIDECTOMY, INCISION AN DRAINAGE;  Surgeon: Trevon Garcia DPM;  Location: [facility] MAIN OR;  Service: Podiatry;  Laterality: Right;    BONE EXCISION LEG Right 2/21/2025    Procedure: Partial first metatarsal excision, right foot;  Surgeon: Trevon Garcia DPM;  Location: [facility] MAIN OR;  Service: Podiatry;  Laterality: Right;    CARDIAC CATHETERIZATION N/A 3/19/2025    Procedure: Left Heart Cath;  Surgeon: Carmen Erickson MD;  Location: [facility] CATH INVASIVE LOCATION;  Service: Cardiology;  Laterality: N/A;    CARDIAC CATHETERIZATION N/A 3/19/2025    Procedure: Coronary angiography;  Surgeon: Carmen Erickson MD;  Location: [facility] CATH INVASIVE LOCATION;  Service: Cardiology;  Laterality: N/A;    CORONARY STENT PLACEMENT      EYE SURGERY      INCISION AND DRAINAGE LEG Right 2/24/2025    Procedure: INCISION AND DRAINAGE LOWER EXTREMITY, PARTIAL BONE EXCISION;  Surgeon: Trevon Garcia DPM;  Location: [facility] MAIN OR;  Service: Podiatry;  Laterality: Right;    TUNNELED VENOUS CATHETER PLACEMENT N/A 2/28/2025    Procedure: INSERTION OF PETER CATHETER;  Surgeon: Lam Gomez MD;  Location: [facility] MAIN OR;  Service: Vascular;  Laterality: N/A;    WISDOM TOOTH EXTRACTION      WOUND DEBRIDEMENT Right 2/21/2025    Procedure: DEBRIDEMENT FOOT, RIGHT;  Surgeon: Jose  JESSICA Lester;  Location: [facility] OR;  Service: Podiatry;  Laterality: Right;         FAMILY HISTORY  Family History   Problem Relation Age of Onset    Malig Hyperthermia Neg Hx          SOCIAL HISTORY  Social History     Socioeconomic History    Marital status:    Tobacco Use    Smoking status: Never     Passive exposure: Never    Smokeless tobacco: Never   Vaping Use    Vaping status: Never Used   Substance and Sexual Activity    Alcohol use: Yes     Comment: occ    Drug use: Yes     Types: Marijuana    Sexual activity: Defer         ALLERGIES  Azithromycin and Penicillins      REVIEW OF SYSTEMS  Review of Systems  Included in HPI  All systems reviewed and negative except for those discussed in HPI.      PHYSICAL EXAM    I have reviewed the triage vital signs and nursing notes.    ED Triage Vitals   Temp Heart Rate Resp BP SpO2   03/22/25 0533 03/22/25 0520 03/22/25 0520 03/22/25 0520 03/22/25 0520   94 °F (34.4 °C) 81 18 169/86 99 %       Physical Exam    GENERAL: Chronically ill-appearing, nontoxic appearing, drowsy but arouses to verbal stimuli  HENT: normocephalic, atraumatic  EYES: no scleral icterus  CV: regular rhythm, regular rate, no murmur  RESPIRATORY: normal effort, CTAB  ABDOMEN: soft, nontender  MUSCULOSKELETAL: no deformity  NEURO: Drowsy, moves all extremities, follows commands, mental status normal/baseline  SKIN: warm, dry, no rash, dialysis fistula left upper extremity with good thrill  Psych: Appropriate mood and affect  Nursing notes and vital signs reviewed    Vital signs and nursing notes reviewed.                LAB RESULTS  Recent Results (from the past 24 hours)   Comprehensive Metabolic Panel    Collection Time: 03/22/25  5:33 AM    Specimen: Blood   Result Value Ref Range    Glucose 46 (C) 65 - 99 mg/dL    BUN 25 (H) 6 - 20 mg/dL    Creatinine 5.58 (H) 0.76 - 1.27 mg/dL    Sodium 141 136 - 145 mmol/L    Potassium 3.1 (L) 3.5 - 5.2 mmol/L    Chloride 98 98 - 107 mmol/L    CO2  26.0 22.0 - 29.0 mmol/L    Calcium 9.6 8.6 - 10.5 mg/dL    Total Protein 8.3 6.0 - 8.5 g/dL    Albumin 4.0 3.5 - 5.2 g/dL    ALT (SGPT) 159 (H) 1 - 41 U/L    AST (SGOT) 53 (H) 1 - 40 U/L    Alkaline Phosphatase 158 (H) 39 - 117 U/L    Total Bilirubin 0.5 0.0 - 1.2 mg/dL    Globulin 4.3 gm/dL    A/G Ratio 0.9 g/dL    BUN/Creatinine Ratio 4.5 (L) 7.0 - 25.0    Anion Gap 17.0 (H) 5.0 - 15.0 mmol/L    eGFR 12.3 (L) >60.0 mL/min/1.73   CBC Auto Differential    Collection Time: 03/22/25  5:33 AM    Specimen: Blood   Result Value Ref Range    WBC 6.61 3.40 - 10.80 10*3/mm3    RBC 3.40 (L) 4.14 - 5.80 10*6/mm3    Hemoglobin 9.3 (L) 13.0 - 17.7 g/dL    Hematocrit 30.7 (L) 37.5 - 51.0 %    MCV 90.3 79.0 - 97.0 fL    MCH 27.4 26.6 - 33.0 pg    MCHC 30.3 (L) 31.5 - 35.7 g/dL    RDW 15.0 12.3 - 15.4 %    RDW-SD 49.1 37.0 - 54.0 fl    MPV 8.7 6.0 - 12.0 fL    Platelets 276 140 - 450 10*3/mm3    Neutrophil % 72.8 42.7 - 76.0 %    Lymphocyte % 9.5 (L) 19.6 - 45.3 %    Monocyte % 10.0 5.0 - 12.0 %    Eosinophil % 6.2 0.3 - 6.2 %    Basophil % 0.9 0.0 - 1.5 %    Immature Grans % 0.6 (H) 0.0 - 0.5 %    Neutrophils, Absolute 4.81 1.70 - 7.00 10*3/mm3    Lymphocytes, Absolute 0.63 (L) 0.70 - 3.10 10*3/mm3    Monocytes, Absolute 0.66 0.10 - 0.90 10*3/mm3    Eosinophils, Absolute 0.41 (H) 0.00 - 0.40 10*3/mm3    Basophils, Absolute 0.06 0.00 - 0.20 10*3/mm3    Immature Grans, Absolute 0.04 0.00 - 0.05 10*3/mm3    nRBC 0.3 (H) 0.0 - 0.2 /100 WBC   POC Glucose Once    Collection Time: 03/22/25  5:35 AM    Specimen: Blood   Result Value Ref Range    Glucose 90 70 - 130 mg/dL   POC Glucose Once    Collection Time: 03/22/25  5:46 AM    Specimen: Blood   Result Value Ref Range    Glucose 99 70 - 130 mg/dL   POC Glucose Once    Collection Time: 03/22/25  6:03 AM    Specimen: Blood   Result Value Ref Range    Glucose 122 70 - 130 mg/dL   POC Glucose Once    Collection Time: 03/22/25  6:22 AM    Specimen: Blood   Result Value Ref Range    Glucose  114 70 - 130 mg/dL   POC Glucose Once    Collection Time: 03/22/25  6:49 AM    Specimen: Blood   Result Value Ref Range    Glucose 131 (H) 70 - 130 mg/dL   POC Glucose Once    Collection Time: 03/22/25  8:05 AM    Specimen: Blood   Result Value Ref Range    Glucose 157 (H) 70 - 130 mg/dL         RADIOLOGY  No Radiology Exams Resulted Within Past 24 Hours      MEDICATIONS GIVEN IN ER  Medications - No data to display        ORDERS PLACED DURING THIS VISIT:  Orders Placed This Encounter   Procedures    Comprehensive Metabolic Panel    CBC Auto Differential    POC Glucose Once    POC Glucose Once    POC Glucose Once    POC Glucose Once    POC Glucose Once    POC Glucose Once    POC Glucose Once    CBC & Differential         DIFFERENTIAL DIAGNOSIS:  Differential diagnosis include but are not limited to the following: Hypoglycemia, electrolyte abnormality, AMS      OUTPATIENT MEDICATION MANAGEMENT:  No current Epic-ordered facility-administered medications on file.     Current Outpatient Medications Ordered in Epic   Medication Sig Dispense Refill    aspirin 81 MG EC tablet Take 1 tablet by mouth Daily.      atorvastatin (LIPITOR) 10 MG tablet Take 1 tablet by mouth Every Night for 30 days. 30 tablet 0    B-D UF III MINI PEN NEEDLES 31G X 5 MM misc USE 1 TO CHECK BLOOD SUGAR FIVE TIMES DAILY      bumetanide (BUMEX) 2 MG tablet Take 1 tablet by mouth 2 (Two) Times a Day for 30 days. 60 tablet 0    carvedilol (COREG) 25 MG tablet Take 2 tablets by mouth 2 (Two) Times a Day.      cetirizine (zyrTEC) 10 MG tablet Take 1 tablet by mouth Daily for 30 days. 30 tablet 0    clopidogrel (PLAVIX) 75 MG tablet Take 1 tablet by mouth Daily for 30 days. 30 tablet 0    docusate sodium 100 MG capsule Take 1 capsule by mouth 2 (Two) Times a Day for 30 days. 60 capsule 0    epoetin jamil-epbx (RETACRIT) 79822 UNIT/ML injection Inject 1 mL under the skin into the appropriate area as directed 3 (Three) Times a Week for 30 days. Indications:  ESRD on Dialysis 12 mL 0    guaiFENesin (MUCINEX) 600 MG 12 hr tablet Take 1 tablet by mouth Every 12 (Twelve) Hours for 30 days. 60 tablet 0    Insulin Degludec (TRESIBA FLEXTOUCH) 200 UNIT/ML solution pen-injector pen injection Inject 40 Units under the skin into the appropriate area as directed Daily.      isosorbide mononitrate (IMDUR) 30 MG 24 hr tablet Take 1 tablet by mouth Daily for 30 days. 30 tablet 0    NovoLOG FlexPen 100 UNIT/ML solution pen-injector sc pen INJECT 5 UNITS SUBCUTANEOUSLY THREE TIMES DAILY WITH MEALS PLUS UP TO 30 UNITS SLIDING SCALE. MAX OF 45 UNITS DAILY      pantoprazole (PROTONIX) 40 MG EC tablet Take 1 tablet by mouth 2 (Two) Times a Day Before Meals for 30 days. 60 tablet 0    polyethylene glycol (MIRALAX) 17 g packet Take 17 g by mouth.      sodium chloride 0.9 % solution 100 mL with piperacillin-tazobactam 3.375 (3-0.375) g reconstituted solution 3.375 g IVPB Infuse 3.375 g into a venous catheter Every 12 (Twelve) Hours for 70 doses. Indications: Bone and/or Joint Infection      sucralfate (CARAFATE) 1 g tablet Take 1 tablet by mouth 4 (Four) Times a Day Before Meals & at Bedtime for 30 days. 120 tablet 0         PROCEDURES  Procedures        PROGRESS, DATA ANALYSIS, CONSULTS, AND MEDICAL DECISION MAKING  All labs have been independently interpreted by me.  All radiology studies have been reviewed by me. All EKG's have been independently viewed and interpreted by me.  Discussion below represents my analysis of pertinent findings related to patient's condition, differential diagnosis, treatment plan and final disposition        Clinical Scores:                  ED Course as of 03/22/25 1626   Sat Mar 22, 2025   0512 First look: Patient presents to emergency department with hypoglycemia.  He was just discharged from the hospital yesterday.  He received 40 units of Tresiba yesterday prior to discharge.  Patient got confused and took an additional 40 units at bedtime since he normally  takes it at night.  Wife awoke to the patient confused and diaphoretic.  On EMS arrival, patient had blood glucose of 60.  He was given oral glucose with improvement in his sugar to 80.  Patient complains of feeling cold and is mildly hypothermic upon arrival.    Will initiate workup with labs and frequent Accu-Chek.  Will start patient on D10 drip.  Will defer additional workup to oncoming provider. [MP]   0654 Patient still pretty drowsy but does easily arouse to verbal stimuli and is oriented to person, place and time.  Glucose 131.  Awaiting other labs to result. [MS]   0813 Glucose(!): 157  Patient much more alert and awake than upon arrival.  Wife is going to get him something to eat for breakfast. [MS]   0900 Reviewed pt's history and workup with Dr. Art.  After a bedside evaluation, he agrees with the plan of care.     [MS]   0906 Patient is eaten, feeling much better and is wanting to go home.  Wife will monitor his blood sugars closely today.  He will follow-up with his primary care physician as needed.  He was provided strict return to ER precautions. [MS]      ED Course User Index  [MP] Alessandra Handley PA-C  [MS] Mona Sellers, APRN             AS OF 16:26 EDT VITALS:    BP - 160/96  HR - 93  TEMP - 94 °F (34.4 °C) (Rectal)  O2 SATS - 100%    COMPLEXITY OF CARE  Admission was considered but after careful review of the patient's presentation, physical examination, diagnostic results, and response to treatment the patient may be safely discharged with outpatient follow-up.      DIAGNOSIS  Final diagnoses:   Hypoglycemia   ESRD (end stage renal disease)         DISPOSITION  ED Disposition       ED Disposition   Discharge    Condition   Stable    Comment   --              Discussed plan for discharge, as there is no emergent indication for admission. Pt/family is agreeable and understands need for follow up and repeat testing.  Pt is aware that discharge does not mean that nothing is wrong  but it indicates no emergency is present that requires admission and they must continue care with follow-up as given below or physician of their choice.   Patient/Family voiced understanding of above instructions.  Patient discharged in stable condition.    FOLLOW UP   Roosevelt Thao MD  [id number]  [facility] [address] [phone]    Schedule an appointment as soon as possible for a visit   As needed      RX     Medication List      No changes were made to your prescriptions during this visit.             Please note that portions of this document were completed with a voice recognition program.    Note Disclaimer: At [facility], we believe that sharing information builds trust and better relationships. You are receiving this note because you recently visited [facility]. It is possible you will see health information before a provider has talked with you about it. This kind of information can be easy to misunderstand. To help you fully understand what it means for your health, we urge you to discuss this note with your provider.         Mona Sellers, APRN  03/22/25 6395

## 2025-03-22 NOTE — OUTREACH NOTE
Prep Survey      Flowsheet Row Responses   [facility] facility patient discharged from? [facility]   Is LACE score < 7 ? No   Eligibility Readm Mgmt   Discharge diagnosis Chest pain, ESRD on HD,  heart cath - multivessel CAD - medical mgmt recommended, recent right great toe cellulitis/osteomyelitis with recent amputation   Does the patient have one of the following disease processes/diagnoses(primary or secondary)? Other   Does the patient have Home health ordered? Yes   What is the Home health agency?  [facility] for [facility]x   Is there a DME ordered? Yes   What DME was ordered? [facility] for wound vac   Prep survey completed? Yes            Jennyfer PUENTE - Registered Nurse

## 2025-03-24 LAB — GLUCOSE BLDC GLUCOMTR-MCNC: 82 MG/DL (ref 70–130)

## 2025-03-26 ENCOUNTER — READMISSION MANAGEMENT (OUTPATIENT)
Dept: CALL CENTER | Facility: HOSPITAL | Age: 40
End: 2025-03-26
Payer: MEDICARE

## 2025-03-26 NOTE — OUTREACH NOTE
Medical Week 1 Survey      Flowsheet Row Responses   [facility] patient discharged from? [place]   Does the patient have one of the following disease processes/diagnoses(primary or secondary)? Other   Week 1 attempt successful? Yes   Call start time 0955   Discharge diagnosis Chest pain, ESRD on HD,  heart cath - multivessel CAD - medical mgmt recommended, recent right great toe cellulitis/osteomyelitis with recent amputation   Person spoke with today (if not patient) and relationship patient   Meds reviewed with patient/caregiver? Yes   Is the patient having any side effects they believe may be caused by any medication additions or changes? Yes   Is the patient taking all medications as directed (includes completed medication regime)? Yes   Does the patient have a primary care provider?  Yes   Comments regarding PCP Has an appt per patient.   Has home health visited the patient within 72 hours of discharge? Yes   Home health comments HH coming today   Did the patient receive a copy of their discharge instructions? Yes   Nursing interventions Reviewed instructions with patient   What is the patient's perception of their health status since discharge? Improving   Is the patient/caregiver able to teach back signs and symptoms related to disease process for when to call PCP? Yes   Is the patient/caregiver able to teach back signs and symptoms related to disease process for when to call 911? Yes   Is the patient/caregiver able to teach back the hierarchy of who to call/visit for symptoms/problems? PCP, Specialist, Home health nurse, Urgent Care, ED, 911 Yes   Week 1 call completed? Yes   Graduated Yes   Wrap up additional comments Patient reports doing well. No questions or concerns at this time.            Honey SANCHEZ - Registered Nurse

## 2025-03-27 RX ORDER — IOPAMIDOL 755 MG/ML
INJECTION, SOLUTION INTRAVASCULAR
Status: DISCONTINUED | OUTPATIENT
Start: 2025-03-19 | End: 2025-03-27 | Stop reason: HOSPADM

## 2025-04-07 ENCOUNTER — TELEPHONE (OUTPATIENT)
Age: 40
End: 2025-04-07
Payer: MEDICARE

## 2025-04-07 DIAGNOSIS — M86.9 OSTEOMYELITIS OF GREAT TOE OF RIGHT FOOT: ICD-10-CM

## 2025-04-07 DIAGNOSIS — I73.9 PERIPHERAL ARTERIAL DISEASE: Primary | ICD-10-CM

## 2025-04-07 NOTE — TELEPHONE ENCOUNTER
Pt wife called stating pt will have completed his course of abx on 4/9/25. Pt wife wanted to scheduled gentile catheter removal- pt scheduled for 4/14/25, with an arrival of 1200.

## 2025-04-14 DIAGNOSIS — N18.6 ESRD (END STAGE RENAL DISEASE): Primary | ICD-10-CM

## 2025-05-07 ENCOUNTER — PRE-ADMISSION TESTING (OUTPATIENT)
Dept: PREADMISSION TESTING | Facility: HOSPITAL | Age: 40
End: 2025-05-07
Payer: MEDICARE

## 2025-05-07 VITALS
WEIGHT: 226.5 LBS | HEIGHT: 67 IN | DIASTOLIC BLOOD PRESSURE: 77 MMHG | TEMPERATURE: 99.1 F | BODY MASS INDEX: 35.55 KG/M2 | HEART RATE: 94 BPM | RESPIRATION RATE: 18 BRPM | SYSTOLIC BLOOD PRESSURE: 121 MMHG | OXYGEN SATURATION: 100 %

## 2025-05-07 LAB
ALBUMIN SERPL-MCNC: 3.8 G/DL (ref 3.5–5.2)
ALBUMIN/GLOB SERPL: 0.8 G/DL
ALP SERPL-CCNC: 200 U/L (ref 39–117)
ALT SERPL W P-5'-P-CCNC: 21 U/L (ref 1–41)
ANION GAP SERPL CALCULATED.3IONS-SCNC: 10.8 MMOL/L (ref 5–15)
AST SERPL-CCNC: 24 U/L (ref 1–40)
BILIRUB SERPL-MCNC: 0.3 MG/DL (ref 0–1.2)
BUN SERPL-MCNC: 26 MG/DL (ref 6–20)
BUN/CREAT SERPL: 6.4 (ref 7–25)
CALCIUM SPEC-SCNC: 10 MG/DL (ref 8.6–10.5)
CHLORIDE SERPL-SCNC: 92 MMOL/L (ref 98–107)
CO2 SERPL-SCNC: 29.2 MMOL/L (ref 22–29)
CREAT SERPL-MCNC: 4.05 MG/DL (ref 0.76–1.27)
DEPRECATED RDW RBC AUTO: 48.4 FL (ref 37–54)
EGFRCR SERPLBLD CKD-EPI 2021: 18.1 ML/MIN/1.73
ERYTHROCYTE [DISTWIDTH] IN BLOOD BY AUTOMATED COUNT: 15.9 % (ref 12.3–15.4)
GLOBULIN UR ELPH-MCNC: 4.9 GM/DL
GLUCOSE SERPL-MCNC: 248 MG/DL (ref 65–99)
HCT VFR BLD AUTO: 37.6 % (ref 37.5–51)
HGB BLD-MCNC: 11.9 G/DL (ref 13–17.7)
INR PPP: 1.33 (ref 0.9–1.1)
MCH RBC QN AUTO: 27.1 PG (ref 26.6–33)
MCHC RBC AUTO-ENTMCNC: 31.6 G/DL (ref 31.5–35.7)
MCV RBC AUTO: 85.6 FL (ref 79–97)
PLATELET # BLD AUTO: 209 10*3/MM3 (ref 140–450)
PMV BLD AUTO: 8.6 FL (ref 6–12)
POTASSIUM SERPL-SCNC: 4.7 MMOL/L (ref 3.5–5.2)
PROT SERPL-MCNC: 8.7 G/DL (ref 6–8.5)
PROTHROMBIN TIME: 16.5 SECONDS (ref 11.7–14.2)
QT INTERVAL: 356 MS
QTC INTERVAL: 437 MS
RBC # BLD AUTO: 4.39 10*6/MM3 (ref 4.14–5.8)
SODIUM SERPL-SCNC: 132 MMOL/L (ref 136–145)
WBC NRBC COR # BLD AUTO: 8.71 10*3/MM3 (ref 3.4–10.8)

## 2025-05-07 PROCEDURE — 85610 PROTHROMBIN TIME: CPT

## 2025-05-07 PROCEDURE — 80053 COMPREHEN METABOLIC PANEL: CPT

## 2025-05-07 PROCEDURE — 36415 COLL VENOUS BLD VENIPUNCTURE: CPT

## 2025-05-07 PROCEDURE — 85027 COMPLETE CBC AUTOMATED: CPT

## 2025-05-07 PROCEDURE — 93005 ELECTROCARDIOGRAM TRACING: CPT

## 2025-05-07 RX ORDER — CLOPIDOGREL BISULFATE 75 MG/1
75 TABLET ORAL DAILY
COMMUNITY

## 2025-05-07 RX ORDER — LOSARTAN POTASSIUM 100 MG/1
100 TABLET ORAL DAILY
COMMUNITY

## 2025-05-07 RX ORDER — SERTRALINE HYDROCHLORIDE 25 MG/1
25 TABLET, FILM COATED ORAL DAILY PRN
COMMUNITY
Start: 2025-03-13

## 2025-05-07 RX ORDER — DOXYCYCLINE 100 MG/1
1 CAPSULE ORAL EVERY 12 HOURS SCHEDULED
COMMUNITY
Start: 2025-05-02

## 2025-05-07 RX ORDER — EPINEPHRINE 0.3 MG/.3ML
0.15 INJECTION SUBCUTANEOUS DAILY PRN
COMMUNITY
Start: 2025-02-28

## 2025-05-07 RX ORDER — PANTOPRAZOLE SODIUM 40 MG/1
40 TABLET, DELAYED RELEASE ORAL DAILY
COMMUNITY

## 2025-05-07 RX ORDER — METOLAZONE 5 MG/1
5 TABLET ORAL 3 TIMES WEEKLY
COMMUNITY
Start: 2024-06-10

## 2025-05-07 RX ORDER — CLONIDINE HYDROCHLORIDE 0.2 MG/1
0.2 TABLET ORAL 2 TIMES DAILY
COMMUNITY
Start: 2024-11-21 | End: 2025-05-07

## 2025-05-07 RX ORDER — ATORVASTATIN CALCIUM 10 MG/1
10 TABLET, FILM COATED ORAL NIGHTLY
COMMUNITY

## 2025-05-07 RX ORDER — FERROUS SULFATE 325(65) MG
325 TABLET, DELAYED RELEASE (ENTERIC COATED) ORAL 3 TIMES WEEKLY
COMMUNITY
Start: 2024-06-10 | End: 2025-06-10

## 2025-05-07 RX ORDER — HYDRALAZINE HYDROCHLORIDE 10 MG/1
5 TABLET, FILM COATED ORAL 2 TIMES DAILY
COMMUNITY
Start: 2025-05-06

## 2025-05-07 NOTE — DISCHARGE INSTRUCTIONS
Take the following medications the morning of surgery: CARVEDILOL, CLONIDINE (IFYOU ARE TAKING THIS), HYDRALAZINE, ISOSORBIDE, AND PANTOPRAZOLE      If you are on prescription narcotic pain medication to control your pain you may also take that medication the morning of surgery.      General Instructions:     Do not eat solid food after midnight the night before surgery.  Clear liquids day of surgery are allowed but must be stopped at least two hours before your hospital arrival time.       Allowed clear liquids      Water, sodas, and tea or coffee with no cream or milk added.       12 to 20 ounces of a clear liquid that contains carbohydrates is recommended.  If non-diabetic, have Gatorade or Powerade.  If diabetic, have G2 or Powerade Zero.     Do not have liquids red in color.  Do not consume chicken, beef, pork or vegetable broth or bouillon cubes of any variety as they are not considered clear liquids and are not allowed.      Infants may have breast milk up to four hours before surgery.  Infants drinking formula may drink formula up to six hours before surgery.   Patients who avoid smoking, chewing tobacco and alcohol for 4 weeks prior to surgery have a reduced risk of post-operative complications.  Quit smoking as many days before surgery as you can.  Do not smoke, use chewing tobacco or drink alcohol the day of surgery.   If applicable bring your C-PAP/ BI-PAP machine in with you to preop day of surgery.  Bring any papers given to you in the doctor’s office.  Wear clean comfortable clothes.  Do not wear contact lenses, false eyelashes or make-up.  Bring a case for your glasses.   Bring crutches or walker if applicable.  Remove all piercings.  Leave jewelry and any other valuables at home.  Hair extensions with metal clips must be removed prior to surgery.  The Pre-Admission Testing nurse will instruct you to bring medications if unable to obtain an accurate list in Pre-Admission Testing.    Day of surgery  you will need to let the preoperative nurse know the last time you took each of your medications.  To ensure a safe environment for patients and staff, we kindly ask that children under the age of 16 not accompany patients.  If you must bring a dependent child or dependent adult please ensure a responsible adult, other than yourself, is present to supervise them.      If you were given a blood bank ID arm band remember to bring it with you the day of surgery.    Preventing a Surgical Site Infection:  For 2 to 3 days before surgery, avoid shaving with a razor because the razor can irritate skin and make it easier to develop an infection.    Any areas of open skin can increase the risk of a post-operative wound infection by allowing bacteria to enter and travel throughout the body.  Notify your surgeon if you have any skin wounds / rashes even if it is not near the expected surgical site.  The area will need assessed to determine if surgery should be delayed until it is healed.  The night prior to surgery shower using a fresh bar of anti-bacterial soap (such as Dial) and clean washcloth.  Sleep in a clean bed with clean clothing.  Do not allow pets to sleep with you.  Shower on the morning of surgery using a fresh bar of anti-bacterial soap (such as Dial) and clean washcloth.  Dry with a clean towel and dress in clean clothing.  Ask your surgeon if you will be receiving antibiotics prior to surgery.  Make sure you, your family, and all healthcare providers clean their hands with soap and water or an alcohol based hand  before caring for you or your wound.    Day of surgery:  Your arrival time is approximately two hours before your scheduled surgery time.  Please note if you have an early arrival time the surgery doors do not open before 5:00 AM.  Upon arrival, a Pre-op nurse and Anesthesiologist will review your health history, obtain vital signs, and answer questions you may have.  The only belongings needed  at this time will be a list of your home medications and if applicable your C-PAP/BI-PAP machine.  A Pre-op nurse will start an IV and you may receive medication in preparation for surgery, including something to help you relax.     Please be aware that surgery does come with discomfort.  We want to make every effort to control your discomfort so please discuss any uncontrolled symptoms with your nurse.   Your doctor will most likely have prescribed pain medications.      If you are going home after surgery you will receive individualized written care instructions before being discharged.  A responsible adult must drive you to and from the hospital on the day of your surgery and ideally stay with you through the night.   .  Discharge prescriptions can be filled by the hospital pharmacy during regular pharmacy hours.  If you are having surgery late in the day/evening your prescription may be e-prescribed to your pharmacy.  Please verify your pharmacy hours or chose a 24 hour pharmacy to avoid not having access to your prescription because your pharmacy has closed for the day.    If you are staying overnight following surgery, you will be transported to your hospital room following the recovery period.  [facility] has all private rooms.    If you have any questions please call Pre-Admission Testing at [phone].  Deductibles and co-payments are collected on the day of service. Please be prepared to pay the required co-pay, deductible or deposit on the day of service as defined by your plan.    Call your surgeon immediately if you experience any of the following symptoms:  Sore Throat  Shortness of Breath or difficulty breathing  Cough  Chills  Body soreness or muscle pain  Headache  Fever  New loss of taste or smell  Do not arrive for your surgery ill.  Your procedure will need to be rescheduled to another time.  You will need to call your physician before the day of surgery to avoid any unnecessary  exposure to hospital staff as well as other patients.

## 2025-05-08 ENCOUNTER — ANESTHESIA (OUTPATIENT)
Dept: PERIOP | Facility: HOSPITAL | Age: 40
End: 2025-05-08
Payer: MEDICARE

## 2025-05-08 ENCOUNTER — ANESTHESIA EVENT (OUTPATIENT)
Dept: PERIOP | Facility: HOSPITAL | Age: 40
End: 2025-05-08
Payer: MEDICARE

## 2025-05-08 ENCOUNTER — HOSPITAL ENCOUNTER (OUTPATIENT)
Facility: HOSPITAL | Age: 40
Setting detail: HOSPITAL OUTPATIENT SURGERY
Discharge: HOME | End: 2025-05-08
Attending: PODIATRIST | Admitting: PODIATRIST
Payer: MEDICARE

## 2025-05-08 ENCOUNTER — APPOINTMENT (OUTPATIENT)
Dept: GENERAL RADIOLOGY | Facility: HOSPITAL | Age: 40
End: 2025-05-08
Payer: MEDICARE

## 2025-05-08 VITALS
RESPIRATION RATE: 16 BRPM | HEART RATE: 87 BPM | OXYGEN SATURATION: 100 % | DIASTOLIC BLOOD PRESSURE: 80 MMHG | TEMPERATURE: 98.2 F | SYSTOLIC BLOOD PRESSURE: 130 MMHG

## 2025-05-08 DIAGNOSIS — Z47.89 ORTHOPEDIC AFTERCARE: Primary | ICD-10-CM

## 2025-05-08 DIAGNOSIS — L97.509 DIABETIC TOE ULCER: ICD-10-CM

## 2025-05-08 DIAGNOSIS — E11.621 DIABETIC TOE ULCER: ICD-10-CM

## 2025-05-08 LAB
GLUCOSE BLDC GLUCOMTR-MCNC: 180 MG/DL (ref 70–130)
GLUCOSE BLDC GLUCOMTR-MCNC: 225 MG/DL (ref 70–130)
POTASSIUM SERPL-SCNC: 5 MMOL/L (ref 3.5–5.2)

## 2025-05-08 PROCEDURE — 87185 SC STD ENZYME DETCJ PER NZM: CPT | Performed by: PODIATRIST

## 2025-05-08 PROCEDURE — 87206 SMEAR FLUORESCENT/ACID STAI: CPT | Performed by: PODIATRIST

## 2025-05-08 PROCEDURE — 25010000002 CLINDAMYCIN 900 MG/50ML SOLUTION

## 2025-05-08 PROCEDURE — 25010000002 FENTANYL CITRATE (PF) 50 MCG/ML SOLUTION: Performed by: ANESTHESIOLOGY

## 2025-05-08 PROCEDURE — 87015 SPECIMEN INFECT AGNT CONCNTJ: CPT | Performed by: PODIATRIST

## 2025-05-08 PROCEDURE — 87116 MYCOBACTERIA CULTURE: CPT | Performed by: PODIATRIST

## 2025-05-08 PROCEDURE — 87186 SC STD MICRODIL/AGAR DIL: CPT | Performed by: PODIATRIST

## 2025-05-08 PROCEDURE — 87077 CULTURE AEROBIC IDENTIFY: CPT | Performed by: PODIATRIST

## 2025-05-08 PROCEDURE — 88311 DECALCIFY TISSUE: CPT | Performed by: PODIATRIST

## 2025-05-08 PROCEDURE — 25010000002 LIDOCAINE 1 % SOLUTION 20 ML VIAL: Performed by: PODIATRIST

## 2025-05-08 PROCEDURE — 87102 FUNGUS ISOLATION CULTURE: CPT | Performed by: PODIATRIST

## 2025-05-08 PROCEDURE — 25010000002 FAMOTIDINE 10 MG/ML SOLUTION: Performed by: ANESTHESIOLOGY

## 2025-05-08 PROCEDURE — 25010000002 LIDOCAINE 2% SOLUTION

## 2025-05-08 PROCEDURE — 87205 SMEAR GRAM STAIN: CPT | Performed by: PODIATRIST

## 2025-05-08 PROCEDURE — 87070 CULTURE OTHR SPECIMN AEROBIC: CPT | Performed by: PODIATRIST

## 2025-05-08 PROCEDURE — 87075 CULTR BACTERIA EXCEPT BLOOD: CPT | Performed by: PODIATRIST

## 2025-05-08 PROCEDURE — 25010000002 PROPOFOL 200 MG/20ML EMULSION

## 2025-05-08 PROCEDURE — 82948 REAGENT STRIP/BLOOD GLUCOSE: CPT

## 2025-05-08 PROCEDURE — 88305 TISSUE EXAM BY PATHOLOGIST: CPT | Performed by: PODIATRIST

## 2025-05-08 PROCEDURE — 25010000002 BUPIVACAINE (PF) 0.5 % SOLUTION 10 ML VIAL: Performed by: PODIATRIST

## 2025-05-08 PROCEDURE — 73630 X-RAY EXAM OF FOOT: CPT

## 2025-05-08 PROCEDURE — 84132 ASSAY OF SERUM POTASSIUM: CPT | Performed by: ANESTHESIOLOGY

## 2025-05-08 PROCEDURE — 25010000002 HYDROMORPHONE PER 4 MG

## 2025-05-08 PROCEDURE — 87176 TISSUE HOMOGENIZATION CULTR: CPT | Performed by: PODIATRIST

## 2025-05-08 PROCEDURE — 63710000001 INSULIN REGULAR HUMAN PER 5 UNITS: Performed by: ANESTHESIOLOGY

## 2025-05-08 DEVICE — INTEGRA® MESHED BILAYER WOUND MATRIX 2 IN*2 IN (5 CM*5 CM)
Type: IMPLANTABLE DEVICE | Site: FOOT | Status: FUNCTIONAL
Brand: INTEGRA®

## 2025-05-08 RX ORDER — LABETALOL HYDROCHLORIDE 5 MG/ML
5 INJECTION, SOLUTION INTRAVENOUS
Status: DISCONTINUED | OUTPATIENT
Start: 2025-05-08 | End: 2025-05-08 | Stop reason: HOSPADM

## 2025-05-08 RX ORDER — HYDROMORPHONE HYDROCHLORIDE 1 MG/ML
0.5 INJECTION, SOLUTION INTRAMUSCULAR; INTRAVENOUS; SUBCUTANEOUS
Status: DISCONTINUED | OUTPATIENT
Start: 2025-05-08 | End: 2025-05-08 | Stop reason: HOSPADM

## 2025-05-08 RX ORDER — SODIUM CHLORIDE 0.9 % (FLUSH) 0.9 %
3 SYRINGE (ML) INJECTION EVERY 12 HOURS SCHEDULED
Status: DISCONTINUED | OUTPATIENT
Start: 2025-05-08 | End: 2025-05-08 | Stop reason: HOSPADM

## 2025-05-08 RX ORDER — HYDROCODONE BITARTRATE AND ACETAMINOPHEN 5; 325 MG/1; MG/1
1 TABLET ORAL ONCE AS NEEDED
Status: DISCONTINUED | OUTPATIENT
Start: 2025-05-08 | End: 2025-05-08 | Stop reason: HOSPADM

## 2025-05-08 RX ORDER — ONDANSETRON 2 MG/ML
4 INJECTION INTRAMUSCULAR; INTRAVENOUS ONCE AS NEEDED
Status: DISCONTINUED | OUTPATIENT
Start: 2025-05-08 | End: 2025-05-08 | Stop reason: HOSPADM

## 2025-05-08 RX ORDER — EPHEDRINE SULFATE 50 MG/ML
5 INJECTION, SOLUTION INTRAVENOUS ONCE AS NEEDED
Status: DISCONTINUED | OUTPATIENT
Start: 2025-05-08 | End: 2025-05-08 | Stop reason: HOSPADM

## 2025-05-08 RX ORDER — DIPHENHYDRAMINE HYDROCHLORIDE 50 MG/ML
12.5 INJECTION, SOLUTION INTRAMUSCULAR; INTRAVENOUS
Status: DISCONTINUED | OUTPATIENT
Start: 2025-05-08 | End: 2025-05-08 | Stop reason: HOSPADM

## 2025-05-08 RX ORDER — FENTANYL CITRATE 50 UG/ML
50 INJECTION, SOLUTION INTRAMUSCULAR; INTRAVENOUS
Status: DISCONTINUED | OUTPATIENT
Start: 2025-05-08 | End: 2025-05-08 | Stop reason: HOSPADM

## 2025-05-08 RX ORDER — PROMETHAZINE HYDROCHLORIDE 25 MG/1
25 TABLET ORAL ONCE AS NEEDED
Status: DISCONTINUED | OUTPATIENT
Start: 2025-05-08 | End: 2025-05-08 | Stop reason: HOSPADM

## 2025-05-08 RX ORDER — IPRATROPIUM BROMIDE AND ALBUTEROL SULFATE 2.5; .5 MG/3ML; MG/3ML
3 SOLUTION RESPIRATORY (INHALATION) ONCE AS NEEDED
Status: DISCONTINUED | OUTPATIENT
Start: 2025-05-08 | End: 2025-05-08 | Stop reason: HOSPADM

## 2025-05-08 RX ORDER — NALOXONE HCL 0.4 MG/ML
0.2 VIAL (ML) INJECTION AS NEEDED
Status: DISCONTINUED | OUTPATIENT
Start: 2025-05-08 | End: 2025-05-08 | Stop reason: HOSPADM

## 2025-05-08 RX ORDER — SODIUM CHLORIDE 9 MG/ML
INJECTION, SOLUTION INTRAVENOUS CONTINUOUS PRN
Status: DISCONTINUED | OUTPATIENT
Start: 2025-05-08 | End: 2025-05-08 | Stop reason: SURG

## 2025-05-08 RX ORDER — SODIUM CHLORIDE 0.9 % (FLUSH) 0.9 %
3-10 SYRINGE (ML) INJECTION AS NEEDED
Status: DISCONTINUED | OUTPATIENT
Start: 2025-05-08 | End: 2025-05-08 | Stop reason: HOSPADM

## 2025-05-08 RX ORDER — MAGNESIUM HYDROXIDE 1200 MG/15ML
LIQUID ORAL AS NEEDED
Status: DISCONTINUED | OUTPATIENT
Start: 2025-05-08 | End: 2025-05-08 | Stop reason: HOSPADM

## 2025-05-08 RX ORDER — SODIUM CHLORIDE, SODIUM LACTATE, POTASSIUM CHLORIDE, CALCIUM CHLORIDE 600; 310; 30; 20 MG/100ML; MG/100ML; MG/100ML; MG/100ML
9 INJECTION, SOLUTION INTRAVENOUS CONTINUOUS
Status: DISCONTINUED | OUTPATIENT
Start: 2025-05-08 | End: 2025-05-08 | Stop reason: HOSPADM

## 2025-05-08 RX ORDER — CLINDAMYCIN PHOSPHATE 900 MG/50ML
INJECTION, SOLUTION INTRAVENOUS AS NEEDED
Status: DISCONTINUED | OUTPATIENT
Start: 2025-05-08 | End: 2025-05-08 | Stop reason: SURG

## 2025-05-08 RX ORDER — LIDOCAINE HYDROCHLORIDE 10 MG/ML
0.5 INJECTION, SOLUTION INFILTRATION; PERINEURAL ONCE AS NEEDED
Status: DISCONTINUED | OUTPATIENT
Start: 2025-05-08 | End: 2025-05-08 | Stop reason: HOSPADM

## 2025-05-08 RX ORDER — HYDROCODONE BITARTRATE AND ACETAMINOPHEN 5; 325 MG/1; MG/1
1 TABLET ORAL EVERY 6 HOURS PRN
Qty: 16 TABLET | Refills: 0 | Status: SHIPPED | OUTPATIENT
Start: 2025-05-08

## 2025-05-08 RX ORDER — OXYCODONE AND ACETAMINOPHEN 7.5; 325 MG/1; MG/1
1 TABLET ORAL EVERY 4 HOURS PRN
Status: DISCONTINUED | OUTPATIENT
Start: 2025-05-08 | End: 2025-05-08 | Stop reason: HOSPADM

## 2025-05-08 RX ORDER — PROPOFOL 10 MG/ML
INJECTION, EMULSION INTRAVENOUS CONTINUOUS PRN
Status: DISCONTINUED | OUTPATIENT
Start: 2025-05-08 | End: 2025-05-08 | Stop reason: SURG

## 2025-05-08 RX ORDER — LIDOCAINE HYDROCHLORIDE 20 MG/ML
INJECTION, SOLUTION INFILTRATION; PERINEURAL AS NEEDED
Status: DISCONTINUED | OUTPATIENT
Start: 2025-05-08 | End: 2025-05-08 | Stop reason: SURG

## 2025-05-08 RX ORDER — PROMETHAZINE HYDROCHLORIDE 25 MG/1
25 SUPPOSITORY RECTAL ONCE AS NEEDED
Status: DISCONTINUED | OUTPATIENT
Start: 2025-05-08 | End: 2025-05-08 | Stop reason: HOSPADM

## 2025-05-08 RX ORDER — ATROPINE SULFATE 0.4 MG/ML
0.4 INJECTION, SOLUTION INTRAMUSCULAR; INTRAVENOUS; SUBCUTANEOUS ONCE AS NEEDED
Status: DISCONTINUED | OUTPATIENT
Start: 2025-05-08 | End: 2025-05-08 | Stop reason: HOSPADM

## 2025-05-08 RX ORDER — FLUMAZENIL 0.1 MG/ML
0.2 INJECTION INTRAVENOUS AS NEEDED
Status: DISCONTINUED | OUTPATIENT
Start: 2025-05-08 | End: 2025-05-08 | Stop reason: HOSPADM

## 2025-05-08 RX ORDER — HYDRALAZINE HYDROCHLORIDE 20 MG/ML
5 INJECTION INTRAMUSCULAR; INTRAVENOUS
Status: DISCONTINUED | OUTPATIENT
Start: 2025-05-08 | End: 2025-05-08 | Stop reason: HOSPADM

## 2025-05-08 RX ORDER — FAMOTIDINE 10 MG/ML
20 INJECTION, SOLUTION INTRAVENOUS ONCE
Status: COMPLETED | OUTPATIENT
Start: 2025-05-08 | End: 2025-05-08

## 2025-05-08 RX ORDER — FENTANYL CITRATE 50 UG/ML
50 INJECTION, SOLUTION INTRAMUSCULAR; INTRAVENOUS ONCE AS NEEDED
Status: COMPLETED | OUTPATIENT
Start: 2025-05-08 | End: 2025-05-08

## 2025-05-08 RX ADMIN — PROPOFOL 20 MG: 10 INJECTION, EMULSION INTRAVENOUS at 10:36

## 2025-05-08 RX ADMIN — FAMOTIDINE 20 MG: 10 INJECTION INTRAVENOUS at 08:17

## 2025-05-08 RX ADMIN — OXYCODONE AND ACETAMINOPHEN 1 TABLET: 7.5; 325 TABLET ORAL at 12:03

## 2025-05-08 RX ADMIN — PROPOFOL 30 MG: 10 INJECTION, EMULSION INTRAVENOUS at 10:54

## 2025-05-08 RX ADMIN — CLINDAMYCIN PHOSPHATE 900 MG: 900 INJECTION, SOLUTION INTRAVENOUS at 10:24

## 2025-05-08 RX ADMIN — HYDROMORPHONE HYDROCHLORIDE 0.5 MG: 1 INJECTION, SOLUTION INTRAMUSCULAR; INTRAVENOUS; SUBCUTANEOUS at 11:42

## 2025-05-08 RX ADMIN — SODIUM CHLORIDE: 9 INJECTION, SOLUTION INTRAVENOUS at 10:10

## 2025-05-08 RX ADMIN — FENTANYL CITRATE 50 MCG: 50 INJECTION, SOLUTION INTRAMUSCULAR; INTRAVENOUS at 08:17

## 2025-05-08 RX ADMIN — PROPOFOL 50 MG: 10 INJECTION, EMULSION INTRAVENOUS at 10:16

## 2025-05-08 RX ADMIN — LIDOCAINE HYDROCHLORIDE 80 MG: 20 INJECTION, SOLUTION INFILTRATION; PERINEURAL at 10:16

## 2025-05-08 RX ADMIN — INSULIN HUMAN 5 UNITS: 100 INJECTION, SOLUTION PARENTERAL at 08:21

## 2025-05-08 RX ADMIN — PROPOFOL 50 MCG/KG/MIN: 10 INJECTION, EMULSION INTRAVENOUS at 10:18

## 2025-05-08 NOTE — H&P
[facility]   PREOPERATIVE HISTORY AND PHYSICAL    Patient Name:Wilner Menjivar  : 1985  MRN: [id number]  Primary Care Physician: Roosevelt Thao MD  Date of admission: 2025    Subjective   Subjective     Chief Complaint: preoperative evaluation    History of Present Illness  Wilner Menjivar is a 40 y.o. male who presents for preoperative evaluation. He is scheduled for RIGHT SECOND RAY AMPUTATION (Right).  Patient has a history of a partial first ray amputation and has been receiving local wound care in my outpatient clinic.  He has had worsening drainage and fibrotic changes to the second digit and second metatarsal head exposure.  X-rays were taken in my office which shows osseous erosion consistent with osteomyelitis.  He is requiring a partial second ray amputation at this time.    Review of Systems     Personal History     Past Medical History:   Diagnosis Date    A-V fistula     LEFT ARM    Chronic systolic heart failure     [facility]    Coronary artery disease     Dialysis patient     ESRD (end stage renal disease)     GERD (gastroesophageal reflux disease)     H/O heart artery stent     X4    History of transfusion     Hypertension     Iron deficiency anemia     MI (myocardial infarction)     NON STEMI    Neuropathy     Osteomyelitis     PAD (peripheral artery disease)     Pneumonia     Seizure     X1 ONLY, NO MEDS.    Stroke 2018    Type 2 diabetes mellitus     Vision impairment        Past Surgical History:   Procedure Laterality Date    AMPUTATION DIGIT Right 2025    Procedure: Hallux amputation, right foot SESAMOIDECTOMY, INCISION AN DRAINAGE;  Surgeon: Trevon Garcia DPM;  Location: [facility];  Service: Podiatry;  Laterality: Right;    ARTERIOVENOUS FISTULA Left     BONE EXCISION LEG Right 2025    Procedure: Partial first metatarsal excision, right foot;  Surgeon: Trevon Garcia DPM;  Location: [facility];  Service: Podiatry;  Laterality:  Right;    CARDIAC CATHETERIZATION N/A 03/19/2025    Procedure: Left Heart Cath;  Surgeon: Carmen Erickson MD;  [facility];  Service: Cardiology;  Laterality: N/A;    CARDIAC CATHETERIZATION N/A 03/19/2025    Procedure: Coronary angiography;  Surgeon: Carmen Erickson MD;  Location:  [facility] CATH INVASIVE LOCATION;  Service: Cardiology;  Laterality: N/A;    CORONARY STENT PLACEMENT  2024    AND 2022?    EYE SURGERY      X2    INCISION AND DRAINAGE LEG Right 02/24/2025    Procedure: INCISION AND DRAINAGE LOWER EXTREMITY, PARTIAL BONE EXCISION;  Surgeon: Trevon Garcia DPM;  [facility];  Service: Podiatry;  Laterality: Right;    TUNNELED VENOUS CATHETER PLACEMENT N/A 02/28/2025    Procedure: INSERTION OF PETER CATHETER;  Surgeon: Lam Gomez MD;  [facility];  Service: Vascular;  Laterality: N/A;    WISDOM TOOTH EXTRACTION      WOUND DEBRIDEMENT Right 02/21/2025    Procedure: DEBRIDEMENT FOOT, RIGHT;  Surgeon: Trevon Garcia DPM;  Location: [facility] MAIN OR;  Service: Podiatry;  Laterality: Right;       Family History: His family history is not on file.     Social History: He  reports that he has never smoked. He has never been exposed to tobacco smoke. He has never used smokeless tobacco. He reports current alcohol use. He reports current drug use. Drug: Marijuana.    Home Medications:  EPINEPHrine, Insulin Degludec, Methoxy PEG-Epoetin Beta, aspirin, atorvastatin, bumetanide, carvedilol, cetirizine, clopidogrel, doxycycline, ferrous sulfate, hydrALAZINE, insulin aspart, isosorbide mononitrate, losartan, metOLazone, pantoprazole, polyethylene glycol, sertraline, and vitamin D3    Allergies:  He is allergic to azithromycin and penicillins.    Objective    Objective     Vitals:    Temp:  [98.4 °F (36.9 °C)-99.1 °F (37.3 °C)] 98.4 °F (36.9 °C)  Heart Rate:  [] 100  Resp:  [18] 18  BP: (121-124)/(73-77) 124/73    Physical Exam    Discoloration with  increased edema and swelling to the second digit.  There is exposed second metatarsal head that is dusky in appearance.  Minimal purulence expressed.  Incisions absent to the forefoot  DP and PT pulses are palpable    Assessment & Plan   Assessment / Plan     Brief Patient Summary:  Wilner Menjivar is a 40 y.o. male who presents for preoperative evaluation.    * No Diagnosis Codes entered *    Active Hospital Problems:  There are no active hospital problems to display for this patient.    Plan:   Procedure(s):  RIGHT SECOND RAY AMPUTATION    The risks, benefits, and alternatives of the procedure including but not limited to surgical site dehiscence, recurrent infection, nerve damage, excessive bleeding, loss of limb and risks of the anesthesia were discussed in detail with the patient and questions were answered. No guarantees were made or implied. Informed consent was obtained.    Trevon Garcia III, DPM

## 2025-05-08 NOTE — NURSING NOTE
Dr. Garcia called and states patient can resume Plavix  tomorrow. HARRIETT called Mitali, spouse and informed of the above at 1345.

## 2025-05-08 NOTE — NURSING NOTE
Patient and spouse, Mitali informed to call Dr. Garcia's office  to see when to resume Plavix.  Pt. And spouse verbalize understanding to  not resume Plavix until Dr. Garcia approves.

## 2025-05-08 NOTE — ANESTHESIA POSTPROCEDURE EVALUATION
Patient: Wilner Menjivar    Procedure Summary       Date: 05/08/25 Room / Location: [facility] OR [address] [place] [address] MAIN OR    Anesthesia Start: 1010 Anesthesia Stop: 1112    Procedure: RIGHT SECOND RAY AMPUTATION (Right) Diagnosis:     Surgeons: Trevon Garcia DPM Provider: Sherif Red MD    Anesthesia Type: MAC ASA Status: 4            Anesthesia Type: MAC    Vitals  Vitals Value Taken Time   /81 05/08/25 12:15   Temp 36.8 °C (98.2 °F) 05/08/25 11:10   Pulse 85 05/08/25 12:15   Resp 16 05/08/25 12:15   SpO2 100 % 05/08/25 12:15           Post Anesthesia Care and Evaluation    Level of consciousness: awake and alert  Pain management: adequate    Airway patency: patent  Anesthetic complications: No anesthetic complications  PONV Status: none  Cardiovascular status: acceptable  Respiratory status: acceptable  Hydration status: acceptable    Comments: /84 (BP Location: Right arm, Patient Position: Sitting)   Pulse 82   Temp 36.8 °C (98.2 °F) (Oral)   Resp 15   SpO2 99%

## 2025-05-08 NOTE — OP NOTE
Operative Report    Patient: Wilner Menjivar     Patient YOB: 1985     Date of Surgery: 05/08/25     MRN: [id number]       SURGEON: Trevon Garcia III, DPM     ASSISTANT: None    PROCEDURE: Partial second ray amputation, right foot  Ulcer debridement with graft application, right foot    PRE-OPERATIVE DIAGNOSIS: Osteomyelitis of second digit and second metatarsal head, right foot  Septic arthritis second MTPJ  Diabetic foot ulcer including muscle and tendon, right foot    POST-OPERATIVE DIAGNOSIS: Same    ANAESTHESIA: MAC    HEMOSTASIS: None    ESTIMATED BLOOD LOSS: 20 cc    SPECIMEN: Right second digit sent for microbiology  Second metatarsal head sent for pathology    IMPLANTS: Integra bilayer collagen graft 2 x 2     COMPLICATIONS: None    INDICATION FOR PROCEDURE: This is a 40-year-old male with attempted limb salvage for the right lower extremity.  He has been receiving local wound care weekly by myself.  He has developed osteomyelitis of his second ray and requiring surgical invention for about this time.  His wound base is healthy and we will also apply a graft.    Consent was obtained pre-operatively. All questions were answered, risks versus benefits were discussed at length. No guarantees or assurances were given or implied.    PROCEDURE: Patient was brought to the operating room and placed on the operative table in supine position. A surgical timeout was performed and then patients name, date of birth, procedure and surgical site were all verified. A local block of 10 cc 1% lidocaine plain was injected to the surgical site.  No tourniquet was used for this procedure.  The right lower extremity was then scrubbed, prepped and draped in the usual sterile manner.     Attention was directed to the right foot where a sharp incision was made along the medial aspect of the second digit.  This was deepened straight down the bone and there is noted to be nonviable tissue as well as necrosis and  fragmented bone consistent with osteomyelitis.  The second digit was disarticulated at the MTPJ and sent for micro biology evaluation.  Incision was then extended proximally along the shaft of the second metatarsal which was noted to have fragmented cartilage at the head consistent with osteomyelitis.  Sagittal saw was used to remove the distal 2 cm and sent for pathology evaluation.  All nonviable tissue was then removed with #15 blade and rongeur.  Skin edges were then cleaned and noted to be good healthy bleeding.  There is no purulence found.  The distal incision was then reapproximated with 2-0 nylon in simple and vertical mattress fashion.  There is good apposition of the wound edges noted which then approached the diabetic ulcer that was 4 cm x 4 cm.  The wound edges were freshened and curettage and debridement down to the level and including muscle.  There is exposed extensor tendons which were removed from the surgical site.  The Integra bilayer graft was then placed over the ulceration and sutured in place with 3-0 Monocryl.  There is noted to be good apposition of the graft onto the wound with full coverage.  After the procedure 10 cc of 0.5% Marcaine plain was injected the surgical site.  A bolster dressing with 4 x 4's and Adaptic with Steri-Strips was applied to the right foot graft.  A dry dressing of 4 x 4's Sobeida cast padding Coban is applied to right lower extremity.  Patient tolerated procedure and anesthesia well.    Trevon Garcia III, JESSICA

## 2025-05-08 NOTE — DISCHARGE INSTRUCTIONS
Leave dressing , keep clean, dry and intact.    Weight - bearing on Right heel as tolerated.    Advance diet as tolerated to pre-surgery diet.

## 2025-05-08 NOTE — ANESTHESIA PREPROCEDURE EVALUATION
Anesthesia Evaluation                  Airway   Mallampati: II  TM distance: >3 FB  Neck ROM: full  No difficulty expected  Dental - normal exam     Pulmonary    (-) rhonchi, decreased breath sounds, wheezes  Cardiovascular   Exercise tolerance: poor (<4 METS)    Rhythm: regular  Rate: normal    (+) hypertension poorly controlled, past MI , CAD, cardiac stents Drug eluting stent within the past 12 months , CHF (Last LVEF 22 % 3/19/25) Systolic <55%, PVD  (-) murmur      Neuro/Psych  (+) seizures (x1 4-5 yrs ago no longer on keppra), numbness  GI/Hepatic/Renal/Endo    (+) GERD well controlled, renal disease (last HD yesterday)- ESRD and dialysis, diabetes mellitus ( giving 5 units SQ insulin, has retinopathy neuropathy) type 2 poorly controlled using insulin    Musculoskeletal     Abdominal     Abdomen: soft.   Substance History      OB/GYN          Other                    Anesthesia Plan    ASA 4     MAC   total IV anesthesia  intravenous induction     Anesthetic plan, risks, benefits, and alternatives have been provided, discussed and informed consent has been obtained with: patient.    CODE STATUS:

## 2025-05-09 LAB — NIGHT BLUE STAIN TISS: NORMAL

## 2025-05-11 LAB — BACTERIA SPEC ANAEROBE CULT: NORMAL

## 2025-05-12 LAB
CYTO UR: NORMAL
LAB AP CASE REPORT: NORMAL
PATH REPORT.FINAL DX SPEC: NORMAL
PATH REPORT.GROSS SPEC: NORMAL

## 2025-05-13 LAB
BACTERIA SPEC AEROBE CULT: ABNORMAL
BACTERIA SPEC ANAEROBE CULT: NORMAL
GRAM STN SPEC: ABNORMAL

## 2025-05-15 LAB — FUNGUS WND CULT: NORMAL

## 2025-05-22 LAB
FUNGUS WND CULT: NORMAL
MYCOBACTERIUM SPEC CULT: NORMAL
NIGHT BLUE STAIN TISS: NORMAL

## 2025-05-29 LAB
FUNGUS WND CULT: NORMAL
MYCOBACTERIUM SPEC CULT: NORMAL
NIGHT BLUE STAIN TISS: NORMAL

## 2025-06-05 LAB
FUNGUS WND CULT: NORMAL
MYCOBACTERIUM SPEC CULT: NORMAL
NIGHT BLUE STAIN TISS: NORMAL

## 2025-06-12 LAB
MYCOBACTERIUM SPEC CULT: NORMAL
NIGHT BLUE STAIN TISS: NORMAL

## 2025-06-19 LAB
MYCOBACTERIUM SPEC CULT: NORMAL
NIGHT BLUE STAIN TISS: NORMAL

## 2025-08-11 ENCOUNTER — TELEPHONE (OUTPATIENT)
Age: 40
End: 2025-08-11
Payer: MEDICARE

## 2025-08-11 DIAGNOSIS — N18.6 END STAGE CHRONIC KIDNEY DISEASE: Primary | ICD-10-CM

## (undated) DEVICE — SKIN PREP TRAY 4 COMPARTM TRAY: Brand: MEDLINE INDUSTRIES, INC.

## (undated) DEVICE — DRESSING,GAUZE,XEROFORM,CURAD,1"X8",ST: Brand: CURAD

## (undated) DEVICE — SUT SILK 4/0 TIES 18IN A183H

## (undated) DEVICE — BANDAGE,GAUZE,CONFORMING,4"X75",STRL,LF: Brand: MEDLINE

## (undated) DEVICE — TR BAND RADIAL ARTERY COMPRESSION DEVICE: Brand: TR BAND

## (undated) DEVICE — GLV SURG SENSICARE PI MIC PF SZ7 LF STRL

## (undated) DEVICE — UNDERCAST PADDING: Brand: DEROYAL

## (undated) DEVICE — WEBRIL* CAST PADDING: Brand: DEROYAL

## (undated) DEVICE — FEMORAL ENTRY ANGIOGRAPHY SHIELD-YELLOW: Brand: RADPAD

## (undated) DEVICE — PK ORTHO MINOR TOWER 40

## (undated) DEVICE — GLV SURG BIOGEL LTX PF 7

## (undated) DEVICE — LOU MINOR PROCEDURE: Brand: MEDLINE INDUSTRIES, INC.

## (undated) DEVICE — BNDG ELAS CO-FLEX SLF ADHR 4IN5YD LF STRL

## (undated) DEVICE — APPL CHLORAPREP HI/LITE 26ML ORNG

## (undated) DEVICE — SUT ETHLN 2/0 PS 18IN 585H

## (undated) DEVICE — SPONGE,LAP,18"X18",DLX,XR,ST,5/PK,40/PK: Brand: MEDLINE

## (undated) DEVICE — SUT SILK 2/0 TIES 18IN A185H

## (undated) DEVICE — COVER,C-ARM,41X74: Brand: MEDLINE

## (undated) DEVICE — STOCKINETTE,IMPERVIOUS,12X48,STERILE: Brand: MEDLINE

## (undated) DEVICE — BNDG ESMARK STRL 6INX12FT LF

## (undated) DEVICE — CATH DIAG IMPULSE FR4 5F 100CM

## (undated) DEVICE — LOU PACE DEFIB: Brand: MEDLINE INDUSTRIES, INC.

## (undated) DEVICE — ANTIBACTERIAL UNDYED BRAIDED (POLYGLACTIN 910), SYNTHETIC ABSORBABLE SUTURE: Brand: COATED VICRYL

## (undated) DEVICE — CATH DIAG IMPULSE FL3.5 5F 100CM

## (undated) DEVICE — DIL W/HC 5F .038 20CM

## (undated) DEVICE — TRAP FLD MINIVAC MEGADYNE 100ML

## (undated) DEVICE — PENCL SMOKE/EVAC MEGADYNE TELESCP 10FT

## (undated) DEVICE — SPNG GZ WOVN 4X4IN 12PLY 10/BX STRL

## (undated) DEVICE — MAT FLR ABS LIQUILOC 28X56IN PNK

## (undated) DEVICE — STRIP,CLOSURE,WOUND,MEDI-STRIP,1/2X4: Brand: MEDLINE

## (undated) DEVICE — DGW .035 FC J3MM 260CM TEF: Brand: EMERALD

## (undated) DEVICE — GLV SURG SENSICARE PI LF PF 7.5 GRN STRL

## (undated) DEVICE — Device

## (undated) DEVICE — DRSNG WND GZ CURAD OIL EMULSION 3X3IN STRL

## (undated) DEVICE — EXOFIN PRECISION PEN HIGH VISCOSITY TOPICAL SKIN ADHESIVE: Brand: EXOFIN PRECISION PEN, 1G

## (undated) DEVICE — BIOPATCH™ ANTIMICROBIAL DRESSING WITH CHLORHEXIDINE GLUCONATE IS A HYDROPHILLIC POLYURETHANE ABSORPTIVE FOAM WITH CHLORHEXIDINE GLUCONATE (CHG) WHICH INHIBITS BACTERIAL GROWTH UNDER THE DRESSING. THE DRESSING IS INTENDED TO BE USED TO ABSORB EXUDATE, COVER A WOUND CAUSED BY VASCULAR AND NONVASCULAR PERCUTANEOUS MEDICAL DEVICES DURING SURGERY, AS WELL AS REDUCE LOCAL INFECTION AND COLONIZATION OF MICROORGANISMS.: Brand: BIOPATCH

## (undated) DEVICE — ST. SORBAVIEW ULTIMATE IJ SYSTEM A,C: Brand: CENTURION

## (undated) DEVICE — CVR PROB 96IN LF STRL

## (undated) DEVICE — PRECISION THIN (9.0 X 0.38 X 25.0MM)

## (undated) DEVICE — SUT ETHLN 3/0 PS1 18IN 1663H

## (undated) DEVICE — DIL VESL 7F.038 20CM

## (undated) DEVICE — GOWN,SIRUS,NONRNF,SETINSLV,XL,20/CS: Brand: MEDLINE

## (undated) DEVICE — SYR LL TP 10ML STRL

## (undated) DEVICE — GLIDESHEATH SLENDER STAINLESS STEEL KIT: Brand: GLIDESHEATH SLENDER

## (undated) DEVICE — KT MANIFLD CARDIAC

## (undated) DEVICE — SYR LUERLOK 20CC BX/50

## (undated) DEVICE — DRAPE,U/ SHT,SPLIT,PLAS,STERIL: Brand: MEDLINE

## (undated) DEVICE — STRIP PACKING W IODOFORM 1/4

## (undated) DEVICE — INTENDED FOR TISSUE SEPARATION, AND OTHER PROCEDURES THAT REQUIRE A SHARP SURGICAL BLADE TO PUNCTURE OR CUT.: Brand: BARD-PARKER ® CARBON RIB-BACK BLADES

## (undated) DEVICE — DECANTER BAG 9": Brand: MEDLINE INDUSTRIES, INC.

## (undated) DEVICE — NDL HYPO PRECISIONGLIDE REG 25G 1 1/2

## (undated) DEVICE — PK CATH CARD 40

## (undated) DEVICE — SUT VIC 2/0 CT2 27IN J269H